# Patient Record
Sex: FEMALE | Race: WHITE | HISPANIC OR LATINO | Employment: FULL TIME | URBAN - METROPOLITAN AREA
[De-identification: names, ages, dates, MRNs, and addresses within clinical notes are randomized per-mention and may not be internally consistent; named-entity substitution may affect disease eponyms.]

---

## 2017-01-23 ENCOUNTER — TRANSFERRED RECORDS (OUTPATIENT)
Dept: HEALTH INFORMATION MANAGEMENT | Facility: CLINIC | Age: 37
End: 2017-01-23

## 2017-09-15 ENCOUNTER — TRANSFERRED RECORDS (OUTPATIENT)
Dept: HEALTH INFORMATION MANAGEMENT | Facility: CLINIC | Age: 37
End: 2017-09-15

## 2017-12-27 ENCOUNTER — TRANSFERRED RECORDS (OUTPATIENT)
Dept: HEALTH INFORMATION MANAGEMENT | Facility: CLINIC | Age: 37
End: 2017-12-27

## 2017-12-27 LAB
CHOLEST SERPL-MCNC: 189 MG/DL (ref 100–199)
CREAT SERPL-MCNC: 0.68 MG/DL (ref 0.57–1.11)
GFR SERPL CREATININE-BSD FRML MDRD: >60 ML/MIN/1.73M2
GLUCOSE SERPL-MCNC: 110 MG/DL (ref 65–100)
HDLC SERPL-MCNC: 44 MG/DL
LDLC SERPL CALC-MCNC: 92 MG/DL
NONHDLC SERPL-MCNC: 145 MG/DL
POTASSIUM SERPL-SCNC: 4.3 MMOL/L (ref 3.5–5)
TRIGL SERPL-MCNC: 264 MG/DL

## 2018-04-18 ENCOUNTER — TRANSFERRED RECORDS (OUTPATIENT)
Dept: HEALTH INFORMATION MANAGEMENT | Facility: CLINIC | Age: 38
End: 2018-04-18

## 2018-06-25 ENCOUNTER — TRANSFERRED RECORDS (OUTPATIENT)
Dept: HEALTH INFORMATION MANAGEMENT | Facility: CLINIC | Age: 38
End: 2018-06-25

## 2019-05-06 ENCOUNTER — HOSPITAL ENCOUNTER (OUTPATIENT)
Facility: CLINIC | Age: 39
Setting detail: OBSERVATION
Discharge: HOME | End: 2019-05-07
Attending: EMERGENCY MEDICINE | Admitting: HOSPITALIST
Payer: COMMERCIAL

## 2019-05-06 ENCOUNTER — APPOINTMENT (OUTPATIENT)
Dept: GENERAL RADIOLOGY | Facility: CLINIC | Age: 39
End: 2019-05-06
Attending: EMERGENCY MEDICINE
Payer: COMMERCIAL

## 2019-05-06 ENCOUNTER — APPOINTMENT (OUTPATIENT)
Dept: ULTRASOUND IMAGING | Facility: CLINIC | Age: 39
End: 2019-05-06
Attending: EMERGENCY MEDICINE
Payer: COMMERCIAL

## 2019-05-06 DIAGNOSIS — N92.0 MENORRHAGIA WITH REGULAR CYCLE: ICD-10-CM

## 2019-05-06 DIAGNOSIS — K59.00 CONSTIPATION, UNSPECIFIED CONSTIPATION TYPE: Primary | ICD-10-CM

## 2019-05-06 DIAGNOSIS — D50.0 IRON DEFICIENCY ANEMIA DUE TO CHRONIC BLOOD LOSS: ICD-10-CM

## 2019-05-06 PROBLEM — D64.9 SYMPTOMATIC ANEMIA: Status: ACTIVE | Noted: 2019-05-06

## 2019-05-06 LAB
ABO + RH BLD: NORMAL
ABO + RH BLD: NORMAL
ANION GAP SERPL CALCULATED.3IONS-SCNC: 5 MMOL/L (ref 3–14)
ANISOCYTOSIS BLD QL SMEAR: NORMAL
BLD GP AB SCN SERPL QL: NORMAL
BLD PROD TYP BPU: NORMAL
BLOOD BANK CMNT PATIENT-IMP: NORMAL
BUN SERPL-MCNC: 10 MG/DL (ref 7–30)
CALCIUM SERPL-MCNC: 9.5 MG/DL (ref 8.5–10.1)
CHLORIDE SERPL-SCNC: 113 MMOL/L (ref 94–109)
CO2 SERPL-SCNC: 21 MMOL/L (ref 20–32)
CREAT SERPL-MCNC: 0.46 MG/DL (ref 0.52–1.04)
DIFFERENTIAL METHOD BLD: NORMAL
ERYTHROCYTE [DISTWIDTH] IN BLOOD BY AUTOMATED COUNT: 18.6 % (ref 10–15)
FERRITIN SERPL-MCNC: 3 NG/ML (ref 12–150)
FOLATE SERPL-MCNC: 10.6 NG/ML
GFR SERPL CREATININE-BSD FRML MDRD: >90 ML/MIN/{1.73_M2}
GLUCOSE SERPL-MCNC: 97 MG/DL (ref 70–99)
HCG SERPL QL: NEGATIVE
HCT VFR BLD AUTO: 18.4 % (ref 35–47)
HGB BLD-MCNC: 4.7 G/DL (ref 11.7–15.7)
HGB BLD-MCNC: 5.4 G/DL (ref 11.7–15.7)
HGB BLD-MCNC: 6.7 G/DL (ref 11.7–15.7)
HYPOCHROMIA BLD QL: PRESENT
INTERPRETATION ECG - MUSE: NORMAL
INTERPRETATION ECG - MUSE: NORMAL
IRON SATN MFR SERPL: 2 % (ref 15–46)
IRON SERPL-MCNC: 10 UG/DL (ref 35–180)
LDH SERPL L TO P-CCNC: 164 U/L (ref 81–234)
MCH RBC QN AUTO: 15.6 PG (ref 26.5–33)
MCHC RBC AUTO-ENTMCNC: 25.5 G/DL (ref 31.5–36.5)
MCV RBC AUTO: 61 FL (ref 78–100)
MICROCYTES BLD QL SMEAR: PRESENT
NUM BPU REQUESTED: 3
PLATELET # BLD AUTO: 358 10E9/L (ref 150–450)
PLATELET # BLD EST: NORMAL 10*3/UL
POTASSIUM SERPL-SCNC: 3.7 MMOL/L (ref 3.4–5.3)
RBC # BLD AUTO: 3.02 10E12/L (ref 3.8–5.2)
RETICS # AUTO: 51.8 10E9/L (ref 25–95)
RETICS/RBC NFR AUTO: 1.8 % (ref 0.5–2)
SODIUM SERPL-SCNC: 139 MMOL/L (ref 133–144)
SPECIMEN EXP DATE BLD: NORMAL
TIBC SERPL-MCNC: 509 UG/DL (ref 240–430)
TROPONIN I SERPL-MCNC: <0.015 UG/L (ref 0–0.04)
VIT B12 SERPL-MCNC: 516 PG/ML (ref 193–986)
WBC # BLD AUTO: 7.7 10E9/L (ref 4–11)

## 2019-05-06 PROCEDURE — P9016 RBC LEUKOCYTES REDUCED: HCPCS | Performed by: EMERGENCY MEDICINE

## 2019-05-06 PROCEDURE — 71046 X-RAY EXAM CHEST 2 VIEWS: CPT

## 2019-05-06 PROCEDURE — 83550 IRON BINDING TEST: CPT | Performed by: EMERGENCY MEDICINE

## 2019-05-06 PROCEDURE — 86923 COMPATIBILITY TEST ELECTRIC: CPT | Performed by: EMERGENCY MEDICINE

## 2019-05-06 PROCEDURE — 86901 BLOOD TYPING SEROLOGIC RH(D): CPT | Performed by: EMERGENCY MEDICINE

## 2019-05-06 PROCEDURE — 85045 AUTOMATED RETICULOCYTE COUNT: CPT | Performed by: EMERGENCY MEDICINE

## 2019-05-06 PROCEDURE — 76830 TRANSVAGINAL US NON-OB: CPT

## 2019-05-06 PROCEDURE — 86900 BLOOD TYPING SEROLOGIC ABO: CPT | Performed by: EMERGENCY MEDICINE

## 2019-05-06 PROCEDURE — 93005 ELECTROCARDIOGRAM TRACING: CPT

## 2019-05-06 PROCEDURE — 83010 ASSAY OF HAPTOGLOBIN QUANT: CPT | Performed by: EMERGENCY MEDICINE

## 2019-05-06 PROCEDURE — 85027 COMPLETE CBC AUTOMATED: CPT | Performed by: EMERGENCY MEDICINE

## 2019-05-06 PROCEDURE — 99285 EMERGENCY DEPT VISIT HI MDM: CPT | Mod: 25

## 2019-05-06 PROCEDURE — 86850 RBC ANTIBODY SCREEN: CPT | Performed by: EMERGENCY MEDICINE

## 2019-05-06 PROCEDURE — 84703 CHORIONIC GONADOTROPIN ASSAY: CPT | Performed by: EMERGENCY MEDICINE

## 2019-05-06 PROCEDURE — 80048 BASIC METABOLIC PNL TOTAL CA: CPT | Performed by: EMERGENCY MEDICINE

## 2019-05-06 PROCEDURE — 36430 TRANSFUSION BLD/BLD COMPNT: CPT

## 2019-05-06 PROCEDURE — 36415 COLL VENOUS BLD VENIPUNCTURE: CPT | Performed by: PHYSICIAN ASSISTANT

## 2019-05-06 PROCEDURE — G0378 HOSPITAL OBSERVATION PER HR: HCPCS

## 2019-05-06 PROCEDURE — 82746 ASSAY OF FOLIC ACID SERUM: CPT | Performed by: EMERGENCY MEDICINE

## 2019-05-06 PROCEDURE — 82607 VITAMIN B-12: CPT | Performed by: EMERGENCY MEDICINE

## 2019-05-06 PROCEDURE — 83540 ASSAY OF IRON: CPT | Performed by: EMERGENCY MEDICINE

## 2019-05-06 PROCEDURE — 82728 ASSAY OF FERRITIN: CPT | Performed by: EMERGENCY MEDICINE

## 2019-05-06 PROCEDURE — 85018 HEMOGLOBIN: CPT | Mod: 91 | Performed by: PHYSICIAN ASSISTANT

## 2019-05-06 PROCEDURE — 84484 ASSAY OF TROPONIN QUANT: CPT | Performed by: EMERGENCY MEDICINE

## 2019-05-06 PROCEDURE — 83615 LACTATE (LD) (LDH) ENZYME: CPT | Performed by: EMERGENCY MEDICINE

## 2019-05-06 PROCEDURE — 25000132 ZZH RX MED GY IP 250 OP 250 PS 637: Performed by: PHYSICIAN ASSISTANT

## 2019-05-06 PROCEDURE — 99220 ZZC INITIAL OBSERVATION CARE,LEVL III: CPT | Performed by: PHYSICIAN ASSISTANT

## 2019-05-06 PROCEDURE — 85004 AUTOMATED DIFF WBC COUNT: CPT | Performed by: EMERGENCY MEDICINE

## 2019-05-06 RX ORDER — NALOXONE HYDROCHLORIDE 0.4 MG/ML
.1-.4 INJECTION, SOLUTION INTRAMUSCULAR; INTRAVENOUS; SUBCUTANEOUS
Status: DISCONTINUED | OUTPATIENT
Start: 2019-05-06 | End: 2019-05-07 | Stop reason: HOSPADM

## 2019-05-06 RX ORDER — ACETAMINOPHEN 650 MG/1
650 SUPPOSITORY RECTAL EVERY 4 HOURS PRN
Status: DISCONTINUED | OUTPATIENT
Start: 2019-05-06 | End: 2019-05-07 | Stop reason: HOSPADM

## 2019-05-06 RX ORDER — ONDANSETRON 2 MG/ML
4 INJECTION INTRAMUSCULAR; INTRAVENOUS EVERY 6 HOURS PRN
Status: DISCONTINUED | OUTPATIENT
Start: 2019-05-06 | End: 2019-05-07 | Stop reason: HOSPADM

## 2019-05-06 RX ORDER — AMOXICILLIN 250 MG
1 CAPSULE ORAL 2 TIMES DAILY PRN
Status: DISCONTINUED | OUTPATIENT
Start: 2019-05-06 | End: 2019-05-07 | Stop reason: HOSPADM

## 2019-05-06 RX ORDER — ACETAMINOPHEN 325 MG/1
650 TABLET ORAL EVERY 4 HOURS PRN
Status: DISCONTINUED | OUTPATIENT
Start: 2019-05-06 | End: 2019-05-07 | Stop reason: HOSPADM

## 2019-05-06 RX ORDER — AMOXICILLIN 250 MG
2 CAPSULE ORAL 2 TIMES DAILY PRN
Status: DISCONTINUED | OUTPATIENT
Start: 2019-05-06 | End: 2019-05-07 | Stop reason: HOSPADM

## 2019-05-06 RX ORDER — ONDANSETRON 4 MG/1
4 TABLET, ORALLY DISINTEGRATING ORAL EVERY 6 HOURS PRN
Status: DISCONTINUED | OUTPATIENT
Start: 2019-05-06 | End: 2019-05-07 | Stop reason: HOSPADM

## 2019-05-06 RX ORDER — IBUPROFEN 200 MG
400 TABLET ORAL DAILY PRN
COMMUNITY
End: 2020-07-06 | Stop reason: ALTCHOICE

## 2019-05-06 RX ORDER — PROCHLORPERAZINE 25 MG
25 SUPPOSITORY, RECTAL RECTAL EVERY 12 HOURS PRN
Status: DISCONTINUED | OUTPATIENT
Start: 2019-05-06 | End: 2019-05-07 | Stop reason: HOSPADM

## 2019-05-06 RX ORDER — PROCHLORPERAZINE MALEATE 10 MG
10 TABLET ORAL EVERY 6 HOURS PRN
Status: DISCONTINUED | OUTPATIENT
Start: 2019-05-06 | End: 2019-05-07 | Stop reason: HOSPADM

## 2019-05-06 RX ADMIN — ACETAMINOPHEN 650 MG: 325 TABLET, FILM COATED ORAL at 21:00

## 2019-05-06 ASSESSMENT — ENCOUNTER SYMPTOMS
FEVER: 0
SHORTNESS OF BREATH: 1
COUGH: 1
NECK PAIN: 1
RHINORRHEA: 0

## 2019-05-06 NOTE — PLAN OF CARE
ROOM # 231    Living Situation  Lives independently in the community with spouse and two children  Facility name:  [facility]  : Cj Chapa,  [phone] (H), cell:  [phone]    Activity level at baseline: indep  Activity level on admit: indep      Patient registered to observation; given Patient Bill of Rights; given the opportunity to ask questions about observation status and their plan of care.  Patient has been oriented to the observation room, bathroom and call light is in place.    Discussed discharge goals and expectations with patient/family.

## 2019-05-06 NOTE — ED NOTES
[facility]  ED Nurse Handoff Report    Radha Bryan is a 38 year old female   ED Chief complaint: Chest Pain and Shortness of Breath  . ED Diagnosis:   Final diagnoses:   Iron deficiency anemia due to chronic blood loss   Menorrhagia with regular cycle     Allergies: No Known Allergies    Code Status: Full Code  Activity level - Baseline/Home:  Independent. Activity Level - Current:   Independent. Lift room needed: No. Bariatric: No   Needed: No   Isolation: No. Infection: Not Applicable.     Vital Signs:   Vitals:    05/06/19 1136 05/06/19 1341 05/06/19 1344   BP: 130/75  116/69   Pulse:   90   Resp: 20  18   Temp: 98  F (36.7  C) 97.8  F (36.6  C) 97.8  F (36.6  C)   TempSrc: Oral Oral    SpO2: 99%         Cardiac Rhythm:  ,      Pain level:    Patient confused: No. Patient Falls Risk: Yes.   Elimination Status: Has voided   Patient Report - Initial Complaint: Shortness of breath. Focused Assessment: Patient here with one week of feeling short of breath especially with activity.  Also reports pressure sensation in her neck.  Patient has a history of anemia secondary to heavy menstrual cycles, reports her period lasts for 3 weeks and she uses an entire box of pads and tampons during that time.   Tests Performed:   Labs Ordered and Resulted from Time of ED Arrival Up to the Time of Departure from the ED   CBC WITH PLATELETS - Abnormal; Notable for the following components:       Result Value    RBC Count 3.02 (*)     Hemoglobin 4.7 (*)     Hematocrit 18.4 (*)     MCV 61 (*)     MCH 15.6 (*)     MCHC 25.5 (*)     RDW 18.6 (*)     All other components within normal limits   BASIC METABOLIC PANEL - Abnormal; Notable for the following components:    Chloride 113 (*)     Creatinine 0.46 (*)     All other components within normal limits   TROPONIN I   HCG QUALITATIVE   DIFFERENTIAL   IRON AND IRON BINDING CAPACITY   FERRITIN   HAPTOGLOBIN   FOLATE   LACTATE DEHYDROGENASE   RETICULOCYTE COUNT    VITAMIN B12   PERIPHERAL IV CATHETER   CARDIAC CONTINUOUS MONITORING   ABO/RH TYPE AND SCREEN   RED BLOOD CELL PREPARE ORDER UNIT   BLOOD COMPONENT   BLOOD COMPONENT     . Abnormal Results: Hgb 4.7.   Chest XR,  PA & LAT   Final Result   IMPRESSION: Negative.      ULISES CÁRDENAS MD      US Pelvic Complete w Transvaginal    (Results Pending)       Treatments provided: Transfuse  PRBC  Family Comments: Patient has been talking to family on the phone.  OBS brochure/video discussed/provided to patient:  N/A  ED Medications: Medications - No data to display  Drips infusing:  No  For the majority of the shift, the patient's behavior Green. Interventions performed were None.     Severe Sepsis OR Septic Shock Diagnosis Present: No      ED Nurse Name/Phone Number: Ana María Martinez,   1:52 PM  RECEIVING UNIT ED HANDOFF REVIEW    Above ED Nurse Handoff Report was reviewed: Yes  Reviewed by: Karen M. Lesch on May 6, 2019 at 3:13 PM

## 2019-05-06 NOTE — LETTER
May 7, 2019      Radha Bryan  [address] [address]        To Whom It May Concern:    Radha Bryan was seen on 5/7-5/8/19.  Please excuse her until 5/9/19 due to illness.        Sincerely,        Edie Cunha PA-C

## 2019-05-06 NOTE — ED PROVIDER NOTES
History     Chief Complaint:  Chest Pain and Shortness of Breath     HPI   Radha Bryan is a 38 year old female with a history of anemia who presents to the Emergency Department today for evaluation of chest pain and shortness of breath. Patient reports she has had a dry nonproductive cough for the last month. She has become progressively more short of breath and now cannot walk from her bed to the door of her room without feeling short of breath. She reports a chest pressure which increases when she feels more short of breath. The chest discomfort is intermittent and non-radiating. She has no active chest pain. For the last three days patient has had intermittent neck pain. Her legs feel bilaterally swollen. She is pale. No fever or runny nose. Patient is not coughing up blood. Her last period was two weeks ago. Patient reports her anemia is due to heavy periods and that she typically uses 60 tampons and 30 pads in a week for her typical period.     CARDIAC RISK FACTORS:  Sex:    F  Tobacco:   N  Hypertension:   N  Hyperlipidemia:  N  Diabetes:   N  Family History:  N    PE/DVT RISK FACTORS:  Sex:    F  Hormones:   N  Tobacco:   N  Cancer:   N  Travel:   N  Surgery:   N  Other immobilization: N  Personal history:  N  Family history:  N    Allergies:  No known drug allergies    Medications:    The patient is not currently taking any prescribed medications.    Past Medical History:    Anemia  Irregular periods  Uterine fibroids    Past Surgical History:    Cholecystectomy  Tubal ligation     Family History:    History reviewed. No pertinent family history.     Social History:  Smoking status: Never smoker  Alcohol use: No  Marital Status:  Single [1]     Review of Systems   Constitutional: Negative for fever.   HENT: Negative for rhinorrhea.    Respiratory: Positive for cough and shortness of breath.    Cardiovascular: Positive for chest pain and leg swelling.   Musculoskeletal: Positive for neck pain.   Skin:  Positive for pallor.   All other systems reviewed and are negative.      Physical Exam     Patient Vitals for the past 24 hrs:   BP Temp Temp src Pulse Heart Rate Resp SpO2   05/06/19 1500 115/70 97.8  F (36.6  C) -- 78 -- 16 --   05/06/19 1415 123/79 97.9  F (36.6  C) Oral 78 77 16 99 %   05/06/19 1344 116/69 97.8  F (36.6  C) -- 90 -- 18 --   05/06/19 1341 -- 97.8  F (36.6  C) Oral -- -- -- --   05/06/19 1136 130/75 98  F (36.7  C) Oral -- 81 20 99 %       Physical Exam    General:   Pleasant, age appropriate.  HEENT:    Oropharynx is moist, without lesions or trismus.  Eyes:    Conjunctival pallor  Neck:    Supple, no meningismus.     CV:     Regular rate and rhythm.      No murmurs, rubs or gallops.       No unilateral leg swelling.       2+ radial pulses bilateral.       No lower extremity edema.  PULM:    Clear to auscultation bilateral.       No respiratory distress.      Good air exchange.     No rales or wheezing.  ABD:    Soft, non-tender, non-distended.       No pulsatile masses.       No rebound, guarding or rigidity.  :    Normal external genitalia.     No perineal lesions.     No blood in the in the vaginal vault.     No clots present.     No visualized tissue  MSK:     No gross deformity to all four extremities.   LYMPH:   No cervical lymphadenopathy.  NEURO:   Alert. Good muscle tone, no atrophy.  Skin:    Warm, dry and intact.    Psych:    Mood is good and affect is appropriate.        Emergency Department Course     ECG (12:31:06):  Rate 82 bpm. WV interval 136. QRS duration 80. QT/QTc 372/434. P-R-T axes 20 49 27. Normal sinus rhythm. Normal ECG. Interpreted at 1237 by Tani Houston MD.    Imaging:  Radiographic findings were communicated with the patient who voiced understanding of the findings.  Chest XR, PA & LAT  IMPRESSION: Negative As read by radiology.  US Pelvic Complete w Transvaginal   Pending    Laboratory:  CBC: HGB 4.7 (L) o/w WNL (WBC 7.7, )  WBC differential:  WNL  BMP: Chloride 113 (H), Creatinine 0.46 (L) o/w WNL  HCG: Negative    Troponin I: <0.015    Lactate dehydrogenase: 164    Ferritin: 3 (L)  Iron and iron binding capacity: Iron 10 (L), Iron binding capacity 509 (H), Iron saturation 2 (L)  Haptoglobin: In process  Folate: In process    Reticulocyte count: WNL    Vitamin B12: In process    ABO/Rh type and screen: A positive    Emergency Department Course:  Past medical records, nursing notes, and vitals reviewed.  1229: I performed an exam of the patient and obtained history, as documented above.    IV inserted and blood drawn.    The patient was sent for a chest x-ray while in the emergency department, findings above.    1329: I rechecked the patient. Explained findings to the patient.    The patient was sent for a complete pelvic ultrasound while in the emergency department, findings above.    Findings and plan explained to the Patient who consents to admission.     1347: Discussed the patient with Dr. Mendez, who will admit the patient to a obs bed for further monitoring, evaluation, and treatment.      1401: I performed a chaperoned pelvic exam of the patient.    Impression & Plan      Medical Decision Makin-year-old female presents the ED with progressively worsening dyspnea and associated chest pain.  EKG reveals no ischemic changes and troponin is within normal limits.  No features concerning for pulmonary embolus, aortic dissection or aortic aneurysm.  Unfortunately she has marked chronic iron deficiency anemia with a hemoglobin of 4.7 likely related to profound menorrhagia.  Pelvic examination unremarkable.  Pelvic ultrasound pending but does have a reported history of uterine fibroids.  Patient transfused with 2 units of packed red blood cells in the ED but may require additional transfusions thus she will be transferred to an observation bed.  Patient safe for transfer the floor.    Diagnosis:    ICD-10-CM   1. Iron deficiency anemia due to  chronic blood loss D50.0                       2. Menorrhagia with regular cycle N92.0     Disposition:  Admitted     Margi Amezcua  5/6/2019   [facility] EMERGENCY DEPARTMENT  Scribe Disclosure:  I, Margi Amezcua, am serving as a scribe at 12:29 PM on 5/6/2019 to document services personally performed by Tani Houston MD based on my observations and the provider's statements to me.        Tani Houston MD  05/07/19 1319

## 2019-05-06 NOTE — ED TRIAGE NOTES
Chest pain and shortness of breath for the past month. States noticing it is getting worse. Also states her mouth is dry.     Patient is alert and oriented times four and in no apparent distress. Skin is warm and dry. Respirations are even and easy. She does have an intermittent dry cough.

## 2019-05-06 NOTE — LETTER
May 7, 2019      Radha Bryan  [address] [address] [facility] [address]        To Whom It May Concern:    Radha Bryan was seen on 5/6-5/7/19.  Please excuse her until 5/8/19 due to illness.        Sincerely,        Edie Cunha Pa-C

## 2019-05-06 NOTE — PHARMACY-ADMISSION MEDICATION HISTORY
Admission medication history interview status for this patient is complete. See [facility] admission navigator for allergy information, prior to admission medications and immunization status.     Medication history interview source(s):Patient  Medication history resources (including written lists, pill bottles, clinic record):None  Primary pharmacy:none    Changes made to PTA medication list:  Added: ibuprofen prn  Deleted: none  Changed: none    Actions taken by pharmacist (provider contacted, etc):None     Additional medication history information:None    Medication reconciliation/reorder completed by provider prior to medication history? No    Do you take OTC medications (eg tylenol, ibuprofen, fish oil, eye/ear drops, etc)? Y(Y/N)    For patients on insulin therapy: N (Y/N)  Lantus/levemir/NPH/Mix 70/30 dose:   (Y/N) (see Med list for doses)   Sliding scale Novolog Y/N  If Yes, do you have a baseline novolog pre-meal dose:  units with meals  Patients eat three meals a day:   Y/N    How many episodes of hypoglycemia do you have per week: _______  How many missed doses do you have per week: ______  How many times do you check your blood glucose per day: _______  Do you have a Continuous glucose monitor (CGM)   Y/N (remind pt that not approved for hospital use)   Any Barriers to therapy - Be specific :  cost of medications, comfortable with giving injections (if applicable), comfortable and confident with current diabetes regimen: Y/N ______________      Prior to Admission medications    Medication Sig Last Dose Taking? Auth Provider   ibuprofen (ADVIL/MOTRIN) 200 MG tablet Take 400 mg by mouth daily as needed for mild pain  Yes Unknown, Entered By History

## 2019-05-06 NOTE — H&P
[facility]    Observation Unit  Hospitalist History and Physical    Name: Radha Bryan    MRN: [id number]  YOB: 1980    Age: 38 year old  Date of Admission:  5/6/2019  Date of Service (when I saw the patient): 05/06/19    Assessment & Plan   Radha Bryan is a 38 year old female with PMH significant for iron deficiency anemia 2/2 menorrhagia who presents to the ED on 5/6/19 for evaluation of chest pain and shortness of breath. ED workup reveals VSS, chloride of 113, ferritin of 3, iron of 10, iron binding capacity of 509, iron saturation index of 2, negative troponin, Hgb of 4.7, hematocrit of 18.4, type and screen performed, EKG showed rate of 82 bpm in NSR, CXR negative, and ultrasound of pelvis shows focal area of hyperechogenicity within the endometrium measures 1.7x1cm and is indeterminate, possible endometrial polyp, intramural fibroid within uterine body that measures 3 cm, and 4.9 cm slightly complex cystic lesion containing septation in the left ovary.      #Symptomatic iron deficiency anemia: ongoing shortness of breath with centralized chest pressure on exertion, increased fatigue, lightheadedness/dizziness for the past month that has been worse the last few days with Hgb of 4.7 today. Suspect 2/2 menorrhagia. LMP 2 weeks ago. Previously instructed to take iron supplementation but has been noncompliant due to this causing constipation. Iron studies today consistent with iron deficiency anemia.   - Transfuse 2 units of PRBCs, started in the ED  - Conditional 1 unit of PRBCs after recheck if Hgb <7.0  - Recheck CBC in AM  - Monitor on telemetry  - Start iron supplement upon discharge  - Recommend outpatient follow up with gynecology for additional management of menorrhagia     #Abnormal pelvis ultrasound: ultrasound of pelvis shows focal area of hyperechogenicity within the endometrium measures 1.7x1cm and is indeterminate, possible endometrial polyp, intramural fibroid  within uterine body that measures 3 cm, and 4.9 cm slightly complex cystic lesion containing septation in the left ovary. Will review these findings with on call gynecologist for recommendations on management and follow up prior to discharge.     DVT Prophylaxis: Ambulate every shift  Code Status: Full Code, discussed with patient   Disposition: Expected discharge tomorrow once hemoglobin stable, admit to observation     Primary Care Physician   [facility]    Chief Complaint   Chest pain and shortness of breath    History obtained from discussion with ED provider, chart review, and interview with patient.     History of Present Illness   Radha Bryan is a 38 year old female who presents with central chest pain and shortness of breath for the past month.  The patient states that she has become aggressively more short of breath especially with exertion since the onset of her symptoms.  Patient states that she can barely walk from her bed to the door of her room without feeling out of breath.  The patient's chest pressure seems to be associated with her increased shortness of breath and denies associated diaphoresis or radiation.  She notes a dry cough for the last 2 days without congestion or wheezing.  She notes feeling lightheaded and dizzy all the time, especially with standing.  She notes intermittent lower abdominal pain that has been associated with her menstrual cycle.  She was recently told by her mother over the last day that she appears pale.  She notes feeling generally weak and fatigued. Denies recent fever, chills, nausea, vomiting, or diarrhea.  Denies presence of blood in stools or urine.  She has not previously had a colonoscopy.  Denies increased use of NSAIDs.  The patient states that her last menstrual cycle was about 2 weeks ago.  Her periods last about 2 weeks in duration and are extremely heavy with the patient reporting using 3-4 super tampons with a pad each time per day.  The  patient states that she has never been hospitalized previously for anemia.  She has been instructed in the past to be taking iron due to her heavy menstrual cycles but has not been doing so due to this causing constipation.  Patient has previously discussed her heavy periods with her gynecologist and believe she had an ultrasound about 2 years ago that showed fibroids but at that time her provider had no recommendations for management per the patient's understanding.    Past Medical History    Past Medical History:   Diagnosis Date     Irregular periods/menstrual cycles      Past Surgical History   Past Surgical History:   Procedure Laterality Date     CHOLECYSTECTOMY       LAPAROSCOPIC TUBAL LIGATION       Prior to Admission Medications   Prior to Admission Medications   Prescriptions Last Dose Informant Patient Reported? Taking?   ibuprofen (ADVIL/MOTRIN) 200 MG tablet   Yes Yes   Sig: Take 400 mg by mouth daily as needed for mild pain      Facility-Administered Medications: None     Allergies   No Known Allergies    Social History   Social History     Tobacco Use     Smoking status: Never Smoker   Substance Use Topics     Alcohol use: No     Social History     Social History Narrative     Not on file     Family History   Family history reviewed with patient and is noncontributory.    Review of Systems   A Comprehensive greater than 10 system review of systems was carried out.  Pertinent positives and negatives are noted above.  Otherwise negative for contributory information.    Physical Exam   Temp: 97.8  F (36.6  C) Temp src: Oral BP: 115/70 Pulse: 78 Heart Rate: 73 Resp: 16 SpO2: 100 % O2 Device: None (Room air)    Vital Signs with Ranges  Temp:  [97.8  F (36.6  C)-98  F (36.7  C)] 97.8  F (36.6  C)  Pulse:  [78-90] 78  Heart Rate:  [73-81] 73  Resp:  [16-20] 16  BP: (115-130)/(69-79) 115/70  SpO2:  [99 %-100 %] 100 %  0 lbs 0 oz    GEN:  Alert, oriented x 3, appears comfortable, no overt distress  HEENT:   Normocephalic/atraumatic, no scleral icterus, no nasal discharge, mouth moist.  CV:  Regular rate and rhythm, no murmur or JVD.  S1 + S2 noted, no S3 or S4.  LUNGS:  Clear to auscultation bilaterally without rales/rhonchi/wheezing/retractions. Symmetric chest rise on inhalation noted.  ABD:  Active bowel sounds, soft, non-tender/non-distended.  No rebound/guarding/rigidity.  EXT:  No edema.  No cyanosis.  No acute joint synovitis noted.  SKIN:  Pale, dry to touch, no exanthems noted in the visualized areas.  NEURO:  Symmetric muscle strength, sensation to touch grossly intact.  Coordination symmetric on general exam.  No new focal deficits appreciated.    Data   Data reviewed today:  I personally reviewed the EKG tracing showing rate of 82 bpm in NSR.    Results for orders placed or performed during the hospital encounter of 05/06/19   Chest XR,  PA & LAT    Narrative    XR CHEST 2 VW 5/6/2019 1:36 PM    HISTORY: chest pain      Impression    IMPRESSION: Negative.    ULISES CÁRDENAS MD   CBC (platelets, no diff)   Result Value Ref Range    WBC 7.7 4.0 - 11.0 10e9/L    RBC Count 3.02 (L) 3.8 - 5.2 10e12/L    Hemoglobin 4.7 (LL) 11.7 - 15.7 g/dL    Hematocrit 18.4 (L) 35.0 - 47.0 %    MCV 61 (L) 78 - 100 fl    MCH 15.6 (L) 26.5 - 33.0 pg    MCHC 25.5 (L) 31.5 - 36.5 g/dL    RDW 18.6 (H) 10.0 - 15.0 %    Platelet Count 358 150 - 450 10e9/L   Basic metabolic panel (BMP)   Result Value Ref Range    Sodium 139 133 - 144 mmol/L    Potassium 3.7 3.4 - 5.3 mmol/L    Chloride 113 (H) 94 - 109 mmol/L    Carbon Dioxide 21 20 - 32 mmol/L    Anion Gap 5 3 - 14 mmol/L    Glucose 97 70 - 99 mg/dL    Urea Nitrogen 10 7 - 30 mg/dL    Creatinine 0.46 (L) 0.52 - 1.04 mg/dL    GFR Estimate >90 >60 mL/min/[1.73_m2]    GFR Estimate If Black >90 >60 mL/min/[1.73_m2]    Calcium 9.5 8.5 - 10.1 mg/dL   Troponin I   Result Value Ref Range    Troponin I ES <0.015 0.000 - 0.045 ug/L   HCG qualitative   Result Value Ref Range    HCG Qualitative  Serum Negative NEG^Negative   WBC Differential   Result Value Ref Range    Diff Method Automated Method     Anisocytosis Moderate     Microcytes Present     Hypochromasia Present     Platelet Estimate       Automated count confirmed.  Platelet morphology is normal.   Iron and iron binding capacity   Result Value Ref Range    Iron 10 (L) 35 - 180 ug/dL    Iron Binding Cap 509 (H) 240 - 430 ug/dL    Iron Saturation Index 2 (L) 15 - 46 %   Ferritin   Result Value Ref Range    Ferritin 3 (L) 12 - 150 ng/mL   Lactate Dehydrogenase   Result Value Ref Range    Lactate Dehydrogenase 164 81 - 234 U/L   Reticulocyte count   Result Value Ref Range    % Retic 1.8 0.5 - 2.0 %    Absolute Retic 51.8 25 - 95 10e9/L   EKG 12-lead, tracing only   Result Value Ref Range    Interpretation ECG Click View Image link to view waveform and result    EKG 12 lead   Result Value Ref Range    Interpretation ECG Click View Image link to view waveform and result    ABO/Rh type and screen   Result Value Ref Range    Units Ordered 2     ABO A     RH(D) Pos     Antibody Screen Neg     Test Valid Only At [facility]        Specimen Expires 05/09/2019     Crossmatch Red Blood Cells    Blood component   Result Value Ref Range    Unit Number [id number]     Blood Component Type Red Blood Cells Leukocyte Reduced     Division Number 00     Status of Unit Released to care unit 05/06/2019 1328     Blood Product Code [id number]     Unit Status ISS    Blood component   Result Value Ref Range    Unit Number [id number]     Blood Component Type Red Blood Cells Leukocyte Reduced     Division Number 00     Status of Unit Ready for patient 05/06/2019 1327     Blood Product Code [id number]     Unit Status ZOEY Cunha PA-C

## 2019-05-07 VITALS
OXYGEN SATURATION: 97 % | SYSTOLIC BLOOD PRESSURE: 101 MMHG | HEART RATE: 79 BPM | RESPIRATION RATE: 20 BRPM | DIASTOLIC BLOOD PRESSURE: 60 MMHG | TEMPERATURE: 98.6 F

## 2019-05-07 LAB
ANION GAP SERPL CALCULATED.3IONS-SCNC: 2 MMOL/L (ref 3–14)
BASOPHILS # BLD AUTO: 0.1 10E9/L (ref 0–0.2)
BASOPHILS NFR BLD AUTO: 0.6 %
BLD PROD TYP BPU: NORMAL
BLD UNIT ID BPU: 0
BLOOD PRODUCT CODE: NORMAL
BPU ID: NORMAL
BUN SERPL-MCNC: 8 MG/DL (ref 7–30)
CALCIUM SERPL-MCNC: 9.3 MG/DL (ref 8.5–10.1)
CHLORIDE SERPL-SCNC: 113 MMOL/L (ref 94–109)
CO2 SERPL-SCNC: 23 MMOL/L (ref 20–32)
CREAT SERPL-MCNC: 0.47 MG/DL (ref 0.52–1.04)
DIFFERENTIAL METHOD BLD: ABNORMAL
EOSINOPHIL # BLD AUTO: 0.1 10E9/L (ref 0–0.7)
EOSINOPHIL NFR BLD AUTO: 1.6 %
ERYTHROCYTE [DISTWIDTH] IN BLOOD BY AUTOMATED COUNT: 25.2 % (ref 10–15)
GFR SERPL CREATININE-BSD FRML MDRD: >90 ML/MIN/{1.73_M2}
GLUCOSE SERPL-MCNC: 95 MG/DL (ref 70–99)
HAPTOGLOB SERPL-MCNC: 112 MG/DL (ref 15–200)
HCT VFR BLD AUTO: 25.5 % (ref 35–47)
HGB BLD-MCNC: 7.4 G/DL (ref 11.7–15.7)
IMM GRANULOCYTES # BLD: 0 10E9/L (ref 0–0.4)
IMM GRANULOCYTES NFR BLD: 0.2 %
LYMPHOCYTES # BLD AUTO: 3.1 10E9/L (ref 0.8–5.3)
LYMPHOCYTES NFR BLD AUTO: 35.4 %
MCH RBC QN AUTO: 19.7 PG (ref 26.5–33)
MCHC RBC AUTO-ENTMCNC: 29 G/DL (ref 31.5–36.5)
MCV RBC AUTO: 68 FL (ref 78–100)
MONOCYTES # BLD AUTO: 0.7 10E9/L (ref 0–1.3)
MONOCYTES NFR BLD AUTO: 8.1 %
NEUTROPHILS # BLD AUTO: 4.7 10E9/L (ref 1.6–8.3)
NEUTROPHILS NFR BLD AUTO: 54.1 %
NRBC # BLD AUTO: 0 10*3/UL
NRBC BLD AUTO-RTO: 0 /100
PLATELET # BLD AUTO: 290 10E9/L (ref 150–450)
POTASSIUM SERPL-SCNC: 4 MMOL/L (ref 3.4–5.3)
RBC # BLD AUTO: 3.75 10E12/L (ref 3.8–5.2)
SODIUM SERPL-SCNC: 138 MMOL/L (ref 133–144)
TRANSFUSION STATUS PATIENT QL: NORMAL
WBC # BLD AUTO: 8.7 10E9/L (ref 4–11)

## 2019-05-07 PROCEDURE — 80048 BASIC METABOLIC PNL TOTAL CA: CPT | Performed by: PHYSICIAN ASSISTANT

## 2019-05-07 PROCEDURE — G0378 HOSPITAL OBSERVATION PER HR: HCPCS

## 2019-05-07 PROCEDURE — 85025 COMPLETE CBC W/AUTO DIFF WBC: CPT | Performed by: PHYSICIAN ASSISTANT

## 2019-05-07 PROCEDURE — 36415 COLL VENOUS BLD VENIPUNCTURE: CPT | Performed by: PHYSICIAN ASSISTANT

## 2019-05-07 PROCEDURE — 99217 ZZC OBSERVATION CARE DISCHARGE: CPT | Performed by: PHYSICIAN ASSISTANT

## 2019-05-07 RX ORDER — FERROUS GLUCONATE 324(38)MG
324 TABLET ORAL
Refills: 0 | COMMUNITY
Start: 2019-05-07 | End: 2019-08-27

## 2019-05-07 RX ORDER — AMOXICILLIN 250 MG
1 CAPSULE ORAL DAILY PRN
COMMUNITY
Start: 2019-05-07 | End: 2019-08-27

## 2019-05-07 NOTE — PLAN OF CARE
PRIMARY DIAGNOSIS: Anemia    OUTPATIENT/OBSERVATION GOALS TO BE MET BEFORE DISCHARGE  Orthostatic performed: No        Stable Hgb Yes.   Recent Labs   Lab Test 05/07/19  0518 05/06/19  1838 05/06/19  1648   HGB 7.4* 6.7* 5.4*       Appropriate testing complete: Yes    Cleared for discharge by consultants (if involved): No      Discharge Planner Nurse   Safe discharge environment identified: Yes  Barriers to discharge: No       Entered by: Kenya Connors 05/07/2019 8:31 AM     Please review provider order for any additional goals.   Nurse to notify provider when observation goals have been met and patient is ready for discharge.    /60 (BP Location: Left arm)   Pulse 79   Temp 98.6  F (37  C) (Oral)   Resp 20   SpO2 97%     Orientation: A&Ox4  Pain status: denies - denies dizziness or lightheadedness  Activity: SBA  Resp: no SOB reported or assessed  Lungs: clear on auscultation  Cardiac: WNL  GI: WNL - reported last BM last night, bowel sounds present in all 4Qs  : WNL  Skin: in tact  IVF: PIV SL  Labs/imaging: Hgb this AM 7.4  Diet: Regular - pt did not wish to order breakfast, oral intake encouraged  Plan: monitor hgb, tele, encourage PO intake and ambulation

## 2019-05-07 NOTE — PLAN OF CARE
PRIMARY DIAGNOSIS: SOB/Central chest pain/Low Hgb   OUTPATIENT/OBSERVATION GOALS TO BE MET BEFORE DISCHARGE:  1. Stable vital signs Yes  2. Tolerating diet:Yes  3. Pain controlled with oral pain medications:  Yes  4. Positive bowel sounds:  Yes  5. Voiding without difficulty:  Yes  6. Able to ambulate:  Yes. SBA   7. Provider specific discharge goals met:  No     Discharge Planner Nurse   Safe discharge environment identified: Yes  Barriers to discharge: Yes.       Entered by: Azul Gil 05/07/2019 5:11 AM      A&Ox4. VSS. Hgb at 6.7 in evening. Pt denies any pain/chest pain. Tele NSR. LS are WNL. BS audible and active.Tolerating regular diet. Pt up with SBA. Plan to continue supportive cares and monitor labs closely. Discharge TBD.     Please review provider order for any additional goals.   Nurse to notify provider when observation goals have been met and patient is ready for discharge.

## 2019-05-07 NOTE — PLAN OF CARE
PRIMARY DIAGNOSIS: SOB/Central chest pain/Low Hgb   OUTPATIENT/OBSERVATION GOALS TO BE MET BEFORE DISCHARGE:  1. Stable vital signs Yes  2. Tolerating diet:Yes  3. Pain controlled with oral pain medications:  Yes  4. Positive bowel sounds:  Yes  5. Voiding without difficulty:  Yes  6. Able to ambulate:  Yes. SBA   7. Provider specific discharge goals met:  No     Discharge Planner Nurse   Safe discharge environment identified: Yes  Barriers to discharge: Yes.       Entered by: Azul Gil 05/07/2019 1:41 AM     Please review provider order for any additional goals.   Nurse to notify provider when observation goals have been met and patient is ready for discharge.

## 2019-05-07 NOTE — DISCHARGE INSTRUCTIONS
Your follow-up appointment with OB has been set up for June 18th 2019 at 11:00AM with Dr. Carrion in [place].    [facility]  [address]   [place], [place] [id number] [phone]

## 2019-05-07 NOTE — PLAN OF CARE
PRIMARY DIAGNOSIS: SOB, Central Chest Pain, Low HGB  OUTPATIENT/OBSERVATION GOALS TO BE MET BEFORE DISCHARGE:  1. ADLs back to baseline: Yes    2. Activity and level of assistance: Up with standby assistance.    3. Pain status: c/o of minor headache/neck ache    4. Return to near baseline physical activity: No     Discharge Planner Nurse   Safe discharge environment identified: Yes  Barriers to discharge: Yes, unless medically cleared    Recent Labs   Lab Test 05/06/19  1838 05/06/19  1648 05/06/19  1231   HGB 6.7* 5.4* 4.7*     Blood pressure 113/59, pulse 79, temperature 98  F (36.7  C), temperature source Oral, resp. rate 24, SpO2 98 %.    VSS.  Lung sounds clear,  Adequate sats on room air. Hgb 6.7 after 1 unit of PRBCs.  Second of a total of 2 units of PRBCs infusing since admission to Obs Unit.   Patient c/o of mild neck pain and headache pain, however refusing any medical intervention at this time.  Patient tolerating regular diet.  Patient up with standby assist to bathroom prn.  PLAN:  Continue to provide supportive cares.        Please review provider order for any additional goals.   Nurse to notify provider when observation goals have been met and patient is ready for discharge.           Entered by: Karen M. Lesch 05/06/2019 8:43 PM     Please review provider order for any additional goals.   Nurse to notify provider when observation goals have been met and patient is ready for discharge.

## 2019-05-07 NOTE — PLAN OF CARE
Patient's After Visit Summary was reviewed with patient and/or daughter.   Patient verbalized understanding of After Visit Summary, recommended follow up and was given an opportunity to ask questions.   Discharge medications sent home with patient/family: Not applicable, pt to  meds at local pharmacy - prescription not needed   Discharged with daughter        OBSERVATION patient END time: 1010

## 2019-05-23 ENCOUNTER — OFFICE VISIT (OUTPATIENT)
Dept: INTERNAL MEDICINE | Facility: CLINIC | Age: 39
End: 2019-05-23
Payer: COMMERCIAL

## 2019-05-23 VITALS
OXYGEN SATURATION: 97 % | BODY MASS INDEX: 31.91 KG/M2 | RESPIRATION RATE: 16 BRPM | HEIGHT: 62 IN | WEIGHT: 173.4 LBS | TEMPERATURE: 98.5 F | SYSTOLIC BLOOD PRESSURE: 108 MMHG | HEART RATE: 82 BPM | DIASTOLIC BLOOD PRESSURE: 68 MMHG

## 2019-05-23 DIAGNOSIS — R93.89 ABNORMAL PELVIC ULTRASOUND: ICD-10-CM

## 2019-05-23 DIAGNOSIS — D50.0 IRON DEFICIENCY ANEMIA DUE TO CHRONIC BLOOD LOSS: Primary | ICD-10-CM

## 2019-05-23 LAB
ERYTHROCYTE [DISTWIDTH] IN BLOOD BY AUTOMATED COUNT: ABNORMAL % (ref 10–15)
HCT VFR BLD AUTO: 33.4 % (ref 35–47)
HGB BLD-MCNC: 10 G/DL (ref 11.7–15.7)
MCH RBC QN AUTO: 21.5 PG (ref 26.5–33)
MCHC RBC AUTO-ENTMCNC: 29.9 G/DL (ref 31.5–36.5)
MCV RBC AUTO: 72 FL (ref 78–100)
PLATELET # BLD AUTO: 486 10E9/L (ref 150–450)
RBC # BLD AUTO: 4.66 10E12/L (ref 3.8–5.2)
WBC # BLD AUTO: 7.1 10E9/L (ref 4–11)

## 2019-05-23 PROCEDURE — 99203 OFFICE O/P NEW LOW 30 MIN: CPT | Performed by: FAMILY MEDICINE

## 2019-05-23 PROCEDURE — 85027 COMPLETE CBC AUTOMATED: CPT | Performed by: FAMILY MEDICINE

## 2019-05-23 PROCEDURE — 36415 COLL VENOUS BLD VENIPUNCTURE: CPT | Performed by: FAMILY MEDICINE

## 2019-05-23 ASSESSMENT — MIFFLIN-ST. JEOR: SCORE: 1409.79

## 2019-05-23 NOTE — PROGRESS NOTES
Subjective     Radha Bryan is a 38 year old female who presents to clinic today for the following health issues:    HPI       Hospital Follow-up Visit:    Hospital/Nursing Home/IP Rehab Facility: [facility]  Date of Admission: 5/6/19  Date of Discharge: 5/7/19  Reason(s) for Admission: anemia            Problems taking medications regularly:  None       Medication changes since discharge: None       Problems adhering to non-medication therapy:  None    Summary of hospitalization:  [facility] discharge summary reviewed  Diagnostic Tests/Treatments reviewed.  Follow up needed: Hgb  Other Healthcare Providers Involved in Patient s Care:         OB-GYN  Update since discharge: stable.     Post Discharge Medication Reconciliation: discharge medications reconciled, continue medications without change.  Plan of care communicated with patient     Coding guidelines for this visit:  Type of Medical   Decision Making Face-to-Face Visit       within 7 Days of discharge Face-to-Face Visit        within 14 days of discharge   Moderate Complexity [id number]   High Complexity [id number]            This patient presented with a hemoglobin of 4.7.  She did receive 3 units of PRBC. Hgb then was up to 7.4 at dischg.  She has an abnormal pelvic ultrasound and GYN follow-up as planned.  She is on supplemental iron.  She has been taking her medication.  Tolerating OK. She does have some headache.  She states her menstrual period began 1 day ago and is not too heavy at this point but tends to be heavier later.      Patient Active Problem List   Diagnosis     Symptomatic anemia     Current Outpatient Medications   Medication Sig Dispense Refill     ferrous gluconate (FERGON) 324 (38 Fe) MG tablet Take 1 tablet (324 mg) by mouth daily (with breakfast)  0     ibuprofen (ADVIL/MOTRIN) 200 MG tablet Take 400 mg by mouth daily as needed for mild pain       senna-docusate (SENOKOT-S/PERICOLACE) 8.6-50 MG tablet Take 1  "tablet by mouth daily as needed for constipation         REVIEW OF SYSTEMS    No fever  No sob  No CP  No abd pain      Past Medical History:   Diagnosis Date     Irregular periods/menstrual cycles          EXAM  /68 (BP Location: Right arm, Patient Position: Sitting, Cuff Size: Adult Regular)   Pulse 82   Temp 98.5  F (36.9  C) (Oral)   Resp 16   Ht 1.575 m (5' 2\")   Wt 78.7 kg (173 lb 6.4 oz)   LMP 05/22/2019 (Exact Date)   SpO2 97%   Breastfeeding? No   BMI 31.72 kg/m      Appears well.  HR normal  Non labored breathing.  Ambulates comfortably      Hgb is 10 today.      (D50.0) Iron deficiency anemia due to chronic blood loss  (primary encounter diagnosis)  Comment:   Improving.  Plan: CBC with platelets            (R93.89) Abnormal pelvic ultrasound  Comment:   ? Polyp or other abnormal blood loss, ovarian cyst.  Plan:   She got a letter and needs to reschedule. She will stop in today.              "

## 2019-06-27 ENCOUNTER — OFFICE VISIT (OUTPATIENT)
Dept: OBGYN | Facility: CLINIC | Age: 39
End: 2019-06-27
Payer: COMMERCIAL

## 2019-06-27 VITALS — DIASTOLIC BLOOD PRESSURE: 56 MMHG | WEIGHT: 172.8 LBS | SYSTOLIC BLOOD PRESSURE: 108 MMHG | BODY MASS INDEX: 31.61 KG/M2

## 2019-06-27 DIAGNOSIS — D50.0 IRON DEFICIENCY ANEMIA DUE TO CHRONIC BLOOD LOSS: Primary | ICD-10-CM

## 2019-06-27 DIAGNOSIS — Z12.4 SCREENING FOR CERVICAL CANCER: ICD-10-CM

## 2019-06-27 DIAGNOSIS — N89.8 VAGINAL DISCHARGE: ICD-10-CM

## 2019-06-27 DIAGNOSIS — N93.9 ABNORMAL UTERINE BLEEDING (AUB): ICD-10-CM

## 2019-06-27 DIAGNOSIS — Z01.818 PRE-OP EXAM: ICD-10-CM

## 2019-06-27 LAB
ERYTHROCYTE [DISTWIDTH] IN BLOOD BY AUTOMATED COUNT: ABNORMAL % (ref 10–15)
HCT VFR BLD AUTO: 32.7 % (ref 35–47)
HGB BLD-MCNC: 10.4 G/DL (ref 11.7–15.7)
MCH RBC QN AUTO: 26.1 PG (ref 26.5–33)
MCHC RBC AUTO-ENTMCNC: 31.8 G/DL (ref 31.5–36.5)
MCV RBC AUTO: 82 FL (ref 78–100)
PLATELET # BLD AUTO: 387 10E9/L (ref 150–450)
RBC # BLD AUTO: 3.98 10E12/L (ref 3.8–5.2)
SPECIMEN SOURCE: NORMAL
WBC # BLD AUTO: 8 10E9/L (ref 4–11)
WET PREP SPEC: NORMAL

## 2019-06-27 PROCEDURE — 99204 OFFICE O/P NEW MOD 45 MIN: CPT | Performed by: OBSTETRICS & GYNECOLOGY

## 2019-06-27 PROCEDURE — G0145 SCR C/V CYTO,THINLAYER,RESCR: HCPCS | Performed by: OBSTETRICS & GYNECOLOGY

## 2019-06-27 PROCEDURE — 80048 BASIC METABOLIC PNL TOTAL CA: CPT | Performed by: OBSTETRICS & GYNECOLOGY

## 2019-06-27 PROCEDURE — 87210 SMEAR WET MOUNT SALINE/INK: CPT | Performed by: OBSTETRICS & GYNECOLOGY

## 2019-06-27 PROCEDURE — 36415 COLL VENOUS BLD VENIPUNCTURE: CPT | Performed by: OBSTETRICS & GYNECOLOGY

## 2019-06-27 PROCEDURE — 85027 COMPLETE CBC AUTOMATED: CPT | Performed by: OBSTETRICS & GYNECOLOGY

## 2019-06-27 PROCEDURE — G0476 HPV COMBO ASSAY CA SCREEN: HCPCS | Performed by: OBSTETRICS & GYNECOLOGY

## 2019-06-27 PROCEDURE — 87624 HPV HI-RISK TYP POOLED RSLT: CPT | Performed by: OBSTETRICS & GYNECOLOGY

## 2019-06-27 NOTE — PATIENT INSTRUCTIONS
"Head to lab for blood work today.       What is a hysterectomy?  Hysterectomy is the surgical removal of the uterus. It ends menstruation and the ability to become pregnant. Depending on the reason for the surgery, a hysterectomy may also involve the removal of other organs and tissues such as the ovaries and/or fallopian tubes.     A supracervical hysterectomy is the removal of the upper part of the uterus leaving the cervix behind.   A total hysterectomy is the removal of the uterus and cervix.   A total hysterectomy with bilateral salpingo-oophorectomy is the removal of the uterus, cervix, fallopian tubes (salpingo) and ovaries (oophor). If you haven't experienced menopause, removing the ovaries will usually initiate it since your body can no longer produce as much estrogen.   Why is hysterectomy performed?  A hysterectomy may be performed to treat:     Abnormal vaginal bleeding that is not controlled by other treatment methods   Severe endometriosis (uterine tissue that grows outside the uterus)   Leiomyomas or uterine fibroids (benign tumors) that have increased in size, are painful or are causing bleeding   Increased pelvic pain related to the uterus but not controlled by other treatment   Uterine prolapse (uterus that has \"dropped\" into the vaginal canal due to weakened support muscles) that can lead to urinary incontinence or difficulty with bowel movements   Cervical or uterine cancer or abnormalities that may lead to cancer for cancer prevention   Are there alternatives to hysterectomy?  Yes. A hysterectomy is only one way to treat problems affecting the uterus. For certain conditions, however, hysterectomy may be the best choice. Please ask your health care provider to discuss what alternatives are available to treat your specific condition.  Does hysterectomy affect sexual function?  A woman's sexual function is usually not affected after hysterectomy, and her sexual desire should not change. Only if the " "ovaries were removed with the uterus prior to menopause, decreased sex drive may occur and vaginal dryness may be a problem during sex. However, estrogen therapy can relieve vaginal dryness and other hormone-related effects.    Day of procedure:  A health care provider will explain the procedure in detail, including possible complications and side effects. He or she will also answer your questions.  In addition:   Blood and urine tests are taken   An enema or bowel prep may be given to cleanse the bowel   Abdominal and pelvic areas may be shaved   An intravenous (IV) line is placed in a vein in your arm to deliver medications and fluids   During the procedure  An anesthesiologist will give you either:   General anesthesia in which you will not be awake during the procedure; or   Regional anesthesia (also called epidural or spinal anesthesia) in which medications are placed near the nerves in your lower back to \"block\" pain while you stay awake.   The surgeon removes the uterus through an incision in your abdomen or vagina or through several small incisions during laparoscopy. The method used during surgery depends on why you need the surgery and the results of your pelvic exam.  During a vaginal hysterectomy, some doctors use a laparoscope (a procedure called laparoscopically assisted vaginal hysterectomy or LAVH) to help them view the uterus and perform the surgery.   A laparoscope with advanced instruments can also be used to perform hysterectomy completely through tiny incisions (total or supracervical laparoscopic hysterectomy). In more difficult cases, surgeons may employ assistance of robotic instruments placed through the laparoscope to complete the laparoscopic hysterectomy (robotic-assisted laparoscopic hysterectomy).  How long does the procedure last?  The procedure lasts 1 to 3 hours. The amount of time you spend in the hospital for recovery varies, depending on the type of surgery performed.  The day of " discharge  A responsible adult must drive you home the day you are discharged from the hospital.  Home recovery   You may resume your normal diet, as tolerated.   You may take a bath or shower. Wash the incision with soap and water (the stitches do not have to be removed, as they will dissolve in about 6 weeks). A dressing over the incision is not necessary. Do not submerge the skin incisions until they are completely healed  You may use lotions and creams on the skin around the incision to relieve itching.   Increase your activity gradually every day, when you feel capable and aren't in pain. Completely normal activities can be resumed within 4 to 6 weeks or sooner if the procedure was performed vaginally or through the laparoscope.   Drive when you feel capable and are no longer requiring narcotic pain medications-- about 2 weeks after surgery.   You can travel out of town 3 weeks after surgery, including air travel.   Avoid lifting heavy objects (over 15 pounds) for at least 4 weeks.   Do not douche or put anything into the vagina for 4 weeks.   You may have intercourse 6 weeks after surgery, or as directed by your health care provider.   Light swimming is permitted 2 weeks after surgery in a swimming pool, but avoid vigorous swimming until 4 weeks after surgery.   Resume your exercise routine in 4 to 6 weeks, depending on how you feel.   Your doctor can tell you when it's best to go back to work. You can usually go back to work in 3 to 6 weeks, depending on the procedure.   How will I feel after hysterectomy?  Physically  After hysterectomy, your periods will stop. Occasionally, you may feel bloated and have symptoms similar to when you were menstruating. It is normal to have light vaginal bleeding or a dark brown discharge for about 4 to 6 weeks after surgery.  You may feel discomfort at the incision site for about 4 weeks, and any redness, bruising or swelling will disappear in 4 to 6 weeks. Feeling burning or  itching around the incision is normal. You may also experience a numb feeling around the incision and down your leg. This is normal and, if present, usually lasts about 2 months.  Most people experience moderate soreness in the abdomen and increased fatigue levels for 1 to 4 weeks following surgery.   If the ovaries remain, you should not experience hormone-related effects, but you may continue to have period symptoms (breast tenderness, moodiness, bloating, headache, low back pain) monthly. If the ovaries were removed with the uterus before menopause, you may experience the symptoms that often occur with menopause, such as hot flashes. If you develop these symptoms we can discuss hormone replacement therapy to relieve menopausal symptoms.  Emotionally  Emotional reactions to hysterectomy vary, depending on how well you were prepared for the surgery, the reason for having it, and whether the problem has been treated.  Some women may feel a sense of loss or become depressed, but these emotional reactions are usually temporary. Other women may find that hysterectomy improves their health and well-being, and may even be a life-saving operation. Please discuss your emotional concerns with your health care provider.  What are the complications of hysterectomy?  As with any surgery, there is a slight chance that problems may occur. Problems could include blood clots, severe infection, bleeding after surgery, bowel blockage or injury, urinary tract injury, or problems related to anesthesia.  When should I call my health care provider?  Call your health care provider if you have:   Bright red vaginal bleeding   A fever over 100.4 F   Severe nausea or vomiting   Difficulty urinating, burning feeling when urinating, or frequent urination   Increasing amount of pain   Increasing redness, swelling, or drainage from your incision   Inability to pass gas for 24 hours after surgery

## 2019-06-27 NOTE — PROGRESS NOTES
SUBJECTIVE:   CC: AUB and anemia                                               Radha Bryan is a 38 year old  female who presents to clinic today for evaluation and treatment for heavy uterine bleeding and anemia. Menses every 4-6w, lasting 10-20 days. Often wears a tampon in addition to a pad for heavy bleeding, changes every 30 min to 2 hours. She does experience significant pelvic cramping both during her menses and in between cycles.   She has used by oral contraceptives and mirena in the past and had improved control of her menses with oral contraceptives. Hx of symptomatic anemia.  - received 3u pRBCs during admission 19    Surgical history:  PPTL  Cholecystectomy    Hgb 7.4 19  Hgb 10.0 19    Pelvic US images reviewed by me:  US PELVIC COMPLETE WITH TRANSVAGINAL    2019 3:59 PM      HISTORY: Menorrhagia. Low hemoglobin. History of uterine fibroids.     TECHNIQUE: Transvaginal imaging was added to the transabdominal scans.     COMPARISON: 10/1/2016.     FINDINGS: The uterus is measured at 12.1 x 7.4 x 6.1 cm. There is an  intramural fibroid in the uterine body on the right measuring 3 x 2.2  x 2.6 cm, not significantly changed. Endometrial stripe measures 1.8  cm and is mildly thickened for a premenopausal patient. There is an  ill-defined area of increased echogenicity within the endometrium,  measuring 1.7 x 1 cm, with associated hypervascularity. The right  ovary is unremarkable. The left ovary contains a slightly complex  septated cystic lesion measuring 4.9 cm. No solid adnexal masses are  identified. No free pelvic fluid is present. Color Doppler blood flow  is noted within the ovaries bilaterally.                                                                      IMPRESSION:   1. The endometrium is mildly thickened and heterogeneous.  2. A focal area of hyperechogenicity within the endometrium measures  1.7 x 1 cm, and is indeterminate. An endometrial polyp could have  this  appearance, and gynecologic consultation is recommended. This finding  was not seen on the previous exam.  3. An intramural fibroid within the uterine body on the right measures  3 cm, and is not significantly changed.  4. There is a 4.9 cm slightly complex cystic lesion containing  septations in the left ovary, also new since the previous exam. A  follow-up ultrasound in 6-12 weeks may be helpful to ensure  resolution.     Problem list and histories reviewed & adjusted, as indicated.  Additional history: as documented.    ROS:  Const: no weight changes, fever, chills  : no dysuria, hematuria, urinary frequency, urgency, hesitancy  GI: no constipation, diarrhea, abdominal pain  Breast: no nipple discharge, skin changes, lumps  Heme: no history blood clots or easy bruising/bleeding  Neuro: no Hx migraine, no aura  Endo: no heat or cold intolerance, fatigue  Psych: mood stable, no anxiety, depression  CV: no chest pain, palpitations  Pulm: no shortness of breath, chest tightness, cough, wheeze  Skin: no rashes, skin changes     Patient Active Problem List   Diagnosis     Symptomatic anemia     Past Surgical History:   Procedure Laterality Date     CHOLECYSTECTOMY       LAPAROSCOPIC TUBAL LIGATION        Social History     Tobacco Use     Smoking status: Never Smoker     Smokeless tobacco: Never Used   Substance Use Topics     Alcohol use: No      Problem (# of Occurrences) Relation (Name,Age of Onset)    No Known Problems (4) Brother (2), Sister (3), Son (2), Daughter (2)              Current Outpatient Medications on File Prior to Visit:  ferrous gluconate (FERGON) 324 (38 Fe) MG tablet Take 1 tablet (324 mg) by mouth daily (with breakfast)   ibuprofen (ADVIL/MOTRIN) 200 MG tablet Take 400 mg by mouth daily as needed for mild pain   senna-docusate (SENOKOT-S/PERICOLACE) 8.6-50 MG tablet Take 1 tablet by mouth daily as needed for constipation     No current facility-administered medications on file prior to  visit.   No Known Allergies    OBJECTIVE:   /56 (BP Location: Right arm, Patient Position: Chair, Cuff Size: Adult Regular)   Wt 78.4 kg (172 lb 12.8 oz)   LMP 06/20/2019   BMI 31.61 kg/m     Const: sitting in chair in no acute distress, comfortable  Eyes: no scleral icterus, EOMI  CV: regular rate, well perfused  Pulm: no increased work of breathing, no cough  Skin: warm and dry, no rashes/lesions  Psych: mood stable, appropriate affect  Abd: soft, no hepatosplenomegaly, non-tender to palpation  Neuro: A+Ox3   : External genitalia normal well-estrogenized, healthy tissue.  No obvious excoriations, lesions, or rashes. Bartholins, urethra, normal.  Normal moist pink vaginal mucosa.  SSE: large multiparous cervix, normal physiologic discharge.   Bimanual: No CMT, enlarged uterus moderate decreased mobility. No adnexal masses or tenderness appreciated.     ASSESSMENT/PLAN:                                                    Radha Bryan is a 38 year old female P4 with severe AUB provoking anemia, here for evaluation and treatment. We discussed the potential etiologies of AUB including hormonal changes (PCOS/oligoovulation, perimenopause), structural abnormalities (polyps, fibroids), endometrial hyperplasia or carcinoma, adenomyosis. Patient counselled on evaluation and potential options for treatment including hormonal management, progestin IUD, endometrial ablation, hysterectomy. She does appear to have an endometrial polyp in addition to left ovarian cyst and fibroid uterus. We discussed hysteroscopy with polypectomy and ablation, however given adnexal findings in addition to strong desire for definitive management, she would prefer to move forward with total laparoscopic hysterectomy, bilateral salpingectomy, left oopherectomy if cyst is persistent at time of surgery.     1. Iron deficiency anemia due to chronic blood loss  - CBC with platelets    2. Pre-op exam  - Basic metabolic panel  (Ca, Cl, CO2,  Creat, Gluc, K, Na, BUN)    3. Screening for cervical cancer  - PAP imaged thin layer screen  - HPV High Risk Types DNA Cervical    4. Vaginal discharge  - Wet prep    5. Abnormal uterine bleeding (AUB)  See above    Pre-op orders placed for total laparoscopic hysterectomy bilateral salpingectomy, left oophorectomy, possible cystoscopy at [facility]. Plan pre-op with PCP. Reviewed moraima-op expectations at length. Plan 4w follow up as I will be on leave during a 6w post-op time period.       Pamela Carrion MD  Obstetrics and Gynecology   [facility]

## 2019-06-27 NOTE — NURSING NOTE
"Chief Complaint   Patient presents with     Consult     for vaginal bleeding, patient was seen and observed in [facility] . She has heavy bleeding, and she needed 3 blood transfusion.      Vaginal Problem     green discharge, for the past couple of days.        Initial /56 (BP Location: Right arm, Patient Position: Chair, Cuff Size: Adult Regular)   Wt 78.4 kg (172 lb 12.8 oz)   LMP 2019   BMI 31.61 kg/m   Estimated body mass index is 31.61 kg/m  as calculated from the following:    Height as of 19: 1.575 m (5' 2\").    Weight as of this encounter: 78.4 kg (172 lb 12.8 oz).  BP completed using cuff size: regular    Questioned patient about current smoking habits.  Pt. has never smoked.          The following HM Due: pap smear    Jose Galvan CMA                "

## 2019-06-27 NOTE — LETTER
July 8, 2019    Radha Bryan  [address] [id number]    Dear ,  This letter is regarding your recent Pap smear (cervical cancer screening) and Human Papillomavirus (HPV) test.  We are happy to inform you that your Pap smear result is normal. Cervical cancer is closely linked with certain types of HPV. Your results showed no evidence of high-risk HPV.  We recommend you have your next PAP smear and HPV test in 5 years.  You will still need to return to the clinic every year for an annual exam and other preventive tests.  If you have additional questions regarding this result, please call our registered nurse, [place] at [phone].  Sincerely,    Pamela Carrion MD /[facility]

## 2019-06-28 ENCOUNTER — TELEPHONE (OUTPATIENT)
Dept: OBGYN | Facility: CLINIC | Age: 39
End: 2019-06-28

## 2019-06-28 LAB
ANION GAP SERPL CALCULATED.3IONS-SCNC: 8 MMOL/L (ref 3–14)
BUN SERPL-MCNC: 10 MG/DL (ref 7–30)
CALCIUM SERPL-MCNC: 12.2 MG/DL (ref 8.5–10.1)
CHLORIDE SERPL-SCNC: 111 MMOL/L (ref 94–109)
CO2 SERPL-SCNC: 21 MMOL/L (ref 20–32)
CREAT SERPL-MCNC: 0.5 MG/DL (ref 0.52–1.04)
GFR SERPL CREATININE-BSD FRML MDRD: >90 ML/MIN/{1.73_M2}
GLUCOSE SERPL-MCNC: 83 MG/DL (ref 70–99)
POTASSIUM SERPL-SCNC: 4 MMOL/L (ref 3.4–5.3)
SODIUM SERPL-SCNC: 140 MMOL/L (ref 133–144)

## 2019-06-28 NOTE — TELEPHONE ENCOUNTER
Type of surgery: total laparoscopic hysterectomy, bilateral salpingectomy, left oophorectomy, possible cystoscopy  Location of surgery: Other: [facility]  Date and time of surgery: 7/17/19 @ 8:00 am  Surgeon: Dr. Carrion  Pre-Op Appt Date: 7/10/19  Post-Op Appt Date: Patient advised to schedule.   Packet sent out: Yes  Pre-cert/Authorization completed:  No  Date: 6/27/19

## 2019-06-30 NOTE — RESULT ENCOUNTER NOTE
Please call patient with stable hgb and mild electrolyte abnormalities which I would like her to discuss with her PCP during her pre-op visit.     Pamela Carrion MD

## 2019-07-02 LAB
COPATH REPORT: NORMAL
PAP: NORMAL

## 2019-07-04 LAB
FINAL DIAGNOSIS: NORMAL
HPV HR 12 DNA CVX QL NAA+PROBE: NEGATIVE
HPV16 DNA SPEC QL NAA+PROBE: NEGATIVE
HPV18 DNA SPEC QL NAA+PROBE: NEGATIVE
SPECIMEN DESCRIPTION: NORMAL
SPECIMEN SOURCE CVX/VAG CYTO: NORMAL

## 2019-07-11 ENCOUNTER — OFFICE VISIT (OUTPATIENT)
Dept: INTERNAL MEDICINE | Facility: CLINIC | Age: 39
End: 2019-07-11
Payer: COMMERCIAL

## 2019-07-11 VITALS
HEART RATE: 91 BPM | BODY MASS INDEX: 32.02 KG/M2 | RESPIRATION RATE: 16 BRPM | OXYGEN SATURATION: 96 % | HEIGHT: 62 IN | WEIGHT: 174 LBS | SYSTOLIC BLOOD PRESSURE: 108 MMHG | TEMPERATURE: 98.6 F | DIASTOLIC BLOOD PRESSURE: 68 MMHG

## 2019-07-11 DIAGNOSIS — Z01.818 PREOP GENERAL PHYSICAL EXAM: Primary | ICD-10-CM

## 2019-07-11 DIAGNOSIS — K21.9 GASTROESOPHAGEAL REFLUX DISEASE WITHOUT ESOPHAGITIS: ICD-10-CM

## 2019-07-11 LAB
ERYTHROCYTE [DISTWIDTH] IN BLOOD BY AUTOMATED COUNT: 17 % (ref 10–15)
HCT VFR BLD AUTO: 30.2 % (ref 35–47)
HGB BLD-MCNC: 9.7 G/DL (ref 11.7–15.7)
MCH RBC QN AUTO: 27.2 PG (ref 26.5–33)
MCHC RBC AUTO-ENTMCNC: 32.1 G/DL (ref 31.5–36.5)
MCV RBC AUTO: 85 FL (ref 78–100)
PLATELET # BLD AUTO: 349 10E9/L (ref 150–450)
RBC # BLD AUTO: 3.56 10E12/L (ref 3.8–5.2)
WBC # BLD AUTO: 8.1 10E9/L (ref 4–11)

## 2019-07-11 PROCEDURE — 85027 COMPLETE CBC AUTOMATED: CPT | Performed by: NURSE PRACTITIONER

## 2019-07-11 PROCEDURE — 99214 OFFICE O/P EST MOD 30 MIN: CPT | Performed by: NURSE PRACTITIONER

## 2019-07-11 PROCEDURE — 36415 COLL VENOUS BLD VENIPUNCTURE: CPT | Performed by: NURSE PRACTITIONER

## 2019-07-11 PROCEDURE — 80048 BASIC METABOLIC PNL TOTAL CA: CPT | Performed by: NURSE PRACTITIONER

## 2019-07-11 ASSESSMENT — MIFFLIN-ST. JEOR: SCORE: 1416.48

## 2019-07-11 NOTE — LETTER
July 15, 2019      Radha Bryan  [address] [facility] [address]        Dear ,    We are writing to inform you of your test results.  The results of your recent labs were abnormal.  Your hemoglobin is 9.7, likely due to abnormal uterine bleeding.  I advise starting over the counter ferrous sulfate 325 mg 2 x daily and recheck labs 3-4 weeks after your surgery. We attempted to call you, but was unable to reach you, sorry.      Resulted Orders   CBC with platelets   Result Value Ref Range    WBC 8.1 4.0 - 11.0 10e9/L    RBC Count 3.56 (L) 3.8 - 5.2 10e12/L    Hemoglobin 9.7 (L) 11.7 - 15.7 g/dL    Hematocrit 30.2 (L) 35.0 - 47.0 %    MCV 85 78 - 100 fl    MCH 27.2 26.5 - 33.0 pg    MCHC 32.1 31.5 - 36.5 g/dL    RDW 17.0 (H) 10.0 - 15.0 %    Platelet Count 349 150 - 450 10e9/L   Basic metabolic panel   Result Value Ref Range    Sodium 141 133 - 144 mmol/L    Potassium 3.7 3.4 - 5.3 mmol/L    Chloride 110 (H) 94 - 109 mmol/L    Carbon Dioxide 22 20 - 32 mmol/L    Anion Gap 9 3 - 14 mmol/L    Glucose 93 70 - 99 mg/dL      Comment:      Non Fasting    Urea Nitrogen 8 7 - 30 mg/dL    Creatinine 0.45 (L) 0.52 - 1.04 mg/dL    GFR Estimate >90 >60 mL/min/[1.73_m2]      Comment:      Non  GFR Calc  Starting 12/18/2018, serum creatinine based estimated GFR (eGFR) will be   calculated using the Chronic Kidney Disease Epidemiology Collaboration   (CKD-EPI) equation.      GFR Estimate If Black >90 >60 mL/min/[1.73_m2]      Comment:       GFR Calc  Starting 12/18/2018, serum creatinine based estimated GFR (eGFR) will be   calculated using the Chronic Kidney Disease Epidemiology Collaboration   (CKD-EPI) equation.      Calcium 11.2 (H) 8.5 - 10.1 mg/dL       If you have any questions or concerns, please call the clinic at the number listed above.       Sincerely,        Jessica Zimmerman NP

## 2019-07-11 NOTE — PROGRESS NOTES
[place] [address]  [facility] [address]  [phone]  Dept: [phone]    PRE-OP EVALUATION:  Today's date: 2019    Radha Bryan (: 1980) presents for pre-operative evaluation assessment as requested by Dr. Carrion .  She requires evaluation and anesthesia risk assessment prior to undergoing surgery/procedure for treatment of dysfunctional uterine bleeding .    Fax number for surgical facility: [facility] specialty clinic   Primary Physician: [facility]  Type of Anesthesia Anticipated: to be determined    Patient has a Health Care Directive or Living Will:  NO    Preop Questions 2019   Who is doing your surgery? Dr Carrion   What are you having done? hysterectomy   Date of Surgery/Procedure:    Facility or Hospital where procedure/surgery will be performed: [facility]   1.  Do you have a history of Heart attack, stroke, stent, coronary bypass surgery, or other heart surgery? No   2.  Do you ever have any pain or discomfort in your chest? No   3.  Do you have a history of  Heart Failure? No   4.   Are you troubled by shortness of breath when:  walking on a level surface, or up a slight hill, or at night? No   5.  Do you currently have a cold, bronchitis or other respiratory infection? No   6.  Do you have a cough, shortness of breath, or wheezing? No   7.  Do you sometimes get pains in the calves of your legs when you walk? No   8. Do you or anyone in your family have previous history of blood clots? No   9.  Do you or does anyone in your family have a serious bleeding problem such as prolonged bleeding following surgeries or cuts? No   10. Have you ever had problems with anemia or been told to take iron pills? YES -    11. Have you had any abnormal blood loss such as black, tarry or bloody stools, or abnormal vaginal bleeding? No   12. Have you ever had a blood transfusion? YES -    13. Have you or any of your relatives ever had problems with  anesthesia? No   14. Do you have sleep apnea, excessive snoring or daytime drowsiness? No   15. Do you have any prosthetic heart valves? No   16. Do you have prosthetic joints? No   17. Is there any chance that you may be pregnant? No         HPI:     HPI related to upcoming procedure: DUB      See problem list for active medical problems.  Problems all longstanding and stable, except as noted/documented.  See ROS for pertinent symptoms related to these conditions.      MEDICAL HISTORY:     Patient Active Problem List    Diagnosis Date Noted     Symptomatic anemia 05/06/2019     Priority: Medium      Past Medical History:   Diagnosis Date     Irregular periods/menstrual cycles      Past Surgical History:   Procedure Laterality Date     CHOLECYSTECTOMY       LAPAROSCOPIC TUBAL LIGATION       Current Outpatient Medications   Medication Sig Dispense Refill     ferrous gluconate (FERGON) 324 (38 Fe) MG tablet Take 1 tablet (324 mg) by mouth daily (with breakfast)  0     ibuprofen (ADVIL/MOTRIN) 200 MG tablet Take 400 mg by mouth daily as needed for mild pain       senna-docusate (SENOKOT-S/PERICOLACE) 8.6-50 MG tablet Take 1 tablet by mouth daily as needed for constipation       OTC products: None, except as noted above    No Known Allergies   Latex Allergy: NO    Social History     Tobacco Use     Smoking status: Never Smoker     Smokeless tobacco: Never Used   Substance Use Topics     Alcohol use: No     History   Drug Use No       REVIEW OF SYSTEMS:   CONSTITUTIONAL: NEGATIVE for fever, chills, change in weight  ENT/MOUTH: NEGATIVE for ear, mouth and throat problems  RESP: NEGATIVE for significant cough or SOB  CV: NEGATIVE for chest pain, palpitations or peripheral edema  GI: NEGATIVE for nausea, abdominal pain, heartburn, or change in bowel habits  : NEGATIVE for frequency, dysuria, or hematuria  MUSCULOSKELETAL: NEGATIVE for significant arthralgias or myalgia  NEURO: NEGATIVE for weakness, dizziness or  "paresthesias  ENDOCRINE: NEGATIVE for temperature intolerance, skin/hair changes  HEME: NEGATIVE for bleeding problems    EXAM:   /68   Pulse 91   Temp 98.6  F (37  C) (Oral)   Resp 16   Ht 1.581 m (5' 2.25\")   Wt 78.9 kg (174 lb)   LMP 06/20/2019   SpO2 96%   BMI 31.57 kg/m      GENERAL APPEARANCE: healthy, alert and no distress     RESP: lungs clear to auscultation - no rales, rhonchi or wheezes     CV: regular rates and rhythm, normal S1 S2, no S3 or S4 and no murmur, click or rub     ABDOMEN:  soft, nontender, no HSM or masses and bowel sounds normal     MS: extremities normal- no gross deformities noted, no evidence of inflammation in joints, FROM in all extremities.     NEURO: Normal strength and tone, sensory exam grossly normal, mentation intact and speech normal     PSYCH: mentation appears normal. and affect normal/bright    DIAGNOSTICS:   EKG: Not indicated due to non-vascular surgery and low risk of event (age <65 and without cardiac risk factors)    Recent Labs   Lab Test 06/27/19  1519 05/23/19  0926 05/07/19  0518   HGB 10.4* 10.0* 7.4*    486* 290     --  138   POTASSIUM 4.0  --  4.0   CR 0.50*  --  0.47*        IMPRESSION:   Reason for surgery/procedure: dysfunctional uterine bleeding    The proposed surgical procedure is considered INTERMEDIATE risk.    REVISED CARDIAC RISK INDEX  The patient has the following serious cardiovascular risks for perioperative complications such as (MI, PE, VFib and 3  AV Block):  No serious cardiac risks  INTERPRETATION: 0 risks: Class I (very low risk - 0.4% complication rate)    The patient has the following additional risks for perioperative complications:  No identified additional risks      ICD-10-CM    1. Preop general physical exam Z01.818      (Z01.818) Preop general physical exam  (primary encounter diagnosis)  Comment:   Plan: CBC with platelets, Basic metabolic panel          RECOMMENDATIONS:     --Consult hospital rounder / IM to " assist post-op medical management    --Patient is to take all scheduled medications on the day of surgery EXCEPT for modifications listed below.    APPROVAL GIVEN to proceed with proposed procedure, without further diagnostic evaluation       Signed Electronically by: Jessica Zimmerman NP    Copy of this evaluation report is provided to requesting physician.    [facility] Preop Guidelines    Revised Cardiac Risk Index

## 2019-07-12 LAB
ANION GAP SERPL CALCULATED.3IONS-SCNC: 9 MMOL/L (ref 3–14)
BUN SERPL-MCNC: 8 MG/DL (ref 7–30)
CALCIUM SERPL-MCNC: 11.2 MG/DL (ref 8.5–10.1)
CHLORIDE SERPL-SCNC: 110 MMOL/L (ref 94–109)
CO2 SERPL-SCNC: 22 MMOL/L (ref 20–32)
CREAT SERPL-MCNC: 0.45 MG/DL (ref 0.52–1.04)
GFR SERPL CREATININE-BSD FRML MDRD: >90 ML/MIN/{1.73_M2}
GLUCOSE SERPL-MCNC: 93 MG/DL (ref 70–99)
POTASSIUM SERPL-SCNC: 3.7 MMOL/L (ref 3.4–5.3)
SODIUM SERPL-SCNC: 141 MMOL/L (ref 133–144)

## 2019-07-17 ENCOUNTER — HOSPITAL PATHOLOGY (OUTPATIENT)
Dept: OTHER | Facility: CLINIC | Age: 39
End: 2019-07-17

## 2019-07-17 ENCOUNTER — TRANSFERRED RECORDS (OUTPATIENT)
Dept: HEALTH INFORMATION MANAGEMENT | Facility: CLINIC | Age: 39
End: 2019-07-17

## 2019-07-17 NOTE — LETTER
[facility] RESULTS  [address]  [facility] [address]  [phone] July 22, 2019    Radha Bryan                                                                                                            [address] [facility] [address]              Dear Radha,    I have reviewed your recent pathology results from surgery.  Benign findings: Uterine findings included fibroids and focal adenomyosis, both of which explain heavy / painful bleeding. I will review further at your follow up appointment.        If you have any problems or concerns, please call myself or my nurse at [phone].      Sincerely,        Pamela Carrion MD

## 2019-07-18 LAB — COPATH REPORT: NORMAL

## 2019-07-22 NOTE — RESULT ENCOUNTER NOTE
Please call patient with benign findings on pathology specimen from surgery. Uterine findings included fibroids and focal adenomyosis, both of which explain heavy / painful bleeding. We will review further at her follow up appointment.     Pamela Carrion MD

## 2019-08-06 ENCOUNTER — OFFICE VISIT (OUTPATIENT)
Dept: OBGYN | Facility: CLINIC | Age: 39
End: 2019-08-06
Payer: COMMERCIAL

## 2019-08-06 VITALS — WEIGHT: 164.9 LBS | BODY MASS INDEX: 29.92 KG/M2 | SYSTOLIC BLOOD PRESSURE: 118 MMHG | DIASTOLIC BLOOD PRESSURE: 78 MMHG

## 2019-08-06 DIAGNOSIS — G89.18 POSTOPERATIVE PAIN: Primary | ICD-10-CM

## 2019-08-06 LAB
ALBUMIN UR-MCNC: NEGATIVE MG/DL
APPEARANCE UR: CLEAR
BILIRUB UR QL STRIP: NEGATIVE
COLOR UR AUTO: YELLOW
GLUCOSE UR STRIP-MCNC: NEGATIVE MG/DL
HGB UR QL STRIP: ABNORMAL
KETONES UR STRIP-MCNC: NEGATIVE MG/DL
LEUKOCYTE ESTERASE UR QL STRIP: NEGATIVE
NITRATE UR QL: NEGATIVE
PH UR STRIP: 5.5 PH (ref 5–7)
RBC #/AREA URNS AUTO: NORMAL /HPF
SOURCE: ABNORMAL
SP GR UR STRIP: 1.02 (ref 1–1.03)
UROBILINOGEN UR STRIP-ACNC: 0.2 EU/DL (ref 0.2–1)
WBC #/AREA URNS AUTO: NORMAL /HPF

## 2019-08-06 PROCEDURE — 99024 POSTOP FOLLOW-UP VISIT: CPT | Performed by: OBSTETRICS & GYNECOLOGY

## 2019-08-06 PROCEDURE — 81001 URINALYSIS AUTO W/SCOPE: CPT | Performed by: OBSTETRICS & GYNECOLOGY

## 2019-08-06 RX ORDER — SIMETHICONE 80 MG
80 TABLET,CHEWABLE ORAL EVERY 6 HOURS PRN
Qty: 30 TABLET | Refills: 1 | Status: SHIPPED | OUTPATIENT
Start: 2019-08-06 | End: 2019-08-27

## 2019-08-06 NOTE — LETTER
[facility]  [address], [address]  [place] [address]  [phone]        August 6, 2019    Radha Bryan  [address] [facility] [address]            Dear Ms. Radha Bryan:      The results of your recent urine test were NORMAL, no sign of bladder infection.  If you have any further questions or problems, please contact our office.    Sincerely,        Pamela Carrion MD

## 2019-08-06 NOTE — NURSING NOTE
"Chief Complaint   Patient presents with     Post-op Visit     patient had TLH on , has c/o pain on the right side.       Initial /78 (BP Location: Right arm, Patient Position: Chair, Cuff Size: Adult Regular)   Wt 74.8 kg (164 lb 14.4 oz)   BMI 29.92 kg/m   Estimated body mass index is 29.92 kg/m  as calculated from the following:    Height as of 19: 1.581 m (5' 2.25\").    Weight as of this encounter: 74.8 kg (164 lb 14.4 oz).  BP completed using cuff size: regular    Questioned patient about current smoking habits.  Pt. has never smoked.          The following HM Due: NONE      Jose Galvan CMA               "

## 2019-08-06 NOTE — PATIENT INSTRUCTIONS
Try simethicone up to every 6 hours for bloating/gas pain.    Ok to continue tylenol and ibuprofen as needed for post-op pain.     Normal activity around the house is ok, avoid lifting anything over 15 pounds. No sexual activity until 8 weeks post-op.    You can go to the medical supply store for an abdominal binder or purchase one online or at a drug store. You probably need a size large.     Ok to continue physical therapy exercises at home.

## 2019-08-06 NOTE — PROGRESS NOTES
Gyn Clinic Postoperative Visit Note  2019    S: Radha Bryan is a 38 year old  here for post-operative visit following total laparoscopic hysterectomy bilateral salpingo-oophorectomy on 19 for AUB, fibroid uterus, and anemia. She reports right sided pelvic pain since surgery, mickey BMs but difficulty passing gas, and occasional right leg pain. She does have chronic back pain and is undergoing physical therapy for this. Pelvic pain does improve with ibuprofen or tylenol, which she takes very rarely.  no fevers/chills.  Rare pinching with urination.  Denies vaginal bleeding    O:   /78 (BP Location: Right arm, Patient Position: Chair, Cuff Size: Adult Regular)   Wt 74.8 kg (164 lb 14.4 oz)   BMI 29.92 kg/m    Gen: sitting in chair in no acute distress, comfortable  Eyes: no scleral icterus, EOMI  CV: regular rate, well perfused  Pulm: no increased work of breathing, no cough  Abd: soft, no organomegaly, no palpable masses. +RLQ tenderness near port site, suspect   Inc: laparoscopic port sites clean, dry, intact with some skin glue visible.  Skin: warm and dry, no rashes/lesions  Psych: mood stable, appropriate affect  Neuro: A+Ox3       Labs:   Hemoglobin   Date Value Ref Range Status   2019 9.7 (L) 11.7 - 15.7 g/dL Final     Pathology:   Copath Report   Date Value Ref Range Status   2019   Final    SPECIMEN(S):  Uterus, cervix and bilateral fallopian tubes    FINAL DIAGNOSIS:  Uterus, cervix, and bilateral fallopian tubes, hysterectomy and bilateral   salpingectomies.  - Cervix without evidence of dysplasia.  - Proliferative endometrium without evidence of hyperplasia.  - Uterine leiomyomas and focal adenomyosis.  - Paratubal cyst formation.  - Negative for malignancy.     A: 38 year old female POD#21 s/p total laparoscopic hysterectomy bilateral salpingectomy. Persistent RLQ pain with activity, improves with NSAIDs. No s/sx of infection. Likely ongoing port site pain which is  not unexpected at this point. UA/UC given occasional pinching sensation with urination.    P:   1. Postoperative pain  - encouraged NSAIDs/tylenol as needed  - Abdominal dressing bronson/binder  - simethicone (MYLICON) 80 MG chewable tablet; Take 1 tablet (80 mg) by mouth every 6 hours as needed for flatulence or cramping (bloating)  Dispense: 30 tablet; Refill: 1  - UA reflex to Microscopic and Culture  - order for DME; Equipment being ordered: Abdominal binder/dressing  Dispense: 1 Device; Refill: 0  - Urine Microscopic     F/u in 3-4w for routine post-op visit.     Pamela Carrion MD   OB/Gyn  8/6/2019

## 2019-08-27 ENCOUNTER — OFFICE VISIT (OUTPATIENT)
Dept: OBGYN | Facility: CLINIC | Age: 39
End: 2019-08-27
Payer: COMMERCIAL

## 2019-08-27 VITALS — DIASTOLIC BLOOD PRESSURE: 64 MMHG | BODY MASS INDEX: 30.15 KG/M2 | SYSTOLIC BLOOD PRESSURE: 114 MMHG | WEIGHT: 166.2 LBS

## 2019-08-27 DIAGNOSIS — Z48.89 ENCOUNTER FOR POSTOPERATIVE WOUND CHECK: Primary | ICD-10-CM

## 2019-08-27 PROCEDURE — 99024 POSTOP FOLLOW-UP VISIT: CPT | Performed by: OBSTETRICS & GYNECOLOGY

## 2019-08-27 NOTE — NURSING NOTE
"Chief Complaint   Patient presents with     Surgical Followup   Lap hyst with Dr Carrion 19    Initial /64   Wt 75.4 kg (166 lb 3.2 oz)   LMP 2019   BMI 30.15 kg/m   Estimated body mass index is 30.15 kg/m  as calculated from the following:    Height as of 19: 1.581 m (5' 2.25\").    Weight as of this encounter: 75.4 kg (166 lb 3.2 oz).  BP completed using cuff size: regular    Questioned patient about current smoking habits.  Pt. has never smoked.          The following HM Due: NONE    Tiffany Childs CMA             "

## 2019-08-27 NOTE — PROGRESS NOTES
Post Op Follow Up    Radha Bryan is here for post op follow up visit following TLH/BS on 7/17/19.    Procedure was uncomplicated.  Final pathology demonstrated     SPECIMEN(S):   Uterus, cervix and bilateral fallopian tubes     FINAL DIAGNOSIS:   Uterus, cervix, and bilateral fallopian tubes, hysterectomy and bilateral   salpingectomies.   - Cervix without evidence of dysplasia.   - Proliferative endometrium without evidence of hyperplasia.   - Uterine leiomyomas and focal adenomyosis.   - Paratubal cyst formation.   - Negative for malignancy.     Since surgery patient has not had concerns.    Activity, bowel function and bladder function have all returned to normal.    EXAM:  Abdomen: Abdomen soft, non-tender. BS normal. No masses, organomegaly.  Incisions CDI  : Normal external genitalia without lesions and vaginal cuff intact and well supported     Follow up as needed.    Rufino Baumann MD

## 2020-01-22 ENCOUNTER — OFFICE VISIT (OUTPATIENT)
Dept: INTERNAL MEDICINE | Facility: CLINIC | Age: 40
End: 2020-01-22
Payer: COMMERCIAL

## 2020-01-22 VITALS
SYSTOLIC BLOOD PRESSURE: 110 MMHG | BODY MASS INDEX: 30.93 KG/M2 | HEIGHT: 62 IN | RESPIRATION RATE: 16 BRPM | WEIGHT: 168.1 LBS | OXYGEN SATURATION: 98 % | HEART RATE: 104 BPM | DIASTOLIC BLOOD PRESSURE: 70 MMHG | TEMPERATURE: 97.9 F

## 2020-01-22 DIAGNOSIS — Z23 NEED FOR PROPHYLACTIC VACCINATION AND INOCULATION AGAINST INFLUENZA: ICD-10-CM

## 2020-01-22 DIAGNOSIS — B36.0 TINEA VERSICOLOR: ICD-10-CM

## 2020-01-22 DIAGNOSIS — K21.9 GASTROESOPHAGEAL REFLUX DISEASE WITHOUT ESOPHAGITIS: Primary | ICD-10-CM

## 2020-01-22 PROCEDURE — 99213 OFFICE O/P EST LOW 20 MIN: CPT | Mod: 25 | Performed by: NURSE PRACTITIONER

## 2020-01-22 PROCEDURE — 90686 IIV4 VACC NO PRSV 0.5 ML IM: CPT | Performed by: NURSE PRACTITIONER

## 2020-01-22 PROCEDURE — 90471 IMMUNIZATION ADMIN: CPT | Performed by: NURSE PRACTITIONER

## 2020-01-22 RX ORDER — ITRACONAZOLE 100 MG/1
200 CAPSULE ORAL DAILY
Qty: 10 CAPSULE | Refills: 0 | Status: SHIPPED | OUTPATIENT
Start: 2020-01-22 | End: 2020-02-13

## 2020-01-22 ASSESSMENT — MIFFLIN-ST. JEOR: SCORE: 1384.72

## 2020-01-22 NOTE — PROGRESS NOTES
Subjective     Radha Bryan is a 39 year old female who presents to clinic today for the following health issues:    HPI   Rash      Duration: 2-3 years    Description  Location: upper back, shoulders, front of neck and sternum  Itching: mild    Intensity:  mild    Accompanying signs and symptoms: None    History (similar episodes/previous evaluation): no derm eval    Precipitating or alleviating factors:  New exposures:  None  Recent travel: no      Therapies tried and outcome: selsun Blue shampoo x 1 month not helpful        Patient Active Problem List   Diagnosis     Symptomatic anemia     Gastroesophageal reflux disease without esophagitis     Past Surgical History:   Procedure Laterality Date     CHOLECYSTECTOMY       LAPAROSCOPIC HYSTERECTOMY TOTAL, SALPINGECTOMY BILATERAL  07/17/2019    AUB, fibroid uterus, anemia. [facility], Dr. Pamela Carrion     LAPAROSCOPIC TUBAL LIGATION         Social History     Tobacco Use     Smoking status: Never Smoker     Smokeless tobacco: Never Used   Substance Use Topics     Alcohol use: No     Family History   Problem Relation Age of Onset     Family History Negative Mother      No Known Problems Brother      No Known Problems Sister      No Known Problems Son      No Known Problems Daughter          Current Outpatient Medications   Medication Sig Dispense Refill     ibuprofen (ADVIL/MOTRIN) 200 MG tablet Take 400 mg by mouth daily as needed for mild pain       itraconazole (SPORANOX) 100 MG capsule Take 2 capsules (200 mg) by mouth daily for 5 days 10 capsule 0     omeprazole (PRILOSEC) 20 MG DR capsule Take 1 capsule (20 mg) by mouth daily 90 capsule 3     BP Readings from Last 3 Encounters:   01/22/20 110/70   08/27/19 114/64   08/06/19 118/78    Wt Readings from Last 3 Encounters:   01/22/20 76.2 kg (168 lb 1.6 oz)   08/27/19 75.4 kg (166 lb 3.2 oz)   08/06/19 74.8 kg (164 lb 14.4 oz)                      Reviewed and updated as needed this visit by  "Provider         Review of Systems   ROS COMP: Constitutional, HEENT, cardiovascular, pulmonary, gi and gu systems are negative, except as otherwise noted.      Objective    /70 (BP Location: Right arm, Patient Position: Sitting, Cuff Size: Adult Regular)   Pulse 104   Temp 97.9  F (36.6  C) (Oral)   Resp 16   Ht 1.581 m (5' 2.25\")   Wt 76.2 kg (168 lb 1.6 oz)   LMP 06/20/2019   SpO2 98%   BMI 30.50 kg/m    Body mass index is 30.5 kg/m .  Physical Exam   GENERAL: healthy, alert and no distress  SKIN: tinea versicolor darker pigmentation to upper torso A&P            Assessment & Plan       ICD-10-CM    1. Gastroesophageal reflux disease without esophagitis K21.9 omeprazole (PRILOSEC) 20 MG DR capsule   2. Tinea versicolor B36.0 itraconazole (SPORANOX) 100 MG capsule     DERMATOLOGY REFERRAL        BMI:   Estimated body mass index is 30.5 kg/m  as calculated from the following:    Height as of this encounter: 1.581 m (5' 2.25\").    Weight as of this encounter: 76.2 kg (168 lb 1.6 oz).           Sporanox daily x 5 days  F/u derm    Jessica Zimmerman NP  [facility]        "

## 2020-01-22 NOTE — NURSING NOTE
"rash on back,neck chest and face that is itchy-patient states that she has had this for years.  /70 (BP Location: Right arm, Patient Position: Sitting, Cuff Size: Adult Regular)   Pulse 104   Temp 97.9  F (36.6  C) (Oral)   Resp 16   Ht 1.581 m (5' 2.25\")   Wt 76.2 kg (168 lb 1.6 oz)   LMP 06/20/2019   SpO2 98%   BMI 30.50 kg/m      "

## 2020-01-30 ENCOUNTER — HOSPITAL ENCOUNTER (EMERGENCY)
Facility: CLINIC | Age: 40
Discharge: HOME | End: 2020-01-30
Attending: PHYSICIAN ASSISTANT | Admitting: PHYSICIAN ASSISTANT
Payer: COMMERCIAL

## 2020-01-30 VITALS
TEMPERATURE: 98.7 F | HEART RATE: 100 BPM | DIASTOLIC BLOOD PRESSURE: 89 MMHG | SYSTOLIC BLOOD PRESSURE: 125 MMHG | RESPIRATION RATE: 16 BRPM | OXYGEN SATURATION: 99 %

## 2020-01-30 DIAGNOSIS — J32.9 SINUSITIS: ICD-10-CM

## 2020-01-30 LAB
FLUAV+FLUBV AG SPEC QL: NEGATIVE
FLUAV+FLUBV AG SPEC QL: NEGATIVE
SPECIMEN SOURCE: NORMAL

## 2020-01-30 PROCEDURE — 99283 EMERGENCY DEPT VISIT LOW MDM: CPT

## 2020-01-30 PROCEDURE — 87804 INFLUENZA ASSAY W/OPTIC: CPT | Mod: 59 | Performed by: EMERGENCY MEDICINE

## 2020-01-30 PROCEDURE — 87804 INFLUENZA ASSAY W/OPTIC: CPT | Performed by: EMERGENCY MEDICINE

## 2020-01-30 PROCEDURE — 25000132 ZZH RX MED GY IP 250 OP 250 PS 637: Performed by: EMERGENCY MEDICINE

## 2020-01-30 RX ORDER — ACETAMINOPHEN 500 MG
1000 TABLET ORAL EVERY 4 HOURS PRN
Status: DISCONTINUED | OUTPATIENT
Start: 2020-01-30 | End: 2020-01-30 | Stop reason: HOSPADM

## 2020-01-30 RX ORDER — IBUPROFEN 600 MG/1
600 TABLET, FILM COATED ORAL ONCE
Status: DISCONTINUED | OUTPATIENT
Start: 2020-01-30 | End: 2020-01-30

## 2020-01-30 RX ORDER — FLUTICASONE PROPIONATE 50 MCG
1 SPRAY, SUSPENSION (ML) NASAL DAILY
Qty: 16 G | Refills: 0 | Status: SHIPPED | OUTPATIENT
Start: 2020-01-30 | End: 2020-03-17

## 2020-01-30 RX ORDER — PSEUDOEPHEDRINE HCL 30 MG
30 TABLET ORAL EVERY 6 HOURS PRN
Qty: 6 TABLET | Refills: 0 | Status: SHIPPED | OUTPATIENT
Start: 2020-01-30 | End: 2020-03-17

## 2020-01-30 RX ADMIN — ACETAMINOPHEN 1000 MG: 500 TABLET, FILM COATED ORAL at 14:17

## 2020-01-30 ASSESSMENT — ENCOUNTER SYMPTOMS
COUGH: 1
WEAKNESS: 1
HEADACHES: 1
SORE THROAT: 1
FEVER: 1
FATIGUE: 1
RHINORRHEA: 0

## 2020-01-30 NOTE — LETTER
January 30, 2020      To Whom It May Concern:      Radha Bryan was seen in our Emergency Department today, 01/30/20. Please excuse her from work tomorrow(1/31). I expect her condition to improve over the next 2-3 days.  She may return to work/school when improved.    Sincerely,        Eileen Meeks PA-C

## 2020-01-30 NOTE — ED AVS SNAPSHOT
[facility] Emergency Department  [address]  [facility] [address]  Phone:  [phone]  Fax:  [phone]                                    Radha Bryan   MRN: [id number]    Department:  [facility] Emergency Department   Date of Visit:  1/30/2020           After Visit Summary Signature Page    I have received my discharge instructions, and my questions have been answered. I have discussed any challenges I see with this plan with the nurse or doctor.    ..........................................................................................................................................  Patient/Patient Representative Signature      ..........................................................................................................................................  Patient Representative Print Name and Relationship to Patient    ..................................................               ................................................  Date                                   Time    ..........................................................................................................................................  Reviewed by Signature/Title    ...................................................              ..............................................  Date                                               Time          22EPIC Rev 08/18

## 2020-01-30 NOTE — ED PROVIDER NOTES
History     Chief Complaint:  Congestion      The history is provided by the patient.      Radha Bryan is a 39 year old female who presents for evaluation of congestion. For the past two weeks, the patient has been feeling congested, fatigued, and weak with a headache, fever, cough, and sore throat. She denies nasal discharge, but notes intense congestion. She has frequent sinus infections, about twice a year, but has not seen an ENT doctor or been given antibiotics. She has taken ibuprofen for pain with this episode. She denies any fever or vision changes. She has no further concerns at this time.     Allergies:  No Known Allergies    Medications:    Omeprazole    Past Medical History:    Irregular menstrual cycles    Past Surgical History:    Cholecystectomy  Laparoscopic total hysterectomy  Laparoscopic tubal ligation    Family History:    No past pertinent family history.    Social History:  Marital Status:  Single [1]  Presents by herself.  Negative for tobacco use.  Negative for alcohol use.  Negative for drug use.    Review of Systems   Constitutional: Positive for fatigue and fever.   HENT: Positive for congestion and sore throat. Negative for rhinorrhea.    Respiratory: Positive for cough.    Neurological: Positive for weakness and headaches.   All other systems reviewed and are negative.    Physical Exam     Patient Vitals for the past 24 hrs:   BP Temp Temp src Pulse Resp SpO2   01/30/20 1413 125/89 98.7  F (37.1  C) Temporal 100 16 99 %     Physical Exam  General: Resting comfortably.  Alert.  Head:  The scalp, face, and head appear normal   Eyes:  Conjunctivae and sclerae are normal   ENT:    The oropharynx is normal     Uvula is in the midline       Structures are symmetric. No tonsillar hypertrophy or exudate.     Bilateral TMs are normal without evidence of infection     Tenderness to bilateral frontal and axillary sinuses.  Neck:  No lymphadenopathy   CV:  Regular rate and rhythm     Normal  S1/S2   Resp:  Lungs are clear to auscultation    Non-labored    No rales or wheezing   MS:  Normal muscular tone   Skin:  No rash or acute skin lesions noted   Neuro: Speech is normal and fluent.      Emergency Department Course     Laboratory:  Laboratory findings were communicated with the patient who voiced understanding of the findings.    Influenza A/B antigen: Negative    Procedures:  None    Emergency Department Course:  Past medical records, nursing notes, and vitals reviewed.    1527 I performed an exam of the patient as documented above.      The patient's nose was swabbed and this sample was sent for influenza antigen, findings above.     Findings and plan explained to the Patient. Patient discharged home with instructions regarding supportive care, medications, and reasons to return. The importance of close follow-up was reviewed. The patient was prescribed Augmentin, Flonase, and Sudafed.    I personally reviewed the laboratory results with the Patient and answered all related questions prior to discharge.     Impression & Plan     Medical Decision Making:  Radha Bryan is a 39 year old female who presents for evaluation of facial pain and nasal congestion. Upon my exam, patient did have tenderness palpation throughout the frontal and maxillary sinuses..  Nasal congestion noted.  Symptoms consistent with sinusitis.  We did discuss viral versus bacterial sinusitis.  As the patient has had symptoms for longer than 10 days, oral antibiotics of Augmentin will be initiated.We also discussed other symptomatic cares including Flonase, over-the-counter anti-mucus agents, Elis pot, etc. Doubt fungal sinusitis, meningitis, encephalitis, cavernous sinus thrombosis, ocular pathology, intracerebral bleed, serious bacterial infection otherwise.  Exam negative for other abnormalities.  She will follow-up with her primary care doctor in 2 to 3 days for recheck.  She will return immediately for changing/worsening  symptoms, persistent fevers, sore throat, difficulty breathing or swallowing, new concerns. All questions were answered prior to discharge. The patient understands and agrees to this plan.    Diagnosis:    ICD-10-CM    1. Sinusitis J32.9        Disposition:  Discharged to home.    Discharge Medications:  Discharge Medication List as of 1/30/2020  3:47 PM      START taking these medications    Details   amoxicillin-clavulanate (AUGMENTIN) 875-125 MG tablet Take 1 tablet by mouth 2 times daily for 7 days Please take with yogurt of probiotics to help prevent diarrhea, Disp-14 tablet, R-0, Local Print      fluticasone (FLONASE) 50 MCG/ACT nasal spray Spray 1 spray into both nostrils daily, Disp-16 g, R-0, Local Print      pseudoePHEDrine (SUDAFED) 30 MG tablet Take 1 tablet (30 mg) by mouth every 6 hours as needed for congestion, Disp-6 tablet, R-0, Local Print             Scribe Disclosure:  I, Chelsey Max, am serving as a scribe at 3:27 PM on 1/30/2020 to document services personally performed by Eileen Meeks PA based on my observations and the provider's statements to me.     [facility] EMERGENCY DEPARTMENT       Eileen Meeks PA-C  01/30/20 2074

## 2020-01-30 NOTE — ED TRIAGE NOTES
"Sinus pressure x 1 week.  Congestion, weak, tired, \"feels like my head is going to explode\".  ABCDS intact.  "

## 2020-01-30 NOTE — DISCHARGE INSTRUCTIONS
Discharge Instructions  Sinus Infection    You have acute sinusitis, or an infection of the sinuses. The sinuses are the hollow areas within the facial bones that are connected to the nasal opening. The most common cause of acute sinusitis is a virus infection associated with the common cold. Bacterial sinusitis occurs much less commonly, usually as a complication of viral sinusitis. Experts say that most sinusitis is caused by a virus within the first 7-10 days of illness. Antibiotics do nothing to help with virus infections, so most people do not need antibiotics for acute sinusitis.     Generally, every Emergency Department visit should have a follow-up clinic visit with either a primary or a specialty clinic/provider. Please follow-up as instructed by your emergency provider today.    Return to the Emergency Department if:  Your vision changes.  You are confused or have difficulty thinking clearly.  You have swelling around your eye.  You develop a severe headache or neck stiffness.  Your symptoms get worse and you are unable to see your primary provider.      Treatment:  Pain relief -- Non-prescription pain medications, such as Tylenol  (acetaminophen) or Motrin  or Advil  (ibuprofen) are recommended for pain.  Do not use a medicine that you are allergic to, or if your provider has told you not to use it.     Nasal irrigation -- Flushing the nose and sinuses with a saline solution several times per day can help to decrease pain caused by congestion.  Nasal decongestants -- Nasal decongestant sprays, including Afrin  (oxymetazoline) and Yovanny-Synephrine  (phenylephrine) can be used to temporarily treat congestion. However, these sprays should not be used for more than two to three days due to the risk of rebound congestion (when the nose is congested constantly unless the medication is used repeatedly).  Nasal glucocorticoids -- These are prescription steroids delivered by a nasal spray that can help to reduce  swelling inside the nose, usually within two to three days. These drugs have few side effects and dramatically relieve symptoms in most people.  If you use these in conjunction with Afrin  you will need to use at least 15 minutes prior to the nasal decongestant.    Antibiotic? -- Rarely antibiotics are used along with the above treatments.    If you were given a prescription for medicine here today, be sure to read all of the information (including the package insert) that comes with your prescription.  This will include important information about the medicine, its side effects, and any warnings that you need to know about.  The pharmacist who fills the prescription can provide more information and answer questions you may have about the medicine.  If you have questions or concerns that the pharmacist cannot address, please call or return to the Emergency Department.   Remember that you can always come back to the Emergency Department if you are not able to see your regular provider in the amount of time listed above, if you get any new symptoms, or if there is anything that worries you.

## 2020-02-05 ENCOUNTER — TELEPHONE (OUTPATIENT)
Dept: INTERNAL MEDICINE | Facility: CLINIC | Age: 40
End: 2020-02-05

## 2020-02-05 NOTE — TELEPHONE ENCOUNTER
Prior Authorization Retail Medication Request    Medication/Dose: ITRACONAZOLE 100 MG CAPSULES  ICD code (if different than what is on RX):    Previously Tried and Failed:    Rationale:      Insurance Name:  BLUE PLUS  Insurance ID:  [id number]      Pharmacy Information (if different than what is on RX)  Name:  [facility]  Phone:  [phone]

## 2020-02-10 NOTE — TELEPHONE ENCOUNTER
Central Prior Authorization Team   Phone: [phone]    PA Initiation    Medication: ITRACONAZOLE  Insurance Company: [employer] [place] - Phone [phone] Fax [phone]  Pharmacy Filling the Rx: [facility] [address] [place] [address]  Filling Pharmacy Phone: [phone]  Filling Pharmacy Fax: [phone]  Start Date: 2/10/2020

## 2020-02-11 NOTE — TELEPHONE ENCOUNTER
Prior Authorization Approval    Authorization Effective Date: 1/1/2020  Authorization Expiration Date: 3/10/2020  Medication: ITRACONAZOLE-APPROVED  Approved Dose/Quantity:    Reference #:     Insurance Company: [employer] [place] - Phone [phone] Fax [phone]  Expected CoPay:       CoPay Card Available:      Foundation Assistance Needed:    Which Pharmacy is filling the prescription (Not needed for infusion/clinic administered): [id number] - [place], [place] - [address] [facility]  Pharmacy Notified: Yes  Patient Notified: Yes  **Instructed pharmacy to notify patient when script is ready to /ship.**

## 2020-02-12 NOTE — PROGRESS NOTES
[facility] - PRIMARY CARE SKIN    CC: Lesion(s) on chest  SUBJECTIVE:   Radha Bryan is a(n) 39 year old female who presents to clinic today because of episodic rash on chest that started 16 years ago.     She reports the lesions are intermittent in nature. Pt reports they flare up most when having menstrual cycle. She recently was prescribed itraconazole tablets but insurance did not cover this prescription therefore did not fill it.Pt reports the rash are sometimes itchy and can be darker in color. Pt reports she get this rash about 4 times per year. Her daughters get the same time of rash. She has no rash at this time.        Personal Medical History  Skin cancer: NO  Eczema Psoriasis Lupus   NO NO NO   Other: .    Family Medical History  Skin cancer: NO  Eczema Psoriasis Lupus   NO NO NO   Other: .    Occupation:  (indoor).    Refer to electronic medical record (EMR) for past medical history and medications.    INTEGUMENTARY/SKIN: POSITIVE for changing lesion over chest.   ROS: 14 point review of systems was negative except the symptoms listed above in the HPI.    This document serves as a record of the services and decisions personally performed and made by Shira Edmonds MD and was created by Juan Luis Dixon, a trained medical scribe, based on personal observations and provider statements to the medical scribe.  February 13, 2020 12:21 PM   Juan Luis Dixon      OBJECTIVE:   GENERAL: healthy, alert and no distress.  HEENT: PERRL. Conjunctiva, sclera clear.  SKIN: Stern Skin Type - III.  Trunk examined. The dermatoscope was used to help evaluate pigmented lesions.  Skin Pertinent Findings:  Upper back: Faint hyperpigmented macules     ASSESSMENT:     Encounter Diagnosis   Name Primary?     Tinea versicolor Yes     MDM: there are minimal clinical findings at this time but clinical history is most consistent with tinea versicolor.    PLAN:   Patient Instructions   FUTURE APPOINTMENTS  Follow  up as needed.      ORAL MEDICATION  Take by mouth 1 capsule(s)/tablet(s) of 150 mg 2 tablets a week for 4 weeks. .    For prevention :     Apply Selsun Blue on chest/back and leave it on for about 4 hours then wash it off, once per week          to prevent.       TT: 20 minutes.  CT: 15 minutes.    The information in this document, created by the medical scribe for me, accurately reflects the services I personally performed and the decisions made by me. I have reviewed and approved this document for accuracy prior to leaving the patient care area.  February 13, 2020 12:20 PM  Shira Edmonds MD  [facility] [place]

## 2020-02-13 ENCOUNTER — OFFICE VISIT (OUTPATIENT)
Dept: FAMILY MEDICINE | Facility: CLINIC | Age: 40
End: 2020-02-13
Payer: COMMERCIAL

## 2020-02-13 VITALS — SYSTOLIC BLOOD PRESSURE: 122 MMHG | DIASTOLIC BLOOD PRESSURE: 68 MMHG

## 2020-02-13 DIAGNOSIS — B36.0 TINEA VERSICOLOR: Primary | ICD-10-CM

## 2020-02-13 PROCEDURE — 99213 OFFICE O/P EST LOW 20 MIN: CPT | Performed by: FAMILY MEDICINE

## 2020-02-13 RX ORDER — FLUCONAZOLE 150 MG/1
TABLET ORAL
Qty: 8 TABLET | Refills: 0 | Status: SHIPPED | OUTPATIENT
Start: 2020-02-13 | End: 2020-04-07

## 2020-02-13 NOTE — LETTER
2/13/2020         RE: Radha Bryan  [address] [facility] [address]        Dear Colleague,    Thank you for referring your patient, Radha Bryan, to the [facility]. Please see a copy of my visit note below.    [facility] - PRIMARY CARE SKIN    CC: Lesion(s) on chest  SUBJECTIVE:   Radha Bryan is a(n) 39 year old female who presents to clinic today because of episodic rash on chest that started 16 years ago.     She reports the lesions are intermittent in nature. Pt reports they flare up most when having menstrual cycle. She recently was prescribed itraconazole tablets but insurance did not cover this prescription therefore did not fill it.Pt reports the rash are sometimes itchy and can be darker in color. Pt reports she get this rash about 4 times per year. Her daughters get the same time of rash. She has no rash at this time.        Personal Medical History  Skin cancer: NO  Eczema Psoriasis Lupus   NO NO NO   Other: .    Family Medical History  Skin cancer: NO  Eczema Psoriasis Lupus   NO NO NO   Other: .    Occupation:  (indoor).    Refer to electronic medical record (EMR) for past medical history and medications.    INTEGUMENTARY/SKIN: POSITIVE for changing lesion over chest.   ROS: 14 point review of systems was negative except the symptoms listed above in the HPI.    This document serves as a record of the services and decisions personally performed and made by Shira Edmonds MD and was created by Juan Luis Dixon, a trained medical scribe, based on personal observations and provider statements to the medical scribe.  February 13, 2020 12:21 PM   Juan Luis Dixon      OBJECTIVE:   GENERAL: healthy, alert and no distress.  HEENT: PERRL. Conjunctiva, sclera clear.  SKIN: Stern Skin Type - III.  Trunk examined. The dermatoscope was used to help evaluate pigmented lesions.  Skin Pertinent Findings:  Upper back: Faint hyperpigmented macules     ASSESSMENT:      Encounter Diagnosis   Name Primary?     Tinea versicolor Yes     MDM: there are minimal clinical findings at this time but clinical history is most consistent with tinea versicolor.    PLAN:   Patient Instructions   FUTURE APPOINTMENTS  Follow up as needed.      ORAL MEDICATION  Take by mouth 1 capsule(s)/tablet(s) of 150 mg 2 tablets a week for 4 weeks. .    For prevention :     Apply Selsun Blue on chest/back and leave it on for about 4 hours then wash it off, once per week          to prevent.       TT: 20 minutes.  CT: 15 minutes.    The information in this document, created by the medical scribe for me, accurately reflects the services I personally performed and the decisions made by me. I have reviewed and approved this document for accuracy prior to leaving the patient care area.  February 13, 2020 12:20 PM  Shira Edmonds MD  [facility] [place]        Again, thank you for allowing me to participate in the care of your patient.        Sincerely,        Shira Edmonds MD

## 2020-02-13 NOTE — PATIENT INSTRUCTIONS
FUTURE APPOINTMENTS  Follow up as needed.      ORAL MEDICATION  Take by mouth 1 capsule(s)/tablet(s) of 150 mg 2 tablets a week for 4 weeks. .    For prevention :     Apply Selsun Blue on chest/back and leave it on for about 4 hours then wash it off, once per week          to prevent.

## 2020-03-17 ENCOUNTER — HOSPITAL ENCOUNTER (EMERGENCY)
Facility: CLINIC | Age: 40
Discharge: HOME | End: 2020-03-17
Attending: EMERGENCY MEDICINE | Admitting: EMERGENCY MEDICINE
Payer: MEDICAID

## 2020-03-17 VITALS
SYSTOLIC BLOOD PRESSURE: 136 MMHG | OXYGEN SATURATION: 100 % | TEMPERATURE: 97.6 F | DIASTOLIC BLOOD PRESSURE: 88 MMHG | RESPIRATION RATE: 20 BRPM | BODY MASS INDEX: 31.28 KG/M2 | WEIGHT: 170 LBS | HEIGHT: 62 IN

## 2020-03-17 DIAGNOSIS — J02.0 STREPTOCOCCAL PHARYNGITIS: ICD-10-CM

## 2020-03-17 DIAGNOSIS — J01.90 ACUTE SINUSITIS, RECURRENCE NOT SPECIFIED, UNSPECIFIED LOCATION: ICD-10-CM

## 2020-03-17 LAB
DEPRECATED S PYO AG THROAT QL EIA: POSITIVE
SPECIMEN SOURCE: ABNORMAL

## 2020-03-17 PROCEDURE — 25000128 H RX IP 250 OP 636: Performed by: EMERGENCY MEDICINE

## 2020-03-17 PROCEDURE — 99283 EMERGENCY DEPT VISIT LOW MDM: CPT

## 2020-03-17 PROCEDURE — 25000132 ZZH RX MED GY IP 250 OP 250 PS 637: Performed by: EMERGENCY MEDICINE

## 2020-03-17 PROCEDURE — 87880 STREP A ASSAY W/OPTIC: CPT | Performed by: EMERGENCY MEDICINE

## 2020-03-17 RX ORDER — ACETAMINOPHEN 325 MG/1
650 TABLET ORAL ONCE
Status: COMPLETED | OUTPATIENT
Start: 2020-03-17 | End: 2020-03-17

## 2020-03-17 RX ORDER — FLUTICASONE PROPIONATE 50 MCG
1 SPRAY, SUSPENSION (ML) NASAL DAILY
Qty: 15.8 ML | Refills: 1 | Status: SHIPPED | OUTPATIENT
Start: 2020-03-17 | End: 2020-04-07

## 2020-03-17 RX ORDER — IBUPROFEN 600 MG/1
600 TABLET, FILM COATED ORAL ONCE
Status: COMPLETED | OUTPATIENT
Start: 2020-03-17 | End: 2020-03-17

## 2020-03-17 RX ORDER — AMOXICILLIN 500 MG/1
500 CAPSULE ORAL 2 TIMES DAILY
Qty: 20 CAPSULE | Refills: 0 | Status: SHIPPED | OUTPATIENT
Start: 2020-03-17 | End: 2020-04-07

## 2020-03-17 RX ORDER — PSEUDOEPHEDRINE HCL 120 MG/1
120 TABLET, FILM COATED, EXTENDED RELEASE ORAL EVERY 12 HOURS
Qty: 30 TABLET | Refills: 0 | Status: SHIPPED | OUTPATIENT
Start: 2020-03-17 | End: 2020-05-05

## 2020-03-17 RX ORDER — DEXAMETHASONE SODIUM PHOSPHATE 10 MG/ML
10 INJECTION, SOLUTION INTRAMUSCULAR; INTRAVENOUS ONCE
Status: COMPLETED | OUTPATIENT
Start: 2020-03-17 | End: 2020-03-17

## 2020-03-17 RX ADMIN — IBUPROFEN 600 MG: 600 TABLET, FILM COATED ORAL at 14:32

## 2020-03-17 RX ADMIN — ACETAMINOPHEN 650 MG: 325 TABLET, FILM COATED ORAL at 14:32

## 2020-03-17 RX ADMIN — DEXAMETHASONE SODIUM PHOSPHATE 10 MG: 10 INJECTION, SOLUTION INTRAMUSCULAR; INTRAVENOUS at 14:32

## 2020-03-17 ASSESSMENT — ENCOUNTER SYMPTOMS
SINUS PRESSURE: 1
SORE THROAT: 1
COUGH: 0
SINUS PAIN: 1
FEVER: 0
SHORTNESS OF BREATH: 0

## 2020-03-17 ASSESSMENT — MIFFLIN-ST. JEOR: SCORE: 1389.36

## 2020-03-17 NOTE — ED PROVIDER NOTES
"  History     Chief Complaint:  Sinusitis    HPI   Radha Bryan is a 39 year old female who presents for the evaluation of sinusitis. The patient reports that four days ago she started to experience a sore throat and that three days ago she started to experience nasal congestion, prompting her to the ED. The patient describes that her nostrils have an associated burning sensation as well. She notes that she is also experiencing some slight abdominal pain, but states this is typical for her. She states that she has taken a dose of Sudafed today. No known sick contacts or recent travel. The patient denies fever, cough, shortness of breath, vomiting, diarrhea, and other issues.       Allergies:  No known drug allergies       Medications:    Diflucan  Prilosec  Sudafed      Past Medical History:    Irregular periods  GERD  Symptomatic anemia      Past Surgical History:    Cholecystectomy   Hysterectomy      Family History:    History reviewed. No pertinent family history.       Social History:  Smoking status: Never smoker  Alcohol use: No  Drug use: No  PCP: Jessica Zimmerman  Patient presents alone to the ED  Patient is a   Marital Status:  Single [1]     Review of Systems   Constitutional: Negative for fever.   HENT: Positive for sinus pressure, sinus pain and sore throat. Negative for ear pain and postnasal drip.    Respiratory: Negative for cough and shortness of breath.    Cardiovascular: Negative for chest pain.   All other systems reviewed and are negative.    Physical Exam     Patient Vitals for the past 24 hrs:   BP Temp Temp src Heart Rate Resp SpO2 Height Weight   03/17/20 1356 136/88 97.6  F (36.4  C) Oral 77 20 100 % 1.575 m (5' 2\") 77.1 kg (170 lb)      Physical Exam  General: Alert, appears well-developed and well-nourished. Cooperative.   In mild distress   HEENT:   Head: Atraumatic   Ears: External ears are normal   Mouth/Throat: Oropharynx is without erythema or exudate and mucous " membranes are moist.   Eyes: Conjunctivae normal and EOM are normal. No scleral icterus.   CV: Normal rate, regular rhythm, normal heart sounds and radial pulses are 2+ and symmetric. No murmur.   Resp: Breath sounds are clear bilaterally   Non-labored, no retractions or accessory muscle use   GI: Abdomen is soft, no distension, no tenderness. No rebound or guarding. No CVA tenderness bilaterally   MS: Normal range of motion. No edema.   Normal strength in all 4 extremities.   Back atraumatic.   No midline cervical, thoracic, or lumbar tenderness   Skin: Warm and dry. No rash or lesions noted.   Neuro: Alert. Normal strength. GCS: 15   Psych: Normal mood and affect.   Lymph: No anterior or posterior cervical lymphadenopathy noted     Emergency Department Course   Laboratory:  Streptococcus A Rapid Screen w Reflex to PCR: Positive     Interventions:  1432, Tylenol, 650 mg, PO  1432, Advil, 600 mg, PO  1432, Decadron, 10 mg, IV     Emergency Department Course:  Past medical records, nursing notes, and vitals reviewed.  1401: I performed an exam of the patient and obtained history, as documented above.      IV inserted.      1545: I rechecked the patient. Explained findings to patient.      Findings and plan explained to the Patient. Patient discharged home with instructions regarding supportive care, medications, and reasons to return. The importance of close follow-up was reviewed. The patient was prescribed Flonase and Sudafed.       Impression & Plan    Medical Decision Making:  This patient presented with sore throat and clinical evidence of pharyngitis.  The rapid strep test is positive. There is no clinical evidence of  peritonsillar abscess, retropharyngeal abscess, Lemierre's Syndrome, epiglottis, or Vidal's angina. The patient's symptoms are consistent with streptococcal pharyngitis.  I have recommended treatment with antibiotics and analgesics.  Patient also endorses sinusitis symptoms, prescribed Flonase  and Sudafed subsequently. Return if increasing pain, change in voice, neck pain, vomiting, fever, or shortness of breath. Follow-up with primary physician if not improving in 3-5 days.     Diagnosis:    ICD-10-CM    1. Acute sinusitis, recurrence not specified, unspecified location  J01.90 Streptococcus A Rapid Scr w Reflx to PCR   2. Streptococcal pharyngitis  J02.0        Disposition:  Discharged to home with Flonase and Sudafed.    Discharge Medications:  New Prescriptions    AMOXICILLIN (AMOXIL) 500 MG CAPSULE    Take 1 capsule (500 mg) by mouth 2 times daily for 10 days    FLUTICASONE (FLONASE) 50 MCG/ACT NASAL SPRAY    Spray 1 spray into both nostrils daily    PSEUDOEPHEDRINE (SUDAFED) 120 MG 12 HR TABLET    Take 1 tablet (120 mg) by mouth every 12 hours     Scribe Disclosure:  Paddy PEREZ, am serving as a scribe at 3:44 PM on 3/17/2020 to document services personally performed by Miguel Wan MD based on my observations and the provider's statements to me.      Paddy Ac  3/17/2020   [facility] EMERGENCY DEPARTMENT       Miguel Wan MD  03/17/20 2027

## 2020-03-17 NOTE — ED AVS SNAPSHOT
[facility] Emergency Department  [address]  [facility] [address]  Phone:  [phone]  Fax:  [phone]                                    Radha Bryan   MRN: [id number]    Department:  [facility] Emergency Department   Date of Visit:  3/17/2020           After Visit Summary Signature Page    I have received my discharge instructions, and my questions have been answered. I have discussed any challenges I see with this plan with the nurse or doctor.    ..........................................................................................................................................  Patient/Patient Representative Signature      ..........................................................................................................................................  Patient Representative Print Name and Relationship to Patient    ..................................................               ................................................  Date                                   Time    ..........................................................................................................................................  Reviewed by Signature/Title    ...................................................              ..............................................  Date                                               Time          22EPIC Rev 08/18

## 2020-03-17 NOTE — ED TRIAGE NOTES
Sinus pain and pressure for the past 3 days.  Slight cough.  Patient alert and oriented x3.  Airway, breathing and circulation intact.

## 2020-03-17 NOTE — LETTER
March 17, 2020      To Whom It May Concern:      Radha Bryan was seen in our Emergency Department today, 03/17/20.  I expect her condition to improve over the next 2-3 days.  She may return to work/school when improved.    Sincerely,        Ryan Hawkins RN

## 2020-03-18 ENCOUNTER — PATIENT OUTREACH (OUTPATIENT)
Dept: CARE COORDINATION | Facility: CLINIC | Age: 40
End: 2020-03-18

## 2020-03-18 NOTE — PROGRESS NOTES
Clinic Care Coordination Contact  [facility]       Clinical Data: Care Coordinator Outreach  Outreach attempted x 1.  Unable to leave voicemail. Invalid number.    Chart Review:  Referral from discharge report.  ED for sinusitis.  Follow up with Jessica Zimmerman NP, within 1 week if symptoms worsen.    Plan:  Care Coordinator will try to reach patient again in 1-2 business days.    ARNOLD Payan  Clinic Care Coordination  [facility] : [place], [place], [place], and [place]  Phone: [phone]    ______________________  Next Outreach: 03/19/20

## 2020-03-19 NOTE — PROGRESS NOTES
Clinic Care Coordination Contact  [facility]/Voicemail       Clinical Data: Care Coordinator Outreach  Outreach attempted x 2.  Unable to leave voicemail. Number is invalid.    Plan:  Care Coordinator will do no further outreaches at this time.    ARNOLD Payan  Clinic Care Coordination  [facility] : [place], [place], [place], and [place]  Phone: [phone]

## 2020-03-29 ENCOUNTER — HOSPITAL ENCOUNTER (EMERGENCY)
Facility: CLINIC | Age: 40
Discharge: HOME | End: 2020-03-29
Attending: EMERGENCY MEDICINE | Admitting: EMERGENCY MEDICINE
Payer: MEDICAID

## 2020-03-29 VITALS
RESPIRATION RATE: 16 BRPM | HEART RATE: 91 BPM | OXYGEN SATURATION: 100 % | DIASTOLIC BLOOD PRESSURE: 88 MMHG | TEMPERATURE: 98.1 F | SYSTOLIC BLOOD PRESSURE: 143 MMHG

## 2020-03-29 DIAGNOSIS — J32.9 SINUSITIS, UNSPECIFIED CHRONICITY, UNSPECIFIED LOCATION: ICD-10-CM

## 2020-03-29 DIAGNOSIS — J06.9 UPPER RESPIRATORY TRACT INFECTION, UNSPECIFIED TYPE: ICD-10-CM

## 2020-03-29 DIAGNOSIS — J02.9 ACUTE PHARYNGITIS, UNSPECIFIED ETIOLOGY: ICD-10-CM

## 2020-03-29 PROCEDURE — 99282 EMERGENCY DEPT VISIT SF MDM: CPT

## 2020-03-29 ASSESSMENT — ENCOUNTER SYMPTOMS
COUGH: 1
NAUSEA: 0
FEVER: 0
VOICE CHANGE: 0
SORE THROAT: 1
SHORTNESS OF BREATH: 0
DIARRHEA: 0
VOMITING: 0
NECK STIFFNESS: 0
SINUS PRESSURE: 1

## 2020-03-29 NOTE — ED AVS SNAPSHOT
[facility] Department  [address]  [facility] [address]  Phone:  [phone]  Fax:  [phone]                                    Radha Bryan   MRN: [id number]    Department:  [facility] Emergency Department   Date of Visit:  3/29/2020           After Visit Summary Signature Page    I have received my discharge instructions, and my questions have been answered. I have discussed any challenges I see with this plan with the nurse or doctor.    ..........................................................................................................................................  Patient/Patient Representative Signature      ..........................................................................................................................................  Patient Representative Print Name and Relationship to Patient    ..................................................               ................................................  Date                                   Time    ..........................................................................................................................................  Reviewed by Signature/Title    ...................................................              ..............................................  Date                                               Time          22EPIC Rev 08/18

## 2020-03-29 NOTE — LETTER
March 29, 2020      To Whom It May Concern:      Radha Bryan was seen in our Emergency Department today, 03/29/20.  Please excuse her from work until her fever and cough has been gone for 3 days according to the instruction by the [place] [employer].    Sincerely,        Prem Moulton MD

## 2020-03-30 ENCOUNTER — PATIENT OUTREACH (OUTPATIENT)
Dept: CARE COORDINATION | Facility: CLINIC | Age: 40
End: 2020-03-30

## 2020-03-30 NOTE — PROGRESS NOTES
Clinic Care Coordination Contact  The clinic Community Health Worker spoke with the patient today due to ED/Hospital Discharge to discuss possible Clinic Care Coordination enrollment.  The service was described to the patient and immediate needs were discussed.  The patient declined enrollment at this time.  The PCP is encouraged to refer  in the future if the patient's needs change.    Chart Review:  Referral made from discharge report.  ED for sore throat, sinus pressure and cough.  Follow up with PCP as needed, return to ED as needed if symptoms worsen.

## 2020-03-30 NOTE — DISCHARGE INSTRUCTIONS
Discharge Instructions  COVID-19    Your Provider has determined that you should practice self-isolation and self-monitoring in order to protect yourself and your community from COVID-19, which is the disease caused by a new coronavirus. The virus spreads from person to person primarily by droplets when an infected person coughs or sneezes and the droplet either lands on another person or that other person touches a surface with the droplet on it. Diagnosis of COVID-19 can be made with a test but many times the test is unavailable or not necessary. There is no specific treatment or medicine for the disease.    Symptoms of COVID-19  Many people have no symptoms or mild symptoms.  Symptoms may usually appear 4 to 5 days (up to 14 days) after contact with another ill person. Some people will get severe symptoms and pneumonia. Usual symptoms are:     Fever    Cough    Trouble breathing    Less common symptoms are: Headache, body aches, sore throat, sneezing,               diarrhea.    How to Care for Yourself    Stay home  Most people will recover from illness with mild symptoms.  Isolation by staying home is the best method to prevent the spread of the illness. Do not go to work or school. Have a friend or relative do your shopping. Do not use public transportation (bus, train) or ridesharing (Lyft, Uber).    How long should I stay home?    If you have symptoms of a respiratory disease (fever, cough), you should stay home for at least 7 days, and for 3 days with no fever and improvement of respiratory symptoms--whichever is longer. (Your fever should be gone for 3 days without using fever-reducing medicine.)  For example, if you have a fever and coughing for 4 days, you need to stay home 3 more days with no fever for a total of 7 days. Or, if you have a fever and coughing for 5 days, you need to stay home 3 more days with no fever for a total of 8 days.    Separate yourself from other people in your home.? As much as  possible, you should stay in one room and away from other people in your home. Also, use a separate bathroom, if possible. Avoid handling pets or other animals while sick.     Wear a facemask: if you need to be around other people and cover your mouth and nose with a tissue when you cough or sneeze.     Avoid sharing personal household items. You should not share dishes, drinking glasses, forks/knives/spoons, towels, or bedding with other people in your home. After using these items, they should be washed with soap and water. Clean parts of your home that are touched often (doorknobs, faucets, countertops, etc.) daily.     Wash your hands often with soap and water for at least 20 seconds or use an alcohol-based hand  containing at least 60% alcohol.     Avoid touching your face    Treat your symptoms: Take over the counter medications like ibuprofen (Advil or Motrin) or Acetaminophen (Tylenol). Drink fluids. Rest.    Watch for worsening symptoms: shortness of breath, or difficulty breathing.    Return to the Emergency Department if:    If you are developing worsening breathing, shortness of breath, or feel worse you should seek medical attention.  If you are uncertain, contact your health care provider/clinic. If you need emergency medical attention, call 911 and tell them you have been ill.    Discharge Instructions  Upper Respiratory Infection    The upper respiratory tract includes the sinuses, nasal passages, pharynx, and larynx. A URI, or upper respiratory infection, is an infection of any of the parts of the upper airway. Symptoms include runny nose, congestion, sneezing, sore throat, cough, and fever. URIs are almost always caused by a virus. Antibiotics do not help with viral infections, so are generally not prescribed. A URI is very contagious through coughing and nasal secretions; make sure you wash your hands often and clean surfaces after sneezing, coughing or touching them. While you should  start to improve in 3 - 5 days, remember that sometimes a cough can linger for several weeks.    Generally, every Emergency Department visit should have a follow-up clinic visit with either a primary or a specialty clinic/provider. Please follow-up as instructed by your emergency provider today.    Return to the Emergency Department if:  Any of your symptoms get much worse.  You seem very sick, like being too weak to get up.  You have chest pain or shortness of breath.   You have a severe headache.  You are vomiting (throwing up) so much you cannot keep fluids or medicines down.  You have confusion or seem unusually drowsy.  You have a seizure.    What can I do to help myself?  Fill any prescriptions the provider gave you and take them right away  If you have a fever, get plenty of rest and drink lots of fluids, especially water.  Using a humidifier or saline nose spray will also help loosen mucous.   Clothes or blankets will not change your fever. Do what is comfortable for you.  Bathing or sponging in lukewarm water may help you feel better.  Acetaminophen (Tylenol ) or ibuprofen (Advil , Motrin ) will help bring fever down and may help you feel more comfortable. Be sure to read and follow the package directions, and ask your provider if you have questions.  Do not drink alcohol.  Decongestants may help you feel better. You may use decongestant nose sprays Afrin  (oxymetazoline) or Yovanny-Synephrine  (phenylephrine hydrochloride) for up to 3 days, or may use a decongestant tablet like Sudafed  (pseudoephedrine).  If you were given a prescription for medicine here today, be sure to read all of the information (including the package insert) that comes with your prescription.  This will include important information about the medicine, its side effects, and any warnings that you need to know about.  The pharmacist who fills the prescription can provide more information and answer questions you may have about the  medicine.  If you have questions or concerns that the pharmacist cannot address, please call or return to the Emergency Department.   Remember that you can always come back to the Emergency Department if you are not able to see your regular provider in the amount of time listed above, if you get any new symptoms, or if there is anything that worries you.    Discharge Instructions  Sinus Infection    You have acute sinusitis, or an infection of the sinuses. The sinuses are the hollow areas within the facial bones that are connected to the nasal opening. The most common cause of acute sinusitis is a virus infection associated with the common cold. Bacterial sinusitis occurs much less commonly, usually as a complication of viral sinusitis. Experts say that most sinusitis is caused by a virus within the first 7-10 days of illness. Antibiotics do nothing to help with virus infections, so most people do not need antibiotics for acute sinusitis.     Generally, every Emergency Department visit should have a follow-up clinic visit with either a primary or a specialty clinic/provider. Please follow-up as instructed by your emergency provider today.    Return to the Emergency Department if:  Your vision changes.  You are confused or have difficulty thinking clearly.  You have swelling around your eye.  You develop a severe headache or neck stiffness.  Your symptoms get worse and you are unable to see your primary provider.      Treatment:  Pain relief -- Non-prescription pain medications, such as Tylenol  (acetaminophen) or Motrin  or Advil  (ibuprofen) are recommended for pain.  Do not use a medicine that you are allergic to, or if your provider has told you not to use it.     Nasal irrigation -- Flushing the nose and sinuses with a saline solution several times per day can help to decrease pain caused by congestion.  Nasal decongestants -- Nasal decongestant sprays, including Afrin  (oxymetazoline) and Yovanny-Synephrine   (phenylephrine) can be used to temporarily treat congestion. However, these sprays should not be used for more than two to three days due to the risk of rebound congestion (when the nose is congested constantly unless the medication is used repeatedly).  Nasal glucocorticoids -- These are prescription steroids delivered by a nasal spray that can help to reduce swelling inside the nose, usually within two to three days. These drugs have few side effects and dramatically relieve symptoms in most people.  If you use these in conjunction with Afrin  you will need to use at least 15 minutes prior to the nasal decongestant.    Antibiotic? -- Rarely antibiotics are used along with the above treatments.    If you were given a prescription for medicine here today, be sure to read all of the information (including the package insert) that comes with your prescription.  This will include important information about the medicine, its side effects, and any warnings that you need to know about.  The pharmacist who fills the prescription can provide more information and answer questions you may have about the medicine.  If you have questions or concerns that the pharmacist cannot address, please call or return to the Emergency Department.   Remember that you can always come back to the Emergency Department if you are not able to see your regular provider in the amount of time listed above, if you get any new symptoms, or if there is anything that worries you.

## 2020-03-30 NOTE — ED TRIAGE NOTES
Sore throat, cough and sinus infection x 2 weeks.  Seen in ER 3/17/20 and diagnosed with Sinusitis, started on Amoxicillin and Pseuodoephrine.  Pt states medications helped for about a week but symptoms have returned.

## 2020-03-30 NOTE — ED PROVIDER NOTES
History   Chief Complaint:  Pharyngitis and Cough     HPI   Radha Bryan is a 39 year old female who presents with sore throat, sinus pressure, and cough.  Patient reports she was in the ER 2 weeks ago diagnosed with strep pharyngitis and sinusitis.  She took amoxicillin and felt better until approximately 1 week ago.  She does not have neck stiffness, trouble swallowing, voice change, shortness of breath, chest pain, nausea,  vomiting, diarrhea, or fever.  Her work told her that she needs to be evaluated in the ER.    Allergies:  No Known Drug Allergies    Medications:   Diflucan  Flonase  Omeprazole  Sudafed    Past Medical History:    Irregular menstrual cycles  GERD  Anemia  Depression   Ovarian cyst    Past Surgical History:    Cholecystectomy   Laparoscopic hysterectomy total, salpingectomy bilateral  Laparoscopic tubal ligation     Family History:    Father: diabetes, hyperlipidemia, heart disease   Mother: heart disease, osteoporosis, thyroid disease    Social History:  The patient presents to the ED unaccompanied.  Smoking status: Never Smoker  Smokeless tobacco: Never used  Alcohol use: No  Drug use: No    Review of Systems   Constitutional: Negative for fever.   HENT: Positive for sinus pressure and sore throat. Negative for voice change.    Respiratory: Positive for cough. Negative for shortness of breath.         Denies difficulty breathing   Cardiovascular: Negative for chest pain.   Gastrointestinal: Negative for diarrhea, nausea and vomiting.   Musculoskeletal: Negative for neck stiffness.   All other systems reviewed and are negative.    Physical Exam     Patient Vitals for the past 24 hrs:   BP Temp Temp src Pulse Heart Rate Resp SpO2   03/29/20 1932 -- -- -- -- -- -- 100 %   03/29/20 1930 (!) 143/88 98.1  F (36.7  C) Temporal 91 91 16 (!) 10 %       Physical Exam  VS: Reviewed per above  HENT: Mucous membranes moist. Uvula midline, no tonsillar hypertrophy nor asymmetry. Tolerating  secretions, normal phonation. No nuchal rigidity.  EYES: sclera anicteric  CV: Rate as noted, regular rhythm.   RESP: Effort normal. Breath sounds are normal bilaterally.  NEURO: Alert, moving all extremities  MSK: No deformity of the extremities  SKIN: Warm and dry      Emergency Department Course     Emergency Department Course:  Past medical records, nursing notes, and vitals reviewed.   1935 I performed an exam of the patient and obtained history, as documented above.    Findings and plan explained to the Patient. Patient discharged home with instructions regarding supportive care, medications, and reasons to return. The importance of close follow-up was reviewed. The patient was prescribed Augmentin.     Impression & Plan      Medical Decision Making:  Radha Bryan is a 39 year old female who presents for evaluation of cough, sore throat, sinus pressure.  Vitals signs reveal no hypoxemia or tachycardia or fever. Constellation of sx is consistent with an influenza like illness vs URI but upon initial evaluation, there is also concern for possible SARS-CoV2 infection.   Further testing for influenza and SARS-CoV2 was not pursued as testing media is limited and currently being reserved for specific situations, mostly hospital admission. At this point in time, patient is well appearing and there are no historical or exam findings that merit hospital admission or further workup.  I have low suspicion for processes such as meningitis, peritonsillar abscess, retropharyngeal abscess, epiglottitis, pneumonia.  With possible double sickening phenomenon with respect to sinusitis symptoms, will prescribe Augmentin.  Encouraged tylenol at home and lots of fluids and rest. Discussed self isolation. Discussed return precautions including shortness of breath, neck stiffness, trouble swallowing, blood in sputum, unable to tolerate liquids or new concerns. Encouraged follow up with in primary care clinic as they are able as  well.      Diagnosis:    ICD-10-CM    1. Sinusitis, unspecified chronicity, unspecified location  J32.9    2. Acute pharyngitis, unspecified etiology  J02.9    3. Upper respiratory tract infection, unspecified type  J06.9         Disposition:  Discharged to home.    Discharge Medications:  New Prescriptions    AMOXICILLIN-CLAVULANATE (AUGMENTIN) 875-125 MG TABLET    Take 1 tablet by mouth 2 times daily for 7 days       3/29/2020   Jacki Vidal I, Jacki Vidal, am serving as a scribe at 7:34 PM on 3/29/2020 to document services personally performed by Prem Moulton MD based on my observations and the provider's statements to me.      Prem Moulton MD  03/29/20 2005

## 2020-04-07 ENCOUNTER — VIRTUAL VISIT (OUTPATIENT)
Dept: INTERNAL MEDICINE | Facility: CLINIC | Age: 40
End: 2020-04-07
Payer: COMMERCIAL

## 2020-04-07 DIAGNOSIS — N61.0 MASTITIS, ACUTE: Primary | ICD-10-CM

## 2020-04-07 PROCEDURE — 99441 ZZC PHYSICIAN TELEPHONE EVALUATION 5-10 MIN: CPT | Performed by: NURSE PRACTITIONER

## 2020-04-07 RX ORDER — AMOXICILLIN AND CLAVULANATE POTASSIUM 500; 125 MG/1; MG/1
1 TABLET, FILM COATED ORAL 2 TIMES DAILY
Qty: 20 TABLET | Refills: 0 | Status: SHIPPED | OUTPATIENT
Start: 2020-04-07 | End: 2020-05-05

## 2020-04-07 NOTE — PROGRESS NOTES
"Subjective     Radha Bryan is a 39 year old female who is being evaluated via a billable telephone visit.      The patient has been notified of following:     \"This telephone visit will be conducted via a call between you and your physician/provider. We have found that certain health care needs can be provided without the need for a physical exam.  This service lets us provide the care you need with a short phone conversation.  If a prescription is necessary we can send it directly to your pharmacy.  If lab work is needed we can place an order for that and you can then stop by our lab to have the test done at a later time.    Telephone visits are billed at different rates depending on your insurance coverage. During this emergency period, for some insurers they may be billed the same as an in-person visit.  Please reach out to your insurance provider with any questions.    If during the course of the call the physician/provider feels a telephone visit is not appropriate, you will not be charged for this service.\"    Patient has given verbal consent for Telephone visit?  Yes    Radha Bryan complains of   Chief Complaint   Patient presents with     Breast Pain     patient states that she is having Rt breast pain for 1 week. Looks a little swollen,no injury,, not breast feeding.       ALLERGIES  Patient has no known allergies.    R breast pain      Duration: 1 week    Description (location/character/radiation): redness, swelling and tenderness  R breast below areola, no masses or nipple discharge.  Not pregnant or breast feeding. No known injury    Intensity:  moderate    Accompanying signs and symptoms: afebrile    History (similar episodes/previous evaluation): None    Precipitating or alleviating factors: treated for strep throat 2 weeks ago    Therapies tried and outcome: NSAIDS, no heat              Patient Active Problem List   Diagnosis     Symptomatic anemia     Gastroesophageal reflux disease without " esophagitis     Past Surgical History:   Procedure Laterality Date     CHOLECYSTECTOMY       LAPAROSCOPIC HYSTERECTOMY TOTAL, SALPINGECTOMY BILATERAL  07/17/2019    AUB, fibroid uterus, anemia. [facility], Dr. Pamela Carrion     LAPAROSCOPIC TUBAL LIGATION         Social History     Tobacco Use     Smoking status: Never Smoker     Smokeless tobacco: Never Used   Substance Use Topics     Alcohol use: No     Family History   Problem Relation Age of Onset     Family History Negative Mother      No Known Problems Brother      No Known Problems Sister      No Known Problems Son      No Known Problems Daughter          Current Outpatient Medications   Medication Sig Dispense Refill     ibuprofen (ADVIL/MOTRIN) 200 MG tablet Take 400 mg by mouth daily as needed for mild pain       omeprazole (PRILOSEC) 20 MG DR capsule Take 1 capsule (20 mg) by mouth daily 90 capsule 3     pseudoePHEDrine (SUDAFED) 120 MG 12 hr tablet Take 1 tablet (120 mg) by mouth every 12 hours (Patient not taking: Reported on 4/7/2020) 30 tablet 0     BP Readings from Last 3 Encounters:   03/29/20 (!) 143/88   03/17/20 136/88   02/13/20 122/68    Wt Readings from Last 3 Encounters:   03/17/20 77.1 kg (170 lb)   01/22/20 76.2 kg (168 lb 1.6 oz)   08/27/19 75.4 kg (166 lb 3.2 oz)                    Reviewed and updated as needed this visit by Provider         Review of Systems   ROS COMP: Constitutional, HEENT, cardiovascular, pulmonary, gi and gu systems are negative, except as otherwise noted.       Objective   Reported vitals:  LMP 06/20/2019                Assessment/Plan:  1. Mastitis, acute    - amoxicillin-clavulanate (AUGMENTIN) 500-125 MG tablet; Take 1 tablet by mouth 2 times daily for 10 days  Dispense: 20 tablet; Refill: 0    NSAIDs, warm compresses  Call if not improving in 1 week      Phone call duration:  10 minutes    Jessica Zimmerman NP

## 2020-04-07 NOTE — NURSING NOTE
patient states that she is having Rt breast pain for 1 week. Looks a little swollen,no injury,, not breast feeding.

## 2020-05-05 ENCOUNTER — VIRTUAL VISIT (OUTPATIENT)
Dept: INTERNAL MEDICINE | Facility: CLINIC | Age: 40
End: 2020-05-05
Payer: COMMERCIAL

## 2020-05-05 DIAGNOSIS — M54.41 ACUTE RIGHT-SIDED LOW BACK PAIN WITH RIGHT-SIDED SCIATICA: Primary | ICD-10-CM

## 2020-05-05 PROCEDURE — 99213 OFFICE O/P EST LOW 20 MIN: CPT | Mod: 95 | Performed by: NURSE PRACTITIONER

## 2020-05-05 RX ORDER — CYCLOBENZAPRINE HCL 10 MG
10 TABLET ORAL 3 TIMES DAILY PRN
Qty: 30 TABLET | Refills: 0 | Status: SHIPPED | OUTPATIENT
Start: 2020-05-05 | End: 2020-05-19

## 2020-05-05 NOTE — PROGRESS NOTES
"Radha Bryan is a 39 year old female who is being evaluated via a billable telephone visit.      The patient has been notified of following:     \"This telephone visit will be conducted via a call between you and your physician/provider. We have found that certain health care needs can be provided without the need for a physical exam.  This service lets us provide the care you need with a short phone conversation.  If a prescription is necessary we can send it directly to your pharmacy.  If lab work is needed we can place an order for that and you can then stop by our lab to have the test done at a later time.    Telephone visits are billed at different rates depending on your insurance coverage. During this emergency period, for some insurers they may be billed the same as an in-person visit.  Please reach out to your insurance provider with any questions.    If during the course of the call the physician/provider feels a telephone visit is not appropriate, you will not be charged for this service.\"    Patient has given verbal consent for Telephone visit?  Yes    What phone number would you like to be contacted at? [phone]    How would you like to obtain your AVS? Mail a copy    Subjective     Radha Bryan is a 39 year old female who presents to clinic today for the following health issues:    HPI  Back Pain       Duration: 8 days        Specific cause: lifting    Description:   Location of pain: low back right, dull pain  Character of pain: intermittent  Pain radiation:radiates into the right buttock  New numbness or weakness in legs, not attributed to pain:  no    Intensity:     History:   Pain interferes with job: YES  History of back problems: yes  Any previous MRI or X-rays: Yes  Sees a specialist for back pain:  YES  Therapies tried without relief: Physical Therapy    Alleviating factors:   Improved by: walking, Physical Therapy and sitting, NSAIDs, heat, ice    Precipitating factors:  Worsened by: " Standing    She works as  and stands for long periods w/o breaks to sit down  Request time off work x 1-2 weeks      Accompanying Signs & Symptoms:  Risk of Fracture:  None  Risk of Cauda Equina:  None  Risk of Infection:  None  Risk of Cancer:  None  Risk of Ankylosing Spondylitis:  Onset at age <35, male, AND morning back stiffness. no                         Patient Active Problem List   Diagnosis     Symptomatic anemia     Gastroesophageal reflux disease without esophagitis     Past Surgical History:   Procedure Laterality Date     CHOLECYSTECTOMY       LAPAROSCOPIC HYSTERECTOMY TOTAL, SALPINGECTOMY BILATERAL  07/17/2019    AUB, fibroid uterus, anemia. [facility], Dr. Pamela Carrion     LAPAROSCOPIC TUBAL LIGATION         Social History     Tobacco Use     Smoking status: Never Smoker     Smokeless tobacco: Never Used   Substance Use Topics     Alcohol use: No     Family History   Problem Relation Age of Onset     Family History Negative Mother      No Known Problems Brother      No Known Problems Sister      No Known Problems Son      No Known Problems Daughter          Current Outpatient Medications   Medication Sig Dispense Refill     cyclobenzaprine (FLEXERIL) 10 MG tablet Take 1 tablet (10 mg) by mouth 3 times daily as needed for muscle spasms 30 tablet 0     ibuprofen (ADVIL/MOTRIN) 200 MG tablet Take 400 mg by mouth daily as needed for mild pain       omeprazole (PRILOSEC) 20 MG DR capsule Take 1 capsule (20 mg) by mouth daily 90 capsule 3       Reviewed and updated as needed this visit by Provider         Review of Systems   ROS COMP: Constitutional, HEENT, cardiovascular, pulmonary, gi and gu systems are negative, except as otherwise noted.       Objective   Reported vitals:  LMP 06/20/2019    Remainder of exam unable to be completed due to telephone visits            Assessment/Plan:  1. Acute right-sided low back pain with right-sided sciatica  RTW letter, off work 1 week  NSAIDS,  evette, zara  Consider formal PT if not improving  - cyclobenzaprine (FLEXERIL) 10 MG tablet; Take 1 tablet (10 mg) by mouth 3 times daily as needed for muscle spasms  Dispense: 30 tablet; Refill: 0          Phone call duration:  14 minutes    Jessica Zimmerman NP

## 2020-05-05 NOTE — PROGRESS NOTES
"Radha Bryan is a 39 year old female who is being evaluated via a billable telephone visit.      The patient has been notified of following:     \"This telephone visit will be conducted via a call between you and your physician/provider. We have found that certain health care needs can be provided without the need for a physical exam.  This service lets us provide the care you need with a short phone conversation.  If a prescription is necessary we can send it directly to your pharmacy.  If lab work is needed we can place an order for that and you can then stop by our lab to have the test done at a later time.    Telephone visits are billed at different rates depending on your insurance coverage. During this emergency period, for some insurers they may be billed the same as an in-person visit.  Please reach out to your insurance provider with any questions.    If during the course of the call the physician/provider feels a telephone visit is not appropriate, you will not be charged for this service.\"    Patient has given verbal consent for Telephone visit?  Yes    What phone number would you like to be contacted at? [phone]    How would you like to obtain your AVS? Mail a copy     Louisa Davis CMA.      Subjective     Radha Bryan is a 39 year old female who presents to clinic today for the following health issues:    HPI  {SUPERLIST (Optional):632538}      {additonal problems for provider to add (Optional):274516}    {HIST REVIEW/ LINKS 2 (Optional):720577}    Reviewed and updated as needed this visit by Provider         Review of Systems   {ROS COMP (Optional):534180}       Objective   Reported vitals:  LMP 06/20/2019    {GENERAL APPEARANCE:50::\"healthy\",\"alert\",\"no distress\"}  PSYCH: Alert and oriented times 3; coherent speech, normal   rate and volume, able to articulate logical thoughts, able   to abstract reason, no tangential thoughts, no hallucinations   or delusions  Her affect is { " ":9603500::\"normal\"}  RESP: No cough, no audible wheezing, able to talk in full sentences  Remainder of exam unable to be completed due to telephone visits    {Diagnostic Test Results (Optional):422653::\"Diagnostic Test Results:\",\"Labs reviewed in Epic\"}        Assessment/Plan:  {Diagnosis, Associated Orders and Comment:559855}    No follow-ups on file.      Phone call duration:  *** minutes    {signature options:880714}        "

## 2020-05-18 ENCOUNTER — TELEPHONE (OUTPATIENT)
Dept: INTERNAL MEDICINE | Facility: CLINIC | Age: 40
End: 2020-05-18

## 2020-05-18 ENCOUNTER — VIRTUAL VISIT (OUTPATIENT)
Dept: INTERNAL MEDICINE | Facility: CLINIC | Age: 40
End: 2020-05-18
Payer: COMMERCIAL

## 2020-05-18 DIAGNOSIS — M54.41 ACUTE RIGHT-SIDED LOW BACK PAIN WITH RIGHT-SIDED SCIATICA: ICD-10-CM

## 2020-05-18 DIAGNOSIS — M54.50 LUMBAR PAIN: Primary | ICD-10-CM

## 2020-05-18 PROCEDURE — 99213 OFFICE O/P EST LOW 20 MIN: CPT | Mod: 95 | Performed by: NURSE PRACTITIONER

## 2020-05-18 NOTE — LETTER
May 19, 2020      Radha Bryan  [address] [address] [facility] [address]        To Whom It May Concern:    Radha Bryan was seen in our clinic.  She will be absent from work 5/19/20 - 5/26/20 due to back pain      Sincerely,        Jessica Zimmerman, NP

## 2020-05-18 NOTE — TELEPHONE ENCOUNTER
Patient calling  She needs a note to excuse her from work. Ok to call and lm [phone]  She was seen today.

## 2020-05-18 NOTE — PROGRESS NOTES
"Radha Bryan is a 39 year old female who is being evaluated via a billable telephone visit.      The patient has been notified of following:     \"This telephone visit will be conducted via a call between you and your physician/provider. We have found that certain health care needs can be provided without the need for a physical exam.  This service lets us provide the care you need with a short phone conversation.  If a prescription is necessary we can send it directly to your pharmacy.  If lab work is needed we can place an order for that and you can then stop by our lab to have the test done at a later time.    Telephone visits are billed at different rates depending on your insurance coverage. During this emergency period, for some insurers they may be billed the same as an in-person visit.  Please reach out to your insurance provider with any questions.    If during the course of the call the physician/provider feels a telephone visit is not appropriate, you will not be charged for this service.\"    Patient has given verbal consent for Telephone visit?  Yes    What phone number would you like to be contacted at? [phone]    How would you like to obtain your AVS? Mail a copy    Subjective     Radha Bryan is a 39 year old female who presents via phone visit today for the following health issues:    HPI  Back Pain       Duration: 2 weeks        Specific cause: none    Description:   Location of pain: low back right and gluteus right  Character of pain: sharp and dull ache  Pain radiation:radiates into the right buttocks and radiates into the right leg  New numbness or weakness in legs, not attributed to pain:  no     Intensity: mild, moderate    History:   Pain interferes with job: No  History of back problems: recurrent self limited episodes of low back pain in the past  Any previous MRI or X-rays: 2017  Sees a specialist for back pain:  No  Therapies tried without relief: acetaminophen (Tylenol), heat, " muscle relaxants and NSAIDs    Alleviating factors:   Improved by: muscle relaxants      Precipitating factors:  Worsened by: Standing          Accompanying Signs & Symptoms:  Risk of Fracture:  None  Risk of Cauda Equina:  None  Risk of Infection:  None  Risk of Cancer:  None  Risk of Ankylosing Spondylitis:  Onset at age <35, male, AND morning back stiffness. no                         Patient Active Problem List   Diagnosis     Symptomatic anemia     Gastroesophageal reflux disease without esophagitis     Past Surgical History:   Procedure Laterality Date     CHOLECYSTECTOMY       LAPAROSCOPIC HYSTERECTOMY TOTAL, SALPINGECTOMY BILATERAL  07/17/2019    AUB, fibroid uterus, anemia. [facility], Dr. Pamela Carrion     LAPAROSCOPIC TUBAL LIGATION         Social History     Tobacco Use     Smoking status: Never Smoker     Smokeless tobacco: Never Used   Substance Use Topics     Alcohol use: No     Family History   Problem Relation Age of Onset     Family History Negative Mother      No Known Problems Brother      No Known Problems Sister      No Known Problems Son      No Known Problems Daughter          Current Outpatient Medications   Medication Sig Dispense Refill     cyclobenzaprine (FLEXERIL) 10 MG tablet Take 1 tablet (10 mg) by mouth 3 times daily as needed for muscle spasms 30 tablet 0     ibuprofen (ADVIL/MOTRIN) 200 MG tablet Take 400 mg by mouth daily as needed for mild pain       omeprazole (PRILOSEC) 20 MG DR capsule Take 1 capsule (20 mg) by mouth daily 90 capsule 3       Reviewed and updated as needed this visit by Provider         Review of Systems   Constitutional, HEENT, cardiovascular, pulmonary, gi and gu systems are negative, except as otherwise noted.       Objective   Reported vitals:  LMP 06/20/2019      Remainder of exam unable to be completed due to telephone visits            Assessment/Plan:  1. Lumbar pain    - Orthopedic & Spine  Referral; Future          Phone call  duration:  10 minutes    Jessica Zimmerman NP   The current medical regimen is effective;  continue present plan and medications.

## 2020-05-18 NOTE — TELEPHONE ENCOUNTER
Reason for Call:  medication refill:    [facility]?  Name of the pharmacy and phone number for the current request[facility]   Name of the medication requested:   cyclobenzaprine (FLEXERIL) 10 MG tablet  Other request:     Can we leave a detailed message on this number? YES    Phone number patient can be reached at: Cell number on file:    Telephone Information:   Mobile [phone]       Best Time: any    Call taken on 5/18/2020 at 2:40 PM by Sheri Gusman

## 2020-05-19 RX ORDER — CYCLOBENZAPRINE HCL 10 MG
10 TABLET ORAL 3 TIMES DAILY PRN
Qty: 30 TABLET | Refills: 0 | Status: SHIPPED | OUTPATIENT
Start: 2020-05-19 | End: 2020-07-06

## 2020-05-19 NOTE — TELEPHONE ENCOUNTER
"Attempted to contact pt. Unable to reach pt.   Dialing the '1' first, but phone still is stating \"need to dial a '1' first.\"    Tried multiple times.    If pt calls, letter is done. How does she want to get the letter?       "

## 2020-05-19 NOTE — TELEPHONE ENCOUNTER
Last RTW letter for 5/5 -5/12, will do new one for 5/19 -5/26 only as she will be starting PT  Sent to [employer] to print.  Jessica Zimmerman CNP

## 2020-05-20 NOTE — TELEPHONE ENCOUNTER
"Patient's home/cell number would not go through for me either.  Emergency contact number \"mail box is full\".    "

## 2020-05-21 ENCOUNTER — VIRTUAL VISIT (OUTPATIENT)
Dept: ORTHOPEDICS | Facility: CLINIC | Age: 40
End: 2020-05-21
Payer: COMMERCIAL

## 2020-05-21 VITALS — HEART RATE: 80 BPM | HEIGHT: 62 IN | WEIGHT: 165 LBS | BODY MASS INDEX: 30.36 KG/M2

## 2020-05-21 DIAGNOSIS — M51.369 BULGING OF LUMBAR INTERVERTEBRAL DISC: Primary | ICD-10-CM

## 2020-05-21 PROCEDURE — 99204 OFFICE O/P NEW MOD 45 MIN: CPT | Mod: TEL | Performed by: FAMILY MEDICINE

## 2020-05-21 RX ORDER — NAPROXEN 500 MG/1
500 TABLET ORAL 2 TIMES DAILY WITH MEALS
Qty: 60 TABLET | Refills: 1 | Status: SHIPPED | OUTPATIENT
Start: 2020-05-21 | End: 2020-06-12

## 2020-05-21 RX ORDER — TIZANIDINE 2 MG/1
2 TABLET ORAL
Qty: 30 TABLET | Refills: 0 | Status: SHIPPED | OUTPATIENT
Start: 2020-05-21 | End: 2020-06-12

## 2020-05-21 ASSESSMENT — MIFFLIN-ST. JEOR: SCORE: 1366.69

## 2020-05-21 NOTE — LETTER
May 21, 2020      Radha Bryan  [address] [address] [facility] [address]        To Whom It May Concern:    Radha Bryan  was seen on 5/21/20.  Please excuse her from work until 6/12/20 due to injury.        Sincerely,        Albert Yeo, MD

## 2020-05-21 NOTE — TELEPHONE ENCOUNTER
Dr Yeo did letter for pt today, when she had her Virtual visit with him.     Will close this encounter.

## 2020-05-21 NOTE — PATIENT INSTRUCTIONS
1. Bulging of lumbar intervertebral disc      -Patient has low back pain due to aggravation of lumbar herniated  -Previous MRI lumbar spine performed in 2017 was reviewed today.  Previous treatment and medical visits were reviewed and discussed.  All questions were answered.  -Patient will start formal physical therapy and home exercise program  -She will start naproxen 500 mg twice a day as needed.  patient will start tizanidine 2 mg at bedtime  -Patient will be held out of work until symptoms improve.  A work note was given today she can  the   -Patient will follow-up in 3 weeks for reevaluation and assess ability to return to work  -Call direct clinic number [[phone]] at any time with questions or concerns.    Albert Yeo MD [facility] Orthopedics and Sports Medicine  [facility] [place]

## 2020-05-21 NOTE — PROGRESS NOTES
"Radha Bryan is a 39 year old female who is being evaluated via a billable telephone visit.      The patient has been notified of following:     \"This telephone visit will be conducted via a call between you and your physician/provider. We have found that certain health care needs can be provided without the need for a physical exam.  This service lets us provide the care you need with a short phone conversation.  If a prescription is necessary we can send it directly to your pharmacy.  If lab work is needed we can place an order for that and you can then stop by our lab to have the test done at a later time.    If during the course of the call the physician/provider feels a telephone visit is not appropriate, you will not be charged for this service.\"     Physician has received verbal consent for a Telephone Visit from the patient? Yes    Radha Bryan complains of    Chief Complaint   Patient presents with     Back Pain         ALLERGIES  Patient has no known allergies.              ASSESSMENT & PLAN  Patient Instructions     1. Bulging of lumbar intervertebral disc      -Patient has low back pain due to aggravation of lumbar herniated  -Previous MRI lumbar spine performed in 2017 was reviewed today.  Previous treatment and medical visits were reviewed and discussed.  All questions were answered.  -Patient will start formal physical therapy and home exercise program  -She will start naproxen 500 mg twice a day as needed.  patient will start tizanidine 2 mg at bedtime  -Patient will be held out of work until symptoms improve.  A work note was given today she can  the   -Patient will follow-up in 3 weeks for reevaluation and assess ability to return to work  -Call direct clinic number [[phone]] at any time with questions or concerns.    Albert Yeo MD [facility] Orthopedics and Sports Medicine  [facility] [place]          -----    }.  SUBJECTIVE:    Radha Bryan is a 39 " "year old female who is seen  for low back pain .  Did have an injury in 2017 after carrying heavy items.  She did therapy, and was told she may have pain in the future.  Was told her \"5th disc is not good\".   Currently has pain in her low back.  Feels like needles in her back.  Pain in her low back with walking.  Does have pain and tingling that will travel to her knee down the back of her leg.    Pain with playing in bed.       Was referred to a surgeon in 2018 and did not continue due to not having any guarantee relief.   Did have relief between then and now due to having restrictions on lifting.     Works as a .  Will sometimes have to clean as well.  States she does have to fill a cooler at times.   Does not believe she has had any imaging since 2016.       Onset: 3 years(s) ago. Patient describes injury as lifting  Location of Pain: right low back and leg   Rating of Pain at worst: 8/10  Rating of Pain Currently: 5/10  Worsened by: lifting  Better with: muscle relaxant   Treatments tried: therapy, consulted with a surgeon   Quality: sharp  Red flags: Weakness: Yes, bowel/bladder loss: No, foot drop: No  Associated symptoms: tingling  Orthopedic history: YES - Date: 2016  Relevant surgical history: NO  Social history: social history: works as a chasier     Past Medical History:   Diagnosis Date     Irregular periods/menstrual cycles      Social History     Socioeconomic History     Marital status: Single     Spouse name: Not on file     Number of children: Not on file     Years of education: Not on file     Highest education level: Not on file   Occupational History     Not on file   Social Needs     Financial resource strain: Not on file     Food insecurity     Worry: Not on file     Inability: Not on file     Transportation needs     Medical: Not on file     Non-medical: Not on file   Tobacco Use     Smoking status: Never Smoker     Smokeless tobacco: Never Used   Substance and Sexual Activity     " "Alcohol use: No     Drug use: No     Sexual activity: Yes     Partners: Male     Birth control/protection: Female Surgical   Lifestyle     Physical activity     Days per week: Not on file     Minutes per session: Not on file     Stress: Not on file   Relationships     Social connections     Talks on phone: Not on file     Gets together: Not on file     Attends Rastafarian service: Not on file     Active member of club or organization: Not on file     Attends meetings of clubs or organizations: Not on file     Relationship status: Not on file     Intimate partner violence     Fear of current or ex partner: Not on file     Emotionally abused: Not on file     Physically abused: Not on file     Forced sexual activity: Not on file   Other Topics Concern     Not on file   Social History Narrative     Not on file         Patient's past medical, surgical, social, and family histories were reviewed today and no changes are noted.    REVIEW OF SYSTEMS:  10 point ROS is negative other than symptoms noted above in HPI, Past Medical History or as stated below  Constitutional: NEGATIVE for fever, chills, change in weight  Skin: NEGATIVE for worrisome rashes, moles or lesions  GI/: NEGATIVE for bowel or bladder changes  Neuro: NEGATIVE for weakness, dizziness or paresthesias    OBJECTIVE:  Pulse 80   Ht 1.575 m (5' 2\")   Wt 74.8 kg (165 lb)   LMP 06/20/2019   BMI 30.18 kg/m     General: healthy, alert and in no distress      Independent visualization of the below image:  No results found for this or any previous visit (from the past 24 hour(s)).        Phone call start: 9:20am  Phone call end:  9:50am    Albert Yeo MD, [facility] and Orthopedic [facility]      .  "

## 2020-05-27 ENCOUNTER — VIRTUAL VISIT (OUTPATIENT)
Dept: PHYSICAL THERAPY | Facility: CLINIC | Age: 40
End: 2020-05-27
Attending: FAMILY MEDICINE
Payer: COMMERCIAL

## 2020-05-27 DIAGNOSIS — M54.41 ACUTE RIGHT-SIDED LOW BACK PAIN WITH RIGHT-SIDED SCIATICA: ICD-10-CM

## 2020-05-27 DIAGNOSIS — M51.369 BULGING OF LUMBAR INTERVERTEBRAL DISC: ICD-10-CM

## 2020-05-27 PROCEDURE — 97530 THERAPEUTIC ACTIVITIES: CPT | Mod: 95 | Performed by: PHYSICAL THERAPIST

## 2020-05-27 PROCEDURE — 97110 THERAPEUTIC EXERCISES: CPT | Mod: 95 | Performed by: PHYSICAL THERAPIST

## 2020-05-27 NOTE — PROGRESS NOTES
"Physical Therapy Virtual Initial Visit      The patient has been notified of following:     \"This virtual visit will be conducted between you and your provider. We have found that certain health care needs can be provided without the need for physical presence.  This service lets us provide the care you need with a virtual visit.\"    Due to external, as well as internal [facility] management of the COVID-19 Virus, Radha Bryan was not seen in our clinic.  As a substitution, we implemented a virtual visit to manage this patient's condition utilizing the PTRx virtual visit platform via the patient s existing code.  The provider, Gabriella Colmenares, reviewed the patient's chart, PTRx prescription, and spoke with the patient to determine the following telemedicine visit is appropriate and effective for the patient's care.    The following type of visit was completed:   Telephone Visit:  A telephone visit was used in conjunction with the online PTRx exercise platform.         Subjective:  The history is provided by the patient. No  was used.   Patient Health History  Radha Bryan being seen for low back.     Date of Onset: 5/2020.   Problem occurred: lifting   Pain is reported as 7/10 on pain scale.  General health as reported by patient is good.  Pertinent medical history includes: anemia.   Red flags:  None as reported by patient.  Medical allergies: none.   Surgeries include:  Other. Other surgery history details: hysterectomy.    Current medications:  Muscle relaxants and anti-inflammatory.    Current occupation is .   Primary job tasks include:  Prolonged standing, repetitive tasks and lifting/carrying.                  Therapist Generated HPI Evaluation  Problem details: Patient is reporting onset of low back pain 3 weeks ago (May 2020) after lifting at work. States the pain radiates down the posterior R LE, stopping at the knee. Numbness and tingling present. Pain increases with " "standing > 10-15 minutes, sitting > 1 hour, bending and lifting. Feels the R LE is weaker, limps as a result. Has been prescribed scheduled NSAIDs and muscle relaxers with minimal relief. Not currently working secondary to the pain.    History of similar injury in 2017. States the pain resolved with physical therapy at [facility]. Was told by her  that the pain would return in 2-3 years because she has a \"bad disc\"..         Type of problem:  Lumbar.    This is a new condition.  Condition occurred with:  Lifting.  Where condition occurred: at work.  Patient reports pain:  Lower lumbar spine.  Pain is described as other (\"needles\") and is constant.  Pain radiates to:  Thigh right. Pain is the same all the time.  Since onset symptoms are unchanged.  Associated symptoms:  Loss of strength, tingling, numbness and loss of motion/stiffness. Symptoms are exacerbated by bending, lifting, standing, walking, twisting, sitting, other and lying down (bed mobility)  and relieved by NSAID's, muscle relaxants and other (icy/hot - all with minimal relief).  Special tests included:  MRI (2017: L5-S1 shallow R disc protrusion contacting ventral ascept L5 nerve root, mild to mod foraminal stenosis).  Previous treatment includes physical therapy.   Restrictions due to condition include:  Currently not working due to present treatment.  Barriers include:  None as reported by patient.                  Objective:    System       Lumbar/SI Evaluation  ROM:  Arom wnl lumbar: REIL: improves ROM, centralizes symptoms.  AROM Lumbar:   Flexion:        Knees pain returning to stand  Ext:                    25%    Side Bend:        Left:     Right:   Rotation:           Left:     Right:   Side Glide:        Left:     Right:           Lumbar Myotomes:  Lumbar myotomes: patient reports generalized weakness in R LE, unable to formally assess.                    Lumbar Palpation:  Palpation (lumbar): per patient report.  Tenderness present at Left:    " PSIS                                                     General   ROS    PTRx Content from today's visit:    Exercise Name: Prone Press Ups - Reps: 10 - Sessions: 6-10    Exercise Name: Neutral Spine Slouch Overcorrect, Notes: not an exercise - just a reminder to sit with good posture  lumbar roll    Assessment/Plan:     Patient is a 39 year old female with lumbar complaints.    Patient has the following significant findings with corresponding treatment plan.                Diagnosis 1:  Acute low back pain  Pain -  hot/cold therapy, manual therapy, education, directional preference exercise and home program  Decreased ROM/flexibility - manual therapy and therapeutic exercise  Impaired gait - gait training  Impaired muscle performance - neuro re-education  Decreased function - therapeutic activities    Therapy Evaluation Codes:   1) History comprised of:   Personal factors that impact the plan of care:      Past/current experiences and virtual telephone visit.    Comorbidity factors that impact the plan of care are:      Numbness/tingling and anemia.     Medications impacting care: Anti-inflammatory and Muscle relaxant.  2) Examination of Body Systems comprised of:   Body structures and functions that impact the plan of care:      Lumbar spine.   Activity limitations that impact the plan of care are:      Bending, Lifting, Sitting, Standing, Working, Sleeping and Laying down.  3) Clinical presentation characteristics are:   Evolving/Changing.  4) Decision-Making    Moderate complexity using standardized patient assessment instrument and/or measureable assessment of functional outcome.  Cumulative Therapy Evaluation is: Moderate complexity.    Previous and current functional limitations:  (See Goal Flow Sheet for this information)    Short term and Long term goals: (See Goal Flow Sheet for this information)     Communication ability:  Patient appears to be able to clearly communicate and understand verbal and  written communication and follow directions correctly.  Treatment Explanation - The following has been discussed with the patient:   RX ordered/plan of care  Anticipated outcomes  Possible risks and side effects  This patient would benefit from PT intervention to resume normal activities.   Rehab potential is good.    Frequency:  1 X week, once daily  Duration:  for 4 weeks tapering to 2 X a month over 4 weeks  Discharge Plan:  Achieve all LTG.  Independent in home treatment program.  Reach maximal therapeutic benefit.    Please refer to the daily flowsheet for treatment today, total treatment time and time spent performing 1:1 timed codes.       Virtual visit contact time    Time of service began: 12:00 PM  Time of service ended: 12:38 PM  Total Time for set up, visit, and documentation: 45 minutes    Payor: BLUE PLUS / Plan: BLUE PLUS ADVANTAGE MA / Product Type: HMO /     Procedure Code/s   Therapeutic Exercise (81308): 10 minutes  Therapeutic Activities (44603): 5 minutes  Evaluation: 23 minutes    I have reviewed the note as documented above.  This accurately captures the substance of my conversation with the patient.  Provider location: [place], [place] ([facility]State)  Patient location: home    ___________________________________________________

## 2020-06-01 ENCOUNTER — VIRTUAL VISIT (OUTPATIENT)
Dept: PHYSICAL THERAPY | Facility: CLINIC | Age: 40
End: 2020-06-01
Payer: COMMERCIAL

## 2020-06-01 DIAGNOSIS — M54.41 ACUTE RIGHT-SIDED LOW BACK PAIN WITH RIGHT-SIDED SCIATICA: ICD-10-CM

## 2020-06-01 PROCEDURE — 97110 THERAPEUTIC EXERCISES: CPT | Mod: 95 | Performed by: PHYSICAL THERAPIST

## 2020-06-01 NOTE — PROGRESS NOTES
"Physical Therapy Virtual Follow Up Visit      The patient has been notified of following:     \"This virtual visit will be conducted between you and your provider. We have found that certain health care needs can be provided without the need for physical presence.  This service lets us provide the care you need with a virtual visit.\"    Due to external, as well as internal [facility] management of the COVID-19 Virus, Radha Bryan was not seen in our clinic.  As a substitution, we implemented a virtual visit to manage this patient's condition utilizing the PTRx virtual visit platform via the patient s existing code.  The provider, Yeseina Tirado, reviewed the patient's chart, PTRx prescription, and spoke with the patient to determine the following telemedicine visit is appropriate and effective for the patient's care.    The following type of visit was completed:   Telephone Visit:  A telephone visit was used in conjunction with the online PTRx exercise platform.        S: Radha Bryan is a 39 year old female. Connected virtually on the PTRx platform to discuss their condition/progress. They noted improvements in .  They noted ongoing pain or limitations with back pain and leg symptoms. She is doing press ups about 5x daily but has increased leg symptoms as day progresses.     Current pain level: 7/10  Reported 6/10 following CORI    O: Patient demonstrated per patient, Forward bend to knees, Following CORI FB past knees and pain 6/10     PTRx Content from today's visit:  Exercise Name: Neutral Spine Slouch Overcorrect, Notes: not an exercise - just a reminder to sit with good posture    lumbar roll  Exercise Name: Standing Extension, Sets: 1 - Reps: 10 - Sessions: 6-10      A:   Patient is not progressing as expected.  Response to therapy has shown an improvement in  ROM       P: Patient will continue with the exercise program assigned on their PTRx code and will add the following measures to manage their " pain/condition:      Exercise modification. Changed to CORI and hold press ups for now      Virtual visit contact time    Time of service began: 11:42 AM  Time of service ended: 11:38 AM  Total Time for set up, visit, and documentation: 25 minutes    Payor: BLUE PLUS / Plan: BLUE PLUS ADVANTAGE MA / Product Type: HMO /   .  Procedure Code/s   Therapeutic Exercise (27009): 16 minutes    I have reviewed the note as documented above.  This accurately captures the substance of my conversation with the patient.  Provider location: [facility] ([facility]/State)  Patient location: Home

## 2020-06-08 ENCOUNTER — VIRTUAL VISIT (OUTPATIENT)
Dept: PHYSICAL THERAPY | Facility: CLINIC | Age: 40
End: 2020-06-08
Payer: COMMERCIAL

## 2020-06-08 DIAGNOSIS — M54.41 ACUTE RIGHT-SIDED LOW BACK PAIN WITH RIGHT-SIDED SCIATICA: ICD-10-CM

## 2020-06-08 PROCEDURE — 97110 THERAPEUTIC EXERCISES: CPT | Mod: 95 | Performed by: PHYSICAL THERAPIST

## 2020-06-08 NOTE — PROGRESS NOTES
"Physical Therapy Virtual Follow Up Visit      The patient has been notified of following:     \"This virtual visit will be conducted between you and your provider. We have found that certain health care needs can be provided without the need for physical presence.  This service lets us provide the care you need with a virtual visit.\"    Due to external, as well as internal [facility] management of the COVID-19 Virus, Radha Bryan was not seen in our clinic.  As a substitution, we implemented a virtual visit to manage this patient's condition utilizing the PTRx virtual visit platform via the patient s existing code.  The provider, Gabriella Colmenares, reviewed the patient's chart, PTRx prescription, and spoke with the patient to determine the following telemedicine visit is appropriate and effective for the patient's care.    The following type of visit was completed:   Telephone Visit:  A telephone visit was used in conjunction with the online PTRx exercise platform.        S: Radha Bryan is a 39 year old female. Connected virtually on the PTRx platform to discuss their condition/progress.     Reports the pain started to increase in the back on Friday, did feel some weakness in her R leg. Saturday had pain throughout the R LE. Now the leg is feeling better, only a small amount of pain, having  More pain across the entire low back. Has been working on the [employer] 4-5 times per day.       Current pain level: 6/10    O: Patient demonstrated Lumbar Flx: proximal shins     PTRx Content from today's visit:  Exercise Name: Neutral Spine Slouch Overcorrect, Notes: not an exercise - just a reminder to sit with good posture  lumbar roll    Exercise Name: Standing Extension, Sets: 1 - Reps: 10 - Sessions: 6-10    Attempted TrA contraction x 10 reps - but this resulted in pain, not for home yet    A:   Patient is progressing as expected.  Response to therapy has shown an improvement in  radicular symptoms, ROM and function "   Response to therapy has shown lack of progress in  pain level      P: Patient will continue with the exercise program assigned on their PTRx code and will add the following measures to manage their pain/condition: continue with CORI     Continue current treatment plan until patient demonstrates readiness to progress to higher level exercises.      Virtual visit contact time    Time of service began: 12:00 PM  Time of service ended: 12:25 PM  Total Time for set up, visit, and documentation: 30 minutes    Payor: BLUE PLUS / Plan: BLUE PLUS ADVANTAGE MA / Product Type: HMO /   .  Procedure Code/s   Therapeutic Exercise (18550): 25 minutes    I have reviewed the note as documented above.  This accurately captures the substance of my conversation with the patient.  Provider location: [place], [place] ([facility])  Patient location: home

## 2020-06-12 ENCOUNTER — VIRTUAL VISIT (OUTPATIENT)
Dept: ORTHOPEDICS | Facility: CLINIC | Age: 40
End: 2020-06-12
Payer: COMMERCIAL

## 2020-06-12 VITALS — BODY MASS INDEX: 31.28 KG/M2 | HEIGHT: 62 IN | WEIGHT: 170 LBS

## 2020-06-12 DIAGNOSIS — M51.369 BULGING OF LUMBAR INTERVERTEBRAL DISC: Primary | ICD-10-CM

## 2020-06-12 PROCEDURE — 99214 OFFICE O/P EST MOD 30 MIN: CPT | Mod: TEL | Performed by: FAMILY MEDICINE

## 2020-06-12 RX ORDER — TIZANIDINE 2 MG/1
2 TABLET ORAL
Qty: 30 TABLET | Refills: 0 | Status: ON HOLD | OUTPATIENT
Start: 2020-06-12 | End: 2020-08-27

## 2020-06-12 RX ORDER — NAPROXEN 500 MG/1
500 TABLET ORAL 2 TIMES DAILY WITH MEALS
Qty: 60 TABLET | Refills: 1 | Status: SHIPPED | OUTPATIENT
Start: 2020-06-12 | End: 2020-08-26

## 2020-06-12 ASSESSMENT — MIFFLIN-ST. JEOR: SCORE: 1389.36

## 2020-06-12 NOTE — PATIENT INSTRUCTIONS
1. Bulging of lumbar intervertebral disc      -Patient is following up for low back pain due to bulging intervertebral disc  -Patient will continue with physical therapy and home exercise program.  Patient will start in person visits next week which will hopefully be more effective  -Patient will continue with naproxen and tizanidine as needed.  Refills were sent to her pharmacy  -Patient will continue to be held out of work until 6/10/2020.  Patient will follow-up on 6/8/2020 for reevaluation and assess ability to return to work  -Call direct clinic number [[phone]] at any time with questions or concerns.    Albert Yeo MD CAQSM  [facility]  [facility] [place]

## 2020-06-12 NOTE — PROGRESS NOTES
"Radha Bryan is a 39 year old female who is being evaluated via a billable telephone visit.      The patient has been notified of following:     \"This telephone visit will be conducted via a call between you and your physician/provider. We have found that certain health care needs can be provided without the need for a physical exam.  This service lets us provide the care you need with a short phone conversation.  If a prescription is necessary we can send it directly to your pharmacy.  If lab work is needed we can place an order for that and you can then stop by our lab to have the test done at a later time.    Telephone visits are billed at different rates depending on your insurance coverage. During this emergency period, for some insurers they may be billed the same as an in-person visit.  Please reach out to your insurance provider with any questions.    If during the course of the call the physician/provider feels a telephone visit is not appropriate, you will not be charged for this service.\"    Patient has given verbal consent for Telephone visit?  Yes    What phone number would you like to be contacted at?     How would you like to obtain your AVS? Demario    Phone call duration: 23 minutes    ASSESSMENT & PLAN  Patient Instructions     1. Bulging of lumbar intervertebral disc      -Patient is following up for low back pain due to bulging intervertebral disc  -Patient will continue with physical therapy and home exercise program.  Patient will start in person visits next week which will hopefully be more effective  -Patient will continue with naproxen and tizanidine as needed.  Refills were sent to her pharmacy  -Patient will continue to be held out of work until 6/10/2020.  Patient will follow-up on 6/8/2020 for reevaluation and assess ability to return to work  -Call direct clinic number [[phone]] at any time with questions or concerns.    Albert Yeo MD [facility] " "[facility]          -----    SUBJECTIVE:  Radha Bryan is a 39 year old female who is seen in follow-up for low back pain.They were last seen 5/21/2020 for a virtual visit.     Since their last visit reports that some days she feels better and some days she feels the same. She notes pain and tingling down posterior right leg occasionally. They indicate that their current pain level is 6/10. They have tried rest/activity avoidance, ice, other medications: naproxen tizanidine, home exercises and physical therapy (3 visits), work restrictions.      The patient is seen by themselves.    Patient's past medical, surgical, social, and family histories were reviewed today and no changes are noted.    REVIEW OF SYSTEMS:  Constitutional: NEGATIVE for fever, chills, change in weight  Skin: NEGATIVE for worrisome rashes, moles or lesions  GI/: NEGATIVE for bowel or bladder changes  Neuro: NEGATIVE for weakness, dizziness or paresthesias    OBJECTIVE:  Ht 1.575 m (5' 2\")   Wt 77.1 kg (170 lb)   LMP 06/20/2019   BMI 31.09 kg/m           Albert Yeo MD, CAM  [facility]        "

## 2020-06-12 NOTE — LETTER
"    6/12/2020         RE: Radha Bryan  [address] [address]        Dear Colleague,    Thank you for referring your patient, Radha Bryan, to the [facility]. Please see a copy of my visit note below.    Radha Bryan is a 39 year old female who is being evaluated via a billable telephone visit.      The patient has been notified of following:     \"This telephone visit will be conducted via a call between you and your physician/provider. We have found that certain health care needs can be provided without the need for a physical exam.  This service lets us provide the care you need with a short phone conversation.  If a prescription is necessary we can send it directly to your pharmacy.  If lab work is needed we can place an order for that and you can then stop by our lab to have the test done at a later time.    Telephone visits are billed at different rates depending on your insurance coverage. During this emergency period, for some insurers they may be billed the same as an in-person visit.  Please reach out to your insurance provider with any questions.    If during the course of the call the physician/provider feels a telephone visit is not appropriate, you will not be charged for this service.\"    Patient has given verbal consent for Telephone visit?  Yes    What phone number would you like to be contacted at?     How would you like to obtain your AVS? Demario    Phone call duration: 23 minutes    ASSESSMENT & PLAN  Patient Instructions     1. Bulging of lumbar intervertebral disc      -Patient is following up for low back pain due to bulging intervertebral disc  -Patient will continue with physical therapy and home exercise program.  Patient will start in person visits next week which will hopefully be more effective  -Patient will continue with naproxen and tizanidine as needed.  Refills were sent to her pharmacy  -Patient will continue to be held out of work " "until 6/10/2020.  Patient will follow-up on 6/8/2020 for reevaluation and assess ability to return to work  -Call direct clinic number [[phone]] at any time with questions or concerns.    Albert Yeo MD [facility]  [facility]          -----    SUBJECTIVE:  Radha Bryan is a 39 year old female who is seen in follow-up for low back pain.They were last seen 5/21/2020 for a virtual visit.     Since their last visit reports that some days she feels better and some days she feels the same. She notes pain and tingling down posterior right leg occasionally. They indicate that their current pain level is 6/10. They have tried rest/activity avoidance, ice, other medications: naproxen tizanidine, home exercises and physical therapy (3 visits), work restrictions.      The patient is seen by themselves.    Patient's past medical, surgical, social, and family histories were reviewed today and no changes are noted.    REVIEW OF SYSTEMS:  Constitutional: NEGATIVE for fever, chills, change in weight  Skin: NEGATIVE for worrisome rashes, moles or lesions  GI/: NEGATIVE for bowel or bladder changes  Neuro: NEGATIVE for weakness, dizziness or paresthesias    OBJECTIVE:  Ht 1.575 m (5' 2\")   Wt 77.1 kg (170 lb)   LMP 06/20/2019   BMI 31.09 kg/m           Albert Yeo MD, [facility]          Again, thank you for allowing me to participate in the care of your patient.        Sincerely,        Albert Yeo, MD    "

## 2020-06-12 NOTE — LETTER
June 12, 2020      Radha Bryan  [address] [address]        To Whom It May Concern:    Radha Bryan  was seen on 6/12/20.  Please excuse her  until 7/10/20 due to injury.        Sincerely,          Albert Yeo, MD Allison Kucera, [facility] on behalf of Dr. Yeo

## 2020-06-16 ENCOUNTER — VIRTUAL VISIT (OUTPATIENT)
Dept: PHYSICAL THERAPY | Facility: CLINIC | Age: 40
End: 2020-06-16
Payer: COMMERCIAL

## 2020-06-16 DIAGNOSIS — M54.41 ACUTE RIGHT-SIDED LOW BACK PAIN WITH RIGHT-SIDED SCIATICA: ICD-10-CM

## 2020-06-16 PROCEDURE — 97110 THERAPEUTIC EXERCISES: CPT | Mod: 95 | Performed by: PHYSICAL THERAPIST

## 2020-06-16 PROCEDURE — 97530 THERAPEUTIC ACTIVITIES: CPT | Mod: 95 | Performed by: PHYSICAL THERAPIST

## 2020-06-16 NOTE — PROGRESS NOTES
"Physical Therapy Virtual Follow Up Visit      The patient has been notified of following:     \"This virtual visit will be conducted between you and your provider. We have found that certain health care needs can be provided without the need for physical presence.  This service lets us provide the care you need with a virtual visit.\"    Due to external, as well as internal [facility] management of the COVID-19 Virus, Radha Bryan was not seen in our clinic.  As a substitution, we implemented a virtual visit to manage this patient's condition utilizing the [employer] virtual visit platform via the patient s existing code.  The provider, Kari Yeung, reviewed the patient's chart, PTRx prescription, and spoke with the patient to determine the following telemedicine visit is appropriate and effective for the patient's care.    The following type of visit was completed:   Telephone Visit:  A telephone visit was used in conjunction with the online PTRx exercise platform.        S: Radha Bryan is a 39 year old female. Connected virtually on the [employer] platform to discuss their condition/progress. Called patient 7' into appt time, preferred to be called back in 5'.  Called back and proceeded with visit.   Doing CORI with hands on hips 5-6x/day, 10 repetitions.  Not consistently using towel roll as lumbar roll.  Has slight increase of pain following, does not last.  Overall feels slight improvement in pain.  Pain is right LB, right buttock and right LE posterior to just below the knee.  Pain is intermittent, ranges from 0-8/10 and has intermittent numbness/tingling to the same and intermittent \"weakness\" right LE.  Symptoms increase with standing >30', prolonged walking (has been told by others she is limping), intermittently wakes from, sitting>1 hour.  Symptoms decrease with lying supine, using medication, ice.   Current pain level:  Right LB 8/10    O:   Symptoms prior to test movements:  8/10 right LB  CORI with " "hands on hips:  Limited ROM, no effect   CORI with hips on counter:  \"feels better than with hands\", decrease pain/better to 6/10; further repetitions, further decrease/better  Prone:  Central LBP 5/10  REIL:  No effect  REIL with pt OP/\"sag\":  No effect pain, low back \"tired\"; pain returns to right LB upon standing     Patient instructed to change HEP to CORI with hips against counter vs using hands, restart REIL - between the 2 exercises, goal is 6-10x/day    Verbal review of \"other\" exercise patient is doing - states she has been doing single knee to chest 3-5 repetitions 5x/day IN ADDITION to doing CORI.  Patient instructed to HOLD on KTC for now.    Review importance of using lumbar/towel roll when sitting.  She preferred to have roll - gave patient info on price at [facility] and [employer], preferred to go to [facility] to purchase and will plan to tomorrow.    Education re: body mechanics, using golfer's lift and squat lift and will put videos in PTRX for patient to review.      Stressed importance of importance of frequency of exercise (CORI/REIL), avoiding trunk flexion for now (body mechanics) and being consistent with good sitting posture (roll).        PTRx Content from today's visit:  Exercise Name: Standing Extension - Reps: 10 - Sessions: 6-10 times/day - can do some against the counter and do some lying on your stomach, Notes: DO with your hips against the kitchen counter instead of using your hands on your hips  Exercise Name: Prone Press Ups - Reps: 10  Exercise Name: Neutral Spine Slouch Overcorrect, Notes: not an exercise - just a reminder to sit with good posture    lumbar roll  Exercise Name: Body Mechanics - Golfers Lift  Exercise Name: Body Mechanics - Full Squa    A:   Patient is not progressing as expected.  Response to therapy has shown lack of progress in  pain level, radicular symptoms and function      P: Patient will continue with the exercise program assigned on their PTRx code and will " add the following measures to manage their pain/condition: change CORI to doing at counter, restart REIL to do alternately with CORI, STOP KTC and work on consistency with proper posture and body mechanics.     Current treatment program is being advanced to more complex exercises.      Virtual visit contact time    Time of service began: 11:54 AM  Time of service ended: 12:27 PM  Total Time for set up, visit, and documentation: 43 minutes    Payor: BLUE PLUS / Plan: BLUE PLUS ADVANTAGE MA / Product Type: HMO /   .  Procedure Code/s   Therapeutic Exercise (77367): 23 minutes  Therapeutic Activities (50427): 10 minutes    I have reviewed the note as documented above.  This accurately captures the substance of my conversation with the patient.  Provider location: [facility] ([facility])  Patient location: Home

## 2020-06-29 ENCOUNTER — THERAPY VISIT (OUTPATIENT)
Dept: PHYSICAL THERAPY | Facility: CLINIC | Age: 40
End: 2020-06-29
Payer: COMMERCIAL

## 2020-06-29 DIAGNOSIS — M54.41 ACUTE RIGHT-SIDED LOW BACK PAIN WITH RIGHT-SIDED SCIATICA: ICD-10-CM

## 2020-06-29 PROCEDURE — 97110 THERAPEUTIC EXERCISES: CPT | Mod: GP | Performed by: PHYSICAL THERAPIST

## 2020-07-06 ENCOUNTER — OFFICE VISIT (OUTPATIENT)
Dept: INTERNAL MEDICINE | Facility: CLINIC | Age: 40
End: 2020-07-06
Payer: COMMERCIAL

## 2020-07-06 VITALS
OXYGEN SATURATION: 98 % | HEIGHT: 62 IN | SYSTOLIC BLOOD PRESSURE: 113 MMHG | DIASTOLIC BLOOD PRESSURE: 75 MMHG | HEART RATE: 64 BPM | RESPIRATION RATE: 16 BRPM | TEMPERATURE: 97.5 F | BODY MASS INDEX: 32.87 KG/M2 | WEIGHT: 178.6 LBS

## 2020-07-06 DIAGNOSIS — B36.0 TINEA VERSICOLOR: ICD-10-CM

## 2020-07-06 PROCEDURE — 99213 OFFICE O/P EST LOW 20 MIN: CPT | Performed by: NURSE PRACTITIONER

## 2020-07-06 RX ORDER — FLUCONAZOLE 150 MG/1
TABLET ORAL
Qty: 8 TABLET | Refills: 0 | Status: SHIPPED | OUTPATIENT
Start: 2020-07-06 | End: 2020-08-26

## 2020-07-06 ASSESSMENT — MIFFLIN-ST. JEOR: SCORE: 1428.37

## 2020-07-06 NOTE — PATIENT INSTRUCTIONS
Will treat with Diflucan 150 mg 2 tablets weekly x 4 weeks as prescribed by Dermatologist since worked    Encouraged to follow-up with Dermatologist for ongoing    Avoid exposure to sun and use sunscreen    Continue to use Selsun Blue shampoo can help.    Follow-up in 2 months for physical and labs with PCP Thuy GARCIA

## 2020-07-06 NOTE — PROGRESS NOTES
Subjective     Radha Bryan is a 39 year old female who presents to clinic today for the following health issues:    HPI     Derm problem face/neck/chest/arms for 3 weeks.     See above.  Per medical records, patient was evaluated by a Dermatologist Dr. Edmonds on 2/13/2020 for a rash on chest/back and diagnosed withTinea Versicolor.  Treated with Diflucan 150 mg 2 tablets weekly x 4 weeks and Selsan Blue shampoo.      Patient Active Problem List   Diagnosis     Symptomatic anemia     Gastroesophageal reflux disease without esophagitis     Acute right-sided low back pain with right-sided sciatica     Tinea versicolor     Past Surgical History:   Procedure Laterality Date     CHOLECYSTECTOMY       LAPAROSCOPIC HYSTERECTOMY TOTAL, SALPINGECTOMY BILATERAL  07/17/2019    AUB, fibroid uterus, anemia. [facility], Dr. Pamela Carrion     LAPAROSCOPIC TUBAL LIGATION         Social History     Tobacco Use     Smoking status: Never Smoker     Smokeless tobacco: Never Used   Substance Use Topics     Alcohol use: No     Family History   Problem Relation Age of Onset     Family History Negative Mother      No Known Problems Brother      No Known Problems Sister      No Known Problems Son      No Known Problems Daughter          Current Outpatient Medications   Medication Sig Dispense Refill     fluconazole (DIFLUCAN) 150 MG tablet 2 tablets weekly x 4 weeks 8 tablet 0     naproxen (NAPROSYN) 500 MG tablet Take 1 tablet (500 mg) by mouth 2 times daily (with meals) 60 tablet 1     omeprazole (PRILOSEC) 20 MG DR capsule Take 1 capsule (20 mg) by mouth daily 90 capsule 3     tiZANidine (ZANAFLEX) 2 MG tablet Take 1 tablet (2 mg) by mouth nightly as needed for muscle spasms 30 tablet 0     No Known Allergies      Reviewed and updated as needed this visit by Provider  Tobacco  Allergies  Meds  Med Hx  Soc Hx        Review of Systems   Constitutional, HEENT, cardiovascular, pulmonary, gi and gu systems are negative,  "except as otherwise noted.      Objective    /75 (BP Location: Right arm, Patient Position: Chair, Cuff Size: Adult Small)   Pulse 64   Temp 97.5  F (36.4  C)   Resp 16   Ht 1.575 m (5' 2\")   Wt 81 kg (178 lb 9.6 oz)   LMP 06/20/2019   SpO2 98%   Breastfeeding No   BMI 32.67 kg/m    Body mass index is 32.67 kg/m .     Physical Exam   GENERAL: healthy, alert and no distress  NECK: no adenopathy, no asymmetry, masses, or scars and thyroid normal to palpation  RESP: lungs clear to auscultation - no rales, rhonchi or wheezes  CV: regular rate and rhythm, normal S1 S2, no S3 or S4, no murmur, click or rub, no peripheral edema and peripheral pulses strong  SKIN: on chest, back, creases of elbow and armpits with hyperpigmented macules of varying sizes that itch from time to time. States this type of rash is worse in the summer and no issues during the winter    Diagnostic Test Results:  Labs reviewed in Epic        Assessment & Plan     1. Tinea versicolor  - recurrent issue so will prescribe this medication since helped the last time (ordered previously by Dermatologist):   fluconazole (DIFLUCAN) 150 MG tablet; 2 tablets weekly x 4 weeks  Dispense: 8 tablet; Refill: 0     BMI:   Estimated body mass index is 32.67 kg/m  as calculated from the following:    Height as of this encounter: 1.575 m (5' 2\").    Weight as of this encounter: 81 kg (178 lb 9.6 oz).           Patient Instructions   Will treat with Diflucan 150 mg 2 tablets weekly x 4 weeks as prescribed by Dermatologist since worked    Encouraged to follow-up with Dermatologist for ongoing    Avoid exposure to sun and use sunscreen    Continue to use Selsun Blue shampoo can help.    Follow-up in 2 months for physical and labs with PCP Thuy GARCIA      Return in about 2 months (around 9/6/2020) for Physical Exam, Lab Work, In-Clinic Visit with PCP.    Yeimy Garcia CNP  [facility]      "

## 2020-08-07 ENCOUNTER — APPOINTMENT (OUTPATIENT)
Dept: CT IMAGING | Facility: CLINIC | Age: 40
End: 2020-08-07
Attending: PHYSICIAN ASSISTANT
Payer: COMMERCIAL

## 2020-08-07 ENCOUNTER — HOSPITAL ENCOUNTER (EMERGENCY)
Facility: CLINIC | Age: 40
Discharge: HOME | End: 2020-08-07
Attending: PHYSICIAN ASSISTANT | Admitting: PHYSICIAN ASSISTANT
Payer: COMMERCIAL

## 2020-08-07 VITALS
TEMPERATURE: 97.9 F | DIASTOLIC BLOOD PRESSURE: 85 MMHG | OXYGEN SATURATION: 97 % | RESPIRATION RATE: 16 BRPM | HEART RATE: 77 BPM | SYSTOLIC BLOOD PRESSURE: 126 MMHG

## 2020-08-07 DIAGNOSIS — G44.209 ACUTE NON INTRACTABLE TENSION-TYPE HEADACHE: ICD-10-CM

## 2020-08-07 DIAGNOSIS — R51.9 SCALP PAIN: ICD-10-CM

## 2020-08-07 PROCEDURE — 99284 EMERGENCY DEPT VISIT MOD MDM: CPT | Mod: 25

## 2020-08-07 PROCEDURE — 25000132 ZZH RX MED GY IP 250 OP 250 PS 637: Performed by: PHYSICIAN ASSISTANT

## 2020-08-07 PROCEDURE — 25000128 H RX IP 250 OP 636: Performed by: PHYSICIAN ASSISTANT

## 2020-08-07 PROCEDURE — 70450 CT HEAD/BRAIN W/O DYE: CPT

## 2020-08-07 RX ORDER — ONDANSETRON 4 MG/1
4 TABLET, ORALLY DISINTEGRATING ORAL ONCE
Status: COMPLETED | OUTPATIENT
Start: 2020-08-07 | End: 2020-08-07

## 2020-08-07 RX ORDER — ACETAMINOPHEN 500 MG
1000 TABLET ORAL ONCE
Status: COMPLETED | OUTPATIENT
Start: 2020-08-07 | End: 2020-08-07

## 2020-08-07 RX ORDER — ONDANSETRON 4 MG/1
4 TABLET, ORALLY DISINTEGRATING ORAL EVERY 8 HOURS PRN
Qty: 10 TABLET | Refills: 0 | Status: ON HOLD | OUTPATIENT
Start: 2020-08-07 | End: 2020-08-27

## 2020-08-07 RX ADMIN — ONDANSETRON 4 MG: 4 TABLET, ORALLY DISINTEGRATING ORAL at 22:04

## 2020-08-07 RX ADMIN — ACETAMINOPHEN 1000 MG: 500 TABLET, FILM COATED ORAL at 22:04

## 2020-08-07 ASSESSMENT — ENCOUNTER SYMPTOMS
NUMBNESS: 0
FEVER: 0
SHORTNESS OF BREATH: 0
HEADACHES: 1
CHILLS: 0
COUGH: 0
NAUSEA: 1
SINUS PRESSURE: 0
WEAKNESS: 0
SPEECH DIFFICULTY: 0
VOMITING: 0

## 2020-08-07 NOTE — ED AVS SNAPSHOT
[facility] Emergency Department  [address]  [facility] [address]  Phone:  [phone]  Fax:  [phone]                                    Radha Bryan   MRN: [id number]    Department:  [facility] Emergency Department   Date of Visit:  8/7/2020           After Visit Summary Signature Page    I have received my discharge instructions, and my questions have been answered. I have discussed any challenges I see with this plan with the nurse or doctor.    ..........................................................................................................................................  Patient/Patient Representative Signature      ..........................................................................................................................................  Patient Representative Print Name and Relationship to Patient    ..................................................               ................................................  Date                                   Time    ..........................................................................................................................................  Reviewed by Signature/Title    ...................................................              ..............................................  Date                                               Time          22EPIC Rev 08/18

## 2020-08-08 NOTE — ED PROVIDER NOTES
History   Chief Complaint  Headache    The history is provided by the patient.      Radha Bryan is a 39 year old female with a history of depression and hypertriglyceridemia who presents for evaluation of intermittent headaches for the past 1 month on the left side of her head. Currently she rates the pain as an 8/10 in severity. She has a history of sinus infections, but this does not feel like typical sinus infection. A week ago she noticed an indentation on her hair line on the left side. She's also had occasional blurry vision with reading for the past month. Her children also noticed she has been occasionally forgetful. One week of intermittent nausea, but no vomiting. She denies fevers, chills, cough, sinus congestion, and shortness of breath. No numbness or tingling in her face or weakness in her extremities. There have been no recent trauma or falls. No difficulty speaking. She has no history of migraines. There is no chance she is pregnant. She has been taking ibuprofen intermittently, but none today.     Allergies  No Known Allergies    Medications  Omeprazole  Tizanidine     Past Medical History  Irregular menstrual cycles  Tinea versicolor   Right ovarian cyst  Menorrhagia  Hypertriglyceridemia  Depression  Left ovarian cyst  Intramural leiomyoma of uterus  Microcytic anemia    Past Surgical History  Cholecystectomy  Laparoscopic hysterectomy total  Laparoscopic tubal ligation     Family History  Father - diabetes, heart disease, high cholesterol     Social History  The patient was unaccompanied to the ED.  Smoking Status: never  Smokeless Tobacco: never  Alcohol Use: no  Drug Use: no    Review of Systems   Constitutional: Negative for chills and fever.   HENT: Negative for congestion and sinus pressure.    Eyes: Positive for visual disturbance.   Respiratory: Negative for cough and shortness of breath.    Gastrointestinal: Positive for nausea. Negative for vomiting.   Neurological: Positive for  headaches. Negative for speech difficulty, weakness and numbness.   All other systems reviewed and are negative.    Physical Exam     Patient Vitals for the past 24 hrs:   BP Temp Temp src Pulse Heart Rate Resp SpO2   08/07/20 2235 -- -- -- -- -- -- 97 %   08/07/20 2230 -- -- -- -- -- -- 99 %   08/07/20 2225 -- -- -- -- -- -- 97 %   08/07/20 2220 -- -- -- -- -- -- 97 %   08/07/20 2215 126/85 -- -- 77 -- -- 97 %   08/07/20 2205 123/81 -- -- 72 -- -- --   08/07/20 1949 (!) 152/91 97.9  F (36.6  C) Oral 80 80 16 99 %     Physical Exam  General: Alert and interactive. Appears well. Cooperative and pleasant.   Head: Small indentation in the left scalp. No hematoma. No erythema.   Eyes: The pupils are equal and round. EOMs intact. No scleral icterus.  ENT: No abnormalities to the external nose or ears. Mucous membranes moist. Posterior oropharynx is non-erythematous. No sinus tenderness to percussion.   Neck: Trachea is in the midline. No nuchal rigidity.     CV: Regular rate and rhythm. S1 and S2 normal without murmur, click, gallop or rub.   Resp: Breath sounds are clear bilaterally, without rhonchi, wheezes, rales. Non-labored, no retractions or accessory muscle use.     GI: Abdomen is soft without distension. No tenderness to palpation. No peritoneal signs.    MS: Moving all extremities well. Good muscle tone.   Skin: Warm and dry. No rash or lesions noted.  Neuro: Alert and oriented x 3. No focal neurologic deficits. Good strength and sensation in upper and lower extremities. Psych: Awake. Alert.  Normal affect. Appropriate interactions.  Lymph: No anterior or posterior cervical lymphadenopathy noted.    Emergency Department Course   Imaging:  Radiology findings were communicated with the patient who voiced understanding of the findings.    Head CT w/o contrast:  IMPRESSION:   1.  No acute intracranial process.   Readings per Radiology    Interventions:  2204 Tylenol 1,000 mg PO  2204 Zofran 4 mg PO    Emergency  Department Course:  Past medical records, nursing notes, and vitals reviewed.    2100 I physically examined the patient as documented above.    The patient was sent for radiographs while in the emergency department, results above.     2230 I rechecked the patient and discussed the findings of their workup thus far.     Findings and plan explained to the Patient. Patient discharged home with instructions regarding supportive care, medications, and reasons to return. The importance of close follow-up was reviewed. The patient was prescribed Zofran.     I personally reviewed the imaging results with the Patient and answered all related questions prior to discharge.     Impression & Plan   Medical Decision Making:  Radha Bryan is a 39 year old female with a history of chronic sinus infections who presents for evaluation of left sided ongoing headache for the past month worsening in the past week. She also noted that she had a dent in her scalp which she was concerned about. The patient has a normal neurologic examination without any focal findings on her scalp. She possible has a small indentation on the left side of her scalp, but there are no inflammatory changes surrounding this, and I suspect this is chronic. She has no sinus congestion or tenderness to percussion over her sinuses bilaterally. Given the unusual nature of her headache and lack of migraine history, I did order a CT of her head which is negative for intracranial process. This is not the worst headache of her life nor was it sudden in onset to maximum intensity; therefore, I am doubtful of subarachnoid hemorrhage and do not think lumbar puncture is necessary for official rule out. She has no neurologic deficits to suggest CVA. Her blood pressure was normal here without any suggestion of hypertensive urgency or emergency. There is no fever or chills to suggest meningitis. She feels better after Tylenol and Zofran. I've suggested Tylenol/ibuprofen at  home for her pain as well as Zofran for nausea. If she continues to have headaches into next week she should follow-up with her primary care for possible neurology referral. She is stable for discharge at this time.     Diagnosis:    ICD-10-CM    1. Acute non intractable tension-type headache  G44.209    2. Scalp pain  R51      Disposition:  Discharged to home.    Discharge Medications:  Discharge Medication List as of 8/7/2020 10:39 PM      START taking these medications    Details   ondansetron (ZOFRAN ODT) 4 MG ODT tab Take 1 tablet (4 mg) by mouth every 8 hours as needed for nausea, Disp-10 tablet,R-0, Local Print             Scribe Disclosure:  I, Lucila Johnston, am serving as a scribe at 8:59 PM on 8/7/2020 to document services personally performed by Miranda Yost PA-C based on my observations and the provider's statements to me.      Miranda Yost PA-C  08/08/20 0059

## 2020-08-08 NOTE — DISCHARGE INSTRUCTIONS
Take Ibuprofen or Tylenol for pain.   Use Zofran for nausea.   If you're not feeling better in a week, talk to your primary care.   Return here for fevers/chills, weakness in arms/legs.   Discharge Instructions  Headache    You were seen today for a headache. Headaches may be caused by many different things such as muscle tension, sinus inflammation, anxiety and stress, having too little sleep, too much alcohol, some medical conditions or injury. You may have a migraine, which is caused by changes in the blood vessels in your head.  At this time your provider does not find that your headache is a sign of anything dangerous or life-threatening.  However, sometimes the signs of serious illness do not show up right away.      Generally, every Emergency Department visit should have a follow-up clinic visit with either a primary or a specialty clinic/provider. Please follow-up as instructed by your emergency provider today.    Return to the Emergency Department if:  You get a new fever of 100.4 F or higher.  Your headache gets much worse.  You get a stiff neck with your headache.  You get a new headache that is significantly different or worse than headaches you have had before.  You are vomiting (throwing up) and cannot keep food or water down.  You have blurry or double vision or other problems with your eyes.  You have a new weakness on one side of your body.  You have difficulty with balance which is new.  You or your family thinks you are confused.  You have a seizure.    What can I do to help myself?  Pain medications - You may take a pain medication such as Tylenol  (acetaminophen), Advil , Motrin  (ibuprofen) or Aleve  (naproxen).  Take a pain reliever as soon as you notice symptoms.  Starting medications as soon as you start to have symptoms may lessen the amount of pain you have.  Relaxing in a quiet, dark room may help.  Get enough sleep and eat meals regularly.  You may need to watch for certain foods or  other things which may trigger your headaches.  Keeping a journal of your headaches and possible triggers may help you and your primary provider to identify things which you should avoid which may be causing your headaches.  If you were given a prescription for medicine here today, be sure to read all of the information (including the package insert) that comes with your prescription.  This will include important information about the medicine, its side effects, and any warnings that you need to know about.  The pharmacist who fills the prescription can provide more information and answer questions you may have about the medicine.  If you have questions or concerns that the pharmacist cannot address, please call or return to the Emergency Department.   Remember that you can always come back to the Emergency Department if you are not able to see your regular provider in the amount of time listed above, if you get any new symptoms, or if there is anything that worries you.

## 2020-08-08 NOTE — ED TRIAGE NOTES
"Headache and facial pain x 1 week with nausea.  Pt reports that she has a \"dent\" in her skull that has not been present before.  No hx/o migraines.  "

## 2020-08-12 ENCOUNTER — VIRTUAL VISIT (OUTPATIENT)
Dept: ORTHOPEDICS | Facility: CLINIC | Age: 40
End: 2020-08-12
Payer: COMMERCIAL

## 2020-08-12 DIAGNOSIS — M54.31 SCIATICA, RIGHT SIDE: ICD-10-CM

## 2020-08-12 DIAGNOSIS — M51.369 BULGING OF LUMBAR INTERVERTEBRAL DISC: Primary | ICD-10-CM

## 2020-08-12 PROCEDURE — 99214 OFFICE O/P EST MOD 30 MIN: CPT | Mod: 95 | Performed by: FAMILY MEDICINE

## 2020-08-12 NOTE — LETTER
August 12, 2020      Radha Bryan  [address] [facility] [address]        To Whom It May Concern:    Radha Bryan  was seen on 8/12/20.  Please excuse her from work until 9/9/20 due to injury of her lower back.        Sincerely,        Albert Yeo, MD

## 2020-08-12 NOTE — PROGRESS NOTES
"Radha Bryan is a 39 year old female who is being evaluated via a billable telephone visit.      The patient has been notified of following:     \"This telephone visit will be conducted via a call between you and your physician/provider. We have found that certain health care needs can be provided without the need for a physical exam.  This service lets us provide the care you need with a short phone conversation.  If a prescription is necessary we can send it directly to your pharmacy.  If lab work is needed we can place an order for that and you can then stop by our lab to have the test done at a later time.    Telephone visits are billed at different rates depending on your insurance coverage. During this emergency period, for some insurers they may be billed the same as an in-person visit.  Please reach out to your insurance provider with any questions.    If during the course of the call the physician/provider feels a telephone visit is not appropriate, you will not be charged for this service.\"    Patient has given verbal consent for Telephone visit?  Yes    What phone number would you like to be contacted at? [phone], told to call us back if we get disconnected.     How would you like to obtain your AVS? Mail a copy    Phone call duration: 26 minutes      ASSESSMENT & PLAN  Patient Instructions     1. Bulging of lumbar intervertebral disc    2. Sciatica, right side      -Patient is following up for low back pain pain down the right leg due to lumbar herniated disc  -Patient states no improvement with physical therapy, home exercise program and oral medication  -Patient had a previous lumbar MRI 3 years ago which showed lumbar herniated disc pressing up against the L5 nerve root on the right.  Patient had 3 epidural steroid injections and physical therapy which relieved her symptoms.  -Patient will get an updated MRI to evaluate for intervertebral disc injuries  -Your MRI has been ordered. You may go " down to the first floor, [address] to schedule the MRI in person, or call [phone] to schedule over the phone. Once you know the date of your MRI, please call my office and schedule a follow-up phone visit for 2 days after that.  -Patient was given an updated work note to excuse her from work for the next 4 weeks.  We will  the note at the   -Call direct clinic number [[phone]] at any time with questions or concerns.    Albert Yeo MD [facility]  [facility]        -----    SUBJECTIVE:  Radha Bryan is a 39 year old female who is seen in follow-up for low back pain due to bulging intervertebral disc.They were last seen 6/12/2020.     Since their last visit reports 0% - (About the same as last time).  has pain down back of legs. States she feels weak while doing her physical therapy exercises, a couple of times a week. They indicate that their current pain level is 7/10. They have tried other medications: naproxen and tizanidine as needed. , home exercises and physical therapy (3 visits).   has not gone back to work yet.     The patient is seen by themselves.    Patient's past medical, surgical, social, and family histories were reviewed today and no changes are noted.    REVIEW OF SYSTEMS:  Constitutional: NEGATIVE for fever, chills, change in weight  Skin: NEGATIVE for worrisome rashes, moles or lesions  GI/: NEGATIVE for bowel or bladder changes  Neuro: NEGATIVE for weakness, dizziness or paresthesias          Albert Yeo MD, [facility]

## 2020-08-12 NOTE — PATIENT INSTRUCTIONS
1. Bulging of lumbar intervertebral disc    2. Sciatica, right side      -Patient is following up for low back pain pain down the right leg due to lumbar herniated disc  -Patient states no improvement with physical therapy, home exercise program and oral medication  -Patient had a previous lumbar MRI 3 years ago which showed lumbar herniated disc pressing up against the L5 nerve root on the right.  Patient had 3 epidural steroid injections and physical therapy which relieved her symptoms.  -Patient will get an updated MRI to evaluate for intervertebral disc injuries  -Your MRI has been ordered. You may go down to the first floor, [address] to schedule the MRI in person, or call [phone] to schedule over the phone. Once you know the date of your MRI, please call my office and schedule a follow-up phone visit for 2 days after that.  -Patient was given an updated work note to excuse her from work for the next 4 weeks.  We will  the note at the   -Call direct clinic number [[phone]] at any time with questions or concerns.    Albert Yeo MD [facility]  [facility] [place]

## 2020-08-12 NOTE — LETTER
"    8/12/2020         RE: Radha Bryan  [id number] [address] [address]        Dear Colleague,    Thank you for referring your patient, Radha Bryan, to the [facility]. Please see a copy of my visit note below.    Radha Bryan is a 39 year old female who is being evaluated via a billable telephone visit.      The patient has been notified of following:     \"This telephone visit will be conducted via a call between you and your physician/provider. We have found that certain health care needs can be provided without the need for a physical exam.  This service lets us provide the care you need with a short phone conversation.  If a prescription is necessary we can send it directly to your pharmacy.  If lab work is needed we can place an order for that and you can then stop by our lab to have the test done at a later time.    Telephone visits are billed at different rates depending on your insurance coverage. During this emergency period, for some insurers they may be billed the same as an in-person visit.  Please reach out to your insurance provider with any questions.    If during the course of the call the physician/provider feels a telephone visit is not appropriate, you will not be charged for this service.\"    Patient has given verbal consent for Telephone visit?  Yes    What phone number would you like to be contacted at? [phone], told to call us back if we get disconnected.     How would you like to obtain your AVS? Mail a copy    Phone call duration: 26 minutes      ASSESSMENT & PLAN  Patient Instructions     1. Bulging of lumbar intervertebral disc    2. Sciatica, right side      -Patient is following up for low back pain pain down the right leg due to lumbar herniated disc  -Patient states no improvement with physical therapy, home exercise program and oral medication  -Patient had a previous lumbar MRI 3 years ago which showed lumbar herniated disc pressing up " against the L5 nerve root on the right.  Patient had 3 epidural steroid injections and physical therapy which relieved her symptoms.  -Patient will get an updated MRI to evaluate for intervertebral disc injuries  -Your MRI has been ordered. You may go down to the first floor, [address] to schedule the MRI in person, or call [phone] to schedule over the phone. Once you know the date of your MRI, please call my office and schedule a follow-up phone visit for 2 days after that.  -Patient was given an updated work note to excuse her from work for the next 4 weeks.  We will  the note at the   -Call direct clinic number [[phone]] at any time with questions or concerns.    Albert Yeo MD [facility]  [facility]        -----    SUBJECTIVE:  Radha Bryan is a 39 year old female who is seen in follow-up for low back pain due to bulging intervertebral disc.They were last seen 6/12/2020.     Since their last visit reports 0% - (About the same as last time).  has pain down back of legs. States she feels weak while doing her physical therapy exercises, a couple of times a week. They indicate that their current pain level is 7/10. They have tried other medications: naproxen and tizanidine as needed. , home exercises and physical therapy (3 visits).   has not gone back to work yet.     The patient is seen by themselves.    Patient's past medical, surgical, social, and family histories were reviewed today and no changes are noted.    REVIEW OF SYSTEMS:  Constitutional: NEGATIVE for fever, chills, change in weight  Skin: NEGATIVE for worrisome rashes, moles or lesions  GI/: NEGATIVE for bowel or bladder changes  Neuro: NEGATIVE for weakness, dizziness or paresthesias          Albert Yeo MD, [facility]          Again, thank you for allowing me to participate in the care of your patient.         Sincerely,        Albert Yeo, MD

## 2020-08-14 ENCOUNTER — TELEPHONE (OUTPATIENT)
Dept: ORTHOPEDICS | Facility: CLINIC | Age: 40
End: 2020-08-14

## 2020-08-14 DIAGNOSIS — M51.369 BULGING OF LUMBAR INTERVERTEBRAL DISC: Primary | ICD-10-CM

## 2020-08-14 DIAGNOSIS — M54.31 SCIATICA, RIGHT SIDE: ICD-10-CM

## 2020-08-14 NOTE — TELEPHONE ENCOUNTER
8/14/2020 at 2:39 pm.   Please call pt.  She needs a referral to radiology for an MRI (I think).  She called today to schedule but they did not have the referral from Dr. Yeo.  Please call pt. At [phone].

## 2020-08-14 NOTE — TELEPHONE ENCOUNTER
Upon chart review, patient saw Dr. Yeo for a virtual visit on 8/12/20 and he had recommended an MRI of lumbar spine. No order placed.    MRI lumbar spine pending. Please advise.    Daphney Tanner ATC

## 2020-08-26 ENCOUNTER — APPOINTMENT (OUTPATIENT)
Dept: CT IMAGING | Facility: CLINIC | Age: 40
End: 2020-08-26
Attending: PHYSICIAN ASSISTANT
Payer: COMMERCIAL

## 2020-08-26 ENCOUNTER — HOSPITAL ENCOUNTER (OUTPATIENT)
Facility: CLINIC | Age: 40
Setting detail: OBSERVATION
Discharge: HOME | End: 2020-08-27
Attending: PHYSICIAN ASSISTANT | Admitting: STUDENT IN AN ORGANIZED HEALTH CARE EDUCATION/TRAINING PROGRAM
Payer: COMMERCIAL

## 2020-08-26 DIAGNOSIS — N13.30 HYDRONEPHROSIS OF RIGHT KIDNEY: ICD-10-CM

## 2020-08-26 DIAGNOSIS — E83.52 HYPERCALCEMIA: ICD-10-CM

## 2020-08-26 DIAGNOSIS — N39.0 URINARY TRACT INFECTION: ICD-10-CM

## 2020-08-26 DIAGNOSIS — N20.0 KIDNEY STONE: ICD-10-CM

## 2020-08-26 DIAGNOSIS — N20.1 LEFT URETERAL STONE: Primary | ICD-10-CM

## 2020-08-26 DIAGNOSIS — N20.1 URETERAL STONE: ICD-10-CM

## 2020-08-26 LAB
ALBUMIN UR-MCNC: NEGATIVE MG/DL
ANION GAP SERPL CALCULATED.3IONS-SCNC: 8 MMOL/L (ref 3–14)
APPEARANCE UR: CLEAR
BASOPHILS # BLD AUTO: 0 10E9/L (ref 0–0.2)
BASOPHILS NFR BLD AUTO: 0.3 %
BILIRUB UR QL STRIP: NEGATIVE
BUN SERPL-MCNC: 13 MG/DL (ref 7–30)
CALCIUM SERPL-MCNC: 12.3 MG/DL (ref 8.5–10.1)
CHLORIDE SERPL-SCNC: 109 MMOL/L (ref 94–109)
CO2 SERPL-SCNC: 21 MMOL/L (ref 20–32)
COLOR UR AUTO: ABNORMAL
CREAT SERPL-MCNC: 0.74 MG/DL (ref 0.52–1.04)
DIFFERENTIAL METHOD BLD: ABNORMAL
EOSINOPHIL # BLD AUTO: 0.1 10E9/L (ref 0–0.7)
EOSINOPHIL NFR BLD AUTO: 0.7 %
ERYTHROCYTE [DISTWIDTH] IN BLOOD BY AUTOMATED COUNT: 12.7 % (ref 10–15)
GFR SERPL CREATININE-BSD FRML MDRD: >90 ML/MIN/{1.73_M2}
GLUCOSE SERPL-MCNC: 130 MG/DL (ref 70–99)
GLUCOSE UR STRIP-MCNC: NEGATIVE MG/DL
HCT VFR BLD AUTO: 35.7 % (ref 35–47)
HGB BLD-MCNC: 11.7 G/DL (ref 11.7–15.7)
HGB UR QL STRIP: ABNORMAL
IMM GRANULOCYTES # BLD: 0.1 10E9/L (ref 0–0.4)
IMM GRANULOCYTES NFR BLD: 0.4 %
KETONES UR STRIP-MCNC: NEGATIVE MG/DL
LEUKOCYTE ESTERASE UR QL STRIP: NEGATIVE
LYMPHOCYTES # BLD AUTO: 2.5 10E9/L (ref 0.8–5.3)
LYMPHOCYTES NFR BLD AUTO: 17.9 %
MCH RBC QN AUTO: 29.3 PG (ref 26.5–33)
MCHC RBC AUTO-ENTMCNC: 32.8 G/DL (ref 31.5–36.5)
MCV RBC AUTO: 89 FL (ref 78–100)
MONOCYTES # BLD AUTO: 1 10E9/L (ref 0–1.3)
MONOCYTES NFR BLD AUTO: 6.9 %
MUCOUS THREADS #/AREA URNS LPF: PRESENT /LPF
NEUTROPHILS # BLD AUTO: 10.1 10E9/L (ref 1.6–8.3)
NEUTROPHILS NFR BLD AUTO: 73.8 %
NITRATE UR QL: POSITIVE
NRBC # BLD AUTO: 0 10*3/UL
NRBC BLD AUTO-RTO: 0 /100
PH UR STRIP: 5.5 PH (ref 5–7)
PLATELET # BLD AUTO: 339 10E9/L (ref 150–450)
POTASSIUM SERPL-SCNC: 3.4 MMOL/L (ref 3.4–5.3)
RBC # BLD AUTO: 4 10E12/L (ref 3.8–5.2)
RBC #/AREA URNS AUTO: >182 /HPF (ref 0–2)
SODIUM SERPL-SCNC: 138 MMOL/L (ref 133–144)
SOURCE: ABNORMAL
SP GR UR STRIP: 1.02 (ref 1–1.03)
SQUAMOUS #/AREA URNS AUTO: 2 /HPF (ref 0–1)
UROBILINOGEN UR STRIP-MCNC: 2 MG/DL (ref 0–2)
WBC # BLD AUTO: 13.7 10E9/L (ref 4–11)
WBC #/AREA URNS AUTO: 6 /HPF (ref 0–5)

## 2020-08-26 PROCEDURE — 99285 EMERGENCY DEPT VISIT HI MDM: CPT | Mod: 25

## 2020-08-26 PROCEDURE — 99203 OFFICE O/P NEW LOW 30 MIN: CPT | Mod: 25 | Performed by: STUDENT IN AN ORGANIZED HEALTH CARE EDUCATION/TRAINING PROGRAM

## 2020-08-26 PROCEDURE — 96374 THER/PROPH/DIAG INJ IV PUSH: CPT

## 2020-08-26 PROCEDURE — 25800030 ZZH RX IP 258 OP 636: Performed by: PHYSICIAN ASSISTANT

## 2020-08-26 PROCEDURE — C9803 HOPD COVID-19 SPEC COLLECT: HCPCS

## 2020-08-26 PROCEDURE — 25000128 H RX IP 250 OP 636: Performed by: PHYSICIAN ASSISTANT

## 2020-08-26 PROCEDURE — 80048 BASIC METABOLIC PNL TOTAL CA: CPT | Performed by: PHYSICIAN ASSISTANT

## 2020-08-26 PROCEDURE — 87086 URINE CULTURE/COLONY COUNT: CPT | Performed by: PHYSICIAN ASSISTANT

## 2020-08-26 PROCEDURE — 96375 TX/PRO/DX INJ NEW DRUG ADDON: CPT

## 2020-08-26 PROCEDURE — 81001 URINALYSIS AUTO W/SCOPE: CPT | Performed by: PHYSICIAN ASSISTANT

## 2020-08-26 PROCEDURE — 96376 TX/PRO/DX INJ SAME DRUG ADON: CPT

## 2020-08-26 PROCEDURE — 74176 CT ABD & PELVIS W/O CONTRAST: CPT

## 2020-08-26 PROCEDURE — 96361 HYDRATE IV INFUSION ADD-ON: CPT

## 2020-08-26 PROCEDURE — 85025 COMPLETE CBC W/AUTO DIFF WBC: CPT | Performed by: PHYSICIAN ASSISTANT

## 2020-08-26 PROCEDURE — U0003 INFECTIOUS AGENT DETECTION BY NUCLEIC ACID (DNA OR RNA); SEVERE ACUTE RESPIRATORY SYNDROME CORONAVIRUS 2 (SARS-COV-2) (CORONAVIRUS DISEASE [COVID-19]), AMPLIFIED PROBE TECHNIQUE, MAKING USE OF HIGH THROUGHPUT TECHNOLOGIES AS DESCRIBED BY CMS-2020-01-R: HCPCS | Performed by: PHYSICIAN ASSISTANT

## 2020-08-26 RX ORDER — HYDROMORPHONE HYDROCHLORIDE 1 MG/ML
0.5 INJECTION, SOLUTION INTRAMUSCULAR; INTRAVENOUS; SUBCUTANEOUS
Status: DISCONTINUED | OUTPATIENT
Start: 2020-08-26 | End: 2020-08-27 | Stop reason: HOSPADM

## 2020-08-26 RX ORDER — ONDANSETRON 2 MG/ML
4 INJECTION INTRAMUSCULAR; INTRAVENOUS EVERY 30 MIN PRN
Status: DISCONTINUED | OUTPATIENT
Start: 2020-08-26 | End: 2020-08-27 | Stop reason: HOSPADM

## 2020-08-26 RX ORDER — SODIUM CHLORIDE 9 MG/ML
INJECTION, SOLUTION INTRAVENOUS CONTINUOUS
Status: DISCONTINUED | OUTPATIENT
Start: 2020-08-26 | End: 2020-08-27 | Stop reason: HOSPADM

## 2020-08-26 RX ADMIN — ONDANSETRON 4 MG: 2 INJECTION INTRAMUSCULAR; INTRAVENOUS at 20:42

## 2020-08-26 RX ADMIN — SODIUM CHLORIDE: 9 INJECTION, SOLUTION INTRAVENOUS at 23:44

## 2020-08-26 RX ADMIN — SODIUM CHLORIDE 1000 ML: 9 INJECTION, SOLUTION INTRAVENOUS at 20:39

## 2020-08-26 RX ADMIN — HYDROMORPHONE HYDROCHLORIDE 0.5 MG: 1 INJECTION, SOLUTION INTRAMUSCULAR; INTRAVENOUS; SUBCUTANEOUS at 20:44

## 2020-08-26 RX ADMIN — HYDROMORPHONE HYDROCHLORIDE 0.5 MG: 1 INJECTION, SOLUTION INTRAMUSCULAR; INTRAVENOUS; SUBCUTANEOUS at 23:02

## 2020-08-26 ASSESSMENT — ENCOUNTER SYMPTOMS
BLOOD IN STOOL: 0
BACK PAIN: 1
DIARRHEA: 0
CHILLS: 0
NAUSEA: 1
VOMITING: 0
FREQUENCY: 1
SHORTNESS OF BREATH: 0
FEVER: 0
DYSURIA: 0

## 2020-08-27 ENCOUNTER — ANESTHESIA (OUTPATIENT)
Dept: SURGERY | Facility: CLINIC | Age: 40
End: 2020-08-27
Payer: COMMERCIAL

## 2020-08-27 ENCOUNTER — ANESTHESIA EVENT (OUTPATIENT)
Dept: SURGERY | Facility: CLINIC | Age: 40
End: 2020-08-27
Payer: COMMERCIAL

## 2020-08-27 ENCOUNTER — APPOINTMENT (OUTPATIENT)
Dept: GENERAL RADIOLOGY | Facility: CLINIC | Age: 40
End: 2020-08-27
Attending: STUDENT IN AN ORGANIZED HEALTH CARE EDUCATION/TRAINING PROGRAM
Payer: COMMERCIAL

## 2020-08-27 ENCOUNTER — TELEPHONE (OUTPATIENT)
Dept: UROLOGY | Facility: CLINIC | Age: 40
End: 2020-08-27

## 2020-08-27 VITALS
SYSTOLIC BLOOD PRESSURE: 104 MMHG | WEIGHT: 172 LBS | TEMPERATURE: 97.7 F | OXYGEN SATURATION: 94 % | HEART RATE: 91 BPM | BODY MASS INDEX: 31.46 KG/M2 | DIASTOLIC BLOOD PRESSURE: 53 MMHG | RESPIRATION RATE: 24 BRPM

## 2020-08-27 DIAGNOSIS — N20.1 URETERAL STONE: Primary | ICD-10-CM

## 2020-08-27 LAB
ANION GAP SERPL CALCULATED.3IONS-SCNC: 6 MMOL/L (ref 3–14)
BUN SERPL-MCNC: 10 MG/DL (ref 7–30)
CALCIUM SERPL-MCNC: 11.8 MG/DL (ref 8.5–10.1)
CHLORIDE SERPL-SCNC: 113 MMOL/L (ref 94–109)
CO2 SERPL-SCNC: 21 MMOL/L (ref 20–32)
CREAT SERPL-MCNC: 0.58 MG/DL (ref 0.52–1.04)
ERYTHROCYTE [DISTWIDTH] IN BLOOD BY AUTOMATED COUNT: 12.9 % (ref 10–15)
GFR SERPL CREATININE-BSD FRML MDRD: >90 ML/MIN/{1.73_M2}
GLUCOSE SERPL-MCNC: 122 MG/DL (ref 70–99)
HCT VFR BLD AUTO: 35.2 % (ref 35–47)
HGB BLD-MCNC: 11.5 G/DL (ref 11.7–15.7)
LABORATORY COMMENT REPORT: NORMAL
MCH RBC QN AUTO: 28.9 PG (ref 26.5–33)
MCHC RBC AUTO-ENTMCNC: 32.7 G/DL (ref 31.5–36.5)
MCV RBC AUTO: 88 FL (ref 78–100)
PLATELET # BLD AUTO: 338 10E9/L (ref 150–450)
POTASSIUM SERPL-SCNC: 3.9 MMOL/L (ref 3.4–5.3)
RBC # BLD AUTO: 3.98 10E12/L (ref 3.8–5.2)
SARS-COV-2 RNA SPEC QL NAA+PROBE: NEGATIVE
SARS-COV-2 RNA SPEC QL NAA+PROBE: NORMAL
SODIUM SERPL-SCNC: 140 MMOL/L (ref 133–144)
SPECIMEN SOURCE: NORMAL
SPECIMEN SOURCE: NORMAL
WBC # BLD AUTO: 9 10E9/L (ref 4–11)

## 2020-08-27 PROCEDURE — 36000060 ZZH SURGERY LEVEL 3 W FLUORO 1ST 30 MIN: Performed by: STUDENT IN AN ORGANIZED HEALTH CARE EDUCATION/TRAINING PROGRAM

## 2020-08-27 PROCEDURE — 37000009 ZZH ANESTHESIA TECHNICAL FEE, EACH ADDTL 15 MIN: Performed by: STUDENT IN AN ORGANIZED HEALTH CARE EDUCATION/TRAINING PROGRAM

## 2020-08-27 PROCEDURE — 25000128 H RX IP 250 OP 636: Performed by: STUDENT IN AN ORGANIZED HEALTH CARE EDUCATION/TRAINING PROGRAM

## 2020-08-27 PROCEDURE — 99207 ZZC CDG-CODE CATEGORY CHANGED: CPT | Performed by: INTERNAL MEDICINE

## 2020-08-27 PROCEDURE — 25000125 ZZHC RX 250: Performed by: NURSE ANESTHETIST, CERTIFIED REGISTERED

## 2020-08-27 PROCEDURE — 80048 BASIC METABOLIC PNL TOTAL CA: CPT | Performed by: STUDENT IN AN ORGANIZED HEALTH CARE EDUCATION/TRAINING PROGRAM

## 2020-08-27 PROCEDURE — 25000128 H RX IP 250 OP 636: Performed by: PHYSICIAN ASSISTANT

## 2020-08-27 PROCEDURE — 36415 COLL VENOUS BLD VENIPUNCTURE: CPT | Performed by: STUDENT IN AN ORGANIZED HEALTH CARE EDUCATION/TRAINING PROGRAM

## 2020-08-27 PROCEDURE — 99212 OFFICE O/P EST SF 10 MIN: CPT | Performed by: INTERNAL MEDICINE

## 2020-08-27 PROCEDURE — 25000128 H RX IP 250 OP 636: Performed by: NURSE ANESTHETIST, CERTIFIED REGISTERED

## 2020-08-27 PROCEDURE — 27210794 ZZH OR GENERAL SUPPLY STERILE: Performed by: STUDENT IN AN ORGANIZED HEALTH CARE EDUCATION/TRAINING PROGRAM

## 2020-08-27 PROCEDURE — 96375 TX/PRO/DX INJ NEW DRUG ADDON: CPT | Mod: 59

## 2020-08-27 PROCEDURE — C2617 STENT, NON-COR, TEM W/O DEL: HCPCS | Performed by: STUDENT IN AN ORGANIZED HEALTH CARE EDUCATION/TRAINING PROGRAM

## 2020-08-27 PROCEDURE — 52332 CYSTOSCOPY AND TREATMENT: CPT | Mod: 50 | Performed by: STUDENT IN AN ORGANIZED HEALTH CARE EDUCATION/TRAINING PROGRAM

## 2020-08-27 PROCEDURE — 36415 COLL VENOUS BLD VENIPUNCTURE: CPT | Performed by: PHYSICIAN ASSISTANT

## 2020-08-27 PROCEDURE — 40000306 ZZH STATISTIC PRE PROC ASSESS II: Performed by: STUDENT IN AN ORGANIZED HEALTH CARE EDUCATION/TRAINING PROGRAM

## 2020-08-27 PROCEDURE — 36000058 ZZH SURGERY LEVEL 3 EA 15 ADDTL MIN: Performed by: STUDENT IN AN ORGANIZED HEALTH CARE EDUCATION/TRAINING PROGRAM

## 2020-08-27 PROCEDURE — 25800025 ZZH RX 258: Performed by: STUDENT IN AN ORGANIZED HEALTH CARE EDUCATION/TRAINING PROGRAM

## 2020-08-27 PROCEDURE — 85027 COMPLETE CBC AUTOMATED: CPT | Performed by: STUDENT IN AN ORGANIZED HEALTH CARE EDUCATION/TRAINING PROGRAM

## 2020-08-27 PROCEDURE — 71000012 ZZH RECOVERY PHASE 1 LEVEL 1 FIRST HR: Performed by: STUDENT IN AN ORGANIZED HEALTH CARE EDUCATION/TRAINING PROGRAM

## 2020-08-27 PROCEDURE — 25000132 ZZH RX MED GY IP 250 OP 250 PS 637: Performed by: ANESTHESIOLOGY

## 2020-08-27 PROCEDURE — 74420 UROGRAPHY RTRGR +-KUB: CPT | Mod: 26 | Performed by: STUDENT IN AN ORGANIZED HEALTH CARE EDUCATION/TRAINING PROGRAM

## 2020-08-27 PROCEDURE — 25000132 ZZH RX MED GY IP 250 OP 250 PS 637: Performed by: STUDENT IN AN ORGANIZED HEALTH CARE EDUCATION/TRAINING PROGRAM

## 2020-08-27 PROCEDURE — 87040 BLOOD CULTURE FOR BACTERIA: CPT | Performed by: PHYSICIAN ASSISTANT

## 2020-08-27 PROCEDURE — 40000277 XR SURGERY CARM FLUORO LESS THAN 5 MIN W STILLS: Mod: TC

## 2020-08-27 PROCEDURE — 25800030 ZZH RX IP 258 OP 636: Performed by: STUDENT IN AN ORGANIZED HEALTH CARE EDUCATION/TRAINING PROGRAM

## 2020-08-27 PROCEDURE — 25800030 ZZH RX IP 258 OP 636: Performed by: NURSE ANESTHETIST, CERTIFIED REGISTERED

## 2020-08-27 PROCEDURE — 25000125 ZZHC RX 250: Performed by: STUDENT IN AN ORGANIZED HEALTH CARE EDUCATION/TRAINING PROGRAM

## 2020-08-27 PROCEDURE — 37000008 ZZH ANESTHESIA TECHNICAL FEE, 1ST 30 MIN: Performed by: STUDENT IN AN ORGANIZED HEALTH CARE EDUCATION/TRAINING PROGRAM

## 2020-08-27 PROCEDURE — G0378 HOSPITAL OBSERVATION PER HR: HCPCS

## 2020-08-27 DEVICE — STENT URETERAL POLARIS ULTRA 6FRX24CM M0061921320
Type: IMPLANTABLE DEVICE | Site: URETHRA | Status: NON-FUNCTIONAL
Removed: 2020-09-17

## 2020-08-27 RX ORDER — ALBUTEROL SULFATE 0.83 MG/ML
2.5 SOLUTION RESPIRATORY (INHALATION) EVERY 4 HOURS PRN
Status: DISCONTINUED | OUTPATIENT
Start: 2020-08-27 | End: 2020-08-27 | Stop reason: HOSPADM

## 2020-08-27 RX ORDER — NALOXONE HYDROCHLORIDE 0.4 MG/ML
.1-.4 INJECTION, SOLUTION INTRAMUSCULAR; INTRAVENOUS; SUBCUTANEOUS
Status: DISCONTINUED | OUTPATIENT
Start: 2020-08-27 | End: 2020-08-27 | Stop reason: HOSPADM

## 2020-08-27 RX ORDER — LIDOCAINE HYDROCHLORIDE 10 MG/ML
INJECTION, SOLUTION INFILTRATION; PERINEURAL PRN
Status: DISCONTINUED | OUTPATIENT
Start: 2020-08-27 | End: 2020-08-27

## 2020-08-27 RX ORDER — CEFAZOLIN SODIUM 1 G
1 VIAL (EA) INJECTION SEE ADMIN INSTRUCTIONS
Status: CANCELLED | OUTPATIENT
Start: 2020-08-27

## 2020-08-27 RX ORDER — OXYBUTYNIN CHLORIDE 5 MG/1
5 TABLET ORAL 3 TIMES DAILY PRN
Qty: 30 TABLET | Refills: 0 | Status: ON HOLD | OUTPATIENT
Start: 2020-08-27 | End: 2020-09-17

## 2020-08-27 RX ORDER — IBUPROFEN 200 MG
400 TABLET ORAL EVERY 6 HOURS PRN
Status: ON HOLD | COMMUNITY
End: 2020-09-17

## 2020-08-27 RX ORDER — PROPOFOL 10 MG/ML
INJECTION, EMULSION INTRAVENOUS PRN
Status: DISCONTINUED | OUTPATIENT
Start: 2020-08-27 | End: 2020-08-27

## 2020-08-27 RX ORDER — FENTANYL CITRATE 50 UG/ML
INJECTION, SOLUTION INTRAMUSCULAR; INTRAVENOUS PRN
Status: DISCONTINUED | OUTPATIENT
Start: 2020-08-27 | End: 2020-08-27

## 2020-08-27 RX ORDER — SULFAMETHOXAZOLE/TRIMETHOPRIM 800-160 MG
1 TABLET ORAL 2 TIMES DAILY
Qty: 14 TABLET | Refills: 0 | Status: SHIPPED | OUTPATIENT
Start: 2020-08-27 | End: 2020-09-03

## 2020-08-27 RX ORDER — ACETAMINOPHEN 325 MG/1
975 TABLET ORAL ONCE
Status: COMPLETED | OUTPATIENT
Start: 2020-08-27 | End: 2020-08-27

## 2020-08-27 RX ORDER — LIDOCAINE 40 MG/G
CREAM TOPICAL
Status: DISCONTINUED | OUTPATIENT
Start: 2020-08-27 | End: 2020-08-27 | Stop reason: HOSPADM

## 2020-08-27 RX ORDER — HYDROMORPHONE HYDROCHLORIDE 1 MG/ML
.3-.5 INJECTION, SOLUTION INTRAMUSCULAR; INTRAVENOUS; SUBCUTANEOUS EVERY 10 MIN PRN
Status: DISCONTINUED | OUTPATIENT
Start: 2020-08-27 | End: 2020-08-27 | Stop reason: HOSPADM

## 2020-08-27 RX ORDER — SODIUM CHLORIDE, SODIUM LACTATE, POTASSIUM CHLORIDE, CALCIUM CHLORIDE 600; 310; 30; 20 MG/100ML; MG/100ML; MG/100ML; MG/100ML
INJECTION, SOLUTION INTRAVENOUS CONTINUOUS
Status: DISCONTINUED | OUTPATIENT
Start: 2020-08-27 | End: 2020-08-27 | Stop reason: HOSPADM

## 2020-08-27 RX ORDER — CEFTRIAXONE 1 G/1
1 INJECTION, POWDER, FOR SOLUTION INTRAMUSCULAR; INTRAVENOUS ONCE
Status: COMPLETED | OUTPATIENT
Start: 2020-08-27 | End: 2020-08-27

## 2020-08-27 RX ORDER — ONDANSETRON 2 MG/ML
INJECTION INTRAMUSCULAR; INTRAVENOUS PRN
Status: DISCONTINUED | OUTPATIENT
Start: 2020-08-27 | End: 2020-08-27

## 2020-08-27 RX ORDER — CEFAZOLIN SODIUM 1 G/50ML
1 INJECTION, SOLUTION INTRAVENOUS SEE ADMIN INSTRUCTIONS
Status: DISCONTINUED | OUTPATIENT
Start: 2020-08-27 | End: 2020-08-27 | Stop reason: HOSPADM

## 2020-08-27 RX ORDER — SODIUM CHLORIDE, SODIUM LACTATE, POTASSIUM CHLORIDE, CALCIUM CHLORIDE 600; 310; 30; 20 MG/100ML; MG/100ML; MG/100ML; MG/100ML
INJECTION, SOLUTION INTRAVENOUS CONTINUOUS
Status: DISCONTINUED | OUTPATIENT
Start: 2020-08-27 | End: 2020-08-27

## 2020-08-27 RX ORDER — SODIUM CHLORIDE, SODIUM LACTATE, POTASSIUM CHLORIDE, CALCIUM CHLORIDE 600; 310; 30; 20 MG/100ML; MG/100ML; MG/100ML; MG/100ML
INJECTION, SOLUTION INTRAVENOUS CONTINUOUS PRN
Status: DISCONTINUED | OUTPATIENT
Start: 2020-08-27 | End: 2020-08-27

## 2020-08-27 RX ORDER — HYDRALAZINE HYDROCHLORIDE 20 MG/ML
2.5-5 INJECTION INTRAMUSCULAR; INTRAVENOUS EVERY 10 MIN PRN
Status: DISCONTINUED | OUTPATIENT
Start: 2020-08-27 | End: 2020-08-27 | Stop reason: HOSPADM

## 2020-08-27 RX ORDER — MEPERIDINE HYDROCHLORIDE 25 MG/ML
12.5 INJECTION INTRAMUSCULAR; INTRAVENOUS; SUBCUTANEOUS
Status: DISCONTINUED | OUTPATIENT
Start: 2020-08-27 | End: 2020-08-27 | Stop reason: HOSPADM

## 2020-08-27 RX ORDER — METOPROLOL TARTRATE 1 MG/ML
1-2 INJECTION, SOLUTION INTRAVENOUS EVERY 5 MIN PRN
Status: DISCONTINUED | OUTPATIENT
Start: 2020-08-27 | End: 2020-08-27 | Stop reason: HOSPADM

## 2020-08-27 RX ORDER — ONDANSETRON 2 MG/ML
4 INJECTION INTRAMUSCULAR; INTRAVENOUS EVERY 30 MIN PRN
Status: DISCONTINUED | OUTPATIENT
Start: 2020-08-27 | End: 2020-08-27 | Stop reason: HOSPADM

## 2020-08-27 RX ORDER — FENTANYL CITRATE 50 UG/ML
25-50 INJECTION, SOLUTION INTRAMUSCULAR; INTRAVENOUS EVERY 5 MIN PRN
Status: DISCONTINUED | OUTPATIENT
Start: 2020-08-27 | End: 2020-08-27 | Stop reason: HOSPADM

## 2020-08-27 RX ORDER — OXYCODONE HYDROCHLORIDE 5 MG/1
5 TABLET ORAL EVERY 4 HOURS PRN
Status: DISCONTINUED | OUTPATIENT
Start: 2020-08-27 | End: 2020-08-27 | Stop reason: HOSPADM

## 2020-08-27 RX ORDER — DEXAMETHASONE SODIUM PHOSPHATE 4 MG/ML
INJECTION, SOLUTION INTRA-ARTICULAR; INTRALESIONAL; INTRAMUSCULAR; INTRAVENOUS; SOFT TISSUE PRN
Status: DISCONTINUED | OUTPATIENT
Start: 2020-08-27 | End: 2020-08-27

## 2020-08-27 RX ORDER — ACETAMINOPHEN 325 MG/1
975 TABLET ORAL ONCE
Status: CANCELLED | OUTPATIENT
Start: 2020-08-27 | End: 2020-08-27

## 2020-08-27 RX ORDER — TAMSULOSIN HYDROCHLORIDE 0.4 MG/1
0.4 CAPSULE ORAL DAILY
Qty: 14 CAPSULE | Refills: 0 | Status: ON HOLD | OUTPATIENT
Start: 2020-08-27 | End: 2020-09-17

## 2020-08-27 RX ORDER — FENTANYL CITRATE 50 UG/ML
25-50 INJECTION, SOLUTION INTRAMUSCULAR; INTRAVENOUS
Status: CANCELLED | OUTPATIENT
Start: 2020-08-27

## 2020-08-27 RX ORDER — ONDANSETRON 4 MG/1
4 TABLET, ORALLY DISINTEGRATING ORAL EVERY 30 MIN PRN
Status: DISCONTINUED | OUTPATIENT
Start: 2020-08-27 | End: 2020-08-27 | Stop reason: HOSPADM

## 2020-08-27 RX ORDER — CEFAZOLIN SODIUM 2 G/100ML
2 INJECTION, SOLUTION INTRAVENOUS
Status: DISCONTINUED | OUTPATIENT
Start: 2020-08-27 | End: 2020-08-27 | Stop reason: HOSPADM

## 2020-08-27 RX ORDER — KETOROLAC TROMETHAMINE 30 MG/ML
30 INJECTION, SOLUTION INTRAMUSCULAR; INTRAVENOUS EVERY 6 HOURS PRN
Status: DISCONTINUED | OUTPATIENT
Start: 2020-08-27 | End: 2020-08-27 | Stop reason: HOSPADM

## 2020-08-27 RX ADMIN — DEXAMETHASONE SODIUM PHOSPHATE 4 MG: 4 INJECTION, SOLUTION INTRA-ARTICULAR; INTRALESIONAL; INTRAMUSCULAR; INTRAVENOUS; SOFT TISSUE at 01:20

## 2020-08-27 RX ADMIN — MIDAZOLAM 2 MG: 1 INJECTION INTRAMUSCULAR; INTRAVENOUS at 01:16

## 2020-08-27 RX ADMIN — PROPOFOL 50 MG: 10 INJECTION, EMULSION INTRAVENOUS at 01:45

## 2020-08-27 RX ADMIN — ACETAMINOPHEN 975 MG: 325 TABLET, FILM COATED ORAL at 02:54

## 2020-08-27 RX ADMIN — CEFTRIAXONE SODIUM 1 G: 1 INJECTION, POWDER, FOR SOLUTION INTRAMUSCULAR; INTRAVENOUS at 00:15

## 2020-08-27 RX ADMIN — OXYCODONE HYDROCHLORIDE 5 MG: 5 TABLET ORAL at 09:31

## 2020-08-27 RX ADMIN — PROPOFOL 200 MG: 10 INJECTION, EMULSION INTRAVENOUS at 01:20

## 2020-08-27 RX ADMIN — PROPOFOL 50 MG: 10 INJECTION, EMULSION INTRAVENOUS at 01:36

## 2020-08-27 RX ADMIN — Medication 100 MG: at 01:20

## 2020-08-27 RX ADMIN — ONDANSETRON HYDROCHLORIDE 4 MG: 2 INJECTION, SOLUTION INTRAVENOUS at 01:50

## 2020-08-27 RX ADMIN — SODIUM CHLORIDE, POTASSIUM CHLORIDE, SODIUM LACTATE AND CALCIUM CHLORIDE: 600; 310; 30; 20 INJECTION, SOLUTION INTRAVENOUS at 01:40

## 2020-08-27 RX ADMIN — SODIUM CHLORIDE, POTASSIUM CHLORIDE, SODIUM LACTATE AND CALCIUM CHLORIDE: 600; 310; 30; 20 INJECTION, SOLUTION INTRAVENOUS at 08:35

## 2020-08-27 RX ADMIN — FENTANYL CITRATE 100 MCG: 50 INJECTION, SOLUTION INTRAMUSCULAR; INTRAVENOUS at 01:20

## 2020-08-27 RX ADMIN — LIDOCAINE HYDROCHLORIDE 50 MG: 10 INJECTION, SOLUTION INFILTRATION; PERINEURAL at 01:20

## 2020-08-27 ASSESSMENT — ACTIVITIES OF DAILY LIVING (ADL)
FALL_HISTORY_WITHIN_LAST_SIX_MONTHS: NO
COGNITION: 0 - NO COGNITION ISSUES REPORTED
TRANSFERRING: 0-->INDEPENDENT
WHICH_OF_THE_ABOVE_FUNCTIONAL_RISKS_HAD_A_RECENT_ONSET_OR_CHANGE?: AMBULATION
BATHING: 0-->INDEPENDENT
RETIRED_EATING: 0-->INDEPENDENT
RETIRED_COMMUNICATION: 0-->UNDERSTANDS/COMMUNICATES WITHOUT DIFFICULTY
DRESS: 0-->INDEPENDENT
AMBULATION: 0-->INDEPENDENT
SWALLOWING: 0-->SWALLOWS FOODS/LIQUIDS WITHOUT DIFFICULTY
TOILETING: 0-->INDEPENDENT

## 2020-08-27 NOTE — ED TRIAGE NOTES
Pt arrives with one day of L low back/flank pain and darkening urine. Took IBU PTA with improvement in pain. ABCs intact, A/O x4. Denies NVD, fever.

## 2020-08-27 NOTE — PLAN OF CARE
Pt arrived to room @ 0315. Oriented to room and call system. VS monitored. Denied pain. CMS intact. IV infusing. Voiding in large amounts, strainer in bathroom. Up SBA. IV abx. Plan @ discharge is home.

## 2020-08-27 NOTE — ANESTHESIA PREPROCEDURE EVALUATION
Anesthesia Pre-Procedure Evaluation    Patient: Radha Bryan   MRN: [id number] : 1980          Preoperative Diagnosis: * No pre-op diagnosis entered *    Procedure(s):  CYSTOSCOPY, WITH BILATERAL STENT INSERTION    Past Medical History:   Diagnosis Date     Irregular periods/menstrual cycles      Tinea versicolor      Past Surgical History:   Procedure Laterality Date     CHOLECYSTECTOMY       LAPAROSCOPIC HYSTERECTOMY TOTAL, SALPINGECTOMY BILATERAL  2019    AUB, fibroid uterus, anemia. [facility], Dr. Pamela Carrion     LAPAROSCOPIC TUBAL LIGATION       Anesthesia Evaluation     . Pt has had prior anesthetic.            ROS/MED HX    ENT/Pulmonary:  - neg pulmonary ROS    (-) sleep apnea   Neurologic:       Cardiovascular:  - neg cardiovascular ROS       METS/Exercise Tolerance:     Hematologic:         Musculoskeletal:         GI/Hepatic:     (+) GERD       Renal/Genitourinary:     (+) Nephrolithiasis ,       Endo:     (+) Obesity, .      Psychiatric:         Infectious Disease:         Malignancy:         Other:                          Physical Exam      Airway   Mallampati: II  TM distance: >3 FB  Neck ROM: full    Dental     Cardiovascular   Rhythm and rate: regular and normal      Pulmonary    breath sounds clear to auscultation            Lab Results   Component Value Date    WBC 13.7 (H) 2020    HGB 11.7 2020    HCT 35.7 2020     2020     2020    POTASSIUM 3.4 2020    CHLORIDE 109 2020    CO2 21 2020    BUN 13 2020    CR 0.74 2020     (H) 2020    FRANCISCO 12.3 (H) 2020    ALT 16 10/17/2016    AST 21 10/17/2016    HCGS Negative 2019       Preop Vitals  BP Readings from Last 3 Encounters:   20 (!) 140/86   20 126/85   20 113/75    Pulse Readings from Last 3 Encounters:   20 94   20 77   20 64      Resp Readings from Last 3 Encounters:   20 20  "  08/07/20 16   07/06/20 16    SpO2 Readings from Last 3 Encounters:   08/27/20 97%   08/07/20 97%   07/06/20 98%      Temp Readings from Last 1 Encounters:   08/27/20 97.7  F (36.5  C) (Temporal)    Ht Readings from Last 1 Encounters:   07/06/20 1.575 m (5' 2\")      Wt Readings from Last 1 Encounters:   08/26/20 78 kg (172 lb)    Estimated body mass index is 31.46 kg/m  as calculated from the following:    Height as of 7/6/20: 1.575 m (5' 2\").    Weight as of this encounter: 78 kg (172 lb).       Anesthesia Plan      History & Physical Review  History and physical reviewed and following examination; no interval change.    ASA Status:  2 emergent.    NPO Status:  > 8 hours    Plan for General with Intravenous and Propofol induction. Maintenance will be Balanced.    PONV prophylaxis:  Ondansetron (or other 5HT-3) and Dexamethasone or Solumedrol         Postoperative Care  Postoperative pain management:  IV analgesics, Oral pain medications and Multi-modal analgesia.      Consents  Anesthetic plan, risks, benefits and alternatives discussed with:  Patient..                 Andres Lema MD                    .  "

## 2020-08-27 NOTE — TELEPHONE ENCOUNTER
[facility] Call Center    Phone Message    May a detailed message be left on voicemail: yes     Reason for Call: Other: Lise calling from [facility] to request a call back from Dr. Hernandez's care team. Lise says that Radha camt to them through the ED last night, and that Dr. Hernandez preformed surgery on Radha. Lise says in Dr. Hernandez's notes that he wanted her to continue to be observed with the antibtiotics, but Lise says they don't have orders to continue the antibiotics nor do they have a diet order for Radha either. Lise is hoping to get some clarification from Dr. Hernandez. Lise says that Radha doesn't have an assigned hospitalist yet, so Dr. Hernandez is primary at the moment. Please give Lise a call back at your earliest convenience to discuss. Lise can be reached at [phone].    Action Taken: Message routed to:  Other: [facility]    Travel Screening: Not Applicable

## 2020-08-27 NOTE — DISCHARGE INSTRUCTIONS
POSTOPERATIVE INSTRUCTIONS    Diagnosis-------------------------------   Left ureteral stone, right hydroureteronephrosis    Procedure-------------------------------  Procedure(s) (LRB):  CYSTOSCOPY, WITH BILATERAL STENT INSERTION (Bilateral)    Findings--------------------------------  Bilateral hydroureteronephrosis down to the distal ureter    Home-going instructions-----------------         Activity Limitation:     - No driving or operating heavy machinery until 24 hours after anesthesia    FOLLOW THESE INSTRUCTIONS AS INDICATED BELOW:  - Observe operative area for signs of excessive bleeding.  - You may shower.  - Increase fluid intake to promote clear urine.  - Resume usual diet as tolerated    What to expect while recovering-----------  - You may experience some intermittent bleeding that makes your urine pink or cherry colored. This is normal.  - However, if you are unable to urinate, passing large amount of clots, have jacob blood in your urine, or have a temperature >101 degrees, call the urology nurse on call, or present to your nearest emergency department.  - You are encouraged to walk daily, and have no activity restrictions.   - A URETERAL STENT has been placed in each ureter that allows urine to flow unobstructed from your kidney into your bladder.  The stent has a curl in the kidney and a curl in the bladder.  The curl in the bladder can cause some urgency and frequency of urination as well as some mild blood in the urine.  The curl in the kidney can cause some mild flank discomfort.  This may be more noticeable when you urinate.  A URETERAL STENT is meant to be left in temporarily.  It must be removed or changed no later than 3 months after its insertion.  If it's not removed it can result in stone overgrowth on the stent that can cause pain, infection, and can be very difficult to remove.      Discharge Medications/instructions:     - Flomax (tamsulosin) to be taken daily for stent pain and  irritation    - oxybutinin as needed for bladder spasm    - Antibiotics will be prescribed ***    - Take Tylenol 1000mg every 6 hours for pain    - Take Ibuprofen 600mg every 6 hours as needed for additional pain control    Questions/concerns------------------------  [facility]: [phone]  Future appointments  We will schedule you for a cystoscopy, bilateral stent removal, bilateral ureteroscopy, left laser lithotripsy/stone basketing, possible bilateral stent placement in about 1-2 weeks  Make sure that you follow up with your primary care provider about your hypercalcemia    Kavon Hernandez MD

## 2020-08-27 NOTE — PROGRESS NOTES
Urology    No acute events overnight. Afebrile. C/o some bilateral flank irritation from the stents but left abdominal pain is resolved.    - recheck labs this morning  - from  standpoint patient ok to discharge on 7 day course of empiric abx pending culture results; flomax and oxybutinin as needed for stent pain and irritation  - will schedule cystoscopy, bilateral stent removal, bilateral ureteroscopy, left laser lithotripsy/stone basketing, possible bilateral stent placement in 1-2 weeks for treatment of stones as well as further workup of right hydronephrosis       Kavon Hernandez MD   [facility]  [phone] clinic phone

## 2020-08-27 NOTE — PROGRESS NOTES
SPIRITUAL HEALTH SERVICES Progress Note  [facility] 6    Spiritual Health Services visit per routine admission hospital  request.  Radha shared she was admitted for a kidney stone and is awaiting more information about her plan of care today.  She lives with her spouse and two of her four children.  She is also supported by her extended family; parents and sisters who live in the [place] area.    Pt is Jainism and requesting healing prayer which was provided.  Oriented to Spiritual Health Services.  No other needs expressed at this time.    Plan: Spiritual Health Services remains available for additional emotional/spiritual support.    Demario Guajardo MA  Staff   Pager: [phone]  Phone: [phone]

## 2020-08-27 NOTE — ED NOTES
Patient verbalizes understanding that she needs to go to the operating room tonight to treat her kidney stone. Understanding of this was confirmed using [place]  via phone.

## 2020-08-27 NOTE — ANESTHESIA CARE TRANSFER NOTE
Patient: Radha Bryan    Procedure(s):  CYSTOSCOPY, WITH BILATERAL STENT INSERTION    Diagnosis: * No pre-op diagnosis entered *  Diagnosis Additional Information: No value filed.    Anesthesia Type:   General     Note:  Airway :Face Mask  Patient transferred to:PACU  Handoff Report: Identifed the Patient, Identified the Reponsible Provider, Reviewed the pertinent medical history, Discussed the surgical course, Reviewed Intra-OP anesthesia mangement and issues during anesthesia, Set expectations for post-procedure period and Allowed opportunity for questions and acknowledgement of understanding      Vitals: (Last set prior to Anesthesia Care Transfer)    CRNA VITALS  8/27/2020 0149 - 8/27/2020 0222      8/27/2020             SpO2:  97 %                Electronically Signed By: ELIAZAR Kohler CRNA  August 27, 2020  2:22 AM

## 2020-08-27 NOTE — CONSULTS
[facility]  Hospitalist Consult Note  Name: Radha Bryan    MRN: [id number]  YOB: 1980    Age: 39 year old  Date of admission: 8/26/2020  Primary care provider: Jessica Zimmerman     Requesting Physician:  Dr. Hernandez  Reason for consult:  Post-operative medical management         Assessment and Plan:   Radha Bryan is a 39 year old female with a history of sciatica and prior cholecystectomy and hysterectomy who was admitted 8/26/20 with left flank pain and was found to have 3 mm left UVJ stone and hydroureteronephrosis as well as suspected UTI.  Patient was taken the OR by Urology and is s/p cystoscopy with bilateral ureteral stent placement.  Patient also was found to have hypercalcemia.    1.  Left ureteral stone with right ureteral nephrosis status post cystoscopy with bilateral ureteral stent placement on 8/27: Presented with significant flank pain.  CT showed 3 mm left UVJ stone and mild to moderate hydronephrosis and additional nonobstructive left nephrolithiasis as well as mild right hydronephrosis.  Renal function at baseline.  She did have leukocytosis and UA was concerning for infection in the setting of stones.  She received ceftriaxone and Ancef and intraoperative.  Urology completed the above procedure.  They recommend 7-day course of antibiotics at time of discharge.  No prior culture data available in our system or care everywhere.  We will plan to discharge on Bactrim. Urology has already prescribed Flomax and Ditropan.  She will need to follow up with Urology in clinic in the next 1-2 weeks for stent removal and stone removal.    2.  Hypercalcemia: Patient's calcium was 12.3 on admission, 11.8 the following day.  She did have hypercalcemia in 6/2019 but I do not see further work-up from that time.  She does described some constipation (takes Miralax) and also has stones.  This certainly needs to be worked up further in the outpatient setting.  This was discussed  with the patient and I recommend that she follow up with PCP in the next week to start this workup.  This does not need to be done in the hospital as she is ready for discharge.    Code status: Full code  Prophylaxis: ambulate  Disposition: discharge today    Thank you for the consultation, we will continue to follow along during the hospitalization. Please page with any questions or concerns.         History of Present Illness:   Radha rByan is a 39 year old female with a history of sciatica and prior cholecystectomy and hysterectomy who was admitted 8/26/20 with left flank pain and was found to have 3 mm left UVJ stone and hydroureteronephrosis as well as suspected UTI.  Patient was taken the OR by Urology and is s/p cystoscopy with bilateral ureteral stent placement.  Patient also was found to have hypercalcemia on initial labs.  Pre-operative note was fully reviewed and recommendations acknowledged. Op note and anesthesia notes and flow sheets reviewed.     The patient is currently feeling well.  Denies pain.  No nausea, emesis.  Eating fine.  Wondering when she can go home.  Discussed Urology recommendations for follow up with antibiotic plan.  Also discussed that she has hypercalcemia and needs further workup for this through primary care.    She notes she does have constipation at baseline and takes Miralax to help with this.                Past Medical History:     Past Medical History:   Diagnosis Date     Irregular periods/menstrual cycles      Tinea versicolor              Past Surgical History:     Past Surgical History:   Procedure Laterality Date     CHOLECYSTECTOMY       LAPAROSCOPIC HYSTERECTOMY TOTAL, SALPINGECTOMY BILATERAL  07/17/2019    AUB, fibroid uterus, anemia. [facility], Dr. Pamela Carrion     LAPAROSCOPIC TUBAL LIGATION                 Social History:     Social History     Tobacco Use     Smoking status: Never Smoker     Smokeless tobacco: Never Used   Substance Use Topics      Alcohol use: No             Family History:   Family history was fully reviewed and non-contributory in this case.          Allergies:   No Known Allergies          Medications:     Prior to Admission medications    Medication Sig Last Dose Taking? Auth Provider   ibuprofen (ADVIL/MOTRIN) 200 MG tablet Take 400 mg by mouth every 6 hours as needed for mild pain Past Month at Unknown time Yes Unknown, Entered By History   oxybutynin (DITROPAN) 5 MG tablet Take 1 tablet (5 mg) by mouth 3 times daily as needed for bladder spasms  Yes Kavon Hernandez MD   tamsulosin (FLOMAX) 0.4 MG capsule Take 1 capsule (0.4 mg) by mouth daily While the stent is in place, for stent pain and irritation  Yes Kavon Hernandez MD       Current hospital administered medication list (MAR) also reviewed.          Review of Systems:   A comprehensive greater than 10 system review of systems was carried out.  Pertinent positives and negatives are noted above.  Otherwise negative for contributory info.            Physical Exam:   Blood pressure 104/53, pulse 93, temperature 97.7  F (36.5  C), temperature source Temporal, resp. rate 20, weight 78 kg (172 lb), last menstrual period 06/20/2019, SpO2 94 %, not currently breastfeeding.  Exam:  General: Alert, awake, no acute distress.  HEENT: Normocephalic and atraumatic, eyes anicteric and without scleral injection, EOMI, face symmetric, MMM.  Cardiac: RRR, normal S1, S2. No m/g/r, no LE edema.  Pulmonary: Normal chest rise, normal work of breathing.  Lungs CTAB without crackles or wheezing.  Abdomen: soft, non-tender, non-distended.  Normoactive bowel sounds, no guarding or rebound tenderness.  Extremities: no deformities.  Warm, well perfused.  Skin: no rashes or lesions.  Warm and Dry.  Neuro: No focal deficits.  Speech clear.  Spontaneously moving all extremities in bed.  Psych: Alert and oriented x3. Appropriate affect.          Data:   Imaging:  Reviewed.    Labs: Reviewed.   Recent Labs    Lab 08/27/20 1209 08/26/20 2033   WBC 9.0 13.7*   HGB 11.5* 11.7   HCT 35.2 35.7   MCV 88 89    339     Recent Labs   Lab 08/27/20 1209 08/26/20 2033    138   POTASSIUM 3.9 3.4   CHLORIDE 113* 109   CO2 21 21   ANIONGAP 6 8   * 130*   BUN 10 13   CR 0.58 0.74   GFRESTIMATED >90 >90   GFRESTBLACK >90 >90   FRANCISCO 11.8* 12.3*       Aurora Lujan MD  [facility] Hospitalist  August 27, 2020

## 2020-08-27 NOTE — CONSULTS
[facility] Urology Consultation    Radha Bryan MRN# [id number]   Age: 39 year old YOB: 1980     Date of Admission:  8/26/2020    Reason for consult: Ureteral stone, hydronephrosis       Requesting physician: Nickolas Avila MD       Level of consult: One-time consult to assist in determining a diagnosis and to recommend an appropriate treatment plan           Assessment and Plan:   Assessment:   38 yo F with history of left flank and abdominal pain, found to have a 3mm left UVJ stone as well as right hydroureteronephrosis of unclear etiology. + symptomatic cystitis for two days. Urinalysis concerning for infection given the positive nitrite (however, there is a relative paucity of pyuria and the leukocyte esterase is negative). She does have a leukocytosis to 13.7. Given all of this, will place bilateral ureteral stents in this acute setting and return for ureteroscopy for further evaluation after treatment of the urinary tract infection    Notably, she has had hypercalcemia on labs dating back to at least 6/2019. Calcium was normal in 5/2019. Hypercalcemia can be a risk factor for hypercalciuria and kidney stone formation. Note that there is a large stone burden in the left kidney.      Plan:   Keep NPO  Send COVID-19  To OR for cystoscopy, bilateral retrograde pyelogram and bilateral ureteral stent placement  Recommend workup for hypercalcemia per primary             Chief Complaint:   Urolithiasis     History is obtained from the patient with the assistance of a  (via video)         History of Present Illness:   This patient is a 39 year old female with a past medical history of laparoscopic hysterectomy for fibroid uterus 7/17/2019 who presents with the following condition requiring a hospital admission:    Urolithiasis  Patient complains of left flank pain with radiation to the abdomen. Onset of symptoms was abrupt starting around 4pm 8/26/2020 with worsening course  since that time. Patient describes the pain as sharp, continuous and rated as severe. The patient has had nausea and no vomiting. There has been no fever or chills. The patient IS complaining of frequency and urgency since two days ago. Risk factors for urolithiasis: family history of stone disease in brother. Today has also noticed more dark brown coffee colored urine, no blood per se            Past Medical History:     I have reviewed this patient's past medical history  Past Medical History:   Diagnosis Date     Irregular periods/menstrual cycles      Tinea versicolor              Past Surgical History:     I have reviewed this patient's past surgical history  Past Surgical History:   Procedure Laterality Date     CHOLECYSTECTOMY       LAPAROSCOPIC HYSTERECTOMY TOTAL, SALPINGECTOMY BILATERAL  07/17/2019    AUB, fibroid uterus, anemia. [facility], Dr. Pamela Carrion     LAPAROSCOPIC TUBAL LIGATION               Social History:     I have reviewed this patient's social history  Social History     Tobacco Use     Smoking status: Never Smoker     Smokeless tobacco: Never Used   Substance Use Topics     Alcohol use: No             Family History:     I have reviewed this patient's family history  Family History   Problem Relation Age of Onset     Family History Negative Mother      Kidney Disease Brother         kidney stone     No Known Problems Sister      No Known Problems Son      No Known Problems Daughter      Family history reviewed and updated in [facility]          Immunizations:     Immunization History   Administered Date(s) Administered     Flu, Unspecified 10/23/2017     Influenza (H1N1) 01/14/2010     Influenza (intradermal) 01/14/2010, 11/05/2012, 10/02/2013, 10/23/2014, 03/25/2016, 10/03/2016     Influenza Vaccine IM > 6 months Valent IIV4 01/22/2020     Tdap (Adult) Unspecified Formulation 07/08/2010             Allergies:   All allergies reviewed and addressed  No Known Allergies           Medications:     Current Facility-Administered Medications   Medication     acetaminophen (TYLENOL) tablet 975 mg     ceFAZolin (ANCEF) intermittent infusion 1 g     ceFAZolin (ANCEF) intermittent infusion 2 g in 100 mL dextrose PRE-MIX     [Auto Hold] HYDROmorphone (PF) (DILAUDID) injection 0.5 mg     lactated ringers infusion     lidocaine (LMX4) cream     lidocaine 1 % 0.1-1 mL     [Auto Hold] ondansetron (ZOFRAN) injection 4 mg     sodium chloride (PF) 0.9% PF flush 3 mL     sodium chloride (PF) 0.9% PF flush 3 mL     sodium chloride 0.9% infusion             Review of Systems:   The Review of Systems is negative other than noted in the HPI          Physical Exam:   Vitals were reviewed  Temp: 97.7  F (36.5  C) Temp src: Temporal BP: (!) 140/86 Pulse: 94   Resp: 20 SpO2: 97 % O2 Device: None (Room air)    Constitutional:   Awake, alert, appears fatigued   Eyes:   No scleral icterus   ENT:   Nc/at   Neck:   skin normal   Hematologic / Lymphatic:   No bruising   Back:   + left CVAT   Lungs:   nonlabored breathing on ra   Cardiovascular:   No edema, extrem wwp   Abdomen:   Mild llq tenderness   Chest / Breast:   Not examined   Genitourinary:   deferred   Musculoskeletal:   No redness or swelling of joints   Neurologic:   Awake, alert, oriented   Neuropsychiatric:   Normal mood and affect   Skin:   No rashes             Data:   All laboratory and imaging data in the past 24 hours reviewed  Results for orders placed or performed during the hospital encounter of 08/26/20 (from the past 24 hour(s))   UA with Microscopic reflex to Culture    Specimen: Midstream Urine   Result Value Ref Range    Color Urine Dark Yellow     Appearance Urine Clear     Glucose Urine Negative NEG^Negative mg/dL    Bilirubin Urine Negative NEG^Negative    Ketones Urine Negative NEG^Negative mg/dL    Specific Gravity Urine 1.016 1.003 - 1.035    Blood Urine Large (A) NEG^Negative    pH Urine 5.5 5.0 - 7.0 pH    Protein Albumin Urine Negative  NEG^Negative mg/dL    Urobilinogen mg/dL 2.0 0.0 - 2.0 mg/dL    Nitrite Urine Positive (A) NEG^Negative    Leukocyte Esterase Urine Negative NEG^Negative    Source Midstream Urine     WBC Urine 6 (H) 0 - 5 /HPF    RBC Urine >182 (H) 0 - 2 /HPF    Squamous Epithelial /HPF Urine 2 (H) 0 - 1 /HPF    Mucous Urine Present (A) NEG^Negative /LPF   Basic metabolic panel   Result Value Ref Range    Sodium 138 133 - 144 mmol/L    Potassium 3.4 3.4 - 5.3 mmol/L    Chloride 109 94 - 109 mmol/L    Carbon Dioxide 21 20 - 32 mmol/L    Anion Gap 8 3 - 14 mmol/L    Glucose 130 (H) 70 - 99 mg/dL    Urea Nitrogen 13 7 - 30 mg/dL    Creatinine 0.74 0.52 - 1.04 mg/dL    GFR Estimate >90 >60 mL/min/[1.73_m2]    GFR Estimate If Black >90 >60 mL/min/[1.73_m2]    Calcium 12.3 (H) 8.5 - 10.1 mg/dL   CBC with platelets differential   Result Value Ref Range    WBC 13.7 (H) 4.0 - 11.0 10e9/L    RBC Count 4.00 3.8 - 5.2 10e12/L    Hemoglobin 11.7 11.7 - 15.7 g/dL    Hematocrit 35.7 35.0 - 47.0 %    MCV 89 78 - 100 fl    MCH 29.3 26.5 - 33.0 pg    MCHC 32.8 31.5 - 36.5 g/dL    RDW 12.7 10.0 - 15.0 %    Platelet Count 339 150 - 450 10e9/L    Diff Method Automated Method     % Neutrophils 73.8 %    % Lymphocytes 17.9 %    % Monocytes 6.9 %    % Eosinophils 0.7 %    % Basophils 0.3 %    % Immature Granulocytes 0.4 %    Nucleated RBCs 0 0 /100    Absolute Neutrophil 10.1 (H) 1.6 - 8.3 10e9/L    Absolute Lymphocytes 2.5 0.8 - 5.3 10e9/L    Absolute Monocytes 1.0 0.0 - 1.3 10e9/L    Absolute Eosinophils 0.1 0.0 - 0.7 10e9/L    Absolute Basophils 0.0 0.0 - 0.2 10e9/L    Abs Immature Granulocytes 0.1 0 - 0.4 10e9/L    Absolute Nucleated RBC 0.0    CT Abdomen Pelvis w/o Contrast    Narrative    EXAM: CT ABDOMEN PELVIS W/O CONTRAST  LOCATION: [facility]  DATE/TIME: 8/26/2020 9:01 PM    INDICATION: Left flank pain.  COMPARISON: None.  TECHNIQUE: CT scan of the abdomen and pelvis was performed without IV contrast. Multiplanar reformats were  obtained. Dose reduction techniques were used.  CONTRAST: None.    FINDINGS:   LOWER CHEST: Normal.    HEPATOBILIARY: Cholecystectomy. Liver unremarkable.    PANCREAS: Normal.    SPLEEN: Normal.    ADRENAL GLANDS: Normal.    KIDNEYS/BLADDER: Obstructing 3 x 3 mm left ureterovesical junction stone with mild to moderate hydroureter and hydronephrosis. Left renal and perirenal edema. At least 8 nonobstructing 2 to 5 mm stones lower pole left kidney. Mild fullness of the right   renal pelvis and ureter to the level of the pelvis without obstructing stone identified.    BOWEL: Normal.    LYMPH NODES: Normal.    VASCULATURE: Unremarkable.    PELVIC ORGANS: Hysterectomy.    MUSCULOSKELETAL: Degenerative disease.      Impression    IMPRESSION:   1.  Obstructing 3 x 3 mm left ureterovesical junction stone with mild to moderate hydronephrosis, left renal and perirenal edema. Additional nonobstructive left nephrolithiasis.  2.  Mild right-sided hydronephrosis to the level of the urinary pelvis without obstructing stone identified. Urology consult recommended to consider retrograde pyelogram to exclude underlying lesion.       All imaging studies reviewed by me.  CT scan  Left kidney larger than right. Multiple nonobstructing stones in the left kidney. Bilateral hydroureteronephrosis, with a 3 mm left UVJ stone.         Attestation:  Amount of time performed on this consult: 45 minutes.    Kavon Hernandez MD

## 2020-08-27 NOTE — ED PROVIDER NOTES
History     Chief Complaint:  Back Pain      HPI     Radha Bryan is a 39 year old female with a history of right sided sciatica, s/p cholecystectomy and hysterectomy, who presents for evaluation of left sided back pain. Patient reports on onset of left sided back pain this morning that radiates to her groin. She also reports urinary frequency, voiding light brown urine, and nausea. She has taken ibuprofen PTA for her pain to some improvement. She denies dysuria, rash, vomiting, diarrhea, fever, chills, chest pain, shortness of breath, and black/bloody stools.    Allergies:  No known drug allergies    Medications:    Fluconazole  Naproxen  Omeprazole  Tizanidine    Past Medical History:    Irregular periods  Tinea versicolor  GERD  Acute right sided low back pain with right sided sciatica    Past Surgical History:    Cholecystectomy  JASPER, BSO  Tubal ligation    Family History:    History reviewed. No pertinent family history.     Social History:  Smoking status: never smoker  Alcohol use: no  Drug use: no  The patient presents to the emergency department by herself.  PCP: Jessica Zimmerman  Marital Status:  Single      Review of Systems   Constitutional: Negative for chills and fever.   Respiratory: Negative for shortness of breath.    Cardiovascular: Negative for chest pain.   Gastrointestinal: Positive for nausea. Negative for blood in stool, diarrhea and vomiting.   Genitourinary: Positive for frequency. Negative for dysuria.   Musculoskeletal: Positive for back pain.   All other systems reviewed and are negative.      Physical Exam     Patient Vitals for the past 24 hrs:   BP Temp Temp src Pulse Resp SpO2 Weight   08/27/20 0039 (!) 140/86 97.7  F (36.5  C) Temporal 94 20 97 % --   08/27/20 0015 124/82 -- -- 97 -- 96 % --   08/27/20 0000 126/77 -- -- 96 -- 95 % --   08/26/20 2348 -- 98.6  F (37  C) Oral -- -- -- --   08/26/20 2346 -- -- -- -- -- 95 % --   08/26/20 2345 136/86 -- -- 93 -- -- --   08/26/20  2305 -- 98.1  F (36.7  C) Oral -- -- -- --   08/26/20 2230 124/77 -- -- 82 -- 94 % --   08/26/20 2215 121/77 -- -- 85 -- 95 % --   08/26/20 2200 117/87 -- -- 88 -- 94 % --   08/26/20 2145 120/78 -- -- 84 -- 94 % --   08/26/20 2130 125/81 -- -- 83 -- 95 % --   08/26/20 2115 123/78 -- -- 86 -- 96 % --   08/26/20 2045 127/77 -- -- 85 -- 95 % --   08/26/20 2034 133/78 -- -- 82 -- 95 % --   08/26/20 1948 (!) 149/83 98.5  F (36.9  C) Oral 90 20 97 % 78 kg (172 lb)     Physical Exam  Constitutional: Pleasant. Cooperative.   Eyes: Pupils equally round and reactive  HENT: Head is normal in appearance. Oropharynx is normal with moist mucus membranes.  Cardiovascular: Regular rate and rhythm and without murmurs.  Respiratory: Normal respiratory effort, lungs are clear bilaterally.  GI: Mild TTP to RLQ, otherwise non-tender, soft, non-distended. No guarding, rebound, or rigidity.  Musculoskeletal: No asymmetry of the lower extremities, no tenderness to palpation. No CVA TTP. Mild TTP of R low back.  Skin: Normal, without rash.  Neurologic: Cranial nerves grossly intact, normal cognition, no focal deficits. Alert and oriented x 3.   Psychiatric: Normal affect.  Nursing notes and vital signs reviewed.      Emergency Department Course   Imaging:  Radiographic findings were communicated with the patient who voiced understanding of the findings.  CT Abdomen pelvis with contrast:   IMPRESSION:   1.  Obstructing 3 x 3 mm left ureterovesical junction stone with mild to moderate hydronephrosis, left renal and perirenal edema. Additional nonobstructive left nephrolithiasis.   2.  Mild right-sided hydronephrosis to the level of the urinary pelvis without obstructing stone identified. Urology consult recommended to consider retrograde pyelogram to exclude underlying lesion. as per radiology.    Laboratory:  CBC: WBC: 13.7 (H), HGB: 11.7, PLT: 339  BMP: Glucose 130 (H), Calcium 12.3 (H), o/w WNL (Creatinine: 0.74)  UA: Blood large, Nitrite  positive, WBC 6 (H), RBC >182 (H), Squamous epithelial 2 (H), Mucous present, o/w Negative  Urine culture aerobic bacterial: Pending  Blood cultures (X2): Pending  Asymptomatic COVID-19 PCR: Pending    Interventions:  2039 NS 1000 mL IV  2042 Zofran 4 mg IV  2044 Dilaudid 0.5 mg IV  2302 Dilaudid 0.5 mg IV    Emergency Department Course:  Nursing notes and vitals reviewed. (2028) I performed an exam of the patient as documented above.     IV inserted. Medicine administered as documented above. Blood drawn. Urine sample obtained. This was sent to the lab for further testing, results above.     The patient was sent for a CT while in the emergency department, findings above.     2332 I spoke with Dr. Hernandez of Urology and discussed the patient.    2336 I rechecked the patient and discussed the results of her workup thus far.     2339 I shared care of the patient with Dr. Avila.    Patient sent to OR.    Impression & Plan    Medical Decision Making:  Radha Bryan is a 39 year old female who presents to the ED for evaluation of left-sided back/flank pain.  She reports associated frequency, hematuria, nausea.  See HPI as above for additional details.  Vitals and physical exam as above.  Differential is broad and included disc herniation, kidney stone, pyelonephritis, fracture, diverticulitis, hernia, among others.  The above work-up was obtained.  Patient has evidence for obstructing stone at the left UVJ.  This is in the setting of nitrite positive urine with CBC demonstrating leukocytosis at 13.7.  As a function, urology was consulted, and patient was taken to the OR for stent placement.  Discussed with patient right-sided hydronephrosis as well and need for additional work-up regarding this. Patient additionally has evidence for hypercalcemia which has been present in previously evaluations as well. Etiology of this unclear at this time and will require additional work up. All questions answered. Patient admitted in  stable condition.    Covid-19  Radha Bryan was evaluated during a global COVID-19 pandemic, which necessitated consideration that the patient might be at risk for infection with the SARS-CoV-2 virus that causes COVID-19.   Applicable protocols for evaluation were followed during the patient's care.   COVID-19 was considered as part of the patient's evaluation. The plan for testing is:  a test was obtained during this visit.    Diagnosis:    ICD-10-CM    3. Kidney stone  N20.0    4. Urinary tract infection  N39.0    5. Hypercalcemia  E83.52        Disposition:  To OR    Tadeo Hunter  8/26/2020   [facility] EMERGENCY DEPARTMENT  Scribe Disclosure:  I, Tadeo Hunter, am serving as a scribe at 8:28 PM on 8/26/2020 to document services personally performed by Surya Miles PA-C based on my observations and the provider's statements to me.     This record was created at least in part using electronic voice recognition software, so please excuse any typographical errors.         Surya Miles PA-C  08/27/20 0131

## 2020-08-27 NOTE — PLAN OF CARE
VS-afebrile, stable,   Lung Sounds-clear, no cough, taking deep breathes,   O2-on room air.   GI-+Bs, +flatus, lbm was yesterday. Denies nausea.   -voiding well. No stones present.   IVF-sl discontinued.   Dressings-none.   CMS-denies numbness and tingling. +pp, strong dorsi/planter flexion. Wearing scds while in bed.   Drain-none.   Activity-up to the bathroom with sba. Denies lightheadness/dizziness.   Pain-gave oxycodone x 1  for pain level of a 6. Came down to a 0. Pt able to rest/sleep in between cares.   D/C Plan-home with william--daughter. Reviewed discharge paperwork. Will have follow up appt. Labs rechecked. Hospitalist reviewed them. Will have CA level as outpatient. . Will also have urology appt. No questions at this time. 3 meds filled and given to patient.

## 2020-08-27 NOTE — ANESTHESIA POSTPROCEDURE EVALUATION
Patient: Radha Bryan    Procedure(s):  CYSTOSCOPY, WITH BILATERAL STENT INSERTION    Diagnosis:* No pre-op diagnosis entered *  Diagnosis Additional Information: No value filed.    Anesthesia Type:  General    Note:  Anesthesia Post Evaluation    Patient location during evaluation: PACU  Patient participation: Able to fully participate in evaluation  Level of consciousness: awake  Pain management: adequate  Airway patency: patent  Cardiovascular status: acceptable  Respiratory status: acceptable  Hydration status: acceptable  PONV: controlled     Anesthetic complications: None          Last vitals:  Vitals:    08/27/20 0250 08/27/20 0255 08/27/20 0300   BP: (!) 134/90 (!) 123/93    Pulse: 90 92 89   Resp: 20 18 20   Temp:   97.1  F (36.2  C)   SpO2: 95% 95% 96%         Electronically Signed By: Andres Lema MD  August 27, 2020  3:21 AM

## 2020-08-27 NOTE — OP NOTE
[facility]  Operative Note    Pre-operative diagnosis: Left ureteral stone, right hydroureteronephrosis   Post-operative diagnosis Same as preop   Procedure: Procedure(s):  CYSTOSCOPY, WITH BILATERAL RETROGRADE PYELOGRAM AND BILATERAL URETERAL STENT INSERTION  FLUOROSCOPIC INTERPRETATION <1 HOUR PHYSICIAN TIME   Surgeon: Kavon Hernandez MD   Assistants(s): NONE   Anesthesia: General    Estimated blood loss: none   Total IV fluids: (See anesthesia record)   Blood transfusion: No transfusion was given during surgery   Total urine output: Not measured   Drains: Bilateral ureteral stents   Specimens: none   Implants: Implant Name Type Inv. Item Serial No.  Lot No. LRB No. Used Action   STENT URETERAL POLARIS ULTRA [id number] Stent STENT URETERAL POLARIS ULTRA [id number]  [employer] [id number] Left 1 Implanted   STENT URETERAL POLARIS ULTRA [id number] Stent STENT URETERAL POLARIS ULTRA [id number]  [employer] [id number] Right 1 Implanted        Findings: Bilateral hydroureteronephrosis down to the distal ureter bilaterally.   Complications: None.   Condition: Stable       Description of procedure:  38 yo F with history of left flank and abdominal pain, found to have a 3mm left UVJ stone as well as right hydroureteronephrosis of unclear etiology. + symptomatic cystitis for two days. Urinalysis concerning for infection given the positive nitrite (however, there is a relative paucity of pyuria and the leukocyte esterase is negative). She does have a leukocytosis to 13.7. Given all of this, will place bilateral ureteral stents in this acute setting and return for ureteroscopy for further evaluation after treatment of the urinary tract infection. Informed consent was obtained    The patient was brought to OR#14. General anesthesia was induced and she was placed in the dorsal lithotomy position, prepped and draped in the standard sterile  "fashion. A weight and renal appropriate dose of antibiotics was given. A timeout was performed. A 22 Fr Storz cystoscope was assembled and passed into the bladder. Cystoscopy revealed no bladder stones, no concerning urothelial lesions. The left ureteral orifice was identified and cannulated with a 0.035\" stiff shaft glidewire. Over this was passed a 5 Fr Tigertail catheter. The wire was removed and a gentle left retrograde pyelogram was performed which showed moderate hydroureteronephrosis all the way to the distal ureter. The wire was placed back up in the renal pelvis, the cathete removed and a 6 Fr x 24 cm left ureteral stent was placed in the usual fashion under direct cystoscopic and fluoroscopic guidance with good coils noted proximally and distally. The process was repeated on the right side. Again, there was moderate right hydroureteronephrosis down to the distal ureter. The right stent was placed with good coils. The bladder was emptied and the scope removed. The patient was cleaned up, awoken from anesthesia and brought to PACU in stable condition.    - observe overnight on antibiotics  - will follow up in 1-2 weeks for cystoscopy, bilateral stent removal, bilateral ureteroscopy, possible left laser lithotripsy/stone basketing, possible bilateral stent placement    Kavon Hernandez MD   [facility]  [phone] clinic phone    "

## 2020-08-27 NOTE — ED PROVIDER NOTES
Emergency Department Attending Supervision Note  8/27/2020  12:08 AM      I evaluated this patient in conjunction with Surya Miles PA-C      Briefly, the patient presented with back pain and left flank pain. She was found to have a UTI and left UVJ stone with right sided hydro (and no stone on that side).      On my exam,     Patient Vitals for the past 24 hrs:   BP Temp Temp src Pulse Resp SpO2 Weight   08/27/20 0447 123/69 97.3  F (36.3  C) Temporal 82 18 94 % --   08/27/20 0415 122/70 97.2  F (36.2  C) Temporal 85 19 94 % --   08/27/20 0347 122/76 97.1  F (36.2  C) Temporal 84 19 95 % --   08/27/20 0328 125/73 97.4  F (36.3  C) Temporal 81 19 94 % --   08/27/20 0300 -- 97.1  F (36.2  C) Temporal 89 20 96 % --   08/27/20 0255 (!) 123/93 -- -- 92 18 95 % --   08/27/20 0250 (!) 134/90 -- -- 90 20 95 % --   08/27/20 0245 133/88 -- -- 93 21 96 % --   08/27/20 0240 130/89 -- -- 95 19 97 % --   08/27/20 0235 (!) 136/90 -- -- 100 23 100 % --   08/27/20 0230 133/84 97.3  F (36.3  C) Temporal 92 19 100 % --   08/27/20 0225 123/82 -- -- 97 20 100 % --   08/27/20 0220 (!) 132/91 96.8  F (36  C) Temporal 101 20 100 % --   08/27/20 0039 (!) 140/86 97.7  F (36.5  C) Temporal 94 20 97 % --   08/27/20 0015 124/82 -- -- 97 -- 96 % --   08/27/20 0000 126/77 -- -- 96 -- 95 % --   08/26/20 2348 -- 98.6  F (37  C) Oral -- -- -- --   08/26/20 2346 -- -- -- -- -- 95 % --   08/26/20 2345 136/86 -- -- 93 -- -- --   08/26/20 2305 -- 98.1  F (36.7  C) Oral -- -- -- --   08/26/20 2230 124/77 -- -- 82 -- 94 % --   08/26/20 2215 121/77 -- -- 85 -- 95 % --   08/26/20 2200 117/87 -- -- 88 -- 94 % --   08/26/20 2145 120/78 -- -- 84 -- 94 % --   08/26/20 2130 125/81 -- -- 83 -- 95 % --   08/26/20 2115 123/78 -- -- 86 -- 96 % --   08/26/20 2045 127/77 -- -- 85 -- 95 % --   08/26/20 2034 133/78 -- -- 82 -- 95 % --   08/26/20 1948 (!) 149/83 98.5  F (36.9  C) Oral 90 20 97 % 78 kg (172 lb)       Constitutional: Vital signs reviewed as above.   HENT:     Head: No external signs of trauma noted.   Eyes: Conjunctivae are normal. Pupils are equal, round, and reactive to light.   Cardiovascular:    Normal rate, regular rhythm and normal heart sounds.     Exam reveals no friction rub.     No murmur heard.  Pulmonary/Chest:    Effort normal and breath sounds normal.    No respiratory distress.    There are no wheezes.    There are no rales.   Gastrointestinal:    Soft.    Bowel sounds normal.    There is no distension.    There is LLQ and left sided abdominal tenderness.    There is no rebound or guarding.   Musculoskeletal:    Normal range of motion.    Normal Tone  Neurological: Patient is alert and oriented to person, place, and time.   Skin: Skin is warm and dry. Patient is not diaphoretic  Psychiatric: The patient appears calm      Results:    XR Surgery DUNIA L/T 5 Min Fluoro w Stills   Final Result      CT Abdomen Pelvis w/o Contrast   Final Result   IMPRESSION:    1.  Obstructing 3 x 3 mm left ureterovesical junction stone with mild to moderate hydronephrosis, left renal and perirenal edema. Additional nonobstructive left nephrolithiasis.   2.  Mild right-sided hydronephrosis to the level of the urinary pelvis without obstructing stone identified. Urology consult recommended to consider retrograde pyelogram to exclude underlying lesion.             Labs Ordered and Resulted from Time of ED Arrival Up to the Time of Departure from the ED   BASIC METABOLIC PANEL - Abnormal; Notable for the following components:       Result Value    Glucose 130 (*)     Calcium 12.3 (*)     All other components within normal limits   CBC WITH PLATELETS DIFFERENTIAL - Abnormal; Notable for the following components:    WBC 13.7 (*)     Absolute Neutrophil 10.1 (*)     All other components within normal limits   ROUTINE UA WITH MICROSCOPIC REFLEX TO CULTURE - Abnormal; Notable for the following components:    Blood Urine Large (*)     Nitrite Urine Positive (*)     WBC Urine 6 (*)      RBC Urine >182 (*)     Squamous Epithelial /HPF Urine 2 (*)     Mucous Urine Present (*)     All other components within normal limits   PULSE OXIMETRY NURSING   STRAIN URINE       ED course:    Medications:  2039 NS 1000 mL IV  2042 Zofran 4 mg IV  2044 Dilaudid 0.5 mg IV  2302 Dilaudid 0.5 mg IV    ED Course as of Aug 27 0508   Wed Aug 26, 2020   2339 Dr. Avila discussed the case with Surya Miles PA-C. Discussed presentation, exam, ddx, plan. Urology recommends going to the OR [facility].      Thu Aug 27, 2020   0001 Dr. Avila' evaluation      0008 Updated patient's sister on the phone as well.          Impression:    ICD-10-CM    1. Left ureteral stone  N20.1 Urine Culture Aerobic Bacterial     Asymptomatic COVID-19 Virus (Coronavirus) by PCR     Case Request: CYSTOSCOPY, WITH bilateral RETROGRADE PYELOGRAM AND URETERAL STENT placement     Case Request: CYSTOSCOPY, WITH bilateral RETROGRADE PYELOGRAM AND URETERAL STENT placement     Blood culture     Blood culture     oxybutynin (DITROPAN) 5 MG tablet     tamsulosin (FLOMAX) 0.4 MG capsule   2. Hydronephrosis of right kidney  N13.30 Case Request: CYSTOSCOPY, WITH bilateral RETROGRADE PYELOGRAM AND URETERAL STENT placement     Case Request: CYSTOSCOPY, WITH bilateral RETROGRADE PYELOGRAM AND URETERAL STENT placement   3. Kidney stone  N20.0    4. Urinary tract infection  N39.0    5. Hypercalcemia  E83.52          Nickolas Avila DO Burns, Bradley Joseph, DO  08/27/20 0508

## 2020-08-28 ENCOUNTER — PATIENT OUTREACH (OUTPATIENT)
Dept: CARE COORDINATION | Facility: CLINIC | Age: 40
End: 2020-08-28

## 2020-08-28 LAB
BACTERIA SPEC CULT: NO GROWTH
Lab: NORMAL
SPECIMEN SOURCE: NORMAL

## 2020-08-28 NOTE — PROGRESS NOTES
Clinic Care Coordination Contact  [facility]/Voicemail       Clinical Data: Care Coordinator Outreach  Outreach attempted x 1.  Left message on patient's voicemail with call back information and requested return call.    Chart Review:  Referral made from discharge.  Admitted for kidney stone removal.  Follow up with PCP for further testing.  Follow up with Urology in 1-2 weeks.    Plan:  Care Coordinator will try to reach patient again in 1-2 business days.    ARNOLD Payan  Clinic Care Coordination  [facility] : [place], [place], [place], and [place]  Phone: [phone]    ______________________  Next Outreach:  08/31/20

## 2020-08-28 NOTE — LETTER
[facility] CARE COORDINATION  [address]  [facility] [address]  August 31, 2020      Radha Bryan  [address] [facility] [address]      Dear Radha,    I am a clinic community health worker who works with Jessica Zimmerman NP at [facility]. I have been trying to reach you recently to introduce Clinic Care Coordination and to see if there was anything I could assist you with.  Below is a description of clinic care coordination and how I can further assist you.      The clinic care coordination team is made up of a registered nurse,  and community health worker who understand the health care system. The goal of clinic care coordination is to help you manage your health and improve access to the health care system in the most efficient manner. The team can assist you in meeting your health care goals by providing education, coordinating services, strengthening the communication among your providers and supporting you with any resource needs.    Please feel free to contact me at [phone] with any questions or concerns. We are focused on providing you with the highest-quality healthcare experience possible and that all starts with you.     Sincerely,     ARNOLD Payan  Clinic Care Coordination  [facility] : [place], [place], [place], and [place]  Phone: [phone]

## 2020-08-31 NOTE — PROGRESS NOTES
Clinic Care Coordination Contact  [facility]/Voicemail       Clinical Data: Care Coordinator Outreach  Outreach attempted x 2.  Left message on patient's voicemail with call back information and requested return call.    Plan: Care Coordinator will send care coordination introduction letter with care coordinator contact information and explanation of care coordination services via mail. Care Coordinator will do no further outreaches at this time.    ARNOLD Payan  Clinic Care Coordination  [facility] Clinics : [place], [place], and [place]  Phone: [phone]

## 2020-09-02 LAB
BACTERIA SPEC CULT: NO GROWTH
BACTERIA SPEC CULT: NO GROWTH
SPECIMEN SOURCE: NORMAL
SPECIMEN SOURCE: NORMAL

## 2020-09-08 ENCOUNTER — HOSPITAL ENCOUNTER (OUTPATIENT)
Dept: MRI IMAGING | Facility: CLINIC | Age: 40
Discharge: HOME | End: 2020-09-08
Attending: FAMILY MEDICINE | Admitting: FAMILY MEDICINE
Payer: COMMERCIAL

## 2020-09-08 ENCOUNTER — TELEPHONE (OUTPATIENT)
Dept: UROLOGY | Facility: CLINIC | Age: 40
End: 2020-09-08

## 2020-09-08 DIAGNOSIS — M51.369 BULGING OF LUMBAR INTERVERTEBRAL DISC: ICD-10-CM

## 2020-09-08 DIAGNOSIS — M54.31 SCIATICA, RIGHT SIDE: ICD-10-CM

## 2020-09-08 PROCEDURE — 72148 MRI LUMBAR SPINE W/O DYE: CPT

## 2020-09-08 NOTE — TELEPHONE ENCOUNTER
[facility] Call Center    Phone Message    May a detailed message be left on voicemail: yes     Reason for Call: Other: Pt called and was told to schedule a f/u with Dr. Hernandez. Pt also said that she is still bleeding and whenever she urinates she still feels the burning sensation. Pt wants to speak to someone regarding this. Thanks    Action Taken: Message routed to:  [facility] ([facility] URO    Travel Screening: Not Applicable

## 2020-09-08 NOTE — TELEPHONE ENCOUNTER
Tried calling patient. As number is long distance I dialed a one in front of the number but still got the message that a one must be dialed in front of the number. Therefore was not able to reach patient. Hopefully she will return a call. These sound like normal stent symptoms and should resolve once stent is removed. A message should be sent to scheduling to arrange appointment for stent removal.     Becky Lopez LPN

## 2020-09-09 ENCOUNTER — OFFICE VISIT (OUTPATIENT)
Dept: INTERNAL MEDICINE | Facility: CLINIC | Age: 40
End: 2020-09-09
Payer: COMMERCIAL

## 2020-09-09 VITALS
WEIGHT: 177 LBS | DIASTOLIC BLOOD PRESSURE: 71 MMHG | RESPIRATION RATE: 16 BRPM | HEART RATE: 78 BPM | OXYGEN SATURATION: 98 % | BODY MASS INDEX: 32.37 KG/M2 | SYSTOLIC BLOOD PRESSURE: 111 MMHG

## 2020-09-09 DIAGNOSIS — Z23 NEED FOR PROPHYLACTIC VACCINATION AND INOCULATION AGAINST INFLUENZA: ICD-10-CM

## 2020-09-09 DIAGNOSIS — Z11.59 ENCOUNTER FOR SCREENING FOR OTHER VIRAL DISEASES: Primary | ICD-10-CM

## 2020-09-09 DIAGNOSIS — D36.9 DERMOID CYST: ICD-10-CM

## 2020-09-09 DIAGNOSIS — N20.1 URETERAL STONE: Primary | ICD-10-CM

## 2020-09-09 PROCEDURE — 90471 IMMUNIZATION ADMIN: CPT | Performed by: NURSE PRACTITIONER

## 2020-09-09 PROCEDURE — 99213 OFFICE O/P EST LOW 20 MIN: CPT | Mod: 25 | Performed by: NURSE PRACTITIONER

## 2020-09-09 PROCEDURE — 90686 IIV4 VACC NO PRSV 0.5 ML IM: CPT | Performed by: NURSE PRACTITIONER

## 2020-09-09 NOTE — PROGRESS NOTES
"Subjective     Radha Bryan is a 39 year old female who presents to clinic today for the following health issues:    HPI       ED/UC Followup:    Facility:  [facility] ED  Date of visit: 8/26/20  Reason for visit: kidney stone  Current Status: has not had urology OV for evaluation and stent removal if warranted.  stil hematuria, no flank pain       2 month h/o mildly tender lump and \"groove\"\" L scalp, no injury    Review of Systems   Constitutional, HEENT, cardiovascular, pulmonary, gi and gu systems are negative, except as otherwise noted.      Objective    LMP 06/20/2019   There is no height or weight on file to calculate BMI.  Physical Exam   GENERAL: healthy, alert and no distress  MS: L parietal skull with non inflamed cystic lesion < 2 cm and 3 cm cranial groove             Assessment & Plan     Ureteral stone    - UROLOGY ADULT REFERRAL; Future    Dermoid cyst  Monitor, reassurance given       BMI:   Estimated body mass index is 32.37 kg/m  as calculated from the following:    Height as of 7/6/20: 1.575 m (5' 2\").    Weight as of this encounter: 80.3 kg (177 lb).               Jessica Zimmerman NP  [facility]    "

## 2020-09-09 NOTE — TELEPHONE ENCOUNTER
Called alternative phone # and was able to speak with Radha. She says this # is her 's phone. Symptoms are WNL when stent in place. She expressed understanding. Also gave this # to surgery scheduling to get Radha set for her next surgery with Dr. Hernandez.   CARLOS ENRIQUE Isabel RN       Health Call Center    Phone Message    May a detailed message be left on voicemail: yes     Reason for Call: Other: Per call from PT returning calls to the clinic. Please assist the PT. Per PT if she can't be reached at [phone] then call her at [phone].     Action Taken: Message routed to:  Other: Urology - [place]    Travel Screening: Not Applicable

## 2020-09-13 DIAGNOSIS — Z11.59 ENCOUNTER FOR SCREENING FOR OTHER VIRAL DISEASES: ICD-10-CM

## 2020-09-13 PROCEDURE — U0003 INFECTIOUS AGENT DETECTION BY NUCLEIC ACID (DNA OR RNA); SEVERE ACUTE RESPIRATORY SYNDROME CORONAVIRUS 2 (SARS-COV-2) (CORONAVIRUS DISEASE [COVID-19]), AMPLIFIED PROBE TECHNIQUE, MAKING USE OF HIGH THROUGHPUT TECHNOLOGIES AS DESCRIBED BY CMS-2020-01-R: HCPCS | Performed by: STUDENT IN AN ORGANIZED HEALTH CARE EDUCATION/TRAINING PROGRAM

## 2020-09-14 LAB
SARS-COV-2 RNA SPEC QL NAA+PROBE: NOT DETECTED
SPECIMEN SOURCE: NORMAL

## 2020-09-17 ENCOUNTER — APPOINTMENT (OUTPATIENT)
Dept: GENERAL RADIOLOGY | Facility: CLINIC | Age: 40
End: 2020-09-17
Attending: STUDENT IN AN ORGANIZED HEALTH CARE EDUCATION/TRAINING PROGRAM
Payer: COMMERCIAL

## 2020-09-17 ENCOUNTER — ANESTHESIA EVENT (OUTPATIENT)
Dept: SURGERY | Facility: CLINIC | Age: 40
End: 2020-09-17
Payer: COMMERCIAL

## 2020-09-17 ENCOUNTER — HOSPITAL ENCOUNTER (OUTPATIENT)
Facility: CLINIC | Age: 40
Discharge: HOME | End: 2020-09-17
Attending: STUDENT IN AN ORGANIZED HEALTH CARE EDUCATION/TRAINING PROGRAM | Admitting: STUDENT IN AN ORGANIZED HEALTH CARE EDUCATION/TRAINING PROGRAM
Payer: COMMERCIAL

## 2020-09-17 ENCOUNTER — ANESTHESIA (OUTPATIENT)
Dept: SURGERY | Facility: CLINIC | Age: 40
End: 2020-09-17
Payer: COMMERCIAL

## 2020-09-17 VITALS
SYSTOLIC BLOOD PRESSURE: 127 MMHG | OXYGEN SATURATION: 98 % | HEIGHT: 62 IN | HEART RATE: 90 BPM | RESPIRATION RATE: 12 BRPM | TEMPERATURE: 97.9 F | WEIGHT: 175 LBS | BODY MASS INDEX: 32.2 KG/M2 | DIASTOLIC BLOOD PRESSURE: 86 MMHG

## 2020-09-17 DIAGNOSIS — N20.1 URETERAL STONE: ICD-10-CM

## 2020-09-17 LAB — COPATH REPORT: NORMAL

## 2020-09-17 PROCEDURE — 27211024 ZZHC OR SUPPLY OTHER OPNP: Performed by: STUDENT IN AN ORGANIZED HEALTH CARE EDUCATION/TRAINING PROGRAM

## 2020-09-17 PROCEDURE — 25500064 ZZH RX 255 OP 636: Performed by: STUDENT IN AN ORGANIZED HEALTH CARE EDUCATION/TRAINING PROGRAM

## 2020-09-17 PROCEDURE — 25000128 H RX IP 250 OP 636: Performed by: NURSE ANESTHETIST, CERTIFIED REGISTERED

## 2020-09-17 PROCEDURE — 36000063 ZZH SURGERY LEVEL 4 EA 15 ADDTL MIN: Performed by: STUDENT IN AN ORGANIZED HEALTH CARE EDUCATION/TRAINING PROGRAM

## 2020-09-17 PROCEDURE — C1758 CATHETER, URETERAL: HCPCS | Performed by: STUDENT IN AN ORGANIZED HEALTH CARE EDUCATION/TRAINING PROGRAM

## 2020-09-17 PROCEDURE — 71000012 ZZH RECOVERY PHASE 1 LEVEL 1 FIRST HR: Performed by: STUDENT IN AN ORGANIZED HEALTH CARE EDUCATION/TRAINING PROGRAM

## 2020-09-17 PROCEDURE — 25800025 ZZH RX 258: Performed by: STUDENT IN AN ORGANIZED HEALTH CARE EDUCATION/TRAINING PROGRAM

## 2020-09-17 PROCEDURE — 37000008 ZZH ANESTHESIA TECHNICAL FEE, 1ST 30 MIN: Performed by: STUDENT IN AN ORGANIZED HEALTH CARE EDUCATION/TRAINING PROGRAM

## 2020-09-17 PROCEDURE — 82365 CALCULUS SPECTROSCOPY: CPT | Performed by: STUDENT IN AN ORGANIZED HEALTH CARE EDUCATION/TRAINING PROGRAM

## 2020-09-17 PROCEDURE — C2617 STENT, NON-COR, TEM W/O DEL: HCPCS | Performed by: STUDENT IN AN ORGANIZED HEALTH CARE EDUCATION/TRAINING PROGRAM

## 2020-09-17 PROCEDURE — 36000065 ZZH SURGERY LEVEL 4 W FLUORO 1ST 30 MIN: Performed by: STUDENT IN AN ORGANIZED HEALTH CARE EDUCATION/TRAINING PROGRAM

## 2020-09-17 PROCEDURE — 74420 UROGRAPHY RTRGR +-KUB: CPT | Mod: 26 | Performed by: STUDENT IN AN ORGANIZED HEALTH CARE EDUCATION/TRAINING PROGRAM

## 2020-09-17 PROCEDURE — 25800030 ZZH RX IP 258 OP 636: Performed by: NURSE ANESTHETIST, CERTIFIED REGISTERED

## 2020-09-17 PROCEDURE — 37000009 ZZH ANESTHESIA TECHNICAL FEE, EACH ADDTL 15 MIN: Performed by: STUDENT IN AN ORGANIZED HEALTH CARE EDUCATION/TRAINING PROGRAM

## 2020-09-17 PROCEDURE — 88300 SURGICAL PATH GROSS: CPT | Mod: 26 | Performed by: STUDENT IN AN ORGANIZED HEALTH CARE EDUCATION/TRAINING PROGRAM

## 2020-09-17 PROCEDURE — 52356 CYSTO/URETERO W/LITHOTRIPSY: CPT | Mod: LT | Performed by: STUDENT IN AN ORGANIZED HEALTH CARE EDUCATION/TRAINING PROGRAM

## 2020-09-17 PROCEDURE — 71000027 ZZH RECOVERY PHASE 2 EACH 15 MINS: Performed by: STUDENT IN AN ORGANIZED HEALTH CARE EDUCATION/TRAINING PROGRAM

## 2020-09-17 PROCEDURE — 40000277 XR SURGERY CARM FLUORO LESS THAN 5 MIN W STILLS: Mod: TC

## 2020-09-17 PROCEDURE — 27210794 ZZH OR GENERAL SUPPLY STERILE: Performed by: STUDENT IN AN ORGANIZED HEALTH CARE EDUCATION/TRAINING PROGRAM

## 2020-09-17 PROCEDURE — 52332 CYSTOSCOPY AND TREATMENT: CPT | Mod: 59 | Performed by: STUDENT IN AN ORGANIZED HEALTH CARE EDUCATION/TRAINING PROGRAM

## 2020-09-17 PROCEDURE — 25000128 H RX IP 250 OP 636: Performed by: ANESTHESIOLOGY

## 2020-09-17 PROCEDURE — 25000132 ZZH RX MED GY IP 250 OP 250 PS 637: Performed by: STUDENT IN AN ORGANIZED HEALTH CARE EDUCATION/TRAINING PROGRAM

## 2020-09-17 PROCEDURE — C1769 GUIDE WIRE: HCPCS | Performed by: STUDENT IN AN ORGANIZED HEALTH CARE EDUCATION/TRAINING PROGRAM

## 2020-09-17 PROCEDURE — 25000128 H RX IP 250 OP 636: Performed by: STUDENT IN AN ORGANIZED HEALTH CARE EDUCATION/TRAINING PROGRAM

## 2020-09-17 PROCEDURE — 40000306 ZZH STATISTIC PRE PROC ASSESS II: Performed by: STUDENT IN AN ORGANIZED HEALTH CARE EDUCATION/TRAINING PROGRAM

## 2020-09-17 PROCEDURE — 88300 SURGICAL PATH GROSS: CPT | Performed by: STUDENT IN AN ORGANIZED HEALTH CARE EDUCATION/TRAINING PROGRAM

## 2020-09-17 PROCEDURE — 71000013 ZZH RECOVERY PHASE 1 LEVEL 1 EA ADDTL HR: Performed by: STUDENT IN AN ORGANIZED HEALTH CARE EDUCATION/TRAINING PROGRAM

## 2020-09-17 PROCEDURE — 25000132 ZZH RX MED GY IP 250 OP 250 PS 637: Performed by: ANESTHESIOLOGY

## 2020-09-17 PROCEDURE — 25000125 ZZHC RX 250: Performed by: NURSE ANESTHETIST, CERTIFIED REGISTERED

## 2020-09-17 PROCEDURE — 25000125 ZZHC RX 250: Performed by: STUDENT IN AN ORGANIZED HEALTH CARE EDUCATION/TRAINING PROGRAM

## 2020-09-17 DEVICE — STENT URETERAL POLARIS ULTRA 6FRX24CM M0061921320: Type: IMPLANTABLE DEVICE | Site: URETHRA | Status: FUNCTIONAL

## 2020-09-17 RX ORDER — ONDANSETRON 2 MG/ML
4 INJECTION INTRAMUSCULAR; INTRAVENOUS EVERY 30 MIN PRN
Status: DISCONTINUED | OUTPATIENT
Start: 2020-09-17 | End: 2020-09-17 | Stop reason: HOSPADM

## 2020-09-17 RX ORDER — CIPROFLOXACIN 500 MG/1
500 TABLET, FILM COATED ORAL ONCE
Qty: 1 TABLET | Refills: 0 | Status: SHIPPED | OUTPATIENT
Start: 2020-09-17 | End: 2020-09-17

## 2020-09-17 RX ORDER — OXYBUTYNIN CHLORIDE 5 MG/1
5 TABLET ORAL 3 TIMES DAILY PRN
Qty: 15 TABLET | Refills: 0 | Status: SHIPPED | OUTPATIENT
Start: 2020-09-17 | End: 2020-09-30

## 2020-09-17 RX ORDER — ACETAMINOPHEN 500 MG
1000 TABLET ORAL ONCE
Status: COMPLETED | OUTPATIENT
Start: 2020-09-17 | End: 2020-09-17

## 2020-09-17 RX ORDER — FENTANYL CITRATE 50 UG/ML
25-50 INJECTION, SOLUTION INTRAMUSCULAR; INTRAVENOUS
Status: DISCONTINUED | OUTPATIENT
Start: 2020-09-17 | End: 2020-09-17 | Stop reason: HOSPADM

## 2020-09-17 RX ORDER — DEXAMETHASONE SODIUM PHOSPHATE 4 MG/ML
INJECTION, SOLUTION INTRA-ARTICULAR; INTRALESIONAL; INTRAMUSCULAR; INTRAVENOUS; SOFT TISSUE PRN
Status: DISCONTINUED | OUTPATIENT
Start: 2020-09-17 | End: 2020-09-17

## 2020-09-17 RX ORDER — MEPERIDINE HYDROCHLORIDE 25 MG/ML
12.5 INJECTION INTRAMUSCULAR; INTRAVENOUS; SUBCUTANEOUS
Status: DISCONTINUED | OUTPATIENT
Start: 2020-09-17 | End: 2020-09-17 | Stop reason: HOSPADM

## 2020-09-17 RX ORDER — ACETAMINOPHEN 500 MG
1000 TABLET ORAL EVERY 6 HOURS PRN
Qty: 100 TABLET | Refills: 0 | Status: ON HOLD | OUTPATIENT
Start: 2020-09-17 | End: 2020-10-09

## 2020-09-17 RX ORDER — SODIUM CHLORIDE, SODIUM LACTATE, POTASSIUM CHLORIDE, CALCIUM CHLORIDE 600; 310; 30; 20 MG/100ML; MG/100ML; MG/100ML; MG/100ML
INJECTION, SOLUTION INTRAVENOUS CONTINUOUS PRN
Status: DISCONTINUED | OUTPATIENT
Start: 2020-09-17 | End: 2020-09-17

## 2020-09-17 RX ORDER — OXYCODONE HYDROCHLORIDE 5 MG/1
5 TABLET ORAL EVERY 6 HOURS PRN
Qty: 6 TABLET | Refills: 0 | Status: SHIPPED | OUTPATIENT
Start: 2020-09-17 | End: 2020-09-20

## 2020-09-17 RX ORDER — IBUPROFEN 800 MG/1
800 TABLET, FILM COATED ORAL EVERY 6 HOURS PRN
Qty: 50 TABLET | Refills: 0 | Status: ON HOLD | OUTPATIENT
Start: 2020-09-17 | End: 2020-10-09

## 2020-09-17 RX ORDER — DOCUSATE SODIUM 100 MG/1
100 CAPSULE, LIQUID FILLED ORAL 2 TIMES DAILY
Qty: 30 CAPSULE | Refills: 0 | Status: ON HOLD | OUTPATIENT
Start: 2020-09-17 | End: 2020-10-09

## 2020-09-17 RX ORDER — GLYCOPYRROLATE 0.2 MG/ML
INJECTION, SOLUTION INTRAMUSCULAR; INTRAVENOUS PRN
Status: DISCONTINUED | OUTPATIENT
Start: 2020-09-17 | End: 2020-09-17

## 2020-09-17 RX ORDER — NALOXONE HYDROCHLORIDE 0.4 MG/ML
.1-.4 INJECTION, SOLUTION INTRAMUSCULAR; INTRAVENOUS; SUBCUTANEOUS
Status: DISCONTINUED | OUTPATIENT
Start: 2020-09-17 | End: 2020-09-17 | Stop reason: HOSPADM

## 2020-09-17 RX ORDER — SODIUM CHLORIDE, SODIUM LACTATE, POTASSIUM CHLORIDE, CALCIUM CHLORIDE 600; 310; 30; 20 MG/100ML; MG/100ML; MG/100ML; MG/100ML
INJECTION, SOLUTION INTRAVENOUS CONTINUOUS
Status: DISCONTINUED | OUTPATIENT
Start: 2020-09-17 | End: 2020-09-17 | Stop reason: HOSPADM

## 2020-09-17 RX ORDER — LIDOCAINE HYDROCHLORIDE 10 MG/ML
INJECTION, SOLUTION INFILTRATION; PERINEURAL PRN
Status: DISCONTINUED | OUTPATIENT
Start: 2020-09-17 | End: 2020-09-17

## 2020-09-17 RX ORDER — CEFAZOLIN SODIUM 2 G/100ML
2 INJECTION, SOLUTION INTRAVENOUS
Status: COMPLETED | OUTPATIENT
Start: 2020-09-17 | End: 2020-09-17

## 2020-09-17 RX ORDER — LABETALOL 20 MG/4 ML (5 MG/ML) INTRAVENOUS SYRINGE
10
Status: DISCONTINUED | OUTPATIENT
Start: 2020-09-17 | End: 2020-09-17 | Stop reason: HOSPADM

## 2020-09-17 RX ORDER — TAMSULOSIN HYDROCHLORIDE 0.4 MG/1
0.4 CAPSULE ORAL DAILY
Qty: 14 CAPSULE | Refills: 0 | Status: SHIPPED | OUTPATIENT
Start: 2020-09-17 | End: 2020-09-30

## 2020-09-17 RX ORDER — ACETAMINOPHEN 325 MG/1
975 TABLET ORAL ONCE
Status: DISCONTINUED | OUTPATIENT
Start: 2020-09-17 | End: 2020-09-17 | Stop reason: HOSPADM

## 2020-09-17 RX ORDER — ATROPA BELLADONNA AND OPIUM 16.2; 3 MG/1; MG/1
SUPPOSITORY RECTAL PRN
Status: DISCONTINUED | OUTPATIENT
Start: 2020-09-17 | End: 2020-09-17 | Stop reason: HOSPADM

## 2020-09-17 RX ORDER — PROPOFOL 10 MG/ML
INJECTION, EMULSION INTRAVENOUS PRN
Status: DISCONTINUED | OUTPATIENT
Start: 2020-09-17 | End: 2020-09-17

## 2020-09-17 RX ORDER — FENTANYL CITRATE 50 UG/ML
INJECTION, SOLUTION INTRAMUSCULAR; INTRAVENOUS PRN
Status: DISCONTINUED | OUTPATIENT
Start: 2020-09-17 | End: 2020-09-17

## 2020-09-17 RX ORDER — HYDROMORPHONE HYDROCHLORIDE 1 MG/ML
.3-.5 INJECTION, SOLUTION INTRAMUSCULAR; INTRAVENOUS; SUBCUTANEOUS EVERY 10 MIN PRN
Status: DISCONTINUED | OUTPATIENT
Start: 2020-09-17 | End: 2020-09-17 | Stop reason: HOSPADM

## 2020-09-17 RX ORDER — ACETAMINOPHEN 325 MG/1
975 TABLET ORAL ONCE
Status: COMPLETED | OUTPATIENT
Start: 2020-09-17 | End: 2020-09-17

## 2020-09-17 RX ORDER — ONDANSETRON 4 MG/1
4 TABLET, ORALLY DISINTEGRATING ORAL EVERY 30 MIN PRN
Status: DISCONTINUED | OUTPATIENT
Start: 2020-09-17 | End: 2020-09-17 | Stop reason: HOSPADM

## 2020-09-17 RX ORDER — OXYCODONE HYDROCHLORIDE 5 MG/1
5 TABLET ORAL EVERY 6 HOURS PRN
Status: DISCONTINUED | OUTPATIENT
Start: 2020-09-17 | End: 2020-09-17 | Stop reason: HOSPADM

## 2020-09-17 RX ORDER — ONDANSETRON 2 MG/ML
INJECTION INTRAMUSCULAR; INTRAVENOUS PRN
Status: DISCONTINUED | OUTPATIENT
Start: 2020-09-17 | End: 2020-09-17

## 2020-09-17 RX ORDER — CEFAZOLIN SODIUM 1 G/3ML
1 INJECTION, POWDER, FOR SOLUTION INTRAMUSCULAR; INTRAVENOUS SEE ADMIN INSTRUCTIONS
Status: DISCONTINUED | OUTPATIENT
Start: 2020-09-17 | End: 2020-09-17 | Stop reason: HOSPADM

## 2020-09-17 RX ADMIN — MIDAZOLAM 2 MG: 1 INJECTION INTRAMUSCULAR; INTRAVENOUS at 12:08

## 2020-09-17 RX ADMIN — OXYCODONE HYDROCHLORIDE 5 MG: 5 TABLET ORAL at 15:30

## 2020-09-17 RX ADMIN — ONDANSETRON 4 MG: 4 TABLET, ORALLY DISINTEGRATING ORAL at 16:50

## 2020-09-17 RX ADMIN — FENTANYL CITRATE 50 MCG: 50 INJECTION, SOLUTION INTRAMUSCULAR; INTRAVENOUS at 14:33

## 2020-09-17 RX ADMIN — LIDOCAINE HYDROCHLORIDE 30 MG: 10 INJECTION, SOLUTION INFILTRATION; PERINEURAL at 12:18

## 2020-09-17 RX ADMIN — ONDANSETRON HYDROCHLORIDE 4 MG: 2 INJECTION, SOLUTION INTRAVENOUS at 12:18

## 2020-09-17 RX ADMIN — DEXAMETHASONE SODIUM PHOSPHATE 4 MG: 4 INJECTION, SOLUTION INTRA-ARTICULAR; INTRALESIONAL; INTRAMUSCULAR; INTRAVENOUS; SOFT TISSUE at 12:21

## 2020-09-17 RX ADMIN — CEFAZOLIN SODIUM 2 G: 2 INJECTION, SOLUTION INTRAVENOUS at 12:08

## 2020-09-17 RX ADMIN — SODIUM CHLORIDE, POTASSIUM CHLORIDE, SODIUM LACTATE AND CALCIUM CHLORIDE: 600; 310; 30; 20 INJECTION, SOLUTION INTRAVENOUS at 11:05

## 2020-09-17 RX ADMIN — FENTANYL CITRATE 50 MCG: 50 INJECTION, SOLUTION INTRAMUSCULAR; INTRAVENOUS at 12:35

## 2020-09-17 RX ADMIN — GLYCOPYRROLATE 0.1 MG: 0.2 INJECTION, SOLUTION INTRAMUSCULAR; INTRAVENOUS at 12:18

## 2020-09-17 RX ADMIN — PROPOFOL 40 MG: 10 INJECTION, EMULSION INTRAVENOUS at 12:32

## 2020-09-17 RX ADMIN — FENTANYL CITRATE 50 MCG: 50 INJECTION, SOLUTION INTRAMUSCULAR; INTRAVENOUS at 13:40

## 2020-09-17 RX ADMIN — HYDROMORPHONE HYDROCHLORIDE 0.5 MG: 1 INJECTION, SOLUTION INTRAMUSCULAR; INTRAVENOUS; SUBCUTANEOUS at 15:10

## 2020-09-17 RX ADMIN — FENTANYL CITRATE 100 MCG: 50 INJECTION, SOLUTION INTRAMUSCULAR; INTRAVENOUS at 12:20

## 2020-09-17 RX ADMIN — FENTANYL CITRATE 50 MCG: 50 INJECTION, SOLUTION INTRAMUSCULAR; INTRAVENOUS at 13:20

## 2020-09-17 RX ADMIN — PROPOFOL 160 MG: 10 INJECTION, EMULSION INTRAVENOUS at 12:21

## 2020-09-17 RX ADMIN — FENTANYL CITRATE 50 MCG: 50 INJECTION, SOLUTION INTRAMUSCULAR; INTRAVENOUS at 12:41

## 2020-09-17 RX ADMIN — ACETAMINOPHEN 975 MG: 325 TABLET, FILM COATED ORAL at 09:23

## 2020-09-17 RX ADMIN — ACETAMINOPHEN 1000 MG: 500 TABLET, FILM COATED ORAL at 16:21

## 2020-09-17 RX ADMIN — FENTANYL CITRATE 50 MCG: 50 INJECTION, SOLUTION INTRAMUSCULAR; INTRAVENOUS at 14:19

## 2020-09-17 SDOH — HEALTH STABILITY: MENTAL HEALTH: CURRENT SMOKER: 0

## 2020-09-17 ASSESSMENT — MIFFLIN-ST. JEOR: SCORE: 1412.04

## 2020-09-17 NOTE — ANESTHESIA POSTPROCEDURE EVALUATION
Patient: Radha Bryan    Procedure(s):  Cystoscopy, bilateral stent removal, bilateral ureteroscopy, left laser lithotripsy, stone basketing, possible bilateral stent placement    Diagnosis:Ureteral stone [N20.1]  Diagnosis Additional Information: No value filed.    Anesthesia Type:  General    Note:  Anesthesia Post Evaluation    Patient location during evaluation: PACU  Patient participation: Able to fully participate in evaluation  Level of consciousness: awake  Pain management: adequate  Airway patency: patent  Cardiovascular status: acceptable  Respiratory status: acceptable  Hydration status: acceptable  PONV: none             Last vitals:  Vitals:    09/17/20 1430 09/17/20 1445 09/17/20 1450   BP: 136/81 128/82 128/82   Pulse: 94 98 102   Resp: 18 18 15   Temp:      SpO2: 100% 96% 96%         Electronically Signed By: Nash Rankin MD  September 17, 2020  2:55 PM

## 2020-09-17 NOTE — DISCHARGE INSTRUCTIONS
POSTOPERATIVE INSTRUCTIONS    Diagnosis-------------------------------   Right hydronephrosis, left ureteral and kidney stones    Procedure-------------------------------  Procedure(s) (LRB):  Cystoscopy, bilateral stent removal, bilateral ureteroscopy, left laser lithotripsy, stone basketing, possible bilateral stent placement (Bilateral)      Findings--------------------------------  Right hydronephrosis with mid ureteral narrow area, poor drainage on retrograde so replaced stent  No left ureteral stone seen. Multiple left renal stones dusted/fragmented and basketed    Home-going instructions-----------------         Activity Limitation:     - No driving or operating heavy machinery while on narcotic pain medication.     FOLLOW THESE INSTRUCTIONS AS INDICATED BELOW:  - Observe operative area for signs of excessive bleeding.  - You may shower.  - Increase fluid intake to promote clear urine.  - Resume usual diet as tolerated    What to expect while recovering-----------  - You may experience some intermittent bleeding that makes your urine pink or cherry colored. This is normal.  - However, if you are unable to urinate, passing large amount of clots, have jacob blood in your urine, or have a temperature >101 degrees, call the urology nurse on call, or present to your nearest emergency department.  - You are encouraged to walk daily, and have no activity restrictions.   - A URETERAL STENT has been placed that allows urine to flow unobstructed from your kidney into your bladder.  The stent has a curl in the kidney and a curl in the bladder.  The curl in the bladder can cause some urgency and frequency of urination as well as some mild blood in the urine.  The curl in the kidney can cause some mild flank discomfort.  This may be more noticeable when you urinate.  A URETERAL STENT is meant to be left in temporarily.  It must be removed or changed no later than 3 months after its insertion.  If it's not removed it can  result in stone overgrowth on the stent that can cause pain, infection, and can be very difficult to remove.      Discharge Medications/instructions:     - Flomax (tamsulosin) to be taken daily until stent is removed    - Antibiotics (ciprofloxacin) are to be taken with you to your scheduled return visit for stent removal    - Take Tylenol 1000mg every 6 hours for pain    - Take Ibuprofen 600mg every 6 hours as needed for additional pain control (alternate with the Tylenol so you take one or the other every 3 hours)    - Take Oxycodone 5mg every 6 hours only for break through pain    - Take Colace while taking Oxycodone to prevent constipation      Questions/concerns------------------------  [employer] [employer]: [phone]    Future appointments  Follow up in 1-2 weeks for cystoscopy and stent removal. Afterwards we will get more imaging to see if your kidney is obstructed      Kavon Hernandez MD     GENERAL ANESTHESIA OR SEDATION ADULT DISCHARGE INSTRUCTIONS   SPECIAL PRECAUTIONS FOR 24 HOURS AFTER SURGERY    IT IS NOT UNUSUAL TO FEEL LIGHT-HEADED OR FAINT, UP TO 24 HOURS AFTER SURGERY OR WHILE TAKING PAIN MEDICATION.  IF YOU HAVE THESE SYMPTOMS; SIT FOR A FEW MINUTES BEFORE STANDING AND HAVE SOMEONE ASSIST YOU WHEN YOU GET UP TO WALK OR USE THE BATHROOM.    YOU SHOULD REST AND RELAX FOR THE NEXT 24 HOURS AND YOU MUST MAKE ARRANGEMENTS TO HAVE SOMEONE STAY WITH YOU FOR AT LEAST 24 HOURS AFTER YOUR DISCHARGE.  AVOID HAZARDOUS AND STRENUOUS ACTIVITIES.  DO NOT MAKE IMPORTANT DECISIONS FOR 24 HOURS.    DO NOT DRIVE ANY VEHICLE OR OPERATE MECHANICAL EQUIPMENT FOR 24 HOURS FOLLOWING THE END OF YOUR SURGERY.  EVEN THOUGH YOU MAY FEEL NORMAL, YOUR REACTIONS MAY BE AFFECTED BY THE MEDICATION YOU HAVE RECEIVED.    DO NOT DRINK ALCOHOLIC BEVERAGES FOR 24 HOURS FOLLOWING YOUR SURGERY.    DRINK CLEAR LIQUIDS (APPLE JUICE, GINGER ALE, 7-UP, BROTH, ETC.).  PROGRESS TO YOUR REGULAR DIET AS YOU FEEL  ABLE.    YOU MAY HAVE A DRY MOUTH, A SORE THROAT, MUSCLES ACHES OR TROUBLE SLEEPING.  THESE SHOULD GO AWAY AFTER 24 HOURS.    CALL YOUR DOCTOR FOR ANY OF THE FOLLOWING:  SIGNS OF INFECTION (FEVER, GROWING TENDERNESS AT THE SURGERY SITE, A LARGE AMOUNT OF DRAINAGE OR BLEEDING, SEVERE PAIN, FOUL-SMELLING DRAINAGE, REDNESS OR SWELLING.    IT HAS BEEN OVER 8 TO 10 HOURS SINCE SURGERY AND YOU ARE STILL NOT ABLE TO URINATE (PASS WATER).     STENT INFORMATION/DISCHARGE INSTRUCTIONS  [facility]  [facility] [facility]  [phone]    During surgery, a stent may be placed in the ureter.  The ureter is the tube that drains urine from the kidney to the bladder.  The stent is placed to dilate (open) the ureter so stone fragments can pass easily through the ureter or to decrease ureteral swelling after surgery or to relieve an obstruction.      The stent is made of silicone.  The upper end of the stent curls in the kidney while the lower end rests in the bladder.    While the stent is in place you may experience the following symptoms:  Blood and/or small blood clots in the urine  Bladder spasms (frequency and urgency of urination)  Discomfort or aching in the back or side where the stent is  Burning or discomfort at the end of urine stream    To decrease these symptoms you should:  Take antispasmodic medication as prescribed (Detrol, Ditropan, etc.)  Drink plenty of fluids but avoid caffeine and citrus (include cranberry)  If you are having discomfort in back or side, decrease activity    Please call your physician or the physician on call if you experience:  Fever greater than 101 degrees  Severe pain not relieved by pain medication or rest    Please make an appointment for the removal of the stent according to your physician's instructions.

## 2020-09-17 NOTE — ANESTHESIA CARE TRANSFER NOTE
Patient: Radha Bryan    Procedure(s):  Cystoscopy, bilateral stent removal, bilateral ureteroscopy, left laser lithotripsy, stone basketing, possible bilateral stent placement    Diagnosis: Ureteral stone [N20.1]  Diagnosis Additional Information: No value filed.    Anesthesia Type:   General     Note:  Airway :Face Mask and Oral Airway  Patient transferred to:PACU  Comments:   Spontaneous respirations, oral suctioned, bilateral eye opening and hand grasps.  Extubated to FM O2 6lpm.  VSS to PACU.Handoff Report: Identifed the Patient, Identified the Reponsible Provider, Reviewed the pertinent medical history, Discussed the surgical course, Reviewed Intra-OP anesthesia mangement and issues during anesthesia, Set expectations for post-procedure period and Allowed opportunity for questions and acknowledgement of understanding      Vitals: (Last set prior to Anesthesia Care Transfer)    CRNA VITALS  9/17/2020 1323 - 9/17/2020 1359      9/17/2020             NIBP:  136/78    Pulse:  78    SpO2:  100 %    Resp Rate (observed):  14    EKG:  Sinus rhythm                Electronically Signed By: ELIAZAR De La Garza CRNA  September 17, 2020  1:59 PM

## 2020-09-17 NOTE — ANESTHESIA PREPROCEDURE EVALUATION
Anesthesia Pre-Procedure Evaluation    Patient: Radha Bryan   MRN: [id number] : 1980          Preoperative Diagnosis: Ureteral stone [N20.1]    Procedure(s):  Cystoscopy, bilateral stent removal, bilateral ureteroscopy, left laser lithotripsy, stone basketing, possible bilateral stent placement    Past Medical History:   Diagnosis Date     History of blood transfusion      Irregular periods/menstrual cycles      Tinea versicolor      Past Surgical History:   Procedure Laterality Date     CHOLECYSTECTOMY       CYSTOSCOPY, RETROGRADES, INSERT STENT URETER(S), COMBINED Bilateral 2020    Procedure: Cystoscopy with bilateral retrograde pyelogram and bilateral ureteral stent insertion with Fluoroscopic interpretation <1 hour physician time;  Surgeon: Kavon Hernandez MD;  Location: [facility] OR     LAPAROSCOPIC HYSTERECTOMY TOTAL, SALPINGECTOMY BILATERAL  2019    AUB, fibroid uterus, anemia. [facility], Dr. Pamela Carrion     LAPAROSCOPIC TUBAL LIGATION       Anesthesia Evaluation     . Pt has had prior anesthetic. Type: General    No history of anesthetic complications          ROS/MED HX    ENT/Pulmonary:  - neg pulmonary ROS     Neurologic:  - neg neurologic ROS     Cardiovascular:  - neg cardiovascular ROS       METS/Exercise Tolerance:     Hematologic:  - neg hematologic  ROS       Musculoskeletal:  - neg musculoskeletal ROS       GI/Hepatic:     (+) GERD Asymptomatic on medication,       Renal/Genitourinary:     (+) Nephrolithiasis ,       Endo:     (+) Obesity, .      Psychiatric:  - neg psychiatric ROS       Infectious Disease:  - neg infectious disease ROS       Malignancy:         Other:    - neg other ROS                      Physical Exam  Normal systems: cardiovascular, pulmonary and dental    Airway   Mallampati: II  TM distance: >3 FB  Neck ROM: full    Dental     Cardiovascular       Pulmonary             Lab Results   Component Value Date    WBC 9.0 2020    HGB 11.5 (L)  "08/27/2020    HCT 35.2 08/27/2020     08/27/2020     08/27/2020    POTASSIUM 3.9 08/27/2020    CHLORIDE 113 (H) 08/27/2020    CO2 21 08/27/2020    BUN 10 08/27/2020    CR 0.58 08/27/2020     (H) 08/27/2020    FRANCISCO 11.8 (H) 08/27/2020    ALT 16 10/17/2016    AST 21 10/17/2016    HCGS Negative 05/06/2019       Preop Vitals  BP Readings from Last 3 Encounters:   09/17/20 117/76   09/09/20 111/71   08/27/20 104/53    Pulse Readings from Last 3 Encounters:   09/09/20 78   08/27/20 91   08/07/20 77      Resp Readings from Last 3 Encounters:   09/17/20 20   09/09/20 16   08/27/20 24    SpO2 Readings from Last 3 Encounters:   09/17/20 98%   09/09/20 98%   08/27/20 94%      Temp Readings from Last 1 Encounters:   09/17/20 97.9  F (36.6  C) (Axillary)    Ht Readings from Last 1 Encounters:   09/17/20 1.575 m (5' 2\")      Wt Readings from Last 1 Encounters:   09/17/20 79.4 kg (175 lb)    Estimated body mass index is 32.01 kg/m  as calculated from the following:    Height as of this encounter: 1.575 m (5' 2\").    Weight as of this encounter: 79.4 kg (175 lb).       Anesthesia Plan      History & Physical Review  History and physical reviewed and following examination; no interval change.    ASA Status:  2 .    NPO Status:  > 8 hours    Plan for General with Intravenous induction. Maintenance will be Balanced.    PONV prophylaxis:  Ondansetron (or other 5HT-3) and Dexamethasone or Solumedrol    The patient is not a current smoker      Postoperative Care  Postoperative pain management:  IV analgesics, Oral pain medications and Multi-modal analgesia.      Consents  Anesthetic plan, risks, benefits and alternatives discussed with:  Patient..                 Edin Gregorio MD                    .  "

## 2020-09-17 NOTE — ANESTHESIA POSTPROCEDURE EVALUATION
Patient: Radha Bryan    Procedure(s):  Cystoscopy, bilateral stent removal, bilateral ureteroscopy, left laser lithotripsy, stone basketing, possible bilateral stent placement    Diagnosis:Ureteral stone [N20.1]  Diagnosis Additional Information: No value filed.    Anesthesia Type:  General    Note:  Anesthesia Post Evaluation    Patient location during evaluation: PACU  Patient participation: Able to fully participate in evaluation  Level of consciousness: awake  Pain management: adequate  Airway patency: patent  Cardiovascular status: acceptable  Respiratory status: acceptable  Hydration status: acceptable  PONV: none             Last vitals:  Vitals:    09/17/20 1430 09/17/20 1445 09/17/20 1450   BP: 136/81 128/82 128/82   Pulse: 94 98 102   Resp: 18 18 15   Temp:      SpO2: 100% 96% 96%         Electronically Signed By: Nash Rankin MD  September 17, 2020  2:56 PM

## 2020-09-17 NOTE — OP NOTE
[facility]  Operative Note    Pre-operative diagnosis: Ureteral stone [N20.1]   Post-operative diagnosis Right hydronephrosis, left kidney stones   Procedure: Procedure(s):  Cystoscopy, bilateral stent removal, bilateral ureteroscopy, left laser lithotripsy, stone basketing, possible bilateral stent placement; bilateral retrograde pyelogram  Fluoroscopic interpretation <1 hour physician time   Surgeon: Kavon Hernandez MD   Assistants(s): none   Anesthesia: General    Estimated blood loss: Minimal    Total IV fluids: (See anesthesia record)   Blood transfusion: No transfusion was given during surgery   Total urine output: (See anesthesia record)   Drains: Bilateral ureteral stents   Specimens: ID Type Source Tests Collected by Time Destination   1 : left kidney stone  Calculus/Stone Kidney, Left STONE ANALYSIS Kavon Hernandez MD 9/17/2020  1:47 PM         Implants: Implant Name Type Inv. Item Serial No.  Lot No. LRB No. Used Action   STENT URETERAL POLARIS ULTRA [id number] Stent STENT URETERAL POLARIS ULTRA [id number]  [employer] [id number] Right 1 Explanted   STENT URETERAL POLARIS ULTRA [id number] Stent STENT URETERAL POLARIS ULTRA [id number]  [employer] [id number] Left 1 Explanted   STENT URETERAL POLARIS ULTRA [id number] Stent STENT URETERAL POLARIS ULTRA [id number]  [employer] [id number] Left 1 Implanted   STENT URETERAL POLARIS ULTRA [id number] Stent STENT URETERAL POLARIS ULTRA [id number]  [employer] [id number] Right 1 Implanted        Findings: Right hydronephrosis without hydroureter  Right mid-proximal ureter convolution and narrowing, could not pass ureteroscope proximally to this  No left ureteral stone. Multiple left kidney stones 5-7 mm in diameters, dusted/fragmented and basketed     Complications: None.   Condition: Stable       Description of procedure:  40 yo  F with history of left flank and abdominal pain, found to have a 3mm left UVJ stone with multiple left kidney stones as well as right hydronephrosis of unclear etiology, s/p bilateral stent placement 8/27/2020. She returns today for cystoscopy and bilateral ureteroscopy with left laser lithotripsy for diagnosis of right hydronephrosis and left kidney/ureteral stone treatment.  Informed consent was obtained.    Patient was brought to OR #14.  Weight and renal appropriate dose of antibiotics was given prior to the procedure.  General anesthesia was used, she was placed in dorsolithotomy position, and she was prepped and draped in standard sterile fashion.  Timeout was performed prior to the procedure.    22 Afghan Storz cystoscope was passed into the bladder.  The bilateral ureteral stents were seen emanating from their respective ureteral orifices with minimal encrustation.  The right ureteral stent was grasped with a cup biopsy forceps and brought out the urethral meatus.  This was attempted to be cannulated with a 0.035 inch Glidewire however there was enough encrustation within the stent to preclude passage of the wire.  The stent was removed.  The Glidewire was attempted to be passed back up to the renal pelvis however it coiled in the mid ureter.  A semirigid ureteroscope was assembled and passed up the ureter to the area where coiled.  There appeared to be a narrowing as well as convolution in the ureter at this point and the lumen was unable to be clearly identified.  Multiple attempts were made at passing a wire proximally under vision however this was unsuccessful.  Retrograde pyelogram did show passage of contrast up to the renal pelvis.  The ureteroscope was removed.  A 0.038 inch Glidewire was able to be passed under fluoroscopic guidance past the area and up to the renal pelvis.  This was exchanged for a tiger tail catheter and a retrograde pyelogram showed right hydronephrosis without hydroureter.  A  sensor wire was then passed through the tiger tail up to the renal pelvis and the tiger tail was removed.  A dual-lumen catheter was attempted to be used to place a second working wire, however the dual-lumen catheter was not able to pass this narrow area in the mid ureter and the second wire kept coiling in the mid ureter.  Decision was made to leave the sensor wire in place while we proceeded with a left ureteroscopy.  Sensor wire was left clipped the drapes.    The left ureteral stent was then grasped with a cup biopsy forceps and brought out the urethral meatus.  This was able to be cannulated easily with a 0.035 inch stiff shaft Glidewire which passed up to the renal pelvis under fluoroscopic guidance.  The stent was removed intact and discarded.  A short semirigid ureteroscope was then be passed up the left ureter.  There were no ureteral stones noted and the ureteroscope passed all the way up to the ureteropelvic junction without issue.  A gentle retrograde pyelogram was performed to delineate the renal pelvis which did not have any hydronephrosis.  A second 0.035 inch stiff shaft Glidewire was then passed through the ureteroscope under vision up to the renal pelvis and the ureteroscope was removed.  A 13/15 Mauritanian by 36 cm [place] MoVoxx Navigator ureteral access sheath were then placed over the working wire up to the proximal ureter.  The obturator and wire were removed and an Olympus flexible digital ureteroscope was used to assess the kidney.  There were several stones in the lower pole calyces measuring 5 to 7 mm in diameter.  A 200  m laser fiber was used along with a holmium laser at settings between 0.2 J /50 Hz to 0.2 J / 25 Hz to dust and fragment the stones.  Large pieces were backed up basketed out with the [place] Halo basket.  At the end of the procedure there were no significant stones remaining in the kidney greater than 1 mm in diameter.  Another retrograde pyelogram was performed to  outline the renal pelvis for stent placement.  Pullout ureteroscopy revealed the ureter to be in good condition without any lacerations.  A 6 Argentine by 24 cm stent was then placed in the usual fashion over the working wire with good coils noted proximally distally.    Attention was turned back to the right side.  A spot image showed that there was no significant drainage of contrast despite having waited over half an hour.  Therefore decision was made to place a stent on the right side.  A 6 Argentine by 24 cm stent was placed in usual fashion under cystoscopic and fluoroscopic guidance with good coils noted proximally distally.  Bladder was emptied.  Belladonna opium suppository was placed per rectum.  The patient was cleaned up, awoken from anesthesia and brought to PACU in stable condition    Kavon Hernandez MD   [facility]  [phone] clinic phone

## 2020-09-20 LAB
APPEARANCE STONE: NORMAL
COMPN STONE: NORMAL
NUMBER STONE: 3
SIZE STONE: NORMAL MM
WT STONE: 17 MG

## 2020-09-30 ENCOUNTER — TELEPHONE (OUTPATIENT)
Dept: UROLOGY | Facility: CLINIC | Age: 40
End: 2020-09-30

## 2020-09-30 DIAGNOSIS — N20.1 URETERAL STONE: ICD-10-CM

## 2020-09-30 RX ORDER — TAMSULOSIN HYDROCHLORIDE 0.4 MG/1
0.4 CAPSULE ORAL DAILY
Qty: 7 CAPSULE | Refills: 0 | Status: ON HOLD | OUTPATIENT
Start: 2020-09-30 | End: 2020-10-09

## 2020-09-30 RX ORDER — OXYBUTYNIN CHLORIDE 5 MG/1
5 TABLET ORAL 3 TIMES DAILY PRN
Qty: 21 TABLET | Refills: 0 | Status: SHIPPED | OUTPATIENT
Start: 2020-09-30 | End: 2020-11-18

## 2020-09-30 NOTE — TELEPHONE ENCOUNTER
Pt calling and states she is out of flomax and oxybutynin.  Pt would like refills.  I sent refills to pharmacy.  flomax 7 tabs  Oxybutynin 21 tabs  Pt also instructed to take cipro day of stent out.  RAJANI Levy CMA

## 2020-10-02 ENCOUNTER — OFFICE VISIT (OUTPATIENT)
Dept: ORTHOPEDICS | Facility: CLINIC | Age: 40
End: 2020-10-02
Payer: COMMERCIAL

## 2020-10-02 VITALS
DIASTOLIC BLOOD PRESSURE: 80 MMHG | SYSTOLIC BLOOD PRESSURE: 120 MMHG | WEIGHT: 175 LBS | HEIGHT: 62 IN | BODY MASS INDEX: 32.2 KG/M2

## 2020-10-02 DIAGNOSIS — M51.369 BULGING OF LUMBAR INTERVERTEBRAL DISC: Primary | ICD-10-CM

## 2020-10-02 DIAGNOSIS — M47.816 FACET ARTHROPATHY, LUMBAR: ICD-10-CM

## 2020-10-02 DIAGNOSIS — M48.061 SPINAL STENOSIS OF LUMBAR REGION WITHOUT NEUROGENIC CLAUDICATION: ICD-10-CM

## 2020-10-02 PROCEDURE — 99214 OFFICE O/P EST MOD 30 MIN: CPT | Performed by: FAMILY MEDICINE

## 2020-10-02 RX ORDER — CYCLOBENZAPRINE HCL 10 MG
10 TABLET ORAL
Qty: 30 TABLET | Refills: 0 | Status: SHIPPED | OUTPATIENT
Start: 2020-10-02 | End: 2021-01-15

## 2020-10-02 RX ORDER — CELECOXIB 200 MG/1
200 CAPSULE ORAL DAILY PRN
Qty: 30 CAPSULE | Refills: 2 | Status: SHIPPED | OUTPATIENT
Start: 2020-10-02 | End: 2021-01-15

## 2020-10-02 ASSESSMENT — MIFFLIN-ST. JEOR: SCORE: 1412.04

## 2020-10-02 NOTE — PATIENT INSTRUCTIONS
1. Bulging of lumbar intervertebral disc    2. Spinal stenosis of lumbar region without neurogenic claudication    3. Facet arthropathy, lumbar      -Patient is following up for low back pain due to mild bulging disc, arthritis and stenosis  -Right the lumbar spine was reviewed today.  All questions were answered.  -Patient was given treatment options of continued physical therapy versus repeat cortisone injections  -Patient wishes to proceed with physical therapy and home exercise program.  -Patient would like to think about repeat cortisone injections at this time.  She will call us if she wants to pursue that and will place order at that time  -Patient start Celebrex 200 mg in the morning.  Patient may take 1000 mg of extra Tylenol as needed for breakthrough pain.  Patient will start Flexeril at bedtime.  -Patient will follow-up if pain does not improve for reevaluation  -Call direct clinic number [[phone]] at any time with questions or concerns.    Albert Yeo MD CAM  [facility]  [facility] [place]

## 2020-10-02 NOTE — PROGRESS NOTES
"ASSESSMENT & PLAN  Patient Instructions     1. Bulging of lumbar intervertebral disc    2. Spinal stenosis of lumbar region without neurogenic claudication    3. Facet arthropathy, lumbar      -Patient is following up for low back pain due to mild bulging disc, arthritis and stenosis  -Right the lumbar spine was reviewed today.  All questions were answered.  -Patient was given treatment options of continued physical therapy versus repeat cortisone injections  -Patient wishes to proceed with physical therapy and home exercise program.  -Patient would like to think about repeat cortisone injections at this time.  She will call us if she wants to pursue that and will place order at that time  -Patient start Celebrex 200 mg in the morning.  Patient may take 1000 mg of extra Tylenol as needed for breakthrough pain.  Patient will start Flexeril at bedtime.  -Patient will follow-up if pain does not improve for reevaluation  -Call direct clinic number [[phone]] at any time with questions or concerns.    Albert Yeo MD [facility]  [facility]          -----    SUBJECTIVE:  Radha Bryan is a 39 year old female who is seen in follow-up for low back pain pain down the right leg due to lumbar herniated disc.They were last seen 8/14/2020.     Since their last visit reports 0% - (About the same as last time). They indicate that their current pain level is 6/10. Back feels tight. They have tried Tylenol and ibuprofen.      The patient is seen by themselves.    Patient's past medical, surgical, social, and family histories were reviewed today and no changes are noted.    REVIEW OF SYSTEMS:  Constitutional: NEGATIVE for fever, chills, change in weight  Skin: NEGATIVE for worrisome rashes, moles or lesions  GI/: NEGATIVE for bowel or bladder changes  Neuro: NEGATIVE for weakness, dizziness or paresthesias    OBJECTIVE:  /80   Ht 1.575 m (5' 2\")   Wt 79.4 kg (175 " lb)   LMP 06/20/2019   BMI 32.01 kg/m     General: healthy, alert and in no distress  HEENT: no scleral icterus or conjunctival erythema  Skin: no suspicious lesions or rash. No jaundice.  CV: regular rhythm by palpation, no pedal edema  Resp: normal respiratory effort without conversational dyspnea   Psych: normal mood and affect  Gait: normal steady gait with appropriate coordination and balance  Neuro: normal light touch sensory exam of the extremities.    MSK:  THORACIC/LUMBAR SPINE  Inspection:    No redness, swelling, overlying skin change, gross deformity/asymmetry, scapular winging  Palpation:  landmarks are nontender.  Range of Motion:     Lumbar flexion limited slightly by pain    Lumbar extension limited slightly by pain    Right side bend limited slightly by pain    Left side bend limited slightly by pain    Right rotation limited slightly by pain    Left rotation limited slightly by pain  Strength:    Full strength throughout hips/quads/hamstrings  Special Tests:    Positive: none  Negative: straight leg raise (bilateral)      Independent visualization of the below image:  MRI Lumbar spine w/o contrast [[id number]] Resulted: 09/08/20 0840   Order Status: Completed Updated: 09/08/20 0842   Narrative:     MRI OF THE LUMBAR SPINE WITHOUT CONTRAST 9/8/2020 8:02 AM     COMPARISON: Abdomen and pelvis CT 8/26/2020.     HISTORY: Intervertebral disc degeneration. Bulging of lumbar   intervertebral disc. Sciatica, right side.     TECHNIQUE: Multiplanar, multisequence MRI images of the lumbar spine   were acquired without IV contrast.     FINDINGS: There are five lumbar-type vertebrae for the purposes of   this dictation.     There is normal alignment of the lumbar vertebrae. Vertebral body   heights of the lumbar spine are normal. Marrow signal throughout the   lumbar vertebrae is within normal limits. There is no evidence for   fracture or pathologic bony lesion of the lumbar spine.     There is loss of disc  height, disc desiccation and minimal   circumferential disc bulging at the L5-S1 disc. The lumbar   intervertebral discs are otherwise within normal limits in height,   contour and signal intensity.     The tip of the conus medullaris is at the L1-L2 level which is within   normal limits. There is no evidence for intrathecal abnormality.   Hydronephrosis and hydroureter bilaterally again noted.     Level by level:     T12-L1: Normal disc height and signal. Normal facets. There is no   spinal canal or neural foraminal stenosis at this level.     L1-L2: Normal disc height and signal. Normal facets. There is no   spinal canal or neural foraminal stenosis at this level.     L2-L3: Normal disc height and signal. Normal facets. There is no   spinal canal or neural foraminal stenosis at this level.     L3-L4: Normal disc height and signal. Minimal facet arthropathy   bilaterally. There is no spinal canal or neural foraminal stenosis at   this level.     L4-L5: Normal disc height and signal. Minimal facet arthropathy   bilaterally. There is no spinal canal or neural foraminal stenosis at   this level.     L5-S1: Minimal circumferential disc bulge. There is increased T2   signal in the left posterolateral aspect of the disc bulge consistent   with an annular tear. Mild facet arthropathy bilaterally. No spinal   canal stenosis. Moderate left foraminal stenosis. No right foraminal   stenosis.    Impression:     IMPRESSION:   1. Degenerative changes at the L5-S1 level including moderate left   foraminal stenosis that could result in irritation of impingement upon   the exiting left L5 nerve root.   2. No other significant degenerative changes of the lumbar spine.   3. Hydronephrosis and hydroureter bilaterally again noted.     JEFFREY BRACE, MD Albert Yeo MD, [facility]

## 2020-10-02 NOTE — LETTER
10/2/2020         RE: Radha Bryan  [address] [facility] [address]        Dear Colleague,    Thank you for referring your patient, Radha Bryan, to the [facility] [place]. Please see a copy of my visit note below.    ASSESSMENT & PLAN  Patient Instructions     1. Bulging of lumbar intervertebral disc    2. Spinal stenosis of lumbar region without neurogenic claudication    3. Facet arthropathy, lumbar      -Patient is following up for low back pain due to mild bulging disc, arthritis and stenosis  -Right the lumbar spine was reviewed today.  All questions were answered.  -Patient was given treatment options of continued physical therapy versus repeat cortisone injections  -Patient wishes to proceed with physical therapy and home exercise program.  -Patient would like to think about repeat cortisone injections at this time.  She will call us if she wants to pursue that and will place order at that time  -Patient start Celebrex 200 mg in the morning.  Patient may take 1000 mg of extra Tylenol as needed for breakthrough pain.  Patient will start Flexeril at bedtime.  -Patient will follow-up if pain does not improve for reevaluation  -Call direct clinic number [[phone]] at any time with questions or concerns.    Albert Yeo MD [facility]  [facility]          -----    SUBJECTIVE:  Radha Bryan is a 39 year old female who is seen in follow-up for low back pain pain down the right leg due to lumbar herniated disc.They were last seen 8/14/2020.     Since their last visit reports 0% - (About the same as last time). They indicate that their current pain level is 6/10. Back feels tight. They have tried Tylenol and ibuprofen.      The patient is seen by themselves.    Patient's past medical, surgical, social, and family histories were reviewed today and no changes are noted.    REVIEW OF SYSTEMS:  Constitutional:  "NEGATIVE for fever, chills, change in weight  Skin: NEGATIVE for worrisome rashes, moles or lesions  GI/: NEGATIVE for bowel or bladder changes  Neuro: NEGATIVE for weakness, dizziness or paresthesias    OBJECTIVE:  /80   Ht 1.575 m (5' 2\")   Wt 79.4 kg (175 lb)   LMP 06/20/2019   BMI 32.01 kg/m     General: healthy, alert and in no distress  HEENT: no scleral icterus or conjunctival erythema  Skin: no suspicious lesions or rash. No jaundice.  CV: regular rhythm by palpation, no pedal edema  Resp: normal respiratory effort without conversational dyspnea   Psych: normal mood and affect  Gait: normal steady gait with appropriate coordination and balance  Neuro: normal light touch sensory exam of the extremities.    MSK:  THORACIC/LUMBAR SPINE  Inspection:    No redness, swelling, overlying skin change, gross deformity/asymmetry, scapular winging  Palpation:  landmarks are nontender.  Range of Motion:     Lumbar flexion limited slightly by pain    Lumbar extension limited slightly by pain    Right side bend limited slightly by pain    Left side bend limited slightly by pain    Right rotation limited slightly by pain    Left rotation limited slightly by pain  Strength:    Full strength throughout hips/quads/hamstrings  Special Tests:    Positive: none  Negative: straight leg raise (bilateral)      Independent visualization of the below image:  MRI Lumbar spine w/o contrast [[id number]] Resulted: 09/08/20 0840   Order Status: Completed Updated: 09/08/20 0842   Narrative:     MRI OF THE LUMBAR SPINE WITHOUT CONTRAST 9/8/2020 8:02 AM     COMPARISON: Abdomen and pelvis CT 8/26/2020.     HISTORY: Intervertebral disc degeneration. Bulging of lumbar   intervertebral disc. Sciatica, right side.     TECHNIQUE: Multiplanar, multisequence MRI images of the lumbar spine   were acquired without IV contrast.     FINDINGS: There are five lumbar-type vertebrae for the purposes of   this dictation.     There is normal " alignment of the lumbar vertebrae. Vertebral body   heights of the lumbar spine are normal. Marrow signal throughout the   lumbar vertebrae is within normal limits. There is no evidence for   fracture or pathologic bony lesion of the lumbar spine.     There is loss of disc height, disc desiccation and minimal   circumferential disc bulging at the L5-S1 disc. The lumbar   intervertebral discs are otherwise within normal limits in height,   contour and signal intensity.     The tip of the conus medullaris is at the L1-L2 level which is within   normal limits. There is no evidence for intrathecal abnormality.   Hydronephrosis and hydroureter bilaterally again noted.     Level by level:     T12-L1: Normal disc height and signal. Normal facets. There is no   spinal canal or neural foraminal stenosis at this level.     L1-L2: Normal disc height and signal. Normal facets. There is no   spinal canal or neural foraminal stenosis at this level.     L2-L3: Normal disc height and signal. Normal facets. There is no   spinal canal or neural foraminal stenosis at this level.     L3-L4: Normal disc height and signal. Minimal facet arthropathy   bilaterally. There is no spinal canal or neural foraminal stenosis at   this level.     L4-L5: Normal disc height and signal. Minimal facet arthropathy   bilaterally. There is no spinal canal or neural foraminal stenosis at   this level.     L5-S1: Minimal circumferential disc bulge. There is increased T2   signal in the left posterolateral aspect of the disc bulge consistent   with an annular tear. Mild facet arthropathy bilaterally. No spinal   canal stenosis. Moderate left foraminal stenosis. No right foraminal   stenosis.    Impression:     IMPRESSION:   1. Degenerative changes at the L5-S1 level including moderate left   foraminal stenosis that could result in irritation of impingement upon   the exiting left L5 nerve root.   2. No other significant degenerative changes of the lumbar  spine.   3. Hydronephrosis and hydroureter bilaterally again noted.     JEFFREY BRACE, MD Albert Yeo MD, [facility]            Again, thank you for allowing me to participate in the care of your patient.        Sincerely,        Albert Yeo, MD

## 2020-10-07 ENCOUNTER — PREP FOR PROCEDURE (OUTPATIENT)
Dept: UROLOGY | Facility: CLINIC | Age: 40
End: 2020-10-07

## 2020-10-07 ENCOUNTER — HOSPITAL ENCOUNTER (OUTPATIENT)
Dept: CT IMAGING | Facility: CLINIC | Age: 40
Discharge: HOME | End: 2020-10-07
Attending: STUDENT IN AN ORGANIZED HEALTH CARE EDUCATION/TRAINING PROGRAM | Admitting: STUDENT IN AN ORGANIZED HEALTH CARE EDUCATION/TRAINING PROGRAM
Payer: COMMERCIAL

## 2020-10-07 ENCOUNTER — OFFICE VISIT (OUTPATIENT)
Dept: UROLOGY | Facility: CLINIC | Age: 40
End: 2020-10-07
Payer: COMMERCIAL

## 2020-10-07 VITALS
OXYGEN SATURATION: 98 % | HEART RATE: 86 BPM | HEIGHT: 62 IN | SYSTOLIC BLOOD PRESSURE: 132 MMHG | WEIGHT: 175 LBS | DIASTOLIC BLOOD PRESSURE: 70 MMHG | BODY MASS INDEX: 32.2 KG/M2

## 2020-10-07 DIAGNOSIS — N13.30 HYDRONEPHROSIS OF RIGHT KIDNEY: Primary | ICD-10-CM

## 2020-10-07 DIAGNOSIS — N13.30 HYDRONEPHROSIS OF RIGHT KIDNEY: ICD-10-CM

## 2020-10-07 PROCEDURE — 74176 CT ABD & PELVIS W/O CONTRAST: CPT

## 2020-10-07 PROCEDURE — 99207 PR NO CHARGE LOS: CPT | Performed by: STUDENT IN AN ORGANIZED HEALTH CARE EDUCATION/TRAINING PROGRAM

## 2020-10-07 PROCEDURE — T1013 SIGN LANG/ORAL INTERPRETER: HCPCS | Mod: U3

## 2020-10-07 RX ORDER — CEFAZOLIN SODIUM 1 G
1 VIAL (EA) INJECTION SEE ADMIN INSTRUCTIONS
Status: CANCELLED | OUTPATIENT
Start: 2020-10-07

## 2020-10-07 RX ORDER — CIPROFLOXACIN 500 MG/1
500 TABLET, FILM COATED ORAL 2 TIMES DAILY
Qty: 6 TABLET | Refills: 0 | Status: SHIPPED | OUTPATIENT
Start: 2020-10-07 | End: 2020-11-18

## 2020-10-07 ASSESSMENT — MIFFLIN-ST. JEOR: SCORE: 1412.04

## 2020-10-07 NOTE — PROGRESS NOTES
PRE-PROCEDURE DIAGNOSIS: right hydronephrosis, left renal/ureteral stones    POST-PROCEDURE DIAGNOSIS: right hydronephrosis, left renal/ureteral stones    PROCEDURE: Cystoscopy with left ureteral stent removal, attempted right ureteral stent removal    HISTORY: 39 year old female with h/o left renal and ureteral stones, right hydronephrosis s/p bilateral ureteral stent placement 8/27/2020, left ureteroscopy and laser lithotripsy with bilateral ureteral stent exchange 9/17/2020, here for stent removal    Calculi composed primarily of:   10% calcium oxalate dihydrate, and   90% calcium phosphate (hydroxy- and carbonate- apatite).       DESCRIPTION OF PROCEDURE: After informed consent was obtained, the patient was brought to the procedure room where she was placed in the lithotomy position with all pressure points well padded.  The vulva was prepped and draped in sterile fashion. A flexible cystoscope was introduced through a well-lubricated urethra. The left ureteral stent was visualized, grasped with a flexible grasper and removed intact and discarded. The right ureteral stent was visualized, grasped with a flexible grasper, but could not be completely removed and met significant resistance. This was attempted to be uncoiled by cannulating with a glidewire however the stent was still not able to be removed. The distal coil of the stent was then pushed back into the bladder  The flexible cystoscope was removed and the findings were described to the patient.     ASSESSMENT AND PLAN: 38 yo h/o left renal and ureteral stones, right hydronephrosis s/p bilateral ureteral stent placement 8/27/2020, left ureteroscopy and laser lithotripsy with bilateral ureteral stent exchange 9/17/2020, here for stent removal. Left stent removed easily. Right ureteral stent is retained and could not be removed in the office; was pushed back into the bladder temporarily    Stat ct abd/pelvis to assess stent position  Npo until this  results  Likely will need OR for cystoscopy, right ureteral stent removal, right retrograde pyelogram, right ureteroscopy, possible right ureteral stent placement  If patient is having severe flank pain, nausea/vomiting or fever, needs to go to ED    Kavon Hernandez MD   [facility]  [phone] clinic phone

## 2020-10-07 NOTE — LETTER
10/7/2020       RE: Radha Bryan  [address] [facility] [address]     Dear Colleague,    Thank you for referring your patient, Radha Bryan, to the [facility] [place] [facility]. Please see a copy of my visit note below.    PRE-PROCEDURE DIAGNOSIS: right hydronephrosis, left renal/ureteral stones    POST-PROCEDURE DIAGNOSIS: right hydronephrosis, left renal/ureteral stones    PROCEDURE: Cystoscopy with left ureteral stent removal, attempted right ureteral stent removal    HISTORY: 39 year old female with h/o left renal and ureteral stones, right hydronephrosis s/p bilateral ureteral stent placement 8/27/2020, left ureteroscopy and laser lithotripsy with bilateral ureteral stent exchange 9/17/2020, here for stent removal    Calculi composed primarily of:   10% calcium oxalate dihydrate, and   90% calcium phosphate (hydroxy- and carbonate- apatite).       DESCRIPTION OF PROCEDURE: After informed consent was obtained, the patient was brought to the procedure room where she was placed in the lithotomy position with all pressure points well padded.  The vulva was prepped and draped in sterile fashion. A flexible cystoscope was introduced through a well-lubricated urethra. The left ureteral stent was visualized, grasped with a flexible grasper and removed intact and discarded. The right ureteral stent was visualized, grasped with a flexible grasper, but could not be completely removed and met significant resistance. This was attempted to be uncoiled by cannulating with a glidewire however the stent was still not able to be removed. The distal coil of the stent was then pushed back into the bladder  The flexible cystoscope was removed and the findings were described to the patient.     ASSESSMENT AND PLAN: 38 yo h/o left renal and ureteral stones, right hydronephrosis s/p bilateral ureteral stent placement 8/27/2020, left ureteroscopy and laser  lithotripsy with bilateral ureteral stent exchange 9/17/2020, here for stent removal. Left stent removed easily. Right ureteral stent is retained and could not be removed in the office; was pushed back into the bladder temporarily    Stat ct abd/pelvis to assess stent position  Npo until this results  Likely will need OR for cystoscopy, right ureteral stent removal, right retrograde pyelogram, right ureteroscopy, possible right ureteral stent placement  If patient is having severe flank pain, nausea/vomiting or fever, needs to go to MAMI Hernandez MD   [facility]  [phone] clinic phone

## 2020-10-07 NOTE — NURSING NOTE
Chief Complaint   Patient presents with     Kidney Stone Related     Patient here today for Cystoscopy Stent removal     Prior to the start of the procedure and with procedural staff participation, I verbally confirmed the patient s identity using two indicators, relevant allergies, that the procedure was appropriate and matched the consent or emergent situation, and that the correct equipment/implants were available. Immediately prior to starting the procedure I conducted the Time Out with the procedural staff and re-confirmed the patient s name, procedure, and site/side. I have wiped the patient off with the povidone-Iodine solution, draped them,  , and instilled sterile water into the bladder. (The Joint Commission universal protocol was followed.)  Yes    WE WERE ONLY ABLE TO REMOVE ONE STENT TODAY   PATIENT WENT TO HAVE CT TODAY AND POSSIBLE HAVE SURGERY TO REMOVE THE OTHER STENT ON RIGHT SIDE.    Sedation (Moderate or Deep): None        GAMALIEL Grant

## 2020-10-07 NOTE — PATIENT INSTRUCTIONS
"Get a CT abd/pelvis stat  Wait there for further instructions  Do not eat or drink anything until the CT is resulted      AFTER YOUR CYSTOSCOPY STENT REMOVAL  ?  ?  You have just completed a cystoscopy, or \"cysto\", which allowed your physician to learn more about your bladder (or to remove a stent placed after surgery). We suggest that you continue to avoid caffeine, fruit juice, and alcohol for the next 24 hours, however, you are encouraged to return to your normal activities.  ?  ?  A few things that are considered normal after your cystoscopy:  ?  * small amount of bleeding (or spotting) that clears within the next 24 hours  ?  * slight burning sensation with urination  ?  * sensation of needing to void (urinate) more frequently  ?  * the feeling of \"air\" in your urine  ?  * mild discomfort that is relieved with Tylenol    * bladder spasms  ?  ?  ?  Please contact our office promptly if you:  ?  * develop a fever above 101 degrees  ?  * are unable to urinate  ?  * develop bright red blood that does not stop  ?  * experience severe pain or swelling  ?  ?  ?  And of course, please contact our office with any concerns or questions [phone]  ?      "

## 2020-10-07 NOTE — PROGRESS NOTES
Urology    Attempted right ureteral stent removal in office today unsuccessful. Sent for stat CT scan which shows the proximal coil is now in the proximal ureter just distal to the ureteropelvic junction. There is no large stone seen on the stent; likely that the coil has some encrustation and is not uncoiling well. She has some residual stone burden on the left side despite being clear of significant stone burden on ureteroscopy on 9/17/2020.     Called patient and discussed situation over the phone with assistance of Dutch speaking . She is mainly having bladder spasm type pain right now, no flank pain. Discussed that she will need operative removal of the stent, will tentatively plan for Friday @ [facility] for cystoscopy, right ureteral stent removal, right retrograde pyelogram, possible right ureteroscopy/laser lithotripsy, possible right ureteral stent placement. If she begins having severe flank pain, nausea or vomiting, or fevers/chills she needs to go to the ER for possible more urgent intervention. She will continue a course of ciprofloxacin until the stent is able to be removed, given the significant manipulation today.    Kavon Hernandez MD   [facility]  [phone] clinic phone

## 2020-10-08 ENCOUNTER — HOSPITAL ENCOUNTER (OUTPATIENT)
Dept: LAB | Facility: CLINIC | Age: 40
Discharge: HOME | End: 2020-10-08
Attending: STUDENT IN AN ORGANIZED HEALTH CARE EDUCATION/TRAINING PROGRAM | Admitting: STUDENT IN AN ORGANIZED HEALTH CARE EDUCATION/TRAINING PROGRAM
Payer: COMMERCIAL

## 2020-10-08 ENCOUNTER — APPOINTMENT (OUTPATIENT)
Dept: INTERPRETER SERVICES | Facility: CLINIC | Age: 40
End: 2020-10-08
Payer: COMMERCIAL

## 2020-10-08 DIAGNOSIS — Z01.812 PRE-OPERATIVE LABORATORY EXAMINATION: ICD-10-CM

## 2020-10-08 PROCEDURE — U0003 INFECTIOUS AGENT DETECTION BY NUCLEIC ACID (DNA OR RNA); SEVERE ACUTE RESPIRATORY SYNDROME CORONAVIRUS 2 (SARS-COV-2) (CORONAVIRUS DISEASE [COVID-19]), AMPLIFIED PROBE TECHNIQUE, MAKING USE OF HIGH THROUGHPUT TECHNOLOGIES AS DESCRIBED BY CMS-2020-01-R: HCPCS | Performed by: STUDENT IN AN ORGANIZED HEALTH CARE EDUCATION/TRAINING PROGRAM

## 2020-10-09 ENCOUNTER — HOSPITAL ENCOUNTER (OUTPATIENT)
Facility: CLINIC | Age: 40
Discharge: HOME | End: 2020-10-09
Attending: STUDENT IN AN ORGANIZED HEALTH CARE EDUCATION/TRAINING PROGRAM | Admitting: STUDENT IN AN ORGANIZED HEALTH CARE EDUCATION/TRAINING PROGRAM
Payer: COMMERCIAL

## 2020-10-09 ENCOUNTER — ANESTHESIA (OUTPATIENT)
Dept: SURGERY | Facility: CLINIC | Age: 40
End: 2020-10-09
Payer: COMMERCIAL

## 2020-10-09 ENCOUNTER — APPOINTMENT (OUTPATIENT)
Dept: GENERAL RADIOLOGY | Facility: CLINIC | Age: 40
End: 2020-10-09
Attending: STUDENT IN AN ORGANIZED HEALTH CARE EDUCATION/TRAINING PROGRAM
Payer: COMMERCIAL

## 2020-10-09 ENCOUNTER — ANESTHESIA EVENT (OUTPATIENT)
Dept: SURGERY | Facility: CLINIC | Age: 40
End: 2020-10-09
Payer: COMMERCIAL

## 2020-10-09 VITALS
BODY MASS INDEX: 32.18 KG/M2 | DIASTOLIC BLOOD PRESSURE: 86 MMHG | RESPIRATION RATE: 16 BRPM | OXYGEN SATURATION: 95 % | HEART RATE: 91 BPM | HEIGHT: 62 IN | TEMPERATURE: 96.6 F | WEIGHT: 174.9 LBS | SYSTOLIC BLOOD PRESSURE: 124 MMHG

## 2020-10-09 DIAGNOSIS — N13.30 HYDRONEPHROSIS OF RIGHT KIDNEY: ICD-10-CM

## 2020-10-09 LAB
SARS-COV-2 RNA SPEC QL NAA+PROBE: NOT DETECTED
SPECIMEN SOURCE: NORMAL

## 2020-10-09 PROCEDURE — 360N000027 HC SURGERY LEVEL 4 EA 15 ADDTL MIN: Performed by: STUDENT IN AN ORGANIZED HEALTH CARE EDUCATION/TRAINING PROGRAM

## 2020-10-09 PROCEDURE — 74420 UROGRAPHY RTRGR +-KUB: CPT | Mod: 26 | Performed by: STUDENT IN AN ORGANIZED HEALTH CARE EDUCATION/TRAINING PROGRAM

## 2020-10-09 PROCEDURE — 370N000002 HC ANESTHESIA TECHNICAL FEE, EACH ADDTL 15 MIN: Performed by: STUDENT IN AN ORGANIZED HEALTH CARE EDUCATION/TRAINING PROGRAM

## 2020-10-09 PROCEDURE — C2617 STENT, NON-COR, TEM W/O DEL: HCPCS | Performed by: STUDENT IN AN ORGANIZED HEALTH CARE EDUCATION/TRAINING PROGRAM

## 2020-10-09 PROCEDURE — 52332 CYSTOSCOPY AND TREATMENT: CPT | Mod: RT | Performed by: STUDENT IN AN ORGANIZED HEALTH CARE EDUCATION/TRAINING PROGRAM

## 2020-10-09 PROCEDURE — 761N000002 HC RECOVERY PHASE 1 LEVEL 1 EA ADDTL HR: Performed by: STUDENT IN AN ORGANIZED HEALTH CARE EDUCATION/TRAINING PROGRAM

## 2020-10-09 PROCEDURE — 258N000003 HC RX IP 258 OP 636: Performed by: ANESTHESIOLOGY

## 2020-10-09 PROCEDURE — 258N000001 HC RX 258: Performed by: STUDENT IN AN ORGANIZED HEALTH CARE EDUCATION/TRAINING PROGRAM

## 2020-10-09 PROCEDURE — 761N000001 HC RECOVERY PHASE 1 LEVEL 1 FIRST HR: Performed by: STUDENT IN AN ORGANIZED HEALTH CARE EDUCATION/TRAINING PROGRAM

## 2020-10-09 PROCEDURE — 250N000009 HC RX 250: Performed by: STUDENT IN AN ORGANIZED HEALTH CARE EDUCATION/TRAINING PROGRAM

## 2020-10-09 PROCEDURE — 761N000007 HC RECOVERY PHASE 2 EACH 15 MINS: Performed by: STUDENT IN AN ORGANIZED HEALTH CARE EDUCATION/TRAINING PROGRAM

## 2020-10-09 PROCEDURE — 360N000024 HC SURGERY LEVEL 3 W FLUORO 1ST 30 MIN: Performed by: STUDENT IN AN ORGANIZED HEALTH CARE EDUCATION/TRAINING PROGRAM

## 2020-10-09 PROCEDURE — 250N000009 HC RX 250: Performed by: NURSE ANESTHETIST, CERTIFIED REGISTERED

## 2020-10-09 PROCEDURE — 250N000013 HC RX MED GY IP 250 OP 250 PS 637: Performed by: ANESTHESIOLOGY

## 2020-10-09 PROCEDURE — 272N000002 HC OR SUPPLY OTHER OPNP: Performed by: STUDENT IN AN ORGANIZED HEALTH CARE EDUCATION/TRAINING PROGRAM

## 2020-10-09 PROCEDURE — 272N000001 HC OR GENERAL SUPPLY STERILE: Performed by: STUDENT IN AN ORGANIZED HEALTH CARE EDUCATION/TRAINING PROGRAM

## 2020-10-09 PROCEDURE — 360N000030 HC SURGERY LEVEL 4 W FLUORO 1ST 30 MIN: Performed by: STUDENT IN AN ORGANIZED HEALTH CARE EDUCATION/TRAINING PROGRAM

## 2020-10-09 PROCEDURE — 250N000011 HC RX IP 250 OP 636: Performed by: ANESTHESIOLOGY

## 2020-10-09 PROCEDURE — 250N000011 HC RX IP 250 OP 636: Performed by: NURSE ANESTHETIST, CERTIFIED REGISTERED

## 2020-10-09 PROCEDURE — 360N000021 HC SURGERY LEVEL 3 EA 15 ADDTL MIN: Performed by: STUDENT IN AN ORGANIZED HEALTH CARE EDUCATION/TRAINING PROGRAM

## 2020-10-09 PROCEDURE — 999N000136 HC STATISTIC PRE PROC ASSESS II: Performed by: STUDENT IN AN ORGANIZED HEALTH CARE EDUCATION/TRAINING PROGRAM

## 2020-10-09 PROCEDURE — C1769 GUIDE WIRE: HCPCS | Performed by: STUDENT IN AN ORGANIZED HEALTH CARE EDUCATION/TRAINING PROGRAM

## 2020-10-09 PROCEDURE — 370N000001 HC ANESTHESIA TECHNICAL FEE, 1ST 30 MIN: Performed by: STUDENT IN AN ORGANIZED HEALTH CARE EDUCATION/TRAINING PROGRAM

## 2020-10-09 PROCEDURE — 999N000179 XR SURGERY CARM FLUORO LESS THAN 5 MIN W STILLS: Mod: TC

## 2020-10-09 DEVICE — STENT URETERAL POLARIS ULTRA 5FRX24CM M0061921220: Type: IMPLANTABLE DEVICE | Site: ABDOMEN | Status: FUNCTIONAL

## 2020-10-09 RX ORDER — IBUPROFEN 800 MG/1
800 TABLET, FILM COATED ORAL EVERY 6 HOURS PRN
Qty: 50 TABLET | Refills: 0 | Status: SHIPPED | OUTPATIENT
Start: 2020-10-09 | End: 2020-11-18

## 2020-10-09 RX ORDER — OXYCODONE HYDROCHLORIDE 5 MG/1
5 TABLET ORAL EVERY 4 HOURS PRN
Status: DISCONTINUED | OUTPATIENT
Start: 2020-10-09 | End: 2020-10-09 | Stop reason: HOSPADM

## 2020-10-09 RX ORDER — METOCLOPRAMIDE HYDROCHLORIDE 5 MG/ML
10 INJECTION INTRAMUSCULAR; INTRAVENOUS EVERY 6 HOURS PRN
Status: DISCONTINUED | OUTPATIENT
Start: 2020-10-09 | End: 2020-10-09 | Stop reason: HOSPADM

## 2020-10-09 RX ORDER — CEFAZOLIN SODIUM 1 G/3ML
INJECTION, POWDER, FOR SOLUTION INTRAMUSCULAR; INTRAVENOUS PRN
Status: DISCONTINUED | OUTPATIENT
Start: 2020-10-09 | End: 2020-10-09

## 2020-10-09 RX ORDER — ATROPA BELLADONNA AND OPIUM 16.2; 3 MG/1; MG/1
SUPPOSITORY RECTAL PRN
Status: DISCONTINUED | OUTPATIENT
Start: 2020-10-09 | End: 2020-10-09 | Stop reason: HOSPADM

## 2020-10-09 RX ORDER — HYDROMORPHONE HYDROCHLORIDE 1 MG/ML
.3-.5 INJECTION, SOLUTION INTRAMUSCULAR; INTRAVENOUS; SUBCUTANEOUS EVERY 10 MIN PRN
Status: DISCONTINUED | OUTPATIENT
Start: 2020-10-09 | End: 2020-10-09 | Stop reason: HOSPADM

## 2020-10-09 RX ORDER — TAMSULOSIN HYDROCHLORIDE 0.4 MG/1
0.4 CAPSULE ORAL DAILY
Qty: 14 CAPSULE | Refills: 0 | Status: SHIPPED | OUTPATIENT
Start: 2020-10-09 | End: 2020-11-18

## 2020-10-09 RX ORDER — DEXAMETHASONE SODIUM PHOSPHATE 4 MG/ML
4 INJECTION, SOLUTION INTRA-ARTICULAR; INTRALESIONAL; INTRAMUSCULAR; INTRAVENOUS; SOFT TISSUE EVERY 10 MIN PRN
Status: DISCONTINUED | OUTPATIENT
Start: 2020-10-09 | End: 2020-10-09 | Stop reason: HOSPADM

## 2020-10-09 RX ORDER — FENTANYL CITRATE 50 UG/ML
25-50 INJECTION, SOLUTION INTRAMUSCULAR; INTRAVENOUS
Status: DISCONTINUED | OUTPATIENT
Start: 2020-10-09 | End: 2020-10-09 | Stop reason: HOSPADM

## 2020-10-09 RX ORDER — METOCLOPRAMIDE 10 MG/1
10 TABLET ORAL EVERY 6 HOURS PRN
Status: DISCONTINUED | OUTPATIENT
Start: 2020-10-09 | End: 2020-10-09 | Stop reason: HOSPADM

## 2020-10-09 RX ORDER — OXYCODONE HYDROCHLORIDE 5 MG/1
5 TABLET ORAL EVERY 6 HOURS PRN
Qty: 6 TABLET | Refills: 0 | Status: SHIPPED | OUTPATIENT
Start: 2020-10-09 | End: 2020-10-12

## 2020-10-09 RX ORDER — PHYSOSTIGMINE SALICYLATE 1 MG/ML
1.2 INJECTION INTRAVENOUS
Status: DISCONTINUED | OUTPATIENT
Start: 2020-10-09 | End: 2020-10-09 | Stop reason: HOSPADM

## 2020-10-09 RX ORDER — HYDRALAZINE HYDROCHLORIDE 20 MG/ML
2.5-5 INJECTION INTRAMUSCULAR; INTRAVENOUS EVERY 10 MIN PRN
Status: DISCONTINUED | OUTPATIENT
Start: 2020-10-09 | End: 2020-10-09 | Stop reason: HOSPADM

## 2020-10-09 RX ORDER — KETOROLAC TROMETHAMINE 30 MG/ML
30 INJECTION, SOLUTION INTRAMUSCULAR; INTRAVENOUS EVERY 6 HOURS PRN
Status: DISCONTINUED | OUTPATIENT
Start: 2020-10-09 | End: 2020-10-09 | Stop reason: HOSPADM

## 2020-10-09 RX ORDER — MEPERIDINE HYDROCHLORIDE 25 MG/ML
12.5 INJECTION INTRAMUSCULAR; INTRAVENOUS; SUBCUTANEOUS
Status: DISCONTINUED | OUTPATIENT
Start: 2020-10-09 | End: 2020-10-09 | Stop reason: HOSPADM

## 2020-10-09 RX ORDER — LIDOCAINE HYDROCHLORIDE 10 MG/ML
INJECTION, SOLUTION INFILTRATION; PERINEURAL PRN
Status: DISCONTINUED | OUTPATIENT
Start: 2020-10-09 | End: 2020-10-09

## 2020-10-09 RX ORDER — METOPROLOL TARTRATE 1 MG/ML
1-2 INJECTION, SOLUTION INTRAVENOUS EVERY 5 MIN PRN
Status: DISCONTINUED | OUTPATIENT
Start: 2020-10-09 | End: 2020-10-09 | Stop reason: HOSPADM

## 2020-10-09 RX ORDER — LIDOCAINE 40 MG/G
CREAM TOPICAL
Status: DISCONTINUED | OUTPATIENT
Start: 2020-10-09 | End: 2020-10-09 | Stop reason: HOSPADM

## 2020-10-09 RX ORDER — IBUPROFEN 600 MG/1
600 TABLET, FILM COATED ORAL EVERY 6 HOURS PRN
Status: DISCONTINUED | OUTPATIENT
Start: 2020-10-09 | End: 2020-10-09 | Stop reason: HOSPADM

## 2020-10-09 RX ORDER — SODIUM CHLORIDE, SODIUM LACTATE, POTASSIUM CHLORIDE, CALCIUM CHLORIDE 600; 310; 30; 20 MG/100ML; MG/100ML; MG/100ML; MG/100ML
INJECTION, SOLUTION INTRAVENOUS CONTINUOUS
Status: DISCONTINUED | OUTPATIENT
Start: 2020-10-09 | End: 2020-10-09 | Stop reason: HOSPADM

## 2020-10-09 RX ORDER — CIPROFLOXACIN 500 MG/1
500 TABLET, FILM COATED ORAL ONCE
Qty: 1 TABLET | Refills: 0 | Status: SHIPPED | OUTPATIENT
Start: 2020-10-09 | End: 2020-10-09

## 2020-10-09 RX ORDER — FUROSEMIDE 10 MG/ML
INJECTION INTRAMUSCULAR; INTRAVENOUS PRN
Status: DISCONTINUED | OUTPATIENT
Start: 2020-10-09 | End: 2020-10-09

## 2020-10-09 RX ORDER — DOCUSATE SODIUM 100 MG/1
100 CAPSULE, LIQUID FILLED ORAL 2 TIMES DAILY
Qty: 30 CAPSULE | Refills: 0 | Status: SHIPPED | OUTPATIENT
Start: 2020-10-09 | End: 2020-11-18

## 2020-10-09 RX ORDER — ONDANSETRON 2 MG/ML
4 INJECTION INTRAMUSCULAR; INTRAVENOUS EVERY 30 MIN PRN
Status: DISCONTINUED | OUTPATIENT
Start: 2020-10-09 | End: 2020-10-09 | Stop reason: HOSPADM

## 2020-10-09 RX ORDER — NALOXONE HYDROCHLORIDE 0.4 MG/ML
.1-.4 INJECTION, SOLUTION INTRAMUSCULAR; INTRAVENOUS; SUBCUTANEOUS
Status: DISCONTINUED | OUTPATIENT
Start: 2020-10-09 | End: 2020-10-09 | Stop reason: HOSPADM

## 2020-10-09 RX ORDER — ONDANSETRON 4 MG/1
4 TABLET, ORALLY DISINTEGRATING ORAL EVERY 30 MIN PRN
Status: DISCONTINUED | OUTPATIENT
Start: 2020-10-09 | End: 2020-10-09 | Stop reason: HOSPADM

## 2020-10-09 RX ORDER — DEXAMETHASONE SODIUM PHOSPHATE 4 MG/ML
INJECTION, SOLUTION INTRA-ARTICULAR; INTRALESIONAL; INTRAMUSCULAR; INTRAVENOUS; SOFT TISSUE PRN
Status: DISCONTINUED | OUTPATIENT
Start: 2020-10-09 | End: 2020-10-09

## 2020-10-09 RX ORDER — GLYCOPYRROLATE 0.2 MG/ML
INJECTION, SOLUTION INTRAMUSCULAR; INTRAVENOUS PRN
Status: DISCONTINUED | OUTPATIENT
Start: 2020-10-09 | End: 2020-10-09

## 2020-10-09 RX ORDER — ACETAMINOPHEN 500 MG
1000 TABLET ORAL EVERY 6 HOURS PRN
Qty: 100 TABLET | Refills: 0 | Status: SHIPPED | OUTPATIENT
Start: 2020-10-09 | End: 2020-11-18

## 2020-10-09 RX ORDER — DIMENHYDRINATE 50 MG/ML
25 INJECTION, SOLUTION INTRAMUSCULAR; INTRAVENOUS
Status: DISCONTINUED | OUTPATIENT
Start: 2020-10-09 | End: 2020-10-09 | Stop reason: HOSPADM

## 2020-10-09 RX ORDER — PROPOFOL 10 MG/ML
INJECTION, EMULSION INTRAVENOUS PRN
Status: DISCONTINUED | OUTPATIENT
Start: 2020-10-09 | End: 2020-10-09

## 2020-10-09 RX ORDER — FENTANYL CITRATE 50 UG/ML
INJECTION, SOLUTION INTRAMUSCULAR; INTRAVENOUS PRN
Status: DISCONTINUED | OUTPATIENT
Start: 2020-10-09 | End: 2020-10-09

## 2020-10-09 RX ADMIN — ONDANSETRON 4 MG: 2 INJECTION INTRAMUSCULAR; INTRAVENOUS at 13:56

## 2020-10-09 RX ADMIN — PROPOFOL 160 MG: 10 INJECTION, EMULSION INTRAVENOUS at 10:55

## 2020-10-09 RX ADMIN — MIDAZOLAM 2 MG: 1 INJECTION INTRAMUSCULAR; INTRAVENOUS at 10:44

## 2020-10-09 RX ADMIN — FUROSEMIDE 10 MG: 10 INJECTION, SOLUTION INTRAVENOUS at 11:49

## 2020-10-09 RX ADMIN — FENTANYL CITRATE 100 MCG: 50 INJECTION, SOLUTION INTRAMUSCULAR; INTRAVENOUS at 10:55

## 2020-10-09 RX ADMIN — GLYCOPYRROLATE 0.2 MG: 0.2 INJECTION, SOLUTION INTRAMUSCULAR; INTRAVENOUS at 10:55

## 2020-10-09 RX ADMIN — DEXAMETHASONE SODIUM PHOSPHATE 4 MG: 4 INJECTION, SOLUTION INTRA-ARTICULAR; INTRALESIONAL; INTRAMUSCULAR; INTRAVENOUS; SOFT TISSUE at 10:55

## 2020-10-09 RX ADMIN — PROPOFOL 100 MG: 10 INJECTION, EMULSION INTRAVENOUS at 11:26

## 2020-10-09 RX ADMIN — FENTANYL CITRATE 50 MCG: 50 INJECTION, SOLUTION INTRAMUSCULAR; INTRAVENOUS at 12:42

## 2020-10-09 RX ADMIN — LIDOCAINE HYDROCHLORIDE 50 MG: 10 INJECTION, SOLUTION INFILTRATION; PERINEURAL at 10:55

## 2020-10-09 RX ADMIN — IBUPROFEN 600 MG: 600 TABLET ORAL at 13:40

## 2020-10-09 RX ADMIN — SODIUM CHLORIDE, POTASSIUM CHLORIDE, SODIUM LACTATE AND CALCIUM CHLORIDE: 600; 310; 30; 20 INJECTION, SOLUTION INTRAVENOUS at 10:12

## 2020-10-09 RX ADMIN — SODIUM CHLORIDE, POTASSIUM CHLORIDE, SODIUM LACTATE AND CALCIUM CHLORIDE: 600; 310; 30; 20 INJECTION, SOLUTION INTRAVENOUS at 11:41

## 2020-10-09 RX ADMIN — HYDROMORPHONE HYDROCHLORIDE 0.5 MG: 1 INJECTION, SOLUTION INTRAMUSCULAR; INTRAVENOUS; SUBCUTANEOUS at 13:00

## 2020-10-09 RX ADMIN — CEFAZOLIN 2 G: 1 INJECTION, POWDER, FOR SOLUTION INTRAMUSCULAR; INTRAVENOUS at 10:57

## 2020-10-09 RX ADMIN — FENTANYL CITRATE 50 MCG: 50 INJECTION, SOLUTION INTRAMUSCULAR; INTRAVENOUS at 12:51

## 2020-10-09 RX ADMIN — OXYCODONE HYDROCHLORIDE 5 MG: 5 TABLET ORAL at 13:40

## 2020-10-09 ASSESSMENT — MIFFLIN-ST. JEOR: SCORE: 1411.59

## 2020-10-09 NOTE — DISCHARGE INSTRUCTIONS
POSTOPERATIVE INSTRUCTIONS    Diagnosis-------------------------------   Encrusted right ureteral stent    Procedure-------------------------------  Procedure(s) (LRB):  cystoscopy, right ureteral stent removal, right retrograde pyelogram, right ureteroscopy, right ureteral dilation, right ureteral stent placement (Right)      Findings--------------------------------  Encrusted right ureteral stent removed. Hydronephrosis of the right kidney with a right ureteropelvic junction obstruction/kink    Home-going instructions-----------------         Activity Limitation:     - No driving or operating heavy machinery while on narcotic pain medication.     FOLLOW THESE INSTRUCTIONS AS INDICATED BELOW:  - Observe operative area for signs of excessive bleeding.  - You may shower.  - Increase fluid intake to promote clear urine.  - Resume usual diet as tolerated    What to expect while recovering-----------  - You may experience some intermittent bleeding that makes your urine pink or cherry colored. This is normal.  - However, if you are unable to urinate, passing large amount of clots, have jacob blood in your urine, or have a temperature >101 degrees, call the urology nurse on call, or present to your nearest emergency department.  - You are encouraged to walk daily, and have no activity restrictions.   - A URETERAL STENT has been placed that allows urine to flow unobstructed from your kidney into your bladder.  The stent has a curl in the kidney and a curl in the bladder.  The curl in the bladder can cause some urgency and frequency of urination as well as some mild blood in the urine.  The curl in the kidney can cause some mild flank discomfort.  This may be more noticeable when you urinate.  A URETERAL STENT is meant to be left in temporarily.  It must be removed or changed no later than 3 months after its insertion.  If it's not removed it can result in stone overgrowth on the stent that can cause pain, infection, and  can be very difficult to remove.      Discharge Medications/instructions:     - Flomax (tamsulosin) to be taken daily until stent is removed    - Antibiotics (cipro) are to be taken with you to your scheduled return visit for stent removal    - Take Tylenol 1000mg every 6 hours for pain    - Take Ibuprofen 600mg every 6 hours as needed for additional pain control (alternate with the Tylenol so you take one or the other every 3 hours)    - Take Oxycodone 5mg every 6 hours only for break through pain    - Take Colace while taking Oxycodone to prevent constipation      Questions/concerns------------------------  [facility]: [phone]    Future appointments  Follow up in 1 week for cystoscopy/stent removal      Kavon Hernandez MD           GENERAL ANESTHESIA OR SEDATION ADULT DISCHARGE INSTRUCTIONS   SPECIAL PRECAUTIONS FOR 24 HOURS AFTER SURGERY    IT IS NOT UNUSUAL TO FEEL LIGHT-HEADED OR FAINT, UP TO 24 HOURS AFTER SURGERY OR WHILE TAKING PAIN MEDICATION.  IF YOU HAVE THESE SYMPTOMS; SIT FOR A FEW MINUTES BEFORE STANDING AND HAVE SOMEONE ASSIST YOU WHEN YOU GET UP TO WALK OR USE THE BATHROOM.    YOU SHOULD REST AND RELAX FOR THE NEXT 24 HOURS AND YOU MUST MAKE ARRANGEMENTS TO HAVE SOMEONE STAY WITH YOU FOR AT LEAST 24 HOURS AFTER YOUR DISCHARGE.  AVOID HAZARDOUS AND STRENUOUS ACTIVITIES.  DO NOT MAKE IMPORTANT DECISIONS FOR 24 HOURS.    DO NOT DRIVE ANY VEHICLE OR OPERATE MECHANICAL EQUIPMENT FOR 24 HOURS FOLLOWING THE END OF YOUR SURGERY.  EVEN THOUGH YOU MAY FEEL NORMAL, YOUR REACTIONS MAY BE AFFECTED BY THE MEDICATION YOU HAVE RECEIVED.    DO NOT DRINK ALCOHOLIC BEVERAGES FOR 24 HOURS FOLLOWING YOUR SURGERY.    DRINK CLEAR LIQUIDS (APPLE JUICE, GINGER ALE, 7-UP, BROTH, ETC.).  PROGRESS TO YOUR REGULAR DIET AS YOU FEEL ABLE.    YOU MAY HAVE A DRY MOUTH, A SORE THROAT, MUSCLES ACHES OR TROUBLE SLEEPING.  THESE SHOULD GO AWAY AFTER 24 HOURS.    CALL YOUR DOCTOR FOR ANY OF THE  FOLLOWING:  SIGNS OF INFECTION (FEVER, GROWING TENDERNESS AT THE SURGERY SITE, A LARGE AMOUNT OF DRAINAGE OR BLEEDING, SEVERE PAIN, FOUL-SMELLING DRAINAGE, REDNESS OR SWELLING.    IT HAS BEEN OVER 8 TO 10 HOURS SINCE SURGERY AND YOU ARE STILL NOT ABLE TO URINATE (PASS WATER).

## 2020-10-09 NOTE — ANESTHESIA PREPROCEDURE EVALUATION
Anesthesia Pre-Procedure Evaluation    Patient: Radha Bryan   MRN: [id number] : 1980          Preoperative Diagnosis: Hydronephrosis of right kidney [N13.30]    Procedure(s):  cystoscopy, right ureteral stent removal, right retrograde pyelogram, possible right ureteroscopy/laser lithotripsy, possible right ureteral stent placement    Past Medical History:   Diagnosis Date     History of blood transfusion      Irregular periods/menstrual cycles      Renal disease     stone     Tinea versicolor      Past Surgical History:   Procedure Laterality Date     CHOLECYSTECTOMY       COMBINED CYSTOSCOPY, RETROGRADES, URETEROSCOPY, LASER HOLMIUM LITHOTRIPSY URETER(S), INSERT STENT Bilateral 2020    Procedure: Cystoscopy, bilateral stent removal, bilateral ureteroscopy, left laser lithotripsy, stone basketing, bilateral stent placement; bilateral retrograde pyelogram, fluoroscopic interpretation <1 hour physician time;  Surgeon: Kavon Hernandez MD;  Location: [facility] OR     CYSTOSCOPY, RETROGRADES, INSERT STENT URETER(S), COMBINED Bilateral 2020    Procedure: Cystoscopy with bilateral retrograde pyelogram and bilateral ureteral stent insertion with Fluoroscopic interpretation <1 hour physician time;  Surgeon: Kavon Hernandez MD;  Location: [facility] OR     LAPAROSCOPIC HYSTERECTOMY TOTAL, SALPINGECTOMY BILATERAL  2019    AUB, fibroid uterus, anemia. [facility], Dr. Pamela Carrion     LAPAROSCOPIC TUBAL LIGATION       Anesthesia Evaluation     . Pt has had prior anesthetic. Type: General           ROS/MED HX    ENT/Pulmonary:  - neg pulmonary ROS     Neurologic:  - neg neurologic ROS     Cardiovascular:  - neg cardiovascular ROS       METS/Exercise Tolerance:     Hematologic:  - neg hematologic  ROS       Musculoskeletal:  - neg musculoskeletal ROS       GI/Hepatic:     (+) GERD Asymptomatic on medication,       Renal/Genitourinary:     (+) chronic renal disease, Nephrolithiasis ,       Endo:  - neg  "endo ROS       Psychiatric:  - neg psychiatric ROS       Infectious Disease:  - neg infectious disease ROS       Malignancy:      - no malignancy   Other:    (+) No chance of pregnancy C-spine cleared: N/A, no H/O Chronic Pain,no other significant disability   - neg other ROS                      Physical Exam  Normal systems: cardiovascular and dental    Airway   Mallampati: II  TM distance: >3 FB  Neck ROM: full    Dental     Cardiovascular       Pulmonary             Lab Results   Component Value Date    WBC 9.0 08/27/2020    HGB 11.5 (L) 08/27/2020    HCT 35.2 08/27/2020     08/27/2020     08/27/2020    POTASSIUM 3.9 08/27/2020    CHLORIDE 113 (H) 08/27/2020    CO2 21 08/27/2020    BUN 10 08/27/2020    CR 0.58 08/27/2020     (H) 08/27/2020    FRANCISCO 11.8 (H) 08/27/2020    ALT 16 10/17/2016    AST 21 10/17/2016    HCGS Negative 05/06/2019       Preop Vitals  BP Readings from Last 3 Encounters:   10/09/20 117/76   10/07/20 132/70   10/02/20 120/80    Pulse Readings from Last 3 Encounters:   10/07/20 86   09/17/20 90   09/09/20 78      Resp Readings from Last 3 Encounters:   10/09/20 14   09/17/20 12   09/09/20 16    SpO2 Readings from Last 3 Encounters:   10/09/20 98%   10/07/20 98%   09/17/20 98%      Temp Readings from Last 1 Encounters:   10/09/20 97.8  F (36.6  C) (Temporal)    Ht Readings from Last 1 Encounters:   10/09/20 1.575 m (5' 2\")      Wt Readings from Last 1 Encounters:   10/09/20 79.3 kg (174 lb 14.4 oz)    Estimated body mass index is 31.99 kg/m  as calculated from the following:    Height as of this encounter: 1.575 m (5' 2\").    Weight as of this encounter: 79.3 kg (174 lb 14.4 oz).       Anesthesia Plan      History & Physical Review  History and physical reviewed and following examination; no interval change.    ASA Status:  2 .    NPO Status:  > 8 hours    Plan for General with Intravenous and Propofol induction. Maintenance will be Balanced.    PONV prophylaxis:  Ondansetron " (or other 5HT-3) and Dexamethasone or Solumedrol         Postoperative Care  Postoperative pain management:  IV analgesics.      Consents  Anesthetic plan, risks, benefits and alternatives discussed with:  Patient..                 Trell Patiño MD                    .

## 2020-10-09 NOTE — ANESTHESIA POSTPROCEDURE EVALUATION
Patient: Radha Bryan    Procedure(s):  cystoscopy, right ureteral stent removal, right retrograde pyelogram, possible right ureteroscopy/laser lithotripsy, possible right ureteral stent placement    Diagnosis:Hydronephrosis of right kidney [N13.30]  Diagnosis Additional Information: No value filed.    Anesthesia Type:  General    Note:  Anesthesia Post Evaluation    Patient location during evaluation: PACU  Patient participation: Able to fully participate in evaluation  Level of consciousness: awake and alert  Pain management: adequate  Airway patency: patent  Cardiovascular status: acceptable  Respiratory status: acceptable  Hydration status: acceptable  PONV: controlled     Anesthetic complications: None          Last vitals:  Vitals:    10/09/20 1305 10/09/20 1320 10/09/20 1415   BP: 132/81 124/84 124/86   Pulse: 87 92 91   Resp: 15 16 16   Temp:   96.6  F (35.9  C)   SpO2: 94% 94% 95%         Electronically Signed By: Trell Patiño MD  October 9, 2020  4:50 PM

## 2020-10-09 NOTE — OP NOTE
[facility]  Operative Note    Pre-operative diagnosis: Hydronephrosis of right kidney [N13.30]   Post-operative diagnosis Hydronephrosis of right kidney, right ureteropelvic junction obstruction, encrusted right ureteral stent   Procedure: Procedure(s):  Cystoscopy  right ureteral stent removal  right retrograde pyelogram  right ureteroscopy  Right ureteral dilation  right ureteral stent placement  Fluoroscopic interpretation <1 hour physician   Surgeon: Kavon Hernandez MD   Assistants(s): none   Anesthesia: General    Estimated blood loss: None    Total IV fluids: (See anesthesia record)   Blood transfusion: No transfusion was given during surgery   Total urine output: Not measured   Drains: 5 Fr x 24 cm right ureteral stent   Specimens: none   Implants: Implant Name Type Inv. Item Serial No.  Lot No. LRB No. Used Action   STENT URETERAL POLARIS ULTRA [id number] Stent STENT URETERAL POLARIS ULTRA [id number]  [employer] [id number] Right 1 Implanted      Findings: Encrusted right ureteral stent  Right ureteropelvic junction obstruction/kink in proximal ureter.   Complications: None.   Condition: Stable       Description of procedure:  38 yo F h/o left renal and ureteral stones, right hydronephrosis s/p bilateral ureteral stent placement 8/27/2020, left ureteroscopy and laser lithotripsy with bilateral ureteral stent exchange 9/17/2020, with attempted removal of the stents in the office on 10/7/2020, unable to remove the right ureteral stent. A CT abd/pelvis revealed the proximal stent coil to be pulled down into the proximal ureter. Risks and benefits were discussed and she agreed for stent removal in the operating room with possible ureteroscopy to free the stent. Informed consent was obtained.    The patient was brought to operating room 14.  General anesthesia was induced, she was placed in dorsal lithotomy position, prepped and draped in standard  sterile fashion.  A weight and renal appropriate dose of antibiotics was administered.  A timeout was performed prior to the procedure.  A 22 Malawian Storz cystoscope was assembled and passed through the urethra into the bladder.  The right ureteral stent was noted emanating from the right ureteral orifice and was extending across the trigone due to previously having been pulled out partially.  A 5 Malawian tiger tail catheter was placed alongside the stent at the right ureteral orifice and a gentle right retrograde pyelogram was performed which showed a kinked proximal right ureter as well as significant hydronephrosis concerning for right ureteropelvic junction obstruction.  A 0.035 inch stiff shaft Glidewire was passed next to the stent up to the right renal pelvis under fluoroscopic guidance.  This was then exchanged using the tiger tail catheter for a sensor wire which was able to straighten out the kink in the ureter.  The distal end of the stent was then grasped with a biopsy forceps and was brought out the urethral meatus.  The stent was gently pulled but was unable to be removed.  The sensor wire was then exchanged for an Amplatz superstiff guidewire and this was kept clipped to the drapes as a safety wire.  The sensor wire was then used to cannulate the stent and with some difficulty the proximal coil of the stent was able to be uncoiled and the wire was then seen to enter the renal pelvis.  The stent was still not able to be removed over the wire.  A long semirigid ureteroscope was assembled and passed up the ureteral orifice alongside the stent.  There was significant encrustation noted along the course of the stent.  The ureter was gently dilated up to the mid ureter using the scope.  Gentle traction on the stent was able to move the stent slightly however it was still lodged in the proximal ureter.  The scope was removed.  The ureter was gently dilated using coaxial dilators alongside the stent over the  Amplatz Super Stiff guidewire.  First a 6 Kyrgyz dilator followed by an 8 Kyrgyz dilator was used and these dilators were passed up to the proximal ureter under fluoroscopic guidance.  With careful gentle traction the stent was finally able to be removed over the sensor wire.  There was significant encrustation on the coil.  A [employer] Flex X-2 flexible ureteroscope was passed over the sensor wire up to the renal pelvis under fluoroscopic guidance.  Complete pyeloscopy revealed a very hydronephrotic renal pelvis with dilated calyces, and a small amount of stone debris/encrustation which had been knocked off of the proximal coil.  A repeat retrograde pyelogram was performed which again demonstrated moderate to severe hydronephrosis.  Pullout ureteroscopy revealed the ureteropelvic junction to have a kink which appeared to be obstructive.  The remainder of the ureter appeared normal and widely patent with some small amount of stone debris encrustation lining it.  The Amplatz Super Stiff wire was left in place.  IV Lasix was administered and drainage films were taken several minutes later which did not show any appreciable drainage of the contrast in the renal pelvis.  Therefore decision was made to place another stent.  A 5 Kyrgyz by 24 cm ureteral stent was then placed in the usual fashion under cystoscopic and fluoroscopic guidance with good coils noted proximally distally.  There was excellent efflux of contrast after stent placement.  The bladder was drained.  A belladonna and opium suppository was placed per rectum.  Patient was cleaned up, awoken from anesthesia and brought to PACU in stable condition. The patient needs to return next week for stent removal in the clinic    Kavon Hernandez MD   [facility] Urology  [phone] clinic phone

## 2020-10-09 NOTE — OR NURSING
waiver signed with  over the phone.  Patient understands most English, may need some help from daughter for medical language. This RN did not encounter any difficulties during assessment in English.

## 2020-10-09 NOTE — ANESTHESIA CARE TRANSFER NOTE
Patient: Radha Bryan    Procedure(s):  cystoscopy, right ureteral stent removal, right retrograde pyelogram, possible right ureteroscopy/laser lithotripsy, possible right ureteral stent placement    Diagnosis: Hydronephrosis of right kidney [N13.30]  Diagnosis Additional Information: No value filed.    Anesthesia Type:   General     Note:  Airway :Face Mask  Patient transferred to:PACU  Handoff Report: Identifed the Patient, Identified the Reponsible Provider, Reviewed the pertinent medical history, Discussed the surgical course, Reviewed Intra-OP anesthesia mangement and issues during anesthesia, Set expectations for post-procedure period and Allowed opportunity for questions and acknowledgement of understanding      Vitals: (Last set prior to Anesthesia Care Transfer)    CRNA VITALS  10/9/2020 1158 - 10/9/2020 1232      10/9/2020             Pulse:  93    SpO2:  100 %                Electronically Signed By: ELIAZAR Kohler CRNA  October 9, 2020  12:32 PM

## 2020-10-09 NOTE — PROGRESS NOTES
"SPIRITUAL HEALTH SERVICES Progress Note  [facility] Pre-Op    Saw pt Radha Bryan per her request for  support before her procedure.  Her daughter Josefina was present.  This author used the [employer] through the bedside tablet.  Radha reported that she is having a stent removed today and explained that she will need another surgical procedure after this one.  She acknowledged feeling \"tired\" wanting her treatment plan to be done.  Radha named her spouse, four children, sisters, and extended family as being core to her support network.  She identifies as Jainism and is not affiliated with a aric community at this time.  Radha requested prayer, which was provided.    No further plans as pt expects to discharge home after the procedure.    Ken Nicholson M.Div., [facility]  Staff   Phone [phone]    "

## 2020-10-12 ENCOUNTER — TELEPHONE (OUTPATIENT)
Dept: UROLOGY | Facility: CLINIC | Age: 40
End: 2020-10-12

## 2020-10-12 NOTE — TELEPHONE ENCOUNTER
[facility] Call Center    Phone Message    May a detailed message be left on voicemail: yes     Reason for Call: Medication Refill Request    Has the patient contacted the pharmacy for the refill? Yes   Name of medication being requested: oxyCODONE (ROXICODONE) 5 MG tablet  Provider who prescribed the medication: Dr. Hernandez  Pharmacy: [facility] [id number]  Date medication is needed: 10/12/2020         Action Taken: Message routed to:  [facility] ([facility]  Urology    Travel Screening: Not Applicable

## 2020-10-12 NOTE — TELEPHONE ENCOUNTER
I keep getting the  telling me to dial 1 before the number.  I dial the 1 and still cant get thru to the pt.  I keep getting the .  I only see the [phone]  RAJANI Levy CMA

## 2020-10-12 NOTE — TELEPHONE ENCOUNTER
Also attempted to call patient. Same thing happened. Only received the , even though a one was dialed in front of number.     Becky Lopez LPN

## 2020-10-15 ENCOUNTER — OFFICE VISIT (OUTPATIENT)
Dept: UROLOGY | Facility: CLINIC | Age: 40
End: 2020-10-15
Payer: COMMERCIAL

## 2020-10-15 VITALS
SYSTOLIC BLOOD PRESSURE: 128 MMHG | BODY MASS INDEX: 32.02 KG/M2 | DIASTOLIC BLOOD PRESSURE: 80 MMHG | HEART RATE: 78 BPM | HEIGHT: 62 IN | WEIGHT: 174 LBS

## 2020-10-15 DIAGNOSIS — N13.30 HYDRONEPHROSIS OF RIGHT KIDNEY: Primary | ICD-10-CM

## 2020-10-15 DIAGNOSIS — Z79.2 PROPHYLACTIC ANTIBIOTIC: ICD-10-CM

## 2020-10-15 PROCEDURE — 52310 CYSTOSCOPY AND TREATMENT: CPT | Performed by: STUDENT IN AN ORGANIZED HEALTH CARE EDUCATION/TRAINING PROGRAM

## 2020-10-15 RX ORDER — CIPROFLOXACIN 500 MG/1
500 TABLET, FILM COATED ORAL ONCE
Qty: 1 TABLET | Refills: 0 | Status: SHIPPED | OUTPATIENT
Start: 2020-10-15 | End: 2020-10-15

## 2020-10-15 ASSESSMENT — MIFFLIN-ST. JEOR: SCORE: 1407.51

## 2020-10-15 ASSESSMENT — PAIN SCALES - GENERAL: PAINLEVEL: NO PAIN (0)

## 2020-10-15 NOTE — H&P
Late entry for service rendered 10:30AM 10/9/2019    [facility]    History and Physical  Urology      Assessment & Plan   Radha Bryan is a 38 yo F h/o left renal and ureteral stones, right hydronephrosis s/p bilateral ureteral stent placement 8/27/2020, left ureteroscopy and laser lithotripsy with bilateral ureteral stent exchange 9/17/2020, with attempted removal of the stents in the office on 10/7/2020, unable to remove the right ureteral stent. A CT abd/pelvis revealed the proximal stent coil to be pulled down into the proximal ureter. Risks and benefits were discussed and she agreed for stent removal in the operating room with possible ureteroscopy to free the stent. She presents today for the procedure    - To OR for cystoscopy, possible right ureteroscopy/laser lithotripsy, right stent exchange     Kavon Hernandez    Code Status   Full Code    Primary Care Physician   Jessica Zimmerman    Chief Complaint   Retained right ureteral stent    History is obtained from the patient, review of the medical record and personal involvement in her care    History of Present Illness   Radha Bryan is a 38 yo F h/o left renal and ureteral stones, right hydronephrosis s/p bilateral ureteral stent placement 8/27/2020, left ureteroscopy and laser lithotripsy with bilateral ureteral stent exchange 9/17/2020, with attempted removal of the stents in the office on 10/7/2020, unable to remove the right ureteral stent. A CT abd/pelvis revealed the proximal stent coil to be pulled down into the proximal ureter. Risks and benefits were discussed and she agreed for stent removal in the operating room with possible ureteroscopy to free the stent. She presents today for the procedure     no fever or chills. Right sided flank pain improved    Past Medical History    I have reviewed this patient's medical history and updated it with pertinent information if needed.   Past Medical History:   Diagnosis Date      History of blood transfusion      Irregular periods/menstrual cycles      Renal disease     stone     Tinea versicolor        Past Surgical History   I have reviewed this patient's surgical history and updated it with pertinent information if needed.  Past Surgical History:   Procedure Laterality Date     CHOLECYSTECTOMY       COMBINED CYSTOSCOPY, RETROGRADES, URETEROSCOPY, INSERT STENT Right 10/9/2020    Procedure: Cystoscopy right ureteral stent removal right retrograde pyelogram right ureteroscopy Right ureteral dilation right ureteral stent placement Holmium Laser on Stand-by;  Surgeon: Kavon Hernandez MD;  Location: [facility] OR     COMBINED CYSTOSCOPY, RETROGRADES, URETEROSCOPY, LASER HOLMIUM LITHOTRIPSY URETER(S), INSERT STENT Bilateral 9/17/2020    Procedure: Cystoscopy, bilateral stent removal, bilateral ureteroscopy, left laser lithotripsy, stone basketing, bilateral stent placement; bilateral retrograde pyelogram, fluoroscopic interpretation <1 hour physician time;  Surgeon: Kavon Hernandez MD;  Location: [facility] OR     CYSTOSCOPY, RETROGRADES, INSERT STENT URETER(S), COMBINED Bilateral 8/27/2020    Procedure: Cystoscopy with bilateral retrograde pyelogram and bilateral ureteral stent insertion with Fluoroscopic interpretation <1 hour physician time;  Surgeon: Kavon Hernandez MD;  Location: [facility] OR     LAPAROSCOPIC HYSTERECTOMY TOTAL, SALPINGECTOMY BILATERAL  07/17/2019    AUB, fibroid uterus, anemia. [facility], Dr. Pamela Carrion     LAPAROSCOPIC TUBAL LIGATION         Prior to Admission Medications   Prior to Admission Medications   Prescriptions Last Dose Informant Patient Reported? Taking?   acetaminophen (TYLENOL) 500 MG tablet   No No   Sig: Take 2 tablets (1,000 mg) by mouth every 6 hours as needed for mild pain   Patient not taking: Reported on 10/7/2020   celecoxib (CELEBREX) 200 MG capsule   No No   Sig: Take 1 capsule (200 mg) by mouth daily as needed for moderate pain   Patient not  taking: Reported on 10/7/2020   ciprofloxacin (CIPRO) 500 MG tablet 10/8/2020 at 1800  No Yes   Sig: Take 1 tablet (500 mg) by mouth 2 times daily   Patient not taking: Reported on 10/15/2020   cyclobenzaprine (FLEXERIL) 10 MG tablet   No No   Sig: Take 1 tablet (10 mg) by mouth nightly as needed for muscle spasms   Patient not taking: Reported on 10/7/2020   docusate sodium (COLACE) 100 MG capsule   No No   Sig: Take 1 capsule (100 mg) by mouth 2 times daily   Patient not taking: Reported on 10/7/2020   ibuprofen (ADVIL/MOTRIN) 800 MG tablet 10/8/2020 at 1800  No No   Sig: Take 1 tablet (800 mg) by mouth every 6 hours as needed for pain   omeprazole (PRILOSEC) 20 MG DR capsule   Yes Yes   Sig: Take 20 mg by mouth daily as needed    oxybutynin (DITROPAN) 5 MG tablet 10/8/2020 at 1800  No Yes   Sig: Take 1 tablet (5 mg) by mouth 3 times daily as needed for bladder spasms   Patient not taking: Reported on 10/15/2020   tamsulosin (FLOMAX) 0.4 MG capsule   No No   Sig: Take 1 capsule (0.4 mg) by mouth daily While the stent is in place, for stent pain and irritation   Patient not taking: Reported on 10/7/2020      Facility-Administered Medications: None     Allergies   No Known Allergies    Social History   I have reviewed this patient's social history and updated it with pertinent information if needed. Radha Bryan  reports that she has never smoked. She has never used smokeless tobacco. She reports that she does not drink alcohol or use drugs.    Family History   I have reviewed this patient's family history and updated it with pertinent information if needed.   Family History   Problem Relation Age of Onset     Family History Negative Mother      Kidney Disease Brother         kidney stone     No Known Problems Sister      No Known Problems Son      No Known Problems Daughter        Review of Systems   The 5 point Review of Systems is negative other than noted in the HPI or here    Physical Exam                       Vital Signs with Ranges     174 lbs 14.4 oz    Constitutional: Awake, alert, cooperative, no apparent distress, and appears stated age.  Eyes:  sclera clear, conjunctiva normal.  ENT: Normocephalic, without obvious abnormality  Respiratory: No increased work of breathing, good air exchange, clear to auscultation bilaterally, no crackles or wheezing.  Cardiovascular:  regular rate and rhythm, normal S1 and S2, no S3 or S4, and no murmur noted.  GI: soft, non-distended, non-tender  Lymph/Hematologic: No inguinal LAD  Genitourinary:    Deferred to OR  Skin: No bruising or bleeding  Musculoskeletal: There is no redness, warmth, or swelling of the joints  Neurologic: Awake, alert  Neuropsychiatric: Calm, normal eye contact, alert, normal affect    Data   CT abd/pelvis 10/7/2020  IMPRESSION:   1.  Right hydronephrosis with bilateral nonobstructing renal  collecting system stones. Right ureter is present with the proximal  end just distal to the UPJ and the proximal right ureter. Distal end  is in the bladder. No evidence of left hydronephrosis or ureteral  calculi.     2.  Cholecystectomy changes. No other acute changes in the abdomen or  pelvis are evident.

## 2020-10-15 NOTE — LETTER
10/15/2020       RE: Radha Bryan  [id number] [facility] [address]     Dear Colleague,    Thank you for referring your patient, Radha Bryan, to the [facility]OLOGY [facility] [place] [facility]. Please see a copy of my visit note below.    PRE-PROCEDURE DIAGNOSIS: right hydronephrosis    POST-PROCEDURE DIAGNOSIS: right hydronephrosis    PROCEDURE: Cystoscopy with right ureteral stent removal    HISTORY: 40 yo F h/o left renal and ureteral stones, right hydronephrosis s/p bilateral ureteral stent placement 8/27/2020, left ureteroscopy and laser lithotripsy with bilateral ureteral stent exchange 9/17/2020, with attempted removal of the stents in the office on 10/7/2020, unable to remove the right ureteral stent. Went to OR 10/9/2020 for removal of the right stent which was noted to be encrusted; also seen was a kink in the proximal right ureter concerning for right ureteropelvic junction obstruction. A new stent     Calculi composed primarily of:   10% calcium oxalate dihydrate, and   90% calcium phosphate (hydroxy- and carbonate- apatite).     DESCRIPTION OF PROCEDURE: After informed consent was obtained, the patient was brought to the procedure room where she was placed in the lithotomy position with all pressure points well padded.  The vulva was prepped and draped in sterile fashion. A flexible cystoscope was introduced through a well-lubricated urethra. The stent was visualized, grasped with a flexible grasper and removed intact and discarded  The flexible cystoscope was removed and the findings were described to the patient.     ASSESSMENT AND PLAN: 39 year old female with complex history of urolithiasis as above, here for stent removal. She encrusted her stent to the point of inability to remove it in the office in just over 6 weeks, so she certainly is at high risk for further stone formation. Had predominantly calcium phosphate stones on  analysis. She has right hydronephrosis with a kink in the proximal ureter noted on ureteroscopy. Will proceed with workup for possible ureteropelvic junction obstruction, and also metabolic stone workup    - stent removed intact  - follow up in 4-6 weeks with Blanca MOROCHO NM renal scan and CT urogram prior    Kavon Hernandez MD   [facility] Urology  [phone] clinic phone

## 2020-10-15 NOTE — PROGRESS NOTES
PRE-PROCEDURE DIAGNOSIS: right hydronephrosis    POST-PROCEDURE DIAGNOSIS: right hydronephrosis    PROCEDURE: Cystoscopy with right ureteral stent removal    HISTORY: 38 yo F h/o left renal and ureteral stones, right hydronephrosis s/p bilateral ureteral stent placement 8/27/2020, left ureteroscopy and laser lithotripsy with bilateral ureteral stent exchange 9/17/2020, with attempted removal of the stents in the office on 10/7/2020, unable to remove the right ureteral stent. Went to OR 10/9/2020 for removal of the right stent which was noted to be encrusted; also seen was a kink in the proximal right ureter concerning for right ureteropelvic junction obstruction. A new stent     Calculi composed primarily of:   10% calcium oxalate dihydrate, and   90% calcium phosphate (hydroxy- and carbonate- apatite).     DESCRIPTION OF PROCEDURE: After informed consent was obtained, the patient was brought to the procedure room where she was placed in the lithotomy position with all pressure points well padded.  The vulva was prepped and draped in sterile fashion. A flexible cystoscope was introduced through a well-lubricated urethra. The stent was visualized, grasped with a flexible grasper and removed intact and discarded  The flexible cystoscope was removed and the findings were described to the patient.     ASSESSMENT AND PLAN: 39 year old female with complex history of urolithiasis as above, here for stent removal. She encrusted her stent to the point of inability to remove it in the office in just over 6 weeks, so she certainly is at high risk for further stone formation. Had predominantly calcium phosphate stones on analysis. She has right hydronephrosis with a kink in the proximal ureter noted on ureteroscopy. Will proceed with workup for possible ureteropelvic junction obstruction, and also metabolic stone workup    - stent removed intact  - follow up in 4-6 weeks with BMP, Litholink, NM renal scan and CT urogram  prior    Kavon Hernandez MD   [facility] Urology  [phone] clinic phone

## 2020-10-15 NOTE — PATIENT INSTRUCTIONS
"Return in 4-6 weeks with a BMP, [place], [place] renal scan and CT urogram prior              AFTER YOUR CYSTOSCOPY         You have just completed a cystoscopy, or \"cysto\", which allowed your physician to learn more about your bladder (or to remove a stent placed after surgery). We suggest that you continue to avoid caffeine, fruit juice, and alcohol for the next 24 hours, however, you are encouraged to return to your normal activities.       A few things that are considered normal after your cystoscopy:    * small amount of bleeding (or spotting) that clears within the next 24 hours    * slight burning sensation with urination    * sensation to of needing to avoid more frequently    * the feeling of \"air\" in your urine    * mild discomfort that is relieved with Tylonol        Please contact our office promptly if you:    * develop a fever above 101 degrees    * are unable to urinate    * develop bright red blood that does not stop    * severe pain or swelling        And of course, please contact our office with any concerns or questions [phone]      Patient Education      Cómo prevenir los cálculos renales?  Si tuvo un cálculo renal (nelly en el riñón), quizás le preocupe la posibilidad de tener otro. La extracción o expulsión de un cálculo no previene la formación de otros. Sin embargo, con la ayuda de aiken proveedor de atención médica, podrá reducir el riesgo de que se formen nuevos cálculos. Vaya a las visitas de control con aiken proveedor de atención médica para revisar si tiene cálculos nuevos. Es posible que deba hacer visitas de control con aura frecuencia de entre bismark y doce meses mikel el don de aiken iva.    Dina abundante agua  Estar roxy hidratado es la mejor manera de reducir el riesgo de tener piedras en el futuro. Dina ocho vasos de 12 onzas (355 cc) de agua todos los aide. Dina dos con cada comida y dos entre comidas. Intente tener cerca aura jarra con agua tanto de día althea de noche.  [place] " medicamentos si es necesario  Es posible que le receten medicamentos, que incluyen vitaminas y minerales, para ciertos tipos de piedras. Es aura buena idea escribir las dosis y los horarios en que debe fernando pk medicamentos en un calendario. Algunos medicamentos reducen los niveles en aiken juarez de aquellas sustancias químicas que favorecen la formación de piedras. Otros ayudan a evitar que esos químicos se cristalicen en la orina. Y otros ayudan a que aiken orina mantenga un equilibrio ácido normal.  Siga la dieta que le indicaron  Aiken proveedor de atención médica le dirá qué alimentos tienen las sustancias químicas que debería evitar. También puede sugerirle que hable con un dietista, quien puede ayudarle a planificar comidas que usted disfrutará y que no le generarán riesgos de que se formen piedras en el futuro. Es posible que le indiquen limitar ciertos alimentos, según el tipo de piedras que haya tenido. Debería limitar el consumo de sal en aiken comida a unos dos gramos al día; eso ayudará a prevenir la mayoría de los tipos de cálculos renales. Asegúrese de consumir aura cantidad adecuada de calcio en aiken dieta.  En el jaja de las piedras de oxalato de calcio: Limite las proteínas de origen animal, althea la carne, los huevos y el pescado. Limite el jugo de toronja y las bebidas alcohólicas. Limite los alimentos con alto contenido de oxalato (althea las bebidas cola, el té, el chocolate, la espinaca, el ruibarbo, el salvado de jessica y los cacahuates o maníes).  Date Last Reviewed: 2/1/2017 2000-2019 [employer]. [address] [place], [place] [address], [place] [address]. Todos los derechos reservados. Esta información no pretende sustituir la atención médica profesional. Sólo aiken médico puede diagnosticar y tratar un problema de edgar.

## 2020-10-19 NOTE — PROGRESS NOTES
[facility]: Physical Therapy Initial Evaluation   Oct 20, 2020    Subjective:   Chief Complaint: low back and right leg pain   Pain: bilateral low back, also down the right leg, just past the knee   Numbness/Tingling: None   Weakness: the right leg feels weaker than the left   Stiffness: None  New/Recurrent/Chronic: Chronic  DOI/onset: 2017   Referral Date: 10/2/2020 - Yeo, Albert, MD  Mechanism of onset: accident at work (past work, not present), twisted her back carrying clothes, and again later when trying to move a mattress.   PMH/surgical history/trauma:    - Hydronephrosis  General health as reported by patient: Good  Medications: Not presently  Occupation: works at a gas station Job duties: lifting/carrying, prolonged standing, repetitive tasks,   Previous Treatment (Effect): meds from doctor (helps with sleeping at night) ; PT (helpful, a lot)  Imagin2020 - Lumbar MRI IMPRESSION:  1. Degenerative changes at the L5-S1 level including moderate left foraminal stenosis that could result in irritation of impingement upon the exiting left L5 nerve root.  2. No other significant degenerative changes of the lumbar spine.  3. Hydronephrosis and hydroureter bilaterally again noted.  Quality of Pain: pulsating, throbbing  Pain: 6/10 at present, 4/10 at best, 9/10 at worst  Worse: sleeping, carrying something,   Better: laying on the floor and doing previous PT exercise,   Progression of Symptoms since onset: sometimes feels like it is getting worse   Sleeping: wakes her up early in the mornings because of the pain (around 1am-4am, normally would get up around 8am).    Current Functional Status: SEE GOALS FLOWSHEET  - lifting - laundry is more painful   Previous Functional Status: no restrictions  Transportation to Therapy: hardly drives, got a ride today from her son  Live with Others: , children (grown), sister - not responsible for others' cares    Patient's goal(s): To make my back  stronger and to get rid of the pain.       Objective:    Posture: normal spinal curves in the sagittal plane. Prominence to the right of the spinal column near the thoracic lumbar junction (suspect rotational scoliosis or hypertonic paraspinal    Gait: bilateral hip drop    Lumbar AROM: (Major, Moderate, Minimal or Nil loss)  Movement Loss Manuel Mod Min Nil Comments   Flexion  x   Pain during return from flexion   Extension   x  Shaky, felt pain with pressure   Side Gliding L    x cc   Side Gliding R    x cc      - Prone instability test: (+)            Therapist Impression/Differential Diagnosis:   Radha presents to physical therapy with a primary complaint of low back pain. She has PT previously via virtual in the summer of 2019 during the COVID 19 pandemic. From the records, it appeared she was treated primarily with direction preference exercises. In clinic today, she tested positive for spinal instability. Will proceed with rehab focusing on spinal strengthening and stabilization.       Assessment/Plan:    Patient is a 39 year old female with lumbar complaints.    Patient has the following significant findings with corresponding treatment plan.                Referring Diagnosis: Bulging of lumbar intervertebral disc ; Spinal stenosis of lumbar region without neurogenic claudication ; Facet arthropathy, lumbar   Pain -  hot/cold therapy, manual therapy, splint/taping/bracing/orthotics, self management, education, directional preference exercise and home program  Decreased ROM/flexibility - manual therapy, therapeutic exercise, therapeutic activity and home program  Decreased strength - therapeutic exercise, therapeutic activities and home program  Impaired gait - gait training and home program  Decreased function - therapeutic activities and home program  Instability -  Therapeutic Activity, Therapeutic Exercise, Neuromuscular Re-education, Splinting/Taping/Bracing/Orthotic, and home program      Therapy  Evaluation Codes:   1) History comprised of:   Personal factors that impact the plan of care:      Language, Past/current experiences and Time since onset of symptoms.    Comorbidity factors that impact the plan of care are:      None.     Medications impacting care: None.  2) Examination of Body Systems comprised of:   Body structures and functions that impact the plan of care:      Lumbar spine.   Activity limitations that impact the plan of care are:      Lifting and Sleeping.  3) Clinical presentation characteristics are:   Evolving/Changing.  4) Decision-Making    Low complexity using standardized patient assessment instrument and/or measureable assessment of functional outcome.  Cumulative Therapy Evaluation is: Low complexity.    Previous and current functional limitations:  (See Goal Flow Sheet for this information)    Short term and Long term goals: (See Goal Flow Sheet for this information)     Communication ability:  Patient appears to be able to clearly communicate and understand verbal and written communication and follow directions correctly.  Treatment Explanation - The following has been discussed with the patient:   RX ordered/plan of care  Anticipated outcomes  Possible risks and side effects  This patient would benefit from PT intervention to resume normal activities.   Rehab potential is good.    Frequency:  1 X week, once daily  Duration:  for 8 weeks  Discharge Plan:  Achieve all LTG.  Independent in home treatment program.  Reach maximal therapeutic benefit.    Please refer to the daily flowsheet for treatment today, total treatment time and time spent performing 1:1 timed codes.

## 2020-10-20 ENCOUNTER — THERAPY VISIT (OUTPATIENT)
Dept: PHYSICAL THERAPY | Facility: CLINIC | Age: 40
End: 2020-10-20
Payer: COMMERCIAL

## 2020-10-20 DIAGNOSIS — M48.061 SPINAL STENOSIS OF LUMBAR REGION WITHOUT NEUROGENIC CLAUDICATION: ICD-10-CM

## 2020-10-20 DIAGNOSIS — M54.41 BILATERAL LOW BACK PAIN WITH RIGHT-SIDED SCIATICA: Primary | ICD-10-CM

## 2020-10-20 DIAGNOSIS — M47.816 FACET ARTHROPATHY, LUMBAR: ICD-10-CM

## 2020-10-20 DIAGNOSIS — M51.369 BULGING OF LUMBAR INTERVERTEBRAL DISC: ICD-10-CM

## 2020-10-20 PROCEDURE — 97110 THERAPEUTIC EXERCISES: CPT | Mod: GP | Performed by: PHYSICAL THERAPIST

## 2020-10-20 PROCEDURE — 97161 PT EVAL LOW COMPLEX 20 MIN: CPT | Mod: GP | Performed by: PHYSICAL THERAPIST

## 2020-11-03 ENCOUNTER — TRANSFERRED RECORDS (OUTPATIENT)
Dept: HEALTH INFORMATION MANAGEMENT | Facility: CLINIC | Age: 40
End: 2020-11-03

## 2020-11-17 ENCOUNTER — HOSPITAL ENCOUNTER (OUTPATIENT)
Dept: NUCLEAR MEDICINE | Facility: CLINIC | Age: 40
Setting detail: NUCLEAR MEDICINE
Discharge: HOME | End: 2020-11-17
Attending: STUDENT IN AN ORGANIZED HEALTH CARE EDUCATION/TRAINING PROGRAM | Admitting: STUDENT IN AN ORGANIZED HEALTH CARE EDUCATION/TRAINING PROGRAM
Payer: COMMERCIAL

## 2020-11-17 ENCOUNTER — HOSPITAL ENCOUNTER (OUTPATIENT)
Dept: CT IMAGING | Facility: CLINIC | Age: 40
Discharge: HOME | End: 2020-11-17
Attending: STUDENT IN AN ORGANIZED HEALTH CARE EDUCATION/TRAINING PROGRAM | Admitting: STUDENT IN AN ORGANIZED HEALTH CARE EDUCATION/TRAINING PROGRAM
Payer: COMMERCIAL

## 2020-11-17 DIAGNOSIS — N13.30 HYDRONEPHROSIS OF RIGHT KIDNEY: ICD-10-CM

## 2020-11-17 PROCEDURE — A9562 TC99M MERTIATIDE: HCPCS | Performed by: STUDENT IN AN ORGANIZED HEALTH CARE EDUCATION/TRAINING PROGRAM

## 2020-11-17 PROCEDURE — 250N000011 HC RX IP 250 OP 636: Performed by: RADIOLOGY

## 2020-11-17 PROCEDURE — 343N000001 HC RX 343: Performed by: STUDENT IN AN ORGANIZED HEALTH CARE EDUCATION/TRAINING PROGRAM

## 2020-11-17 PROCEDURE — 78708 K FLOW/FUNCT IMAGE W/DRUG: CPT

## 2020-11-17 PROCEDURE — 250N000009 HC RX 250: Performed by: RADIOLOGY

## 2020-11-17 PROCEDURE — 74178 CT ABD&PLV WO CNTR FLWD CNTR: CPT

## 2020-11-17 PROCEDURE — 36415 COLL VENOUS BLD VENIPUNCTURE: CPT | Performed by: STUDENT IN AN ORGANIZED HEALTH CARE EDUCATION/TRAINING PROGRAM

## 2020-11-17 PROCEDURE — 250N000011 HC RX IP 250 OP 636

## 2020-11-17 PROCEDURE — 80048 BASIC METABOLIC PNL TOTAL CA: CPT | Performed by: STUDENT IN AN ORGANIZED HEALTH CARE EDUCATION/TRAINING PROGRAM

## 2020-11-17 RX ORDER — IOPAMIDOL 755 MG/ML
500 INJECTION, SOLUTION INTRAVASCULAR ONCE
Status: COMPLETED | OUTPATIENT
Start: 2020-11-17 | End: 2020-11-17

## 2020-11-17 RX ORDER — FUROSEMIDE 10 MG/ML
INJECTION INTRAMUSCULAR; INTRAVENOUS
Status: COMPLETED
Start: 2020-11-17 | End: 2020-11-17

## 2020-11-17 RX ADMIN — IOPAMIDOL 88 ML: 755 INJECTION, SOLUTION INTRAVENOUS at 09:28

## 2020-11-17 RX ADMIN — FUROSEMIDE 20 MG: 10 INJECTION, SOLUTION INTRAMUSCULAR; INTRAVENOUS at 08:27

## 2020-11-17 RX ADMIN — TECHNESCAN TC 99M MERTIATIDE 10 MCI.: 1 INJECTION, POWDER, LYOPHILIZED, FOR SOLUTION INTRAVENOUS at 08:15

## 2020-11-17 RX ADMIN — SODIUM CHLORIDE 52 ML: 9 INJECTION, SOLUTION INTRAVENOUS at 09:28

## 2020-11-18 DIAGNOSIS — E83.52 HYPERCALCEMIA: Primary | ICD-10-CM

## 2020-11-18 LAB
ANION GAP SERPL CALCULATED.3IONS-SCNC: 7 MMOL/L (ref 3–14)
BUN SERPL-MCNC: 10 MG/DL (ref 7–30)
CALCIUM SERPL-MCNC: 11 MG/DL (ref 8.5–10.1)
CHLORIDE SERPL-SCNC: 111 MMOL/L (ref 94–109)
CO2 SERPL-SCNC: 20 MMOL/L (ref 20–32)
CREAT SERPL-MCNC: 0.57 MG/DL (ref 0.52–1.04)
GFR SERPL CREATININE-BSD FRML MDRD: >90 ML/MIN/{1.73_M2}
GLUCOSE SERPL-MCNC: 90 MG/DL (ref 70–99)
POTASSIUM SERPL-SCNC: 4 MMOL/L (ref 3.4–5.3)
SODIUM SERPL-SCNC: 138 MMOL/L (ref 133–144)

## 2020-11-18 NOTE — RESULT ENCOUNTER NOTE
Hypercalcemia on BMP, with extreme hypercalciuria noted on Litholink    Will get Parathyroid hormone intact and albumin prior to follow up visit - team please contact patient and ask her to get labs drawn    PCP copied on this result; if PTHI is elevated then suspect hyperparathyroidism. If not elevated recommend further workup including potentially testing PTHrP, VitD 1,25-dihydroxy to assess for malignancy, VitD excess, sarcoidosis, hyperthyroidism    Kavon Hernandez MD   [facility]  [phone] clinic phone

## 2020-11-23 ENCOUNTER — TELEPHONE (OUTPATIENT)
Dept: UROLOGY | Facility: CLINIC | Age: 40
End: 2020-11-23

## 2020-11-23 NOTE — TELEPHONE ENCOUNTER
----- Message from Kavon Hernandez MD sent at 11/18/2020 11:48 AM CST -----  Hypercalcemia on BMP, with extreme hypercalciuria noted on Litholink    Will get Parathyroid hormone intact and albumin prior to follow up visit - team please contact patient and ask her to get labs drawn     PCP copied on this result; if PTHI is elevated then suspect hyperparathyroidism. If not elevated recommend further workup including potentially testing PTHrP, VitD 1,25-dihydroxy to assess for malignancy, VitD excess, sarcoidosis, hyperthyroidism    Kavon Hernandez MD   [facility]  [phone] clinic phone

## 2020-11-24 PROBLEM — M54.41 BILATERAL LOW BACK PAIN WITH RIGHT-SIDED SCIATICA: Status: RESOLVED | Noted: 2020-05-27 | Resolved: 2020-11-24

## 2020-11-24 NOTE — PROGRESS NOTES
"Discharge Note    Progress reporting period is from initial evaluation date 5-27-20 to Jun 29, 2020.      Radha failed to follow up and current status is unknown.  Please see information below for last relevant information on current status.  Patient seen for 5 visits.    SUBJECTIVE  At last visit patient stated she purchased 1/2 lumbar roll and cervical roll (uses on low back when lying down).   Patient frustrated as last 2 days \"back pain has been worse, spreading\" - pointing to right>left LBP and low thoracic pain.  Has not had right LE pain for about 1 week, LBP ranging 0-7/10.  Has intermittent brief feeling of \"weakness\" right LE.  Doing CORI at counter and REIL each about 5x/day.  Has stopped other exercises as recommended.    .  Pain level at last visit: 7/10(across LB, right>left).     Changes in function:  Yes (See Goal flowsheet attached for changes in current functional level)  Adverse reaction to treatment or activity: None    OBJECTIVE  At last visit lumbar flexion AROM fingertips to knees, extension 10%, right SG mod limited, left SG WNL but increase right LBP.  Antalgic gait - decreased right stance phase.      ASSESSMENT/PLAN  Diagnosis: acute low back pain   Updated problem list and treatment plan:     STG/LTGs have been met or progress has been made towards goals:  Yes, please see goal flowsheet for most current information  Assessment of Progress: current status is unknown.    Last current status: Pt is progressing slower than anticipated   Self Management Plans:  HEP  I have re-evaluated this patient and find that the nature, scope, duration and intensity of the therapy is appropriate for the medical condition of the patient.  Radha continues to require the following intervention to meet STG and LTG's:  HEP.    Recommendations:  Patient did not return for follow-up visits as expected.  Will discharge physical therapy chart at this time.      Please refer to the daily flowsheet for treatment " today, total treatment time and time spent performing 1:1 timed codes.

## 2020-11-27 ENCOUNTER — OFFICE VISIT (OUTPATIENT)
Dept: UROLOGY | Facility: CLINIC | Age: 40
End: 2020-11-27
Payer: COMMERCIAL

## 2020-11-27 VITALS
SYSTOLIC BLOOD PRESSURE: 108 MMHG | DIASTOLIC BLOOD PRESSURE: 74 MMHG | BODY MASS INDEX: 32.02 KG/M2 | HEIGHT: 62 IN | WEIGHT: 174 LBS

## 2020-11-27 DIAGNOSIS — R74.8 ABNORMAL SERUM LEVEL OF ALKALINE PHOSPHATASE: ICD-10-CM

## 2020-11-27 DIAGNOSIS — N13.5 URETEROPELVIC JUNCTION OBSTRUCTION: ICD-10-CM

## 2020-11-27 DIAGNOSIS — N13.30 HYDRONEPHROSIS OF RIGHT KIDNEY: ICD-10-CM

## 2020-11-27 DIAGNOSIS — N20.0 CALCIUM NEPHROLITHIASIS: ICD-10-CM

## 2020-11-27 DIAGNOSIS — E83.52 HYPERCALCEMIA: ICD-10-CM

## 2020-11-27 DIAGNOSIS — E21.3 HYPERPARATHYROIDISM (H): ICD-10-CM

## 2020-11-27 DIAGNOSIS — E83.52 HYPERCALCEMIA: Primary | ICD-10-CM

## 2020-11-27 LAB
ALBUMIN SERPL-MCNC: 3.4 G/DL (ref 3.4–5)
ALP SERPL-CCNC: 303 U/L (ref 40–150)
ALT SERPL W P-5'-P-CCNC: 41 U/L (ref 0–50)
ANION GAP SERPL CALCULATED.3IONS-SCNC: 3 MMOL/L (ref 3–14)
AST SERPL W P-5'-P-CCNC: 31 U/L (ref 0–45)
BILIRUB SERPL-MCNC: 0.4 MG/DL (ref 0.2–1.3)
BUN SERPL-MCNC: 9 MG/DL (ref 7–30)
CALCIUM SERPL-MCNC: 11 MG/DL (ref 8.5–10.1)
CHLORIDE SERPL-SCNC: 112 MMOL/L (ref 94–109)
CO2 SERPL-SCNC: 23 MMOL/L (ref 20–32)
CREAT SERPL-MCNC: 0.49 MG/DL (ref 0.52–1.04)
GFR SERPL CREATININE-BSD FRML MDRD: >90 ML/MIN/{1.73_M2}
GLUCOSE SERPL-MCNC: 81 MG/DL (ref 70–99)
POTASSIUM SERPL-SCNC: 4.3 MMOL/L (ref 3.4–5.3)
PROT SERPL-MCNC: 8 G/DL (ref 6.8–8.8)
PTH-INTACT SERPL-MCNC: 544 PG/ML (ref 18–80)
SODIUM SERPL-SCNC: 138 MMOL/L (ref 133–144)

## 2020-11-27 PROCEDURE — 36415 COLL VENOUS BLD VENIPUNCTURE: CPT | Performed by: STUDENT IN AN ORGANIZED HEALTH CARE EDUCATION/TRAINING PROGRAM

## 2020-11-27 PROCEDURE — 99214 OFFICE O/P EST MOD 30 MIN: CPT | Performed by: STUDENT IN AN ORGANIZED HEALTH CARE EDUCATION/TRAINING PROGRAM

## 2020-11-27 PROCEDURE — 83970 ASSAY OF PARATHORMONE: CPT | Performed by: STUDENT IN AN ORGANIZED HEALTH CARE EDUCATION/TRAINING PROGRAM

## 2020-11-27 PROCEDURE — 80053 COMPREHEN METABOLIC PANEL: CPT | Performed by: STUDENT IN AN ORGANIZED HEALTH CARE EDUCATION/TRAINING PROGRAM

## 2020-11-27 ASSESSMENT — MIFFLIN-ST. JEOR: SCORE: 1407.51

## 2020-11-27 ASSESSMENT — PAIN SCALES - GENERAL: PAINLEVEL: NO PAIN (0)

## 2020-11-27 NOTE — PATIENT INSTRUCTIONS
1) Go to lab and have labs drawn (CMP, parathyroid hormone intact)      2) Here are dietary recommendations which can help to reduce the calcium in your urine    sodium 2300 to 3500 mg/day  calcium 800-1200 mg/day from food  protein 63 to 103 gm/day    3) We will refer you to nephrology for further evaluation and treatment of hypercalciuria       Return to clinic in 6 months with repeat NM renal scan prior

## 2020-11-27 NOTE — NURSING NOTE
Chief Complaint   Patient presents with     Hydronephrosis     Review imaging and labs     Regina Charlton, EMT

## 2020-11-27 NOTE — LETTER
"11/27/2020       RE: Radha Bryan  [id number] [address] [facility] [address]     Dear Colleague,    Thank you for referring your patient, Radha Bryan, to the [facility] [place] [facility]. Please see a copy of my visit note below.    CHIEF COMPLAINT   Radha Bryan who is a 39 year old female returns today for follow-up of nephrolithiasis, right hydronephrosis.      HPI  Visit facilitated by Portuguese language phone      Radha Bryan is a 40 yo F h/o left renal and ureteral stones, right hydronephrosis s/p bilateral ureteral stent placement 8/27/2020, left ureteroscopy and laser lithotripsy with bilateral ureteral stent exchange 9/17/2020, with attempted removal of the stents in the office on 10/7/2020, unable to remove the right ureteral stent. Went to OR 10/9/2020 for removal of the right stent which was noted to be encrusted; also seen was a kink in the proximal right ureter concerning for right ureteropelvic junction obstruction. A new stent was placed and then removed 10/15/2020. Here for followup    Has not had any pain her back. No nausea or vomiting. No dysuria or blood in urine.    Occasionally having some burning pain in the right flank, every week or every other week, lasts about a day and goes away.     Calculi composed primarily of:   10% calcium oxalate dihydrate, and   90% calcium phosphate (hydroxy- and carbonate- apatite).     Litholink with extreme hypercalciuria. 465 mg/day (normal <200)    CT urogram with mild kinking of the right proximal ureter with no crossing vessel    NM renal scan with 45:55 R:L split function with mildly delayed excretion on the right after administration of lasix      PHYSICAL EXAM  Patient is a 39 year old  female   Vitals: Blood pressure 108/74, height 1.575 m (5' 2\"), weight 78.9 kg (174 lb), last menstrual period 06/20/2019, not currently breastfeeding.  Body mass index is 31.83 " kg/m .  General Appearance Adult:   Alert, no acute distress, oriented  HENT: throat/mouth:normal, good dentition  Lungs: no respiratory distress, or pursed lip breathing  Heart: No obvious jugular venous distension present  Abdomen: soft, nontender, no organomegaly or masses  Back:  No cvat  Musculoskeltal: extremities normal, no peripheral edema  Skin: no suspicious lesions or rashes  Neuro: Alert, oriented, speech and mentation normal  Psych: affect and mood normal  Gait: Normal  : deferred    All pertinent imaging reviewed:    CT urogram 11/17/2020  IMPRESSION: Mild right hydronephrosis with kinking of the proximal  ureter but no external compression demonstrated.    NM renal scan 11/17/2020  IMPRESSION: Mild partial UPJ obstruction.    Results for MICHAEL CAMPUZANO (MRN [id number]) as of 11/29/2020 00:01   Ref. Range 11/27/2020 11:40   Sodium Latest Ref Range: 133 - 144 mmol/L 138   Potassium Latest Ref Range: 3.4 - 5.3 mmol/L 4.3   Chloride Latest Ref Range: 94 - 109 mmol/L 112 (H)   Carbon Dioxide Latest Ref Range: 20 - 32 mmol/L 23   Urea Nitrogen Latest Ref Range: 7 - 30 mg/dL 9   Creatinine Latest Ref Range: 0.52 - 1.04 mg/dL 0.49 (L)   GFR Estimate Latest Ref Range: >60 mL/min/1.73_m2 >90   GFR Estimate If Black Latest Ref Range: >60 mL/min/1.73_m2 >90   Calcium Latest Ref Range: 8.5 - 10.1 mg/dL 11.0 (H)   Anion Gap Latest Ref Range: 3 - 14 mmol/L 3   Albumin Latest Ref Range: 3.4 - 5.0 g/dL 3.4   Protein Total Latest Ref Range: 6.8 - 8.8 g/dL 8.0   Bilirubin Total Latest Ref Range: 0.2 - 1.3 mg/dL 0.4   Alkaline Phosphatase Latest Ref Range: 40 - 150 U/L 303 (H)   ALT Latest Ref Range: 0 - 50 U/L 41   AST Latest Ref Range: 0 - 45 U/L 31   Glucose Latest Ref Range: 70 - 99 mg/dL 81   Parathyroid Hormone Intact Latest Ref Range: 18 - 80 pg/mL 544 (H)       ASSESSMENT  38 yo F h/o left renal and ureteral stones, right hydronephrosis with complex past surgical history as noted. Litholink with extreme  hypercalciuria. Discussed the following dietary recommendations:    sodium 2300 to 3500 mg/day  calcium 800-1200 mg/day from food  protein 0.8 to 1.3 gm/kg/day (63 to 103 gm a day)    Given the extreme hypercalciuria and also the hypercalcemia seen, sent parathyroid hormone which is elevated at 544, confirming diagnosis of hyperparathyroidism. CMP again with hypercalcemia, also elevated alk phos.    Regarding the right hydronephrosis, it would appear that she has a mild ureteropelvic junction obstruction on that side. Would defer surgical management of this until she becomes more symptomatic and only after the hyperparathyroidism is treated, as the potential treatment (pyeloplasty) would require prolonged postop stenting, and she has been shown to encrust stents very quickly. Will plan to follow the UPJO with repeat NM renal scan in 6 months to ensure stability of split function    PLAN  Referral to nephrology for further assistance with medical management of stones  Referral to endocrine surgery for hyperparathyroidism  Return to clinic in 6 months with repeat NM renal scan prior      I spent over 25 minutes with the patient.  Over half this time was spent on counseling regarding right hydronephrosis, kidney stone prevention.    Kavon Hernandez MD   [facility]  [facility] Phone: [phone]

## 2020-11-27 NOTE — PROGRESS NOTES
"CHIEF COMPLAINT   Radha Bryan who is a 39 year old female returns today for follow-up of nephrolithiasis, right hydronephrosis.      HPI  Visit facilitated by Cambodian language phone      Radha Bryan is a 40 yo F h/o left renal and ureteral stones, right hydronephrosis s/p bilateral ureteral stent placement 8/27/2020, left ureteroscopy and laser lithotripsy with bilateral ureteral stent exchange 9/17/2020, with attempted removal of the stents in the office on 10/7/2020, unable to remove the right ureteral stent. Went to OR 10/9/2020 for removal of the right stent which was noted to be encrusted; also seen was a kink in the proximal right ureter concerning for right ureteropelvic junction obstruction. A new stent was placed and then removed 10/15/2020. Here for followup    Has not had any pain her back. No nausea or vomiting. No dysuria or blood in urine.    Occasionally having some burning pain in the right flank, every week or every other week, lasts about a day and goes away.     Calculi composed primarily of:   10% calcium oxalate dihydrate, and   90% calcium phosphate (hydroxy- and carbonate- apatite).     Litholink with extreme hypercalciuria. 465 mg/day (normal <200)    CT urogram with mild kinking of the right proximal ureter with no crossing vessel    NM renal scan with 45:55 R:L split function with mildly delayed excretion on the right after administration of lasix      PHYSICAL EXAM  Patient is a 39 year old  female   Vitals: Blood pressure 108/74, height 1.575 m (5' 2\"), weight 78.9 kg (174 lb), last menstrual period 06/20/2019, not currently breastfeeding.  Body mass index is 31.83 kg/m .  General Appearance Adult:   Alert, no acute distress, oriented  HENT: throat/mouth:normal, good dentition  Lungs: no respiratory distress, or pursed lip breathing  Heart: No obvious jugular venous distension present  Abdomen: soft, nontender, no organomegaly or masses  Back:  No cvat  Musculoskeltal: " extremities normal, no peripheral edema  Skin: no suspicious lesions or rashes  Neuro: Alert, oriented, speech and mentation normal  Psych: affect and mood normal  Gait: Normal  : deferred    All pertinent imaging reviewed:    CT urogram 11/17/2020  IMPRESSION: Mild right hydronephrosis with kinking of the proximal  ureter but no external compression demonstrated.    NM renal scan 11/17/2020  IMPRESSION: Mild partial UPJ obstruction.    Results for MICHAEL CAMPUZANO (MRN [id number]) as of 11/29/2020 00:01   Ref. Range 11/27/2020 11:40   Sodium Latest Ref Range: 133 - 144 mmol/L 138   Potassium Latest Ref Range: 3.4 - 5.3 mmol/L 4.3   Chloride Latest Ref Range: 94 - 109 mmol/L 112 (H)   Carbon Dioxide Latest Ref Range: 20 - 32 mmol/L 23   Urea Nitrogen Latest Ref Range: 7 - 30 mg/dL 9   Creatinine Latest Ref Range: 0.52 - 1.04 mg/dL 0.49 (L)   GFR Estimate Latest Ref Range: >60 mL/min/1.73_m2 >90   GFR Estimate If Black Latest Ref Range: >60 mL/min/1.73_m2 >90   Calcium Latest Ref Range: 8.5 - 10.1 mg/dL 11.0 (H)   Anion Gap Latest Ref Range: 3 - 14 mmol/L 3   Albumin Latest Ref Range: 3.4 - 5.0 g/dL 3.4   Protein Total Latest Ref Range: 6.8 - 8.8 g/dL 8.0   Bilirubin Total Latest Ref Range: 0.2 - 1.3 mg/dL 0.4   Alkaline Phosphatase Latest Ref Range: 40 - 150 U/L 303 (H)   ALT Latest Ref Range: 0 - 50 U/L 41   AST Latest Ref Range: 0 - 45 U/L 31   Glucose Latest Ref Range: 70 - 99 mg/dL 81   Parathyroid Hormone Intact Latest Ref Range: 18 - 80 pg/mL 544 (H)       ASSESSMENT  40 yo F h/o left renal and ureteral stones, right hydronephrosis with complex past surgical history as noted. Litholink with extreme hypercalciuria. Discussed the following dietary recommendations:    sodium 2300 to 3500 mg/day  calcium 800-1200 mg/day from food  protein 0.8 to 1.3 gm/kg/day (63 to 103 gm a day)    Given the extreme hypercalciuria and also the hypercalcemia seen, sent parathyroid hormone which is elevated at 544, confirming  diagnosis of hyperparathyroidism. CMP again with hypercalcemia, also elevated alk phos.    Regarding the right hydronephrosis, it would appear that she has a mild ureteropelvic junction obstruction on that side. Would defer surgical management of this until she becomes more symptomatic and only after the hyperparathyroidism is treated, as the potential treatment (pyeloplasty) would require prolonged postop stenting, and she has been shown to encrust stents very quickly. Will plan to follow the UPJO with repeat NM renal scan in 6 months to ensure stability of split function    PLAN  Referral to nephrology for further assistance with medical management of stones  Referral to endocrine surgery for hyperparathyroidism  Return to clinic in 6 months with repeat NM renal scan prior      I spent over 25 minutes with the patient.  Over half this time was spent on counseling regarding right hydronephrosis, kidney stone prevention.    Kavon Hernandez MD   [facility]  [facility] Phone: [phone]

## 2020-11-29 NOTE — RESULT ENCOUNTER NOTE
Very high PTH with hypercalcemia and elevated alkaline phosphatase concerning for hyperparathyroidism. Will refer to endocrine surgeon for consideration of parathyroidectomy. PCP cc'ed    Team, can we make sure this patient gets set up to see Dr. Adelaide Saenz at [facility]?    Kavon Hernandez MD   [facility]  [phone] clinic phone

## 2020-12-30 ENCOUNTER — TRANSFERRED RECORDS (OUTPATIENT)
Dept: HEALTH INFORMATION MANAGEMENT | Facility: CLINIC | Age: 40
End: 2020-12-30

## 2021-01-15 ENCOUNTER — TELEPHONE (OUTPATIENT)
Dept: SURGERY | Facility: CLINIC | Age: 41
End: 2021-01-15

## 2021-01-15 ENCOUNTER — VIRTUAL VISIT (OUTPATIENT)
Dept: SURGERY | Facility: CLINIC | Age: 41
End: 2021-01-15
Payer: COMMERCIAL

## 2021-01-15 ENCOUNTER — PREP FOR PROCEDURE (OUTPATIENT)
Dept: SURGERY | Facility: CLINIC | Age: 41
End: 2021-01-15

## 2021-01-15 VITALS — WEIGHT: 174 LBS | BODY MASS INDEX: 32.02 KG/M2 | HEIGHT: 62 IN

## 2021-01-15 DIAGNOSIS — E21.0 PRIMARY HYPERPARATHYROIDISM (H): Primary | ICD-10-CM

## 2021-01-15 PROCEDURE — 99204 OFFICE O/P NEW MOD 45 MIN: CPT | Mod: 95 | Performed by: SURGERY

## 2021-01-15 ASSESSMENT — MIFFLIN-ST. JEOR: SCORE: 1402.51

## 2021-01-15 NOTE — PATIENT INSTRUCTIONS
SESTAMIBI CT SCAN w/ SPECT DUAL ISOTOPE    Date: 1/25/2021  Time: 9:00 am   Location: [place] [address] [place]  [address]        Please check in at the [facility] [employer] lobby at 8:45 am

## 2021-01-15 NOTE — PROGRESS NOTES
History of Present Illness:  Radha Bryan is a 40 year old female who is sent by Kavon Hernandez MD for evaluation of hypercalcemia/hyperparathyroidism. She had an elevated calcium found on a workup for kidney stones.  She has a calcium level as high as 12.3 (8/20/20).   She has had the elevated calcium for at least 3 years (value 11.3 on 12/27/17).  Parathyroid hormone was found to be markedly high at 544 on 12/27/20 when her calcium was 11.0. She has constitutional symptoms of {parathyroid symptoms:616474}.  She has a history of kidney stones which became symptomatic beginning in August 2020.   She has not had bony fractures. A DEXA scan has not been performed     There is no family history of parathyroid disease.  Radha has no prior neck surgery..  She is not on thyroid medications, hydrochlorothiazide, lithium.      Past Medical History:   Diagnosis Date     History of blood transfusion      Irregular periods/menstrual cycles      Renal disease     stone     Tinea versicolor      Past Surgical History:   Procedure Laterality Date     CHOLECYSTECTOMY       COMBINED CYSTOSCOPY, RETROGRADES, URETEROSCOPY, INSERT STENT Right 10/9/2020    Procedure: Cystoscopy right ureteral stent removal right retrograde pyelogram right ureteroscopy Right ureteral dilation right ureteral stent placement Holmium Laser on Stand-by;  Surgeon: Kavon Hernandez MD;  Location: [facility] OR     COMBINED CYSTOSCOPY, RETROGRADES, URETEROSCOPY, LASER HOLMIUM LITHOTRIPSY URETER(S), INSERT STENT Bilateral 9/17/2020    Procedure: Cystoscopy, bilateral stent removal, bilateral ureteroscopy, left laser lithotripsy, stone basketing, bilateral stent placement; bilateral retrograde pyelogram, fluoroscopic interpretation <1 hour physician time;  Surgeon: Kavon Hernandez MD;  Location:  OR     CYSTOSCOPY, RETROGRADES, INSERT STENT URETER(S), COMBINED Bilateral 8/27/2020    Procedure: Cystoscopy with bilateral retrograde pyelogram and bilateral  "ureteral stent insertion with Fluoroscopic interpretation <1 hour physician time;  Surgeon: Kavon Hernandez MD;  Location: [facility] OR     LAPAROSCOPIC HYSTERECTOMY TOTAL, SALPINGECTOMY BILATERAL  07/17/2019    AUB, fibroid uterus, anemia. [facility], Dr. Pamela Carrion     LAPAROSCOPIC TUBAL LIGATION       No Known Allergies  Smoking History:  {SMOKE:476775}  {smoking cessation plans:455307}    Review of Systems:  10 point ROS is negative except as stated in the History of the Present Illness and ***.    Physical Exam:  LMP 06/20/2019   Well developed, well nourished female in no apparent distress***.  HEENT:  Normocephalic, atraumatic***.  Neck:   {Neck appearance:660028}.              Range of motion is normal***.              No*** neck masses.  Thyroid:  Palpably normal***.  Lymph:  No*** cervical adenopathy.  Respirations:  Unlabored.  Neurologic:  Alert.  Speech is clear.  Moves all extremities with good strength***.  Skin:  Warm, dry and no rash***.  Psychologic:  Alert, appropriate range of emotions.    Labs:  Calcium-***  PTH-***  24 hour Urinary calcium-***    Imaging:  All imaging personally reviewed with Radha   Sestimibi Scan with*** SPECT-***  Ultrasound- ***  4 D CT Scan:  ***    Assessment and Plan:  Radha has primary hyperparathyroidism and is a candidate for surgery because of ***.  We discussed the possibility of single adenoma (85%) vs dual adenoma or hyperplasia (15%).  The imaging studies would suggest that there is a good*** probability of a single adenoma.  I would*** recommend a minimally invasive neck exploration with the sestimibi injection to minimize the operative incision and the rapid PTH assay to assess the completeness of the procedure.  Radha is aware of the risks to the recurrent laryngeal nerve and the risk of hypocalcemia, particularly with treatment of  parathyroid hyperplasia.  She is also aware of the 1-2 % risk of \"failure to cure\".  She is anxious to proceed with " surgery in the next few {DAYS/WEEKS:025809}.    Nan Larkin MD    Please route or send letter to:  {LETTERREQ:101721}.  Send dictated letter only to the referring physician.    *** minutes spent with the patient, over 50% as counseling.      .

## 2021-01-15 NOTE — TELEPHONE ENCOUNTER
Type of surgery: CERVICAL EXPLORATION, PARATHYROIDECTOMY      Location of surgery: [facility]  Date and time of surgery: 2/17/2021 @ 7:30 AM   Surgeon: Adelaide Saenz MD   Pre-Op Appt Date: PATIENT TO SCHEDULE    Post-Op Appt Date: PATIENT TO SCHEDULE     Packet sent out: Yes  Pre-cert/Authorization completed:  Not Applicable  Date: 1/15/2021      --OUT PATIENT OVERNIGHT-- CERVICAL EXPLORATION, PARATHYROIDECTOMY    GENERAL PT INST TO HAVE H&P WITH DR ANGLIN 120 MIN REQ PA ASSIST [facility] NMS

## 2021-01-15 NOTE — PROGRESS NOTES
Radha is a 40 year old who is being evaluated via a billable telephone visit.      What phone number would you like to be contacted at? [phone]    History of Present Illness:  Radha Bryan is a 40 year old female who is sent by Kavon Hernandez MD for evaluation of hypercalcemia/hyperparathyroidism. She had an elevated calcium found on a workup for kidney stones.She has a calcium level as high as 12.3 (8/20/20).   She has had the elevated calcium for at least 3 years ([facility]  Parathyroid hormone was found to be markedly high at 544 on 12/27/20 when her calcium was 11.0. She has constitutional symptoms of fatigue, depression, difficulty concentrating, muscle aches and muscle weakness, and constipation.  She has a history of kidney stones which became symptomatic beginning in August 2020.  She has not had bony fractures. A DEXA scan has not been performed     There is no family history of parathyroid disease. However, her brother has had kidney stones as well. Radha has no prior neck surgery.  She is not on thyroid medications, thiazide diuretics, lithium, or calcium/vitamin D supplements.      Past Medical History:   Diagnosis Date     History of blood transfusion      Irregular periods/menstrual cycles      Renal disease     stone     Tinea versicolor      Past Surgical History:   Procedure Laterality Date     CHOLECYSTECTOMY       COMBINED CYSTOSCOPY, RETROGRADES, URETEROSCOPY, INSERT STENT Right 10/9/2020    Procedure: Cystoscopy right ureteral stent removal right retrograde pyelogram right ureteroscopy Right ureteral dilation right ureteral stent placement Holmium Laser on Stand-by;  Surgeon: Kavon Hernandez MD;  Location:  OR     COMBINED CYSTOSCOPY, RETROGRADES, URETEROSCOPY, LASER HOLMIUM LITHOTRIPSY URETER(S), INSERT STENT Bilateral 9/17/2020    Procedure: Cystoscopy, bilateral stent removal, bilateral ureteroscopy, left laser lithotripsy, stone basketing,  "bilateral stent placement; bilateral retrograde pyelogram, fluoroscopic interpretation <1 hour physician time;  Surgeon: Kavon Hernandez MD;  Location: [facility] OR     CYSTOSCOPY, RETROGRADES, INSERT STENT URETER(S), COMBINED Bilateral 8/27/2020    Procedure: Cystoscopy with bilateral retrograde pyelogram and bilateral ureteral stent insertion with Fluoroscopic interpretation <1 hour physician time;  Surgeon: Kavon Hernandez MD;  Location: [facility] OR     LAPAROSCOPIC HYSTERECTOMY TOTAL, SALPINGECTOMY BILATERAL  07/17/2019    AUB, fibroid uterus, anemia. [facility], Dr. Pamela Carrion     LAPAROSCOPIC TUBAL LIGATION       Allergies:   No Known Allergies    Social History:   No tobacco or alcohol use. . Not currently working outside the home.    Review of Systems:  10 point ROS is negative except as stated in the History of the Present Illness.    Vitals:  No vitals were obtained today due to virtual visit.    BMI:   Estimated body mass index is 31.83 kg/m  as calculated from the following:    Height as of this encounter: 1.575 m (5' 2\").    Weight as of this encounter: 78.9 kg (174 lb).     Physical Exam   PSYCH: Alert and oriented times 3; coherent speech, normal   rate and volume, Her affect is normal  RESP: No cough, no audible wheezing, able to talk in full sentences  Remainder of exam unable to be completed due to telephone visits    No imaging of her parathyroid glands has been completed yet.     Labs:  Calcium- up to 12.3  PTH- 544  24 hour Urinary calcium- Not performed    Imaging:   Sestimibi Scan (dual isotope) with SPECT- pending    Assessment & Plan     Primary hyperparathyroidism (H)  - NM Parathyroid Planar w/Spect & CT Dual; Future    Radha has primary hyperparathyroidism and is a candidate for surgery. She has a history of kidney stones and constitutional symptoms  We discussed the possibility of single adenoma (85%) vs dual adenoma or hyperplasia (15%).  I have ordered a dual isotope " "sestamibi scan with SPECT to assess for an adenoma.  If an adenoma is identified, I would recommend a unilateral neck exploration with with rapid PTH assay to assess the completeness of the procedure.  If no adenoma is identified with sestamibi scan, would recommend 4D CT. Ultimately, she would require a bilateral cervical exploration if no adenoma is identified on preoperative imaging. Radha is aware of the risks to the recurrent laryngeal nerve and the risk of hypocalcemia, particularly with treatment of  parathyroid hyperplasia.  She is also aware of the 1-2 % risk of \"failure to cure\".  She is anxious to proceed with surgery in the next few weeks.    Adelaide Saenz MD  [facility] [place]  "

## 2021-01-25 ENCOUNTER — HOSPITAL ENCOUNTER (OUTPATIENT)
Dept: NUCLEAR MEDICINE | Facility: CLINIC | Age: 41
Setting detail: OBSERVATION
Discharge: HOME | End: 2021-01-25
Attending: SURGERY | Admitting: SURGERY
Payer: COMMERCIAL

## 2021-01-25 DIAGNOSIS — E21.0 PRIMARY HYPERPARATHYROIDISM (H): ICD-10-CM

## 2021-01-25 PROCEDURE — A9516 IODINE I-123 SOD IODIDE MIC: HCPCS | Performed by: SURGERY

## 2021-01-25 PROCEDURE — 343N000001 HC RX 343: Performed by: SURGERY

## 2021-01-25 PROCEDURE — A9500 TC99M SESTAMIBI: HCPCS | Performed by: SURGERY

## 2021-01-25 PROCEDURE — 78072 PARATHYRD PLANAR W/SPECT&CT: CPT

## 2021-01-25 RX ADMIN — Medication 885 UCI.: at 08:33

## 2021-01-25 RX ADMIN — Medication 26.2 MILLICURIE: at 11:36

## 2021-01-26 ENCOUNTER — APPOINTMENT (OUTPATIENT)
Dept: INTERPRETER SERVICES | Facility: CLINIC | Age: 41
End: 2021-01-26
Payer: COMMERCIAL

## 2021-01-29 ENCOUNTER — TELEPHONE (OUTPATIENT)
Dept: SURGERY | Facility: CLINIC | Age: 41
End: 2021-01-29

## 2021-01-29 NOTE — TELEPHONE ENCOUNTER
Call does not go through to phone # listed for patient.  I called patient's , Cj, and asked him to have Radha to call our office for test results.

## 2021-01-29 NOTE — TELEPHONE ENCOUNTER
I spoke with Radha over the phone and gave results of NM parathyroid scan.  Per 's note:  Left upper parathyroid adenoma - no further testing prior to surgery needed.     Explained this is likely a benign (non-cancerous) growth on her parathyroid that would be removed during surgery.      Pt verbalized understanding and appreciates the call with results.

## 2021-01-29 NOTE — TELEPHONE ENCOUNTER
Name of caller: Radha    Reason for Call:  pt looking for NM scan results    Surgeon:  Dr. Saenz    Recent Surgery:  No    If yes, when & what type:  N/A      Best phone number to reach pt at is: [phone] and if you cant get through call her spouse at [phone] and then he will get in touch with his wife and tell her to call us back. She says that calls dont always go through.  Ok to leave a message with medical info? No    Pharmacy preferred (if calling for a refill): NA

## 2021-02-02 DIAGNOSIS — Z11.59 ENCOUNTER FOR SCREENING FOR OTHER VIRAL DISEASES: ICD-10-CM

## 2021-02-03 NOTE — PROGRESS NOTES
DISCHARGE REPORT    Updated as of February 3, 2021.    Discharge report is from initial evaluation on Oct 20, 2020.       SUBJECTIVE  Subjective changes noted by patient: Patient has not returned since initial evaluation.   Changes in function:  Patient has not returned to clinic to assess.   Adverse reaction to treatment or activity: Patient has not returned to clinic to assess.     OBJECTIVE  Changes noted in objective findings:  Patient has failed to return to therapy so current objective findings are unknown.  Objective: See initial evaluation note.      ASSESSMENT/PLAN  STG/LTGs have been met or progress has been made towards goals:  Goal status unknown  Assessment of Progress: Patient has not returned to therapy.  Current status is unknown and discharge G code cannot be reported.  Jeanine continues to require the following intervention to meet STG and LTG's:  Unknown, patient has not returned to therapy.     Recommendations:  No recommendations can accurately be made. Patient has failed to return to therapy.     Please refer to the daily flowsheet for treatment today, total treatment time and time spent performing 1:1 timed codes.

## 2021-02-08 ENCOUNTER — OFFICE VISIT (OUTPATIENT)
Dept: INTERNAL MEDICINE | Facility: CLINIC | Age: 41
End: 2021-02-08
Payer: COMMERCIAL

## 2021-02-08 VITALS
HEIGHT: 62 IN | BODY MASS INDEX: 31.74 KG/M2 | WEIGHT: 172.5 LBS | SYSTOLIC BLOOD PRESSURE: 115 MMHG | HEART RATE: 66 BPM | OXYGEN SATURATION: 98 % | RESPIRATION RATE: 16 BRPM | DIASTOLIC BLOOD PRESSURE: 71 MMHG | TEMPERATURE: 96.6 F

## 2021-02-08 DIAGNOSIS — Z01.818 PREOPERATIVE EXAMINATION: Primary | ICD-10-CM

## 2021-02-08 LAB
ERYTHROCYTE [DISTWIDTH] IN BLOOD BY AUTOMATED COUNT: 14.3 % (ref 10–15)
HCT VFR BLD AUTO: 34.2 % (ref 35–47)
HGB BLD-MCNC: 11.2 G/DL (ref 11.7–15.7)
MCH RBC QN AUTO: 28.1 PG (ref 26.5–33)
MCHC RBC AUTO-ENTMCNC: 32.7 G/DL (ref 31.5–36.5)
MCV RBC AUTO: 86 FL (ref 78–100)
PLATELET # BLD AUTO: 308 10E9/L (ref 150–450)
RBC # BLD AUTO: 3.99 10E12/L (ref 3.8–5.2)
WBC # BLD AUTO: 7.3 10E9/L (ref 4–11)

## 2021-02-08 PROCEDURE — 36415 COLL VENOUS BLD VENIPUNCTURE: CPT | Performed by: NURSE PRACTITIONER

## 2021-02-08 PROCEDURE — 80076 HEPATIC FUNCTION PANEL: CPT | Performed by: NURSE PRACTITIONER

## 2021-02-08 PROCEDURE — 99213 OFFICE O/P EST LOW 20 MIN: CPT | Performed by: NURSE PRACTITIONER

## 2021-02-08 PROCEDURE — 85027 COMPLETE CBC AUTOMATED: CPT | Performed by: NURSE PRACTITIONER

## 2021-02-08 ASSESSMENT — MIFFLIN-ST. JEOR: SCORE: 1395.7

## 2021-02-08 NOTE — PROGRESS NOTES
"[place] [address]  [facility] [address]  Phone: [phone]  Primary Provider: Jessica Zimmerman        PREOPERATIVE EVALUATION:  Today's date: 2/8/2021    Radha Bryan is a 40 year old female who presents for a preoperative evaluation.    Surgical Information:  Surgery/Procedure: parathyroidectomy  Surgery Location: [facility]  Surgeon: Dany  Surgery Date: 02/17/21  Time of Surgery: 7\"00  Where patient plans to recover: At home with family  Fax number for surgical facility: Note does not need to be faxed, will be available electronically in Epic.    Type of Anesthesia Anticipated: General    Assessment & Plan     The proposed surgical procedure is considered INTERMEDIATE risk.    Preoperative examination    - CBC with platelets  - Hepatic panel    Possible Sleep Apnea:  Occasional, no CPAP         Risks and Recommendations:  The patient has the following additional risks and recommendations for perioperative complications:   - No identified additional risk factors other than previously addressed    Medication Instructions:  Patient is to take all scheduled medications on the day of surgery    RECOMMENDATION:  APPROVAL GIVEN to proceed with proposed procedure pending review of diagnostic evaluation.                      Subjective     HPI related to upcoming procedure: parathyroidectomy    Preop Questions 2/8/2021   1. Have you ever had a heart attack or stroke? No   2. Have you ever had surgery on your heart or blood vessels, such as a stent placement, a coronary artery bypass, or surgery on an artery in your head, neck, heart, or legs? No   3. Do you have chest pain with activity? No   4. Do you have a history of  heart failure? No   5. Do you currently have a cold, bronchitis or symptoms of other infection? No   6. Do you have a cough, shortness of breath, or wheezing? No   7. Do you or anyone in your family have previous history of blood clots? No   8. Do you or " does anyone in your family have a serious bleeding problem such as prolonged bleeding following surgeries or cuts? No   9. Have you ever had problems with anemia or been told to take iron pills? No   10. Have you had any abnormal blood loss such as black, tarry or bloody stools, or abnormal vaginal bleeding? No   11. Have you ever had a blood transfusion? YES -    11a. Have you ever had a transfusion reaction? No   12. Are you willing to have a blood transfusion if it is medically needed before, during, or after your surgery? Yes   13. Have you or any of your relatives ever had problems with anesthesia? No   14. Do you have sleep apnea, excessive snoring or daytime drowsiness? YES -    14a. Do you have a CPAP machine? No   15. Do you have any artifical heart valves or other implanted medical devices like a pacemaker, defibrillator, or continuous glucose monitor? No   16. Do you have artificial joints? No   17. Are you allergic to latex? No   18. Is there any chance that you may be pregnant? No       Health Care Directive:  Patient does not have a Health Care Directive or Living Will: Discussed advance care planning with patient; information given to patient to review.    Preoperative Review of :   reviewed - no record of controlled substances prescribed.      Status of Chronic Conditions:  See problem list for active medical problems.  Problems all longstanding and stable, except as noted/documented.  See ROS for pertinent symptoms related to these conditions.      Review of Systems  CONSTITUTIONAL: NEGATIVE for fever, chills, change in weight  ENT/MOUTH: NEGATIVE for ear, mouth and throat problems  RESP: NEGATIVE for significant cough or SOB  CV: NEGATIVE for chest pain, palpitations or peripheral edema  GI: NEGATIVE for nausea, abdominal pain, heartburn, or change in bowel habits  : NEGATIVE for frequency, dysuria, or hematuria  MUSCULOSKELETAL: NEGATIVE for significant arthralgias or myalgia  NEURO:  NEGATIVE for weakness, dizziness or paresthesias  ENDOCRINE: NEGATIVE for temperature intolerance, skin/hair changes  HEME: NEGATIVE for bleeding problems  PSYCHIATRIC: NEGATIVE for changes in mood or affect    Patient Active Problem List    Diagnosis Date Noted     Primary hyperparathyroidism (H) 01/15/2021     Priority: Medium     Added automatically from request for surgery [id number]       Hydronephrosis of right kidney 10/07/2020     Priority: Medium     Added automatically from request for surgery [id number]       Ureteral stone 08/27/2020     Priority: Medium     Tinea versicolor      Priority: Medium     Gastroesophageal reflux disease without esophagitis 07/11/2019     Priority: Medium     Symptomatic anemia 05/06/2019     Priority: Medium      Past Medical History:   Diagnosis Date     History of blood transfusion      Irregular periods/menstrual cycles      Renal disease     stone     Tinea versicolor      Past Surgical History:   Procedure Laterality Date     CHOLECYSTECTOMY       COMBINED CYSTOSCOPY, RETROGRADES, URETEROSCOPY, INSERT STENT Right 10/9/2020    Procedure: Cystoscopy right ureteral stent removal right retrograde pyelogram right ureteroscopy Right ureteral dilation right ureteral stent placement Holmium Laser on Stand-by;  Surgeon: Kavon Hernandez MD;  Location:  OR     COMBINED CYSTOSCOPY, RETROGRADES, URETEROSCOPY, LASER HOLMIUM LITHOTRIPSY URETER(S), INSERT STENT Bilateral 9/17/2020    Procedure: Cystoscopy, bilateral stent removal, bilateral ureteroscopy, left laser lithotripsy, stone basketing, bilateral stent placement; bilateral retrograde pyelogram, fluoroscopic interpretation <1 hour physician time;  Surgeon: Kavon Hernandez MD;  Location: [facility] OR     CYSTOSCOPY, RETROGRADES, INSERT STENT URETER(S), COMBINED Bilateral 8/27/2020    Procedure: Cystoscopy with bilateral retrograde pyelogram and bilateral ureteral stent insertion with Fluoroscopic interpretation <1 hour physician time;   "Surgeon: Kavon Hernandez MD;  Location: [facility] OR     LAPAROSCOPIC HYSTERECTOMY TOTAL, SALPINGECTOMY BILATERAL  07/17/2019    AUB, fibroid uterus, anemia. [facility], Dr. Pamela Carrion     LAPAROSCOPIC TUBAL LIGATION       Current Outpatient Medications   Medication Sig Dispense Refill     omeprazole (PRILOSEC) 20 MG DR capsule Take 20 mg by mouth daily as needed          No Known Allergies     Social History     Tobacco Use     Smoking status: Never Smoker     Smokeless tobacco: Never Used   Substance Use Topics     Alcohol use: No     Family History   Problem Relation Age of Onset     Family History Negative Mother      Kidney Disease Brother         kidney stone     No Known Problems Sister      No Known Problems Son      No Known Problems Daughter      History   Drug Use No         Objective     /71 (BP Location: Right arm, Patient Position: Sitting, Cuff Size: Adult Large)   Pulse 66   Temp 96.6  F (35.9  C) (Oral)   Resp 16   Ht 1.575 m (5' 2\")   Wt 78.2 kg (172 lb 8 oz)   LMP 06/20/2019   SpO2 98%   BMI 31.55 kg/m      Physical Exam    GENERAL APPEARANCE: healthy, alert and no distress     EYES: EOMI, PERRL     RESP: lungs clear to auscultation - no rales, rhonchi or wheezes     CV: regular rates and rhythm, normal S1 S2, no S3 or S4 and no murmur, click or rub     ABDOMEN:  soft, nontender, no HSM or masses and bowel sounds normal     MS: extremities normal- no gross deformities noted, no evidence of inflammation in joints, FROM in all extremities.     NEURO: Normal strength and tone, sensory exam grossly normal, mentation intact and speech normal     PSYCH: mentation appears normal. and affect normal/bright     LYMPHATICS: No cervical adenopathy    Recent Labs   Lab Test 11/27/20  1140 11/17/20  0857 08/27/20  1209 08/26/20  2033   HGB  --   --  11.5* 11.7   PLT  --   --  338 339    138 140 138   POTASSIUM 4.3 4.0 3.9 3.4   CR 0.49* 0.57 0.58 0.74        Diagnostics:  Labs " pending at this time.  Results will be reviewed when available.   No EKG required, no history of coronary heart disease, significant arrhythmia, peripheral arterial disease or other structural heart disease.    Revised Cardiac Risk Index (RCRI):  The patient has the following serious cardiovascular risks for perioperative complications:   - No serious cardiac risks = 0 points     RCRI Interpretation: 0 points: Class I (very low risk - 0.4% complication rate)             Signed Electronically by: Jessica Zimmerman NP  Copy of this evaluation report is provided to requesting physician.    [facility] Guidelines    Revised Cardiac Risk Index

## 2021-02-08 NOTE — NURSING NOTE
"Pre  Op for parathyroidectomy FVR  Vital signs:  Temp: 96.6  F (35.9  C) Temp src: Oral BP: 115/71 Pulse: 66   Resp: 16 SpO2: 98 %     Height: 157.5 cm (5' 2\") Weight: 78.2 kg (172 lb 8 oz)  Estimated body mass index is 31.55 kg/m  as calculated from the following:    Height as of this encounter: 1.575 m (5' 2\").    Weight as of this encounter: 78.2 kg (172 lb 8 oz).          "

## 2021-02-08 NOTE — H&P (VIEW-ONLY)
"[place] [address]  [facility] [address]  Phone: [phone]  Primary Provider: Jessica Zimmerman        PREOPERATIVE EVALUATION:  Today's date: 2/8/2021    Radha Bryan is a 40 year old female who presents for a preoperative evaluation.    Surgical Information:  Surgery/Procedure: parathyroidectomy  Surgery Location: [facility]  Surgeon: Dany  Surgery Date: 02/17/21  Time of Surgery: 7\"00  Where patient plans to recover: At home with family  Fax number for surgical facility: Note does not need to be faxed, will be available electronically in Epic.    Type of Anesthesia Anticipated: General    Assessment & Plan     The proposed surgical procedure is considered INTERMEDIATE risk.    Preoperative examination    - CBC with platelets  - Hepatic panel    Possible Sleep Apnea:  Occasional, no CPAP         Risks and Recommendations:  The patient has the following additional risks and recommendations for perioperative complications:   - No identified additional risk factors other than previously addressed    Medication Instructions:  Patient is to take all scheduled medications on the day of surgery    RECOMMENDATION:  APPROVAL GIVEN to proceed with proposed procedure pending review of diagnostic evaluation.                      Subjective     HPI related to upcoming procedure: parathyroidectomy    Preop Questions 2/8/2021   1. Have you ever had a heart attack or stroke? No   2. Have you ever had surgery on your heart or blood vessels, such as a stent placement, a coronary artery bypass, or surgery on an artery in your head, neck, heart, or legs? No   3. Do you have chest pain with activity? No   4. Do you have a history of  heart failure? No   5. Do you currently have a cold, bronchitis or symptoms of other infection? No   6. Do you have a cough, shortness of breath, or wheezing? No   7. Do you or anyone in your family have previous history of blood clots? No   8. Do you or " does anyone in your family have a serious bleeding problem such as prolonged bleeding following surgeries or cuts? No   9. Have you ever had problems with anemia or been told to take iron pills? No   10. Have you had any abnormal blood loss such as black, tarry or bloody stools, or abnormal vaginal bleeding? No   11. Have you ever had a blood transfusion? YES -    11a. Have you ever had a transfusion reaction? No   12. Are you willing to have a blood transfusion if it is medically needed before, during, or after your surgery? Yes   13. Have you or any of your relatives ever had problems with anesthesia? No   14. Do you have sleep apnea, excessive snoring or daytime drowsiness? YES -    14a. Do you have a CPAP machine? No   15. Do you have any artifical heart valves or other implanted medical devices like a pacemaker, defibrillator, or continuous glucose monitor? No   16. Do you have artificial joints? No   17. Are you allergic to latex? No   18. Is there any chance that you may be pregnant? No       Health Care Directive:  Patient does not have a Health Care Directive or Living Will: Discussed advance care planning with patient; information given to patient to review.    Preoperative Review of :   reviewed - no record of controlled substances prescribed.      Status of Chronic Conditions:  See problem list for active medical problems.  Problems all longstanding and stable, except as noted/documented.  See ROS for pertinent symptoms related to these conditions.      Review of Systems  CONSTITUTIONAL: NEGATIVE for fever, chills, change in weight  ENT/MOUTH: NEGATIVE for ear, mouth and throat problems  RESP: NEGATIVE for significant cough or SOB  CV: NEGATIVE for chest pain, palpitations or peripheral edema  GI: NEGATIVE for nausea, abdominal pain, heartburn, or change in bowel habits  : NEGATIVE for frequency, dysuria, or hematuria  MUSCULOSKELETAL: NEGATIVE for significant arthralgias or myalgia  NEURO:  NEGATIVE for weakness, dizziness or paresthesias  ENDOCRINE: NEGATIVE for temperature intolerance, skin/hair changes  HEME: NEGATIVE for bleeding problems  PSYCHIATRIC: NEGATIVE for changes in mood or affect    Patient Active Problem List    Diagnosis Date Noted     Primary hyperparathyroidism (H) 01/15/2021     Priority: Medium     Added automatically from request for surgery [id number]       Hydronephrosis of right kidney 10/07/2020     Priority: Medium     Added automatically from request for surgery [id number]       Ureteral stone 08/27/2020     Priority: Medium     Tinea versicolor      Priority: Medium     Gastroesophageal reflux disease without esophagitis 07/11/2019     Priority: Medium     Symptomatic anemia 05/06/2019     Priority: Medium      Past Medical History:   Diagnosis Date     History of blood transfusion      Irregular periods/menstrual cycles      Renal disease     stone     Tinea versicolor      Past Surgical History:   Procedure Laterality Date     CHOLECYSTECTOMY       COMBINED CYSTOSCOPY, RETROGRADES, URETEROSCOPY, INSERT STENT Right 10/9/2020    Procedure: Cystoscopy right ureteral stent removal right retrograde pyelogram right ureteroscopy Right ureteral dilation right ureteral stent placement Holmium Laser on Stand-by;  Surgeon: Kavon Hernandez MD;  Location:  OR     COMBINED CYSTOSCOPY, RETROGRADES, URETEROSCOPY, LASER HOLMIUM LITHOTRIPSY URETER(S), INSERT STENT Bilateral 9/17/2020    Procedure: Cystoscopy, bilateral stent removal, bilateral ureteroscopy, left laser lithotripsy, stone basketing, bilateral stent placement; bilateral retrograde pyelogram, fluoroscopic interpretation <1 hour physician time;  Surgeon: Kavon Hernandez MD;  Location: [facility] OR     CYSTOSCOPY, RETROGRADES, INSERT STENT URETER(S), COMBINED Bilateral 8/27/2020    Procedure: Cystoscopy with bilateral retrograde pyelogram and bilateral ureteral stent insertion with Fluoroscopic interpretation <1 hour physician time;   "Surgeon: Kavon Hernandez MD;  Location: [facility] OR     LAPAROSCOPIC HYSTERECTOMY TOTAL, SALPINGECTOMY BILATERAL  07/17/2019    AUB, fibroid uterus, anemia. [facility], Dr. Pamela Carrion     LAPAROSCOPIC TUBAL LIGATION       Current Outpatient Medications   Medication Sig Dispense Refill     omeprazole (PRILOSEC) 20 MG DR capsule Take 20 mg by mouth daily as needed          No Known Allergies     Social History     Tobacco Use     Smoking status: Never Smoker     Smokeless tobacco: Never Used   Substance Use Topics     Alcohol use: No     Family History   Problem Relation Age of Onset     Family History Negative Mother      Kidney Disease Brother         kidney stone     No Known Problems Sister      No Known Problems Son      No Known Problems Daughter      History   Drug Use No         Objective     /71 (BP Location: Right arm, Patient Position: Sitting, Cuff Size: Adult Large)   Pulse 66   Temp 96.6  F (35.9  C) (Oral)   Resp 16   Ht 1.575 m (5' 2\")   Wt 78.2 kg (172 lb 8 oz)   LMP 06/20/2019   SpO2 98%   BMI 31.55 kg/m      Physical Exam    GENERAL APPEARANCE: healthy, alert and no distress     EYES: EOMI, PERRL     RESP: lungs clear to auscultation - no rales, rhonchi or wheezes     CV: regular rates and rhythm, normal S1 S2, no S3 or S4 and no murmur, click or rub     ABDOMEN:  soft, nontender, no HSM or masses and bowel sounds normal     MS: extremities normal- no gross deformities noted, no evidence of inflammation in joints, FROM in all extremities.     NEURO: Normal strength and tone, sensory exam grossly normal, mentation intact and speech normal     PSYCH: mentation appears normal. and affect normal/bright     LYMPHATICS: No cervical adenopathy    Recent Labs   Lab Test 11/27/20  1140 11/17/20  0857 08/27/20  1209 08/26/20  2033   HGB  --   --  11.5* 11.7   PLT  --   --  338 339    138 140 138   POTASSIUM 4.3 4.0 3.9 3.4   CR 0.49* 0.57 0.58 0.74        Diagnostics:  Labs " pending at this time.  Results will be reviewed when available.   No EKG required, no history of coronary heart disease, significant arrhythmia, peripheral arterial disease or other structural heart disease.    Revised Cardiac Risk Index (RCRI):  The patient has the following serious cardiovascular risks for perioperative complications:   - No serious cardiac risks = 0 points     RCRI Interpretation: 0 points: Class I (very low risk - 0.4% complication rate)             Signed Electronically by: Jessica Zimmerman NP  Copy of this evaluation report is provided to requesting physician.    [facility] Guidelines    Revised Cardiac Risk Index

## 2021-02-09 ENCOUNTER — TELEPHONE (OUTPATIENT)
Dept: INTERNAL MEDICINE | Facility: CLINIC | Age: 41
End: 2021-02-09

## 2021-02-09 DIAGNOSIS — R79.89 ELEVATED LFTS: Primary | ICD-10-CM

## 2021-02-09 LAB
ALBUMIN SERPL-MCNC: 3.6 G/DL (ref 3.4–5)
ALP SERPL-CCNC: 237 U/L (ref 40–150)
ALT SERPL W P-5'-P-CCNC: 62 U/L (ref 0–50)
AST SERPL W P-5'-P-CCNC: 47 U/L (ref 0–45)
BILIRUB DIRECT SERPL-MCNC: 0.1 MG/DL (ref 0–0.2)
BILIRUB SERPL-MCNC: 0.6 MG/DL (ref 0.2–1.3)
PROT SERPL-MCNC: 7.9 G/DL (ref 6.8–8.8)

## 2021-02-09 NOTE — LETTER
February 10, 2021      Radha Randi  [address]  [facility] [address]        Dear MsGalinaRandi,    We were unable to reach you by phone as the number does not work. Please contact the clinic to update your phone number.    We are writing to inform you of your test results.    Your recent labs show elevated LFT (liver function test),a referral to GI for further evaluation done.They should be calling you within 24-72 hours to schedule an appointment.    No results found from the In Basket message.    If you have any questions or concerns, please call the clinic at the number listed above.       Sincerely,        Jessica Zimmerman NP.

## 2021-02-09 NOTE — TELEPHONE ENCOUNTER
Please advise pt elevated LFT, referral to GI for further evaluation done.  Jessica Zimmerman CNP

## 2021-02-10 ENCOUNTER — TELEPHONE (OUTPATIENT)
Dept: INTERNAL MEDICINE | Facility: CLINIC | Age: 41
End: 2021-02-10

## 2021-02-10 NOTE — TELEPHONE ENCOUNTER
Reason for Call:  Other     Detailed comments: Patient calling. She received a call to schedule with a liver specialist. She did not know anything about why this is needed, she would like a call to discuss what issues she needs to be seen for.     Phone Number Patient can be reached at:     Best Time:     Can we leave a detailed message on this number?     Call taken on 2/10/2021 at 3:11 PM by Haritha Chopra

## 2021-02-10 NOTE — TELEPHONE ENCOUNTER
Pt had elevated LFTs, needs to f/u GI after her surgery, the hyperparathyroid may be influencing this.  Jessica Zimmerman CNP

## 2021-02-10 NOTE — TELEPHONE ENCOUNTER
Unable to reach patient by phone as the number is not working. Letter mailed to patient.  EMILIO RICO CMA

## 2021-02-11 NOTE — TELEPHONE ENCOUNTER
RECORDS RECEIVED FROM: Internal   Appt Date: 02.22.2021   NOTES STATUS DETAILS   OFFICE NOTE from referring provider Internal 02.09.2021 Jessica Zimmerman NP   OFFICE NOTES from other specialists N/A    DISCHARGE SUMMARY from hospital N/A    MEDICATION LIST Internal / CE    LIVER BIOSPY (IF APPLICABLE)      PATHOLOGY REPORTS  N/A    IMAGING     ENDOSCOPY (IF AVAILABLE) N/A    COLONOSCOPY (IF AVAILABLE) N/A    ULTRASOUND LIVER N/A    CT OF ABDOMEN N/A    MRI OF LIVER N/A    FIBROSCAN, US ELASTOGRAPHY, FIBROSIS SCAN, MR ELASTOGRAPHY N/A    LABS     HEPATIC PANEL (LIVER PANEL) Internal 02.08.2021   BASIC METABOLIC PANEL Internal 11.17.2020   COMPLETE METABOLIC PANEL Internal 02.08.2021   COMPLETE BLOOD COUNT (CBC) Internal 02.08.2021   INTERNATIONAL NORMALIZED RATIO (INR) N/A    HEPATITIS C ANTIBODY N/A    HEPATITIS C VIRAL LOAD/PCR N/A    HEPATITIS C GENOTYPE N/A    HEPATITIS B SURFACE ANTIGEN N/A    HEPATITIS B SURFACE ANTIBODY N/A    HEPATITIS B DNA QUANT LEVEL N/A    HEPATITIS B CORE ANTIBODY N/A

## 2021-02-11 NOTE — TELEPHONE ENCOUNTER
Call to pt and advised of Jessica's message she agrees with this, and had no further questions. She has appt with Dr Murillo on 2/22.

## 2021-02-12 ENCOUNTER — APPOINTMENT (OUTPATIENT)
Dept: INTERPRETER SERVICES | Facility: CLINIC | Age: 41
End: 2021-02-12
Payer: COMMERCIAL

## 2021-02-13 NOTE — PHARMACY-ADMISSION MEDICATION HISTORY
PTA meds completed by pre-admitting nurse (Rebekah Davis RN) and reviewed by pharmacy.    Prior to Admission medications    Medication Sig Last Dose Taking? Auth Provider   omeprazole (PRILOSEC) 20 MG DR capsule Take 20 mg by mouth daily as needed   Yes Reported, Patient

## 2021-02-15 DIAGNOSIS — Z11.59 ENCOUNTER FOR SCREENING FOR OTHER VIRAL DISEASES: ICD-10-CM

## 2021-02-15 LAB
SARS-COV-2 RNA RESP QL NAA+PROBE: NORMAL
SPECIMEN SOURCE: NORMAL

## 2021-02-15 PROCEDURE — U0003 INFECTIOUS AGENT DETECTION BY NUCLEIC ACID (DNA OR RNA); SEVERE ACUTE RESPIRATORY SYNDROME CORONAVIRUS 2 (SARS-COV-2) (CORONAVIRUS DISEASE [COVID-19]), AMPLIFIED PROBE TECHNIQUE, MAKING USE OF HIGH THROUGHPUT TECHNOLOGIES AS DESCRIBED BY CMS-2020-01-R: HCPCS | Performed by: SURGERY

## 2021-02-15 PROCEDURE — U0005 INFEC AGEN DETEC AMPLI PROBE: HCPCS | Performed by: SURGERY

## 2021-02-16 LAB
LABORATORY COMMENT REPORT: NORMAL
SARS-COV-2 RNA RESP QL NAA+PROBE: NEGATIVE
SPECIMEN SOURCE: NORMAL

## 2021-02-17 ENCOUNTER — HOSPITAL ENCOUNTER (OUTPATIENT)
Facility: CLINIC | Age: 41
Discharge: HOME | End: 2021-02-17
Attending: SURGERY | Admitting: SURGERY
Payer: COMMERCIAL

## 2021-02-17 ENCOUNTER — APPOINTMENT (OUTPATIENT)
Dept: SURGERY | Facility: PHYSICIAN GROUP | Age: 41
End: 2021-02-17
Payer: COMMERCIAL

## 2021-02-17 ENCOUNTER — ANESTHESIA EVENT (OUTPATIENT)
Dept: SURGERY | Facility: CLINIC | Age: 41
End: 2021-02-17
Payer: COMMERCIAL

## 2021-02-17 ENCOUNTER — ANESTHESIA (OUTPATIENT)
Dept: SURGERY | Facility: CLINIC | Age: 41
End: 2021-02-17
Payer: COMMERCIAL

## 2021-02-17 ENCOUNTER — SURGERY (OUTPATIENT)
Age: 41
End: 2021-02-17
Payer: COMMERCIAL

## 2021-02-17 VITALS
SYSTOLIC BLOOD PRESSURE: 119 MMHG | TEMPERATURE: 97 F | RESPIRATION RATE: 16 BRPM | BODY MASS INDEX: 31.65 KG/M2 | OXYGEN SATURATION: 94 % | DIASTOLIC BLOOD PRESSURE: 79 MMHG | HEART RATE: 104 BPM | WEIGHT: 172 LBS | HEIGHT: 62 IN

## 2021-02-17 DIAGNOSIS — Z90.89 S/P PARATHYROIDECTOMY: Primary | ICD-10-CM

## 2021-02-17 DIAGNOSIS — E21.0 PRIMARY HYPERPARATHYROIDISM (H): ICD-10-CM

## 2021-02-17 DIAGNOSIS — Z98.890 S/P PARATHYROIDECTOMY: Primary | ICD-10-CM

## 2021-02-17 LAB
PTH-INTACT SERPL-MCNC: 37.9 PG/ML (ref 18–80)
PTH-INTACT SERPL-MCNC: 42.8 PG/ML (ref 18–80)
PTH-INTACT SERPL-MCNC: 50.2 PG/ML (ref 18–80)
PTH-INTACT SERPL-MCNC: 556 PG/ML (ref 18–80)
PTH-INTACT SERPL-MCNC: 60.8 PG/ML (ref 18–80)

## 2021-02-17 PROCEDURE — 250N000013 HC RX MED GY IP 250 OP 250 PS 637: Performed by: SURGERY

## 2021-02-17 PROCEDURE — 272N000001 HC OR GENERAL SUPPLY STERILE: Performed by: SURGERY

## 2021-02-17 PROCEDURE — 250N000009 HC RX 250: Performed by: NURSE ANESTHETIST, CERTIFIED REGISTERED

## 2021-02-17 PROCEDURE — 250N000011 HC RX IP 250 OP 636: Performed by: NURSE ANESTHETIST, CERTIFIED REGISTERED

## 2021-02-17 PROCEDURE — 88331 PATH CONSLTJ SURG 1 BLK 1SPC: CPT | Mod: 26 | Performed by: PATHOLOGY

## 2021-02-17 PROCEDURE — 370N000017 HC ANESTHESIA TECHNICAL FEE, PER MIN: Performed by: SURGERY

## 2021-02-17 PROCEDURE — 250N000011 HC RX IP 250 OP 636: Performed by: PHYSICIAN ASSISTANT

## 2021-02-17 PROCEDURE — 60500 EXPLORE PARATHYROID GLANDS: CPT | Performed by: SURGERY

## 2021-02-17 PROCEDURE — 258N000003 HC RX IP 258 OP 636: Performed by: ANESTHESIOLOGY

## 2021-02-17 PROCEDURE — 83970 ASSAY OF PARATHORMONE: CPT | Performed by: PHYSICIAN ASSISTANT

## 2021-02-17 PROCEDURE — 60500 EXPLORE PARATHYROID GLANDS: CPT | Mod: LT | Performed by: PHYSICIAN ASSISTANT

## 2021-02-17 PROCEDURE — 88305 TISSUE EXAM BY PATHOLOGIST: CPT | Mod: 26 | Performed by: PATHOLOGY

## 2021-02-17 PROCEDURE — 83970 ASSAY OF PARATHORMONE: CPT | Performed by: SURGERY

## 2021-02-17 PROCEDURE — 250N000011 HC RX IP 250 OP 636: Performed by: ANESTHESIOLOGY

## 2021-02-17 PROCEDURE — 88305 TISSUE EXAM BY PATHOLOGIST: CPT | Mod: TC | Performed by: SURGERY

## 2021-02-17 PROCEDURE — 88331 PATH CONSLTJ SURG 1 BLK 1SPC: CPT | Mod: TC | Performed by: SURGERY

## 2021-02-17 PROCEDURE — 360N000076 HC SURGERY LEVEL 3, PER MIN: Performed by: SURGERY

## 2021-02-17 PROCEDURE — 710N000009 HC RECOVERY PHASE 1, LEVEL 1, PER MIN: Performed by: SURGERY

## 2021-02-17 PROCEDURE — 710N000012 HC RECOVERY PHASE 2, PER MINUTE: Performed by: SURGERY

## 2021-02-17 PROCEDURE — 999N000141 HC STATISTIC PRE-PROCEDURE NURSING ASSESSMENT: Performed by: SURGERY

## 2021-02-17 RX ORDER — ACETAMINOPHEN 500 MG
1000 TABLET ORAL EVERY 6 HOURS PRN
Qty: 60 TABLET | Refills: 0 | Status: SHIPPED | OUTPATIENT
Start: 2021-02-17 | End: 2022-02-25

## 2021-02-17 RX ORDER — NALOXONE HYDROCHLORIDE 0.4 MG/ML
0.4 INJECTION, SOLUTION INTRAMUSCULAR; INTRAVENOUS; SUBCUTANEOUS
Status: DISCONTINUED | OUTPATIENT
Start: 2021-02-17 | End: 2021-02-17 | Stop reason: HOSPADM

## 2021-02-17 RX ORDER — ONDANSETRON 4 MG/1
4 TABLET, ORALLY DISINTEGRATING ORAL EVERY 30 MIN PRN
Status: DISCONTINUED | OUTPATIENT
Start: 2021-02-17 | End: 2021-02-17 | Stop reason: HOSPADM

## 2021-02-17 RX ORDER — DEXAMETHASONE SODIUM PHOSPHATE 4 MG/ML
INJECTION, SOLUTION INTRA-ARTICULAR; INTRALESIONAL; INTRAMUSCULAR; INTRAVENOUS; SOFT TISSUE PRN
Status: DISCONTINUED | OUTPATIENT
Start: 2021-02-17 | End: 2021-02-17

## 2021-02-17 RX ORDER — SODIUM CHLORIDE, SODIUM LACTATE, POTASSIUM CHLORIDE, CALCIUM CHLORIDE 600; 310; 30; 20 MG/100ML; MG/100ML; MG/100ML; MG/100ML
INJECTION, SOLUTION INTRAVENOUS CONTINUOUS
Status: DISCONTINUED | OUTPATIENT
Start: 2021-02-17 | End: 2021-02-17 | Stop reason: HOSPADM

## 2021-02-17 RX ORDER — OXYCODONE HYDROCHLORIDE 5 MG/1
5-10 TABLET ORAL EVERY 4 HOURS PRN
Qty: 12 TABLET | Refills: 0 | Status: SHIPPED | OUTPATIENT
Start: 2021-02-17 | End: 2021-04-28

## 2021-02-17 RX ORDER — METOPROLOL TARTRATE 1 MG/ML
1-2 INJECTION, SOLUTION INTRAVENOUS EVERY 5 MIN PRN
Status: DISCONTINUED | OUTPATIENT
Start: 2021-02-17 | End: 2021-02-17 | Stop reason: HOSPADM

## 2021-02-17 RX ORDER — CEFAZOLIN SODIUM 1 G/3ML
1 INJECTION, POWDER, FOR SOLUTION INTRAMUSCULAR; INTRAVENOUS SEE ADMIN INSTRUCTIONS
Status: DISCONTINUED | OUTPATIENT
Start: 2021-02-17 | End: 2021-02-17 | Stop reason: HOSPADM

## 2021-02-17 RX ORDER — ONDANSETRON 2 MG/ML
INJECTION INTRAMUSCULAR; INTRAVENOUS PRN
Status: DISCONTINUED | OUTPATIENT
Start: 2021-02-17 | End: 2021-02-17

## 2021-02-17 RX ORDER — HYDROMORPHONE HYDROCHLORIDE 1 MG/ML
.3-.5 INJECTION, SOLUTION INTRAMUSCULAR; INTRAVENOUS; SUBCUTANEOUS EVERY 10 MIN PRN
Status: DISCONTINUED | OUTPATIENT
Start: 2021-02-17 | End: 2021-02-17 | Stop reason: HOSPADM

## 2021-02-17 RX ORDER — PROPOFOL 10 MG/ML
INJECTION, EMULSION INTRAVENOUS CONTINUOUS PRN
Status: DISCONTINUED | OUTPATIENT
Start: 2021-02-17 | End: 2021-02-17

## 2021-02-17 RX ORDER — NALOXONE HYDROCHLORIDE 0.4 MG/ML
0.2 INJECTION, SOLUTION INTRAMUSCULAR; INTRAVENOUS; SUBCUTANEOUS
Status: DISCONTINUED | OUTPATIENT
Start: 2021-02-17 | End: 2021-02-17 | Stop reason: HOSPADM

## 2021-02-17 RX ORDER — CEFAZOLIN SODIUM 2 G/100ML
2 INJECTION, SOLUTION INTRAVENOUS
Status: COMPLETED | OUTPATIENT
Start: 2021-02-17 | End: 2021-02-17

## 2021-02-17 RX ORDER — ACETAMINOPHEN 325 MG/1
650 TABLET ORAL
Status: DISCONTINUED | OUTPATIENT
Start: 2021-02-17 | End: 2021-02-17 | Stop reason: HOSPADM

## 2021-02-17 RX ORDER — ONDANSETRON 2 MG/ML
4 INJECTION INTRAMUSCULAR; INTRAVENOUS EVERY 30 MIN PRN
Status: DISCONTINUED | OUTPATIENT
Start: 2021-02-17 | End: 2021-02-17 | Stop reason: HOSPADM

## 2021-02-17 RX ORDER — LIDOCAINE 40 MG/G
CREAM TOPICAL
Status: DISCONTINUED | OUTPATIENT
Start: 2021-02-17 | End: 2021-02-17 | Stop reason: HOSPADM

## 2021-02-17 RX ORDER — GLYCOPYRROLATE 0.2 MG/ML
INJECTION, SOLUTION INTRAMUSCULAR; INTRAVENOUS PRN
Status: DISCONTINUED | OUTPATIENT
Start: 2021-02-17 | End: 2021-02-17

## 2021-02-17 RX ORDER — LIDOCAINE HYDROCHLORIDE 10 MG/ML
INJECTION, SOLUTION INFILTRATION; PERINEURAL PRN
Status: DISCONTINUED | OUTPATIENT
Start: 2021-02-17 | End: 2021-02-17

## 2021-02-17 RX ORDER — PROPOFOL 10 MG/ML
INJECTION, EMULSION INTRAVENOUS PRN
Status: DISCONTINUED | OUTPATIENT
Start: 2021-02-17 | End: 2021-02-17

## 2021-02-17 RX ORDER — FENTANYL CITRATE 50 UG/ML
25-50 INJECTION, SOLUTION INTRAMUSCULAR; INTRAVENOUS
Status: DISCONTINUED | OUTPATIENT
Start: 2021-02-17 | End: 2021-02-17 | Stop reason: HOSPADM

## 2021-02-17 RX ORDER — FENTANYL CITRATE 50 UG/ML
INJECTION, SOLUTION INTRAMUSCULAR; INTRAVENOUS PRN
Status: DISCONTINUED | OUTPATIENT
Start: 2021-02-17 | End: 2021-02-17

## 2021-02-17 RX ORDER — ALBUTEROL SULFATE 0.83 MG/ML
2.5 SOLUTION RESPIRATORY (INHALATION) EVERY 4 HOURS PRN
Status: DISCONTINUED | OUTPATIENT
Start: 2021-02-17 | End: 2021-02-17 | Stop reason: HOSPADM

## 2021-02-17 RX ORDER — MEPERIDINE HYDROCHLORIDE 25 MG/ML
12.5 INJECTION INTRAMUSCULAR; INTRAVENOUS; SUBCUTANEOUS
Status: DISCONTINUED | OUTPATIENT
Start: 2021-02-17 | End: 2021-02-17 | Stop reason: HOSPADM

## 2021-02-17 RX ORDER — IBUPROFEN 600 MG/1
600 TABLET, FILM COATED ORAL EVERY 6 HOURS PRN
Qty: 30 TABLET | Refills: 0 | Status: SHIPPED | OUTPATIENT
Start: 2021-02-17 | End: 2021-07-09

## 2021-02-17 RX ORDER — ACETAMINOPHEN 325 MG/1
975 TABLET ORAL ONCE
Status: DISCONTINUED | OUTPATIENT
Start: 2021-02-17 | End: 2021-02-17 | Stop reason: HOSPADM

## 2021-02-17 RX ORDER — OXYCODONE HYDROCHLORIDE 5 MG/1
5 TABLET ORAL
Status: COMPLETED | OUTPATIENT
Start: 2021-02-17 | End: 2021-02-17

## 2021-02-17 RX ADMIN — FENTANYL CITRATE 50 MCG: 50 INJECTION, SOLUTION INTRAMUSCULAR; INTRAVENOUS at 10:20

## 2021-02-17 RX ADMIN — MIDAZOLAM 2 MG: 1 INJECTION INTRAMUSCULAR; INTRAVENOUS at 07:33

## 2021-02-17 RX ADMIN — LIDOCAINE HYDROCHLORIDE 30 MG: 10 INJECTION, SOLUTION INFILTRATION; PERINEURAL at 07:38

## 2021-02-17 RX ADMIN — SODIUM CHLORIDE, POTASSIUM CHLORIDE, SODIUM LACTATE AND CALCIUM CHLORIDE: 600; 310; 30; 20 INJECTION, SOLUTION INTRAVENOUS at 06:56

## 2021-02-17 RX ADMIN — FENTANYL CITRATE 250 MCG: 50 INJECTION, SOLUTION INTRAMUSCULAR; INTRAVENOUS at 07:38

## 2021-02-17 RX ADMIN — OXYCODONE HYDROCHLORIDE 5 MG: 5 TABLET ORAL at 11:04

## 2021-02-17 RX ADMIN — FENTANYL CITRATE 50 MCG: 50 INJECTION, SOLUTION INTRAMUSCULAR; INTRAVENOUS at 10:11

## 2021-02-17 RX ADMIN — HYDROMORPHONE HYDROCHLORIDE 1 MG: 1 INJECTION, SOLUTION INTRAMUSCULAR; INTRAVENOUS; SUBCUTANEOUS at 08:23

## 2021-02-17 RX ADMIN — PROPOFOL 50 MCG/KG/MIN: 10 INJECTION, EMULSION INTRAVENOUS at 08:33

## 2021-02-17 RX ADMIN — PROPOFOL 150 MG: 10 INJECTION, EMULSION INTRAVENOUS at 07:38

## 2021-02-17 RX ADMIN — ONDANSETRON HYDROCHLORIDE 4 MG: 2 INJECTION, SOLUTION INTRAVENOUS at 09:28

## 2021-02-17 RX ADMIN — SODIUM CHLORIDE, POTASSIUM CHLORIDE, SODIUM LACTATE AND CALCIUM CHLORIDE: 600; 310; 30; 20 INJECTION, SOLUTION INTRAVENOUS at 08:10

## 2021-02-17 RX ADMIN — HYDROMORPHONE HYDROCHLORIDE 0.5 MG: 1 INJECTION, SOLUTION INTRAMUSCULAR; INTRAVENOUS; SUBCUTANEOUS at 10:50

## 2021-02-17 RX ADMIN — ROCURONIUM BROMIDE 15 MG: 10 INJECTION INTRAVENOUS at 07:38

## 2021-02-17 RX ADMIN — GLYCOPYRROLATE 0.2 MG: 0.2 INJECTION, SOLUTION INTRAMUSCULAR; INTRAVENOUS at 07:38

## 2021-02-17 RX ADMIN — DEXAMETHASONE SODIUM PHOSPHATE 8 MG: 4 INJECTION, SOLUTION INTRA-ARTICULAR; INTRALESIONAL; INTRAMUSCULAR; INTRAVENOUS; SOFT TISSUE at 07:38

## 2021-02-17 RX ADMIN — CEFAZOLIN SODIUM 2 G: 2 INJECTION, SOLUTION INTRAVENOUS at 07:35

## 2021-02-17 ASSESSMENT — MIFFLIN-ST. JEOR: SCORE: 1393.44

## 2021-02-17 NOTE — OR NURSING
Took final PTH values from lab.  PTH value #:3 42.8 & PTH value #4: 37.9.  Both are with in normal range.  No call to physician made.

## 2021-02-17 NOTE — ANESTHESIA PREPROCEDURE EVALUATION
Anesthesia Pre-Procedure Evaluation    Patient: Radha Bryan   MRN: [id number] : 1980        Preoperative Diagnosis: Primary hyperparathyroidism (H) [E21.0]   Procedure : Procedure(s):  CERVICAL EXPLORATION, PARATHYROIDECTOMY     Past Medical History:   Diagnosis Date     History of blood transfusion 2019    heavy menstrual cycles, anemic     Irregular periods/menstrual cycles      PONV (postoperative nausea and vomiting)      Renal disease     stone     Tinea versicolor       Past Surgical History:   Procedure Laterality Date     CHOLECYSTECTOMY       COMBINED CYSTOSCOPY, RETROGRADES, URETEROSCOPY, INSERT STENT Right 10/9/2020    Procedure: Cystoscopy right ureteral stent removal right retrograde pyelogram right ureteroscopy Right ureteral dilation right ureteral stent placement Holmium Laser on Stand-by;  Surgeon: Kavon Hernandez MD;  Location: [facility] OR     COMBINED CYSTOSCOPY, RETROGRADES, URETEROSCOPY, LASER HOLMIUM LITHOTRIPSY URETER(S), INSERT STENT Bilateral 2020    Procedure: Cystoscopy, bilateral stent removal, bilateral ureteroscopy, left laser lithotripsy, stone basketing, bilateral stent placement; bilateral retrograde pyelogram, fluoroscopic interpretation <1 hour physician time;  Surgeon: Kavon Hernandez MD;  Location: [facility] OR     CYSTOSCOPY, RETROGRADES, INSERT STENT URETER(S), COMBINED Bilateral 2020    Procedure: Cystoscopy with bilateral retrograde pyelogram and bilateral ureteral stent insertion with Fluoroscopic interpretation <1 hour physician time;  Surgeon: Kavon Hernandez MD;  Location: [facility] OR     LAPAROSCOPIC HYSTERECTOMY TOTAL, SALPINGECTOMY BILATERAL  2019    AUB, fibroid uterus, anemia. [facility], Dr. Pamela Carrion     LAPAROSCOPIC TUBAL LIGATION        No Known Allergies   Social History     Tobacco Use     Smoking status: Never Smoker     Smokeless tobacco: Never Used   Substance Use Topics     Alcohol use: No      Wt Readings from Last 1  Encounters:   02/17/21 78 kg (172 lb)        Anesthesia Evaluation            ROS/MED HX  ENT/Pulmonary:  - neg pulmonary ROS     Neurologic:  - neg neurologic ROS     Cardiovascular:  - neg cardiovascular ROS     METS/Exercise Tolerance:     Hematologic:  - neg hematologic  ROS     Musculoskeletal:  - neg musculoskeletal ROS     GI/Hepatic:     (+) GERD,     Renal/Genitourinary:     (+) renal disease,     Endo: Comment: .Body mass index is 31.46 kg/m .   - neg endo ROS     Psychiatric/Substance Use:  - neg psychiatric ROS     Infectious Disease:  - neg infectious disease ROS     Malignancy:  - neg malignancy ROS     Other: Comment: .Lab Test        02/08/21 08/27/20 08/26/20                       1000          1209          2033          WBC          7.3          9.0          13.7*         HGB          11.2*        11.5*        11.7          MCV          86           88           89            PLT          308          338          339            Lab Test        11/27/20 11/17/20 08/27/20                       1140          0857          1209          NA           138          138          140           POTASSIUM    4.3          4.0          3.9           CHLORIDE     112*         111*         113*          CO2          23           20           21            BUN          9            10           10            CR           0.49*        0.57         0.58          ANIONGAP     3            7            6             FRANCISCO          11.0*        11.0*        11.8*         GLC          81           90           122*                     Physical Exam    Airway        Mallampati: II    Neck ROM: full     Respiratory Devices and Support         Dental           Cardiovascular   cardiovascular exam normal       Rhythm and rate: regular     Pulmonary   pulmonary exam normal                OUTSIDE LABS:  CBC:   Lab Results   Component Value Date    WBC 7.3 02/08/2021    WBC 9.0 08/27/2020    HGB 11.2 (L)  02/08/2021    HGB 11.5 (L) 08/27/2020    HCT 34.2 (L) 02/08/2021    HCT 35.2 08/27/2020     02/08/2021     08/27/2020     BMP:   Lab Results   Component Value Date     11/27/2020     11/17/2020    POTASSIUM 4.3 11/27/2020    POTASSIUM 4.0 11/17/2020    CHLORIDE 112 (H) 11/27/2020    CHLORIDE 111 (H) 11/17/2020    CO2 23 11/27/2020    CO2 20 11/17/2020    BUN 9 11/27/2020    BUN 10 11/17/2020    CR 0.49 (L) 11/27/2020    CR 0.57 11/17/2020    GLC 81 11/27/2020    GLC 90 11/17/2020     COAGS: No results found for: PTT, INR, FIBR  POC:   Lab Results   Component Value Date    HCGS Negative 05/06/2019     HEPATIC:   Lab Results   Component Value Date    ALBUMIN 3.6 02/08/2021    PROTTOTAL 7.9 02/08/2021    ALT 62 (H) 02/08/2021    AST 47 (H) 02/08/2021    ALKPHOS 237 (H) 02/08/2021    BILITOTAL 0.6 02/08/2021     OTHER:   Lab Results   Component Value Date    FRANCISCO 11.0 (H) 11/27/2020       Anesthesia Plan    ASA Status:  2      Anesthesia Type: General.   Induction: Intravenous, Propofol.   Maintenance: Balanced.        Consents    Anesthesia Plan(s) and associated risks, benefits, and realistic alternatives discussed. Questions answered and patient/representative(s) expressed understanding.     - Discussed with:  Patient         Postoperative Care    Pain management: IV analgesics, Oral pain medications, Multi-modal analgesia.   PONV prophylaxis: Ondansetron (or other 5HT-3), Dexamethasone or Solumedrol     Comments:                Juan Delacruz DO

## 2021-02-17 NOTE — ANESTHESIA CARE TRANSFER NOTE
Patient: Radha Bryan    Procedure(s):  CERVICAL EXPLORATION, PARATHYROIDECTOMY    Diagnosis: Primary hyperparathyroidism (H) [E21.0]  Diagnosis Additional Information: No value filed.    Anesthesia Type:   General     Note:    Oropharynx: nasal airway in place  Level of Consciousness: drowsy  Oxygen Supplementation: face mask    Independent Airway: airway patency satisfactory and stable  Dentition: dentition changed  Vital Signs Stable: post-procedure vital signs reviewed and stable  Report to RN Given: handoff report given  Patient transferred to: PACU    Handoff Report: Identifed the Patient, Identified the Reponsible Provider, Reviewed the pertinent medical history, Discussed the surgical course, Reviewed Intra-OP anesthesia mangement and issues during anesthesia, Set expectations for post-procedure period and Allowed opportunity for questions and acknowledgement of understanding      Vitals: (Last set prior to Anesthesia Care Transfer)  CRNA VITALS  2/17/2021 0916 - 2/17/2021 0953      2/17/2021             Pulse:  95    SpO2:  100 %    Resp Rate (observed):  (!) 7        Electronically Signed By: ELIAZAR Kohler CRNA  February 17, 2021  9:53 AM

## 2021-02-17 NOTE — DISCHARGE INSTRUCTIONS
GENERAL ANESTHESIA OR SEDATION ADULT DISCHARGE INSTRUCTIONS   SPECIAL PRECAUTIONS FOR 24 HOURS AFTER SURGERY    IT IS NOT UNUSUAL TO FEEL LIGHT-HEADED OR FAINT, UP TO 24 HOURS AFTER SURGERY OR WHILE TAKING PAIN MEDICATION.  IF YOU HAVE THESE SYMPTOMS; SIT FOR A FEW MINUTES BEFORE STANDING AND HAVE SOMEONE ASSIST YOU WHEN YOU GET UP TO WALK OR USE THE BATHROOM.    YOU SHOULD REST AND RELAX FOR THE NEXT 24 HOURS AND YOU MUST MAKE ARRANGEMENTS TO HAVE SOMEONE STAY WITH YOU FOR AT LEAST 24 HOURS AFTER YOUR DISCHARGE.  AVOID HAZARDOUS AND STRENUOUS ACTIVITIES.  DO NOT MAKE IMPORTANT DECISIONS FOR 24 HOURS.    DO NOT DRIVE ANY VEHICLE OR OPERATE MECHANICAL EQUIPMENT FOR 24 HOURS FOLLOWING THE END OF YOUR SURGERY.  EVEN THOUGH YOU MAY FEEL NORMAL, YOUR REACTIONS MAY BE AFFECTED BY THE MEDICATION YOU HAVE RECEIVED.    DO NOT DRINK ALCOHOLIC BEVERAGES FOR 24 HOURS FOLLOWING YOUR SURGERY.      DRINK CLEAR LIQUIDS (APPLE JUICE, GINGER ALE, 7-UP, BROTH, ETC.).  PROGRESS TO YOUR REGULAR DIET AS YOU FEEL ABLE.    YOU MAY HAVE A DRY MOUTH, A SORE THROAT, MUSCLES ACHES OR TROUBLE SLEEPING.  THESE SHOULD GO AWAY AFTER 24 HOURS.    CALL YOUR DOCTOR FOR ANY OF THE FOLLOWING:  SIGNS OF INFECTION (FEVER, GROWING TENDERNESS AT THE SURGERY SITE, A LARGE AMOUNT OF DRAINAGE OR BLEEDING, SEVERE PAIN, FOUL-SMELLING DRAINAGE, REDNESS OR SWELLING.    IT HAS BEEN OVER 8 TO 10 HOURS SINCE SURGERY AND YOU ARE STILL NOT ABLE TO URINATE (PASS WATER).       Medications Received:  Oxycodone 5mg at 11:05am. Next dose of Oxycodone after 3:05 pm if needed.

## 2021-02-17 NOTE — ANESTHESIA POSTPROCEDURE EVALUATION
Patient: Radha Bryan    Procedure(s):  CERVICAL EXPLORATION, PARATHYROIDECTOMY    Diagnosis:Primary hyperparathyroidism (H) [E21.0]  Diagnosis Additional Information: No value filed.    Anesthesia Type:  General    Note:     Postop Pain Control: Uneventful            Sign Out: Well controlled pain   PONV: No   Neuro/Psych: Uneventful            Sign Out: Acceptable/Baseline neuro status   Airway/Respiratory: Uneventful            Sign Out: Acceptable/Baseline resp. status   CV/Hemodynamics: Uneventful            Sign Out: Acceptable CV status   Other NRE: NONE   DID A NON-ROUTINE EVENT OCCUR? No         Last vitals:  Vitals:    02/17/21 0614   Temp: 97.7  F (36.5  C)       Last vitals prior to Anesthesia Care Transfer:  CRNA VITALS  2/17/2021 0916 - 2/17/2021 0955      2/17/2021             Pulse:  95    SpO2:  100 %    Resp Rate (observed):  (!) 7          Electronically Signed By: Juan Delacruz DO  February 17, 2021  9:55 AM

## 2021-02-17 NOTE — OP NOTE
General Surgery Operative Note    PREOPERATIVE DIAGNOSIS:  Primary hyperparathyroidism.    POSTOPERATIVE DIAGNOSIS:  Primary hyperparathyroidism.    PROCEDURE: Excision of left superior parathyroid adenoma, Unilateral neck exploration.    ANESTHESIA:  General.    PREOPERATIVE MEDICATIONS:  Ancef 2 gm.    SURGEON:  Adelaide Saenz MD    ASSISTANT:  Chas Rush PA-C  - the physician assistant was medically necessary in providing adequate exposure in the operating field, maintaining hemostasis, cutting suture, clamping and ligating blood vessels, and visualization of the anatomic structures throughout the surgical procedure.     ESTIMATED BLOOD LOSS:  30 ml    INDICATIONS:  Radha Bryan is a 40 year old female who has primary hyperparathyroidism. She has had symptoms of kidney stones, fatigue, depression, difficulty concentrating, muscle aches and muscle weakness, and constipation.  She has been found to have an elevated calcium up to 12.3.  She has a localizing sestamibi in the left superior neck.  She presents today for neck exploration, excision of parathyroid adenoma.  Her PTH preoperatively is 556.    DESCRIPTION OF PROCEDURE:  The patient was placed supine, head and neck in extension and a bump behind the scapulae.  A suitable transverse cervical neck crease was identified and marked. A time-out was performed verifying correct patient and procedure. A transverse incision was made sharply, then deepened to the level of the platysma with electrocautery. The platysma was divided and superior and inferior subplatysmal flaps were raised.  Midline fascia opened and reflected to the left.  An abnormal parathyroid was identified at the left superior location.  It was subcapsular to the thyroid. The superior pole of the thyroid was taken down to aid in exposure. The parathyroid adenoma was meticulously dissected from the surrounding tissue and submitted for frozen section which confirmed a 4138 gram hypercellular  parathyroid. There were an abundance of oncocytic cells, so the pathologist could not say for certain whether this was an oncocytic variant of a parathyroid adenoma vs. A hurthle cell adenoma of the thyroid. We then explored the left inferior neck.  During the exploration, I encountered the second parathyroid at the inferior pole of the left thyroid, just posterolateral to the inferior thyroid vessels. It was not biopsied but appeared to represent normal parathyroid tissue. The site was irrigated and inspected for hemostasis. Given the adenoma's large size, high starting PTH, and the amount of manipulation needed to excise it, I sent extra PTH assays: at 10, 20, 30, and 40 minutes post-excision. The first value came back at 60.8, signifying the completeness of the dissection. The levels continued to appropriately decline to 37.9.  The patient's incision site was closed with interrupted 3-0 Vicryl for the midline fascia, interrupted 3-0 vicryl for platysma and 4-0 subcuticular Monocryl for skin.  The incision was covered with skin glue and a steri-strip. The patient transferred to recovery in good condition.    INTRAOPERATIVE FINDINGS:  1.  A 4138 gram hypercellular left superior parathyroid correlated nicely with sestamibi scan. It was subcapsular to the thyroid.  2.  Second left-sided parathyroid appeared to be grossly normal.  3.  PTH assay diminished from 556 to 60.8 at 10 minutes post excision.  4.  Grossly normal thyroid.    Specimens:   ID Type Source Tests Collected by Time Destination   1 : PTH blood OR Blood, venous Blood PARATHYROID HORMONE INTACT INTRAOPERATIVE Adelaide Saenz MD 2/17/2021  8:53 AM    2 : PTH Blood OR Blood, venous Blood PARATHYROID HORMONE INTACT INTRAOPERATIVE Adelaide Saenz MD 2/17/2021  9:02 AM    3 : PTH Blood OR Blood, venous Blood PARATHYROID HORMONE INTACT INTRAOPERATIVE Adelaide Saenz MD 2/17/2021  9:13 AM    4 : PTH blood OR Blood, venous Blood PARATHYROID  HORMONE INTACT INTRAOPERATIVE Adelaide Saenz MD 2/17/2021  9:21 AM    A : Left superior parathyroid Tissue Parathyroid SURGICAL PATHOLOGY EXAM Adelaide Saenz MD 2/17/2021  8:42 AM        Adelaide Saenz MD

## 2021-02-19 LAB — COPATH REPORT: NORMAL

## 2021-02-20 ENCOUNTER — HOSPITAL ENCOUNTER (EMERGENCY)
Facility: CLINIC | Age: 41
Discharge: HOME | End: 2021-02-20
Attending: EMERGENCY MEDICINE | Admitting: EMERGENCY MEDICINE
Payer: COMMERCIAL

## 2021-02-20 VITALS
TEMPERATURE: 97.5 F | HEART RATE: 80 BPM | RESPIRATION RATE: 18 BRPM | DIASTOLIC BLOOD PRESSURE: 90 MMHG | SYSTOLIC BLOOD PRESSURE: 127 MMHG | OXYGEN SATURATION: 99 %

## 2021-02-20 DIAGNOSIS — R20.2 PARESTHESIA: ICD-10-CM

## 2021-02-20 LAB
ANION GAP SERPL CALCULATED.3IONS-SCNC: 7 MMOL/L (ref 3–14)
BASOPHILS # BLD AUTO: 0 10E9/L (ref 0–0.2)
BASOPHILS NFR BLD AUTO: 0.3 %
BUN SERPL-MCNC: 17 MG/DL (ref 7–30)
CALCIUM SERPL-MCNC: 8.7 MG/DL (ref 8.5–10.1)
CHLORIDE SERPL-SCNC: 108 MMOL/L (ref 94–109)
CO2 SERPL-SCNC: 25 MMOL/L (ref 20–32)
CREAT SERPL-MCNC: 0.57 MG/DL (ref 0.52–1.04)
DIFFERENTIAL METHOD BLD: ABNORMAL
EOSINOPHIL # BLD AUTO: 0.4 10E9/L (ref 0–0.7)
EOSINOPHIL NFR BLD AUTO: 3.9 %
ERYTHROCYTE [DISTWIDTH] IN BLOOD BY AUTOMATED COUNT: 13.9 % (ref 10–15)
GFR SERPL CREATININE-BSD FRML MDRD: >90 ML/MIN/{1.73_M2}
GLUCOSE SERPL-MCNC: 96 MG/DL (ref 70–99)
HCT VFR BLD AUTO: 34 % (ref 35–47)
HGB BLD-MCNC: 11.3 G/DL (ref 11.7–15.7)
IMM GRANULOCYTES # BLD: 0 10E9/L (ref 0–0.4)
IMM GRANULOCYTES NFR BLD: 0.2 %
LYMPHOCYTES # BLD AUTO: 2.5 10E9/L (ref 0.8–5.3)
LYMPHOCYTES NFR BLD AUTO: 28 %
MCH RBC QN AUTO: 28.3 PG (ref 26.5–33)
MCHC RBC AUTO-ENTMCNC: 33.2 G/DL (ref 31.5–36.5)
MCV RBC AUTO: 85 FL (ref 78–100)
MONOCYTES # BLD AUTO: 0.7 10E9/L (ref 0–1.3)
MONOCYTES NFR BLD AUTO: 7.6 %
NEUTROPHILS # BLD AUTO: 5.3 10E9/L (ref 1.6–8.3)
NEUTROPHILS NFR BLD AUTO: 60 %
NRBC # BLD AUTO: 0 10*3/UL
NRBC BLD AUTO-RTO: 0 /100
PLATELET # BLD AUTO: 323 10E9/L (ref 150–450)
POTASSIUM SERPL-SCNC: 3.4 MMOL/L (ref 3.4–5.3)
RBC # BLD AUTO: 3.99 10E12/L (ref 3.8–5.2)
SODIUM SERPL-SCNC: 140 MMOL/L (ref 133–144)
WBC # BLD AUTO: 8.9 10E9/L (ref 4–11)

## 2021-02-20 PROCEDURE — 85025 COMPLETE CBC W/AUTO DIFF WBC: CPT | Performed by: EMERGENCY MEDICINE

## 2021-02-20 PROCEDURE — 82542 COL CHROMOTOGRAPHY QUAL/QUAN: CPT | Performed by: EMERGENCY MEDICINE

## 2021-02-20 PROCEDURE — 80048 BASIC METABOLIC PNL TOTAL CA: CPT | Performed by: EMERGENCY MEDICINE

## 2021-02-20 PROCEDURE — 36415 COLL VENOUS BLD VENIPUNCTURE: CPT | Performed by: EMERGENCY MEDICINE

## 2021-02-20 PROCEDURE — 99284 EMERGENCY DEPT VISIT MOD MDM: CPT

## 2021-02-20 PROCEDURE — 93005 ELECTROCARDIOGRAM TRACING: CPT

## 2021-02-20 RX ORDER — LIDOCAINE 40 MG/G
CREAM TOPICAL
Status: DISCONTINUED | OUTPATIENT
Start: 2021-02-20 | End: 2021-02-20 | Stop reason: HOSPADM

## 2021-02-20 ASSESSMENT — ENCOUNTER SYMPTOMS
NUMBNESS: 1
NAUSEA: 0
VOMITING: 0
SHORTNESS OF BREATH: 0
DIARRHEA: 0

## 2021-02-20 NOTE — ED TRIAGE NOTES
Patient presents s/p parathyroidectomy on 2/17 or high calcium. This morning, patient's left side of face and lips and left arm into left hand became numb around 0800.

## 2021-02-20 NOTE — ED PROVIDER NOTES
History   Chief Complaint:  Post-op Problem     HPI   Radha Bryan is a 40 year old female with history of hyperparathyroidism s/p parathyroidectomy 2/17 by Dr. Saenz who presents with a post-op probelm. This morning, the patient reports the onset of numbness in her left arm and right side of face and lips around 0800. The patient was prescribed 2 calcium pills/day and instructed to take one extra is she experienced numbness, so she took 3 pills after onset. She now notes the numbness has improved a bit, and is only in her right hand and slightly in both lips on the right. She denies any nausea, vomiting, or diarrhea. Further denies any chest pain or shortness of breath. No pain complaints. Notes she has been eating and drinking per normal.    Review of Systems   Respiratory: Negative for shortness of breath.    Cardiovascular: Negative for chest pain.   Gastrointestinal: Negative for diarrhea, nausea and vomiting.   Neurological: Positive for numbness.   All other systems reviewed and are negative.        Allergies:  The patient has no known allergies.     Medications:  Calcium carbonate  Prilosec  Roxicodone    Past Medical History:    Blood transfusion   Renal disease  Tinea versicolor   Hyperparathyroidism  Hydronephrosis right kidney  Uretal stone  GERD  Symptomatic anemia   Ovarian cyst  Depression    Past Surgical History:    Cholecystectomy  Cystoscopy insert stent x3  Hysterectomy  Tubal ligation  Parathyroidectomy      Family History:    DM  Heart disease  Osteoporosis  Thyroid disease    Social History:  Patient presents with  at bedside.  PCP: Jessica Zimmerman NP    Physical Exam     Patient Vitals for the past 24 hrs:   BP Temp Temp src Pulse Resp SpO2   02/20/21 1200 (!) 127/90 -- -- 80 -- 99 %   02/20/21 1145 134/84 -- -- 78 -- 97 %   02/20/21 1115 131/89 -- -- 76 -- 98 %   02/20/21 1100 123/76 -- -- 78 -- 98 %   02/20/21 1045 121/76 -- -- 70 -- 98 %   02/20/21 1030 122/77 -- -- 78  -- 99 %   02/20/21 1020 129/88 -- -- 82 -- 99 %   02/20/21 1006 (!) 136/100 97.5  F (36.4  C) Temporal 83 18 100 %       Physical Exam  Constitutional:       Appearance: She is well-developed.   HENT:      Right Ear: External ear normal.      Left Ear: External ear normal.      Mouth/Throat:      Mouth: Mucous membranes are moist.      Pharynx: Oropharynx is clear. No oropharyngeal exudate.   Eyes:      General: No scleral icterus.     Conjunctiva/sclera: Conjunctivae normal.      Pupils: Pupils are equal, round, and reactive to light.   Neck:      Comments: Midline incision C/D/I  Cardiovascular:      Rate and Rhythm: Normal rate and regular rhythm.      Heart sounds: Normal heart sounds. No murmur. No friction rub. No gallop.    Pulmonary:      Effort: Pulmonary effort is normal. No respiratory distress.      Breath sounds: Normal breath sounds. No wheezing or rales.   Abdominal:      General: Bowel sounds are normal. There is no distension.      Palpations: Abdomen is soft. There is no mass.      Tenderness: There is no abdominal tenderness.   Skin:     General: Skin is warm and dry.      Findings: No rash.   Neurological:      Mental Status: She is alert.      Motor: No weakness.      Deep Tendon Reflexes: Reflexes normal.           Emergency Department Course     ECG  ECG taken at 1107, ECG read at 1120  NSR, Normal ECG   Rate 69 bpm. NM interval 150 ms. QRS duration 82 ms. QT/QTc 394/422 ms. P-R-T axes 52 40 23.     Laboratory:    CBC: WBC: 8.9, HGB: 11.3 (L), PLT: 323  BMP: WNL (Creatinine: 0.57)    PTH Hormone: Pending    Emergency Department Course:    Reviewed:  I reviewed the patient's nursing notes, vitals, past medical records, Care Everywhere.     Assessments:  1000    I evaluated the patient and performed an exam as above.    1205    I updated the patient on results and discussed plan of care. Her numbness is gone and she is comfortable with discharge.    Disposition:  The patient was discharged to  home.     Impression & Plan     Medical Decision Making:  Radha Bryan is a 40 year old female who presents with numbness to left arm which is now only in the hand as well as the right side of her lip. She did recently have parathyroidectomy and is currently on calcium. She did take an extra calcium before she came in after speaking with her on-call surgeon. She has no other neurologic finding, her paresthesia is resolved on its own after monitoring and waiting for labs her paresthesias are gone. Her calcium is 8.7. Most likely her body is still recovering from the surgery and it may take some time to fully recover. She is to continue to follow her post-surgical instructions and will continue with her calcium supplement and return is she has any concerns.    Diagnosis:    ICD-10-CM    1. Paresthesia  R20.2        Scribe Disclosure:  Manuela PEREZ, am serving as a scribe at 10:32 AM on 2/20/2021 to document services personally performed by Reji Giordano MD based on my observations and the provider's statements to me.      Reji Giordano MD  02/20/21 3955

## 2021-02-20 NOTE — DISCHARGE INSTRUCTIONS
Your calcium was normal today  Most likely your body still recovering from the your surgery  Continue calcium and close monitoring

## 2021-02-22 ENCOUNTER — VIRTUAL VISIT (OUTPATIENT)
Dept: GASTROENTEROLOGY | Facility: CLINIC | Age: 41
End: 2021-02-22
Attending: INTERNAL MEDICINE
Payer: COMMERCIAL

## 2021-02-22 ENCOUNTER — PRE VISIT (OUTPATIENT)
Dept: GASTROENTEROLOGY | Facility: CLINIC | Age: 41
End: 2021-02-22

## 2021-02-22 DIAGNOSIS — E88.810 METABOLIC SYNDROME: ICD-10-CM

## 2021-02-22 DIAGNOSIS — R94.5 ABNORMAL RESULTS OF LIVER FUNCTION STUDIES: Primary | ICD-10-CM

## 2021-02-22 LAB — INTERPRETATION ECG - MUSE: NORMAL

## 2021-02-22 PROCEDURE — 99204 OFFICE O/P NEW MOD 45 MIN: CPT | Mod: 95 | Performed by: INTERNAL MEDICINE

## 2021-02-22 ASSESSMENT — PAIN SCALES - GENERAL: PAINLEVEL: NO PAIN (0)

## 2021-02-22 NOTE — LETTER
2/22/2021         RE: Radha Bryan  [address]  [place] [facility] [address]        Dear Colleague,    Thank you for referring your patient, Radha Bryan, to the [facility] [place]. Please see a copy of my visit note below.    Radha is a 40 year old who is being evaluated via a billable telephone visit.      What phone number would you like to be contacted at? [phone] or [phone]  How would you like to obtain your AVS? MyChart  Phone call duration: 25 minutes  ________________________________________________________________________    [facility]    Hepatology New Patient Visit    Referring provider:  Jessica Zimmerman      40 year old female    Chief complaint:  Abnormal liver function tests    HPI:  This visit was conducted with a  ( ID #[id number]).      The patient presents for further evaluation and management of abnormal liver function tests.  This was first identified on routine blood work at a pre-operative physical prior to a parathyroidectomy, which was done last week without complications.  Liver function tests were normal in 10/2016.      The patient is well today.  She has had no problems since her surgery last week.  She denies any symptoms related to liver disease.      The patient denies itch, jaundice, abdominal distention, lower extremity edema, lethargy or confusion.      No history of melena, hematemesis or hematochezia.      The patient denies any fevers, sweats or chills.  No cough or dyspnea.      Weight has been stable.  The patient currently weighs 172 pounds and is 5 feet 2 inches in height (BMI 31).      History of obesity and dyslipidemia noted.  No known history of CAD, CVA, PAD, LUIS FERNANDO, hypertension or diabetes.      The patient does not drink alcohol.  No history of alcohol abuse or DUIs.      The patient has never smoked.  She denies any history of recreational drug use, including marijuana,  IN or IV drugs.       Medical hx Surgical hx   Past Medical History:   Diagnosis Date     Dyslipidemia      History of blood transfusion 2019    heavy menstrual cycles, anemic     Irregular periods/menstrual cycles      Nephrolithiasis      Obesity      PONV (postoperative nausea and vomiting)      Primary hyperparathyroidism (H) 02/2021     Tinea versicolor       Past Surgical History:   Procedure Laterality Date     CHOLECYSTECTOMY       COMBINED CYSTOSCOPY, RETROGRADES, URETEROSCOPY, INSERT STENT Right 10/9/2020    Procedure: Cystoscopy right ureteral stent removal right retrograde pyelogram right ureteroscopy Right ureteral dilation right ureteral stent placement Holmium Laser on Stand-by;  Surgeon: Kavon Hernandez MD;  Location: [facility] OR     COMBINED CYSTOSCOPY, RETROGRADES, URETEROSCOPY, LASER HOLMIUM LITHOTRIPSY URETER(S), INSERT STENT Bilateral 9/17/2020    Procedure: Cystoscopy, bilateral stent removal, bilateral ureteroscopy, left laser lithotripsy, stone basketing, bilateral stent placement; bilateral retrograde pyelogram, fluoroscopic interpretation <1 hour physician time;  Surgeon: Kavon Hernandez MD;  Location: [facility] OR     CYSTOSCOPY, RETROGRADES, INSERT STENT URETER(S), COMBINED Bilateral 8/27/2020    Procedure: Cystoscopy with bilateral retrograde pyelogram and bilateral ureteral stent insertion with Fluoroscopic interpretation <1 hour physician time;  Surgeon: Kavon Hernandez MD;  Location: [facility] OR     LAPAROSCOPIC HYSTERECTOMY TOTAL, SALPINGECTOMY BILATERAL  07/17/2019    AUB, fibroid uterus, anemia. [facility], Dr. Pamela Carrion     LAPAROSCOPIC TUBAL LIGATION       PARATHYROIDECTOMY N/A 2/17/2021    Procedure: CERVICAL EXPLORATION, PARATHYROIDECTOMY;  Surgeon: Adelaide Saenz MD;  Location: [facility] OR          Medications  Prior to Admission medications    Medication Sig Start Date End Date Taking? Authorizing Provider   acetaminophen (TYLENOL) 500 MG tablet Take 2 tablets (1,000 mg) by  mouth every 6 hours as needed for other (mild pain) 2/17/21  Yes Adelaide Saenz MD   calcium carbonate-vitamin D (OSCAL 500/200 D-3) 500-200 MG-UNIT tablet Take 2 tablets by mouth every 12 hours Take two tablets every 12 hours for one week.  Then take two tablets once a day for one week. 2/17/21  Yes Lucero Jack PA-C   ibuprofen (ADVIL/MOTRIN) 600 MG tablet Take 1 tablet (600 mg) by mouth every 6 hours as needed for pain (mild) 2/17/21  Yes Adelaide Saenz MD   omeprazole (PRILOSEC) 20 MG DR capsule Take 20 mg by mouth daily as needed  1/22/20  Yes Reported, Patient   oxyCODONE (ROXICODONE) 5 MG tablet Take 1-2 tablets (5-10 mg) by mouth every 4 hours as needed for pain (Moderate to Severe) 2/17/21  Yes Adelaide Saenz MD       Allergies  No Known Allergies    Family hx Social hx   Family History   Problem Relation Age of Onset     Family History Negative Mother      Kidney Disease Brother         kidney stone     No Known Problems Sister      No Known Problems Son      No Known Problems Daughter      Liver Disease No family hx of      Colon Cancer No family hx of       Social History     Tobacco Use     Smoking status: Never Smoker     Smokeless tobacco: Never Used   Substance Use Topics     Alcohol use: No     Drug use: No     Lives in [place], [place] with  and 4 children, all healthy.  Not currently working.  Previously worked as .  Arrived in [place] from  [place]17 years ago.     Review of systems  A 10-point review of systems was negative.    Examination  N/A    Laboratory  Lab Results   Component Value Date     02/20/2021    POTASSIUM 3.4 02/20/2021    CHLORIDE 108 02/20/2021    CO2 25 02/20/2021    BUN 17 02/20/2021    CR 0.57 02/20/2021       Lab Results   Component Value Date    BILITOTAL 0.6 02/08/2021    ALT 62 02/08/2021    AST 47 02/08/2021    ALKPHOS 237 02/08/2021       Lab Results   Component Value Date    ALBUMIN 3.6 02/08/2021    PROTTOTAL 7.9 02/08/2021         Lab Results   Component Value Date    WBC 8.9 02/20/2021    HGB 11.3 02/20/2021    MCV 85 02/20/2021     02/20/2021       No results found for: INR    Radiology  Nil     Assessment  40-year-old female with a history of metabolic syndrome who presents for further evaluation and management of abnormal liver function tests, most likely secondary to non-alcoholic fatty liver disease.  No biochemical evidence of cirrhosis.  Will rule out other etiologies of chronic liver disease, including primary biliary cholangitis, viral hepatitis and hemochromatosis.  Will obtain an abdominal ultrasound to evaluate for any structural liver disease.        We discussed the natural history of non-alcoholic fatty liver disease and management, which is primarily through weight loss with diet and exercise.     Plan  1.  Check HAV Ab, HBV SAg, HBV SAb, HBV CAb, HCV Ab  2.  Check iron studies, TTG Ab, IgM, AMA  3.  Abd U/S  4.  Weight loss with diet and exercise  5.  Follow-up in 6 months    Raul Gudino MD  Hepatology  [facility]

## 2021-02-22 NOTE — PROGRESS NOTES
Radha is a 40 year old who is being evaluated via a billable telephone visit.      What phone number would you like to be contacted at? [phone] or [phone]  How would you like to obtain your AVS? Demario  Phone call duration: 25 minutes  ________________________________________________________________________    [facility]    Hepatology New Patient Visit    Referring provider:  Jessica Zimmerman      40 year old female    Chief complaint:  Abnormal liver function tests    HPI:  This visit was conducted with a  ( ID #[id number]).      The patient presents for further evaluation and management of abnormal liver function tests.  This was first identified on routine blood work at a pre-operative physical prior to a parathyroidectomy, which was done last week without complications.  Liver function tests were normal in 10/2016.      The patient is well today.  She has had no problems since her surgery last week.  She denies any symptoms related to liver disease.      The patient denies itch, jaundice, abdominal distention, lower extremity edema, lethargy or confusion.      No history of melena, hematemesis or hematochezia.      The patient denies any fevers, sweats or chills.  No cough or dyspnea.      Weight has been stable.  The patient currently weighs 172 pounds and is 5 feet 2 inches in height (BMI 31).      History of obesity and dyslipidemia noted.  No known history of CAD, CVA, PAD, LUIS FERNANDO, hypertension or diabetes.      The patient does not drink alcohol.  No history of alcohol abuse or DUIs.      The patient has never smoked.  She denies any history of recreational drug use, including marijuana, IN or IV drugs.       Medical hx Surgical hx   Past Medical History:   Diagnosis Date     Dyslipidemia      History of blood transfusion 2019    heavy menstrual cycles, anemic     Irregular periods/menstrual cycles      Nephrolithiasis      Obesity      PONV  (postoperative nausea and vomiting)      Primary hyperparathyroidism (H) 02/2021     Tinea versicolor       Past Surgical History:   Procedure Laterality Date     CHOLECYSTECTOMY       COMBINED CYSTOSCOPY, RETROGRADES, URETEROSCOPY, INSERT STENT Right 10/9/2020    Procedure: Cystoscopy right ureteral stent removal right retrograde pyelogram right ureteroscopy Right ureteral dilation right ureteral stent placement Holmium Laser on Stand-by;  Surgeon: Kavon Hernandez MD;  Location: [facility] OR     COMBINED CYSTOSCOPY, RETROGRADES, URETEROSCOPY, LASER HOLMIUM LITHOTRIPSY URETER(S), INSERT STENT Bilateral 9/17/2020    Procedure: Cystoscopy, bilateral stent removal, bilateral ureteroscopy, left laser lithotripsy, stone basketing, bilateral stent placement; bilateral retrograde pyelogram, fluoroscopic interpretation <1 hour physician time;  Surgeon: Kavon Hernandez MD;  Location: [facility] OR     CYSTOSCOPY, RETROGRADES, INSERT STENT URETER(S), COMBINED Bilateral 8/27/2020    Procedure: Cystoscopy with bilateral retrograde pyelogram and bilateral ureteral stent insertion with Fluoroscopic interpretation <1 hour physician time;  Surgeon: Kavon Hernandez MD;  Location: [facility] OR     LAPAROSCOPIC HYSTERECTOMY TOTAL, SALPINGECTOMY BILATERAL  07/17/2019    AUB, fibroid uterus, anemia. [facility], Dr. Pamela Carrion     LAPAROSCOPIC TUBAL LIGATION       PARATHYROIDECTOMY N/A 2/17/2021    Procedure: CERVICAL EXPLORATION, PARATHYROIDECTOMY;  Surgeon: Adelaide Saenz MD;  Location: [facility] OR          Medications  Prior to Admission medications    Medication Sig Start Date End Date Taking? Authorizing Provider   acetaminophen (TYLENOL) 500 MG tablet Take 2 tablets (1,000 mg) by mouth every 6 hours as needed for other (mild pain) 2/17/21  Yes Adelaide Saenz MD   calcium carbonate-vitamin D (OSCAL 500/200 D-3) 500-200 MG-UNIT tablet Take 2 tablets by mouth every 12 hours Take two tablets every 12 hours for one week.  Then take  two tablets once a day for one week. 2/17/21  Yes Lucero Jack PA-C   ibuprofen (ADVIL/MOTRIN) 600 MG tablet Take 1 tablet (600 mg) by mouth every 6 hours as needed for pain (mild) 2/17/21  Yes Adelaide Saenz MD   omeprazole (PRILOSEC) 20 MG DR capsule Take 20 mg by mouth daily as needed  1/22/20  Yes Reported, Patient   oxyCODONE (ROXICODONE) 5 MG tablet Take 1-2 tablets (5-10 mg) by mouth every 4 hours as needed for pain (Moderate to Severe) 2/17/21  Yes Adelaide Saenz MD       Allergies  No Known Allergies    Family hx Social hx   Family History   Problem Relation Age of Onset     Family History Negative Mother      Kidney Disease Brother         kidney stone     No Known Problems Sister      No Known Problems Son      No Known Problems Daughter      Liver Disease No family hx of      Colon Cancer No family hx of       Social History     Tobacco Use     Smoking status: Never Smoker     Smokeless tobacco: Never Used   Substance Use Topics     Alcohol use: No     Drug use: No     Lives in [place], [place] with  and 4 children, all healthy.  Not currently working.  Previously worked as .  [place] from  [place] years ago.     Review of systems  A 10-point review of systems was negative.    Examination  N/A    Laboratory  Lab Results   Component Value Date     02/20/2021    POTASSIUM 3.4 02/20/2021    CHLORIDE 108 02/20/2021    CO2 25 02/20/2021    BUN 17 02/20/2021    CR 0.57 02/20/2021       Lab Results   Component Value Date    BILITOTAL 0.6 02/08/2021    ALT 62 02/08/2021    AST 47 02/08/2021    ALKPHOS 237 02/08/2021       Lab Results   Component Value Date    ALBUMIN 3.6 02/08/2021    PROTTOTAL 7.9 02/08/2021        Lab Results   Component Value Date    WBC 8.9 02/20/2021    HGB 11.3 02/20/2021    MCV 85 02/20/2021     02/20/2021       No results found for: INR    Radiology  Nil     Assessment  40-year-old female with a history of metabolic syndrome who presents  for further evaluation and management of abnormal liver function tests, most likely secondary to non-alcoholic fatty liver disease.  No biochemical evidence of cirrhosis.  Will rule out other etiologies of chronic liver disease, including primary biliary cholangitis, viral hepatitis and hemochromatosis.  Will obtain an abdominal ultrasound to evaluate for any structural liver disease.        We discussed the natural history of non-alcoholic fatty liver disease and management, which is primarily through weight loss with diet and exercise.     Plan  1.  Check HAV Ab, HBV SAg, HBV SAb, HBV CAb, HCV Ab  2.  Check iron studies, TTG Ab, IgM, AMA  3.  Abd U/S  4.  Weight loss with diet and exercise  5.  Follow-up in 6 months    Raul Gudino MD  Hepatology  [facility]

## 2021-02-26 DIAGNOSIS — R94.5 ABNORMAL RESULTS OF LIVER FUNCTION STUDIES: ICD-10-CM

## 2021-02-26 DIAGNOSIS — E88.810 METABOLIC SYNDROME: ICD-10-CM

## 2021-02-26 LAB
HAV IGG SER QL IA: REACTIVE
HBV CORE AB SERPL QL IA: NONREACTIVE
HBV SURFACE AB SERPL IA-ACNC: 0 M[IU]/ML
HBV SURFACE AG SERPL QL IA: NONREACTIVE
HCV AB SERPL QL IA: NONREACTIVE

## 2021-02-26 PROCEDURE — 86706 HEP B SURFACE ANTIBODY: CPT | Performed by: INTERNAL MEDICINE

## 2021-02-26 PROCEDURE — 86803 HEPATITIS C AB TEST: CPT | Performed by: INTERNAL MEDICINE

## 2021-02-26 PROCEDURE — 83516 IMMUNOASSAY NONANTIBODY: CPT | Performed by: INTERNAL MEDICINE

## 2021-02-26 PROCEDURE — 36415 COLL VENOUS BLD VENIPUNCTURE: CPT | Performed by: INTERNAL MEDICINE

## 2021-02-26 PROCEDURE — 82784 ASSAY IGA/IGD/IGG/IGM EACH: CPT | Performed by: INTERNAL MEDICINE

## 2021-02-26 PROCEDURE — 83550 IRON BINDING TEST: CPT | Performed by: INTERNAL MEDICINE

## 2021-02-26 PROCEDURE — 87340 HEPATITIS B SURFACE AG IA: CPT | Performed by: INTERNAL MEDICINE

## 2021-02-26 PROCEDURE — 86708 HEPATITIS A ANTIBODY: CPT | Performed by: INTERNAL MEDICINE

## 2021-02-26 PROCEDURE — 83540 ASSAY OF IRON: CPT | Performed by: INTERNAL MEDICINE

## 2021-02-26 PROCEDURE — 86704 HEP B CORE ANTIBODY TOTAL: CPT | Performed by: INTERNAL MEDICINE

## 2021-02-26 PROCEDURE — 82728 ASSAY OF FERRITIN: CPT | Performed by: INTERNAL MEDICINE

## 2021-02-27 LAB
FERRITIN SERPL-MCNC: 57 NG/ML (ref 12–150)
IRON SATN MFR SERPL: 16 % (ref 15–46)
IRON SERPL-MCNC: 62 UG/DL (ref 35–180)
TIBC SERPL-MCNC: 398 UG/DL (ref 240–430)

## 2021-03-01 LAB
IGM SERPL-MCNC: 124 MG/DL (ref 35–242)
MITOCHONDRIA M2 IGG SER-ACNC: 3 U/ML
TTG IGA SER-ACNC: 1 U/ML
TTG IGG SER-ACNC: <1 U/ML

## 2021-03-04 ENCOUNTER — APPOINTMENT (OUTPATIENT)
Dept: INTERPRETER SERVICES | Facility: CLINIC | Age: 41
End: 2021-03-04
Payer: COMMERCIAL

## 2021-03-05 ENCOUNTER — OFFICE VISIT (OUTPATIENT)
Dept: SURGERY | Facility: CLINIC | Age: 41
End: 2021-03-05
Payer: COMMERCIAL

## 2021-03-05 ENCOUNTER — HOSPITAL ENCOUNTER (OUTPATIENT)
Dept: LAB | Facility: CLINIC | Age: 41
Discharge: HOME | End: 2021-03-05
Attending: SURGERY | Admitting: SURGERY
Payer: COMMERCIAL

## 2021-03-05 VITALS
HEART RATE: 80 BPM | SYSTOLIC BLOOD PRESSURE: 120 MMHG | BODY MASS INDEX: 31.65 KG/M2 | WEIGHT: 172 LBS | OXYGEN SATURATION: 99 % | HEIGHT: 62 IN | RESPIRATION RATE: 16 BRPM | DIASTOLIC BLOOD PRESSURE: 80 MMHG

## 2021-03-05 DIAGNOSIS — Z90.89 S/P PARATHYROIDECTOMY: Primary | ICD-10-CM

## 2021-03-05 DIAGNOSIS — Z90.89 S/P PARATHYROIDECTOMY: ICD-10-CM

## 2021-03-05 DIAGNOSIS — Z98.890 S/P PARATHYROIDECTOMY: Primary | ICD-10-CM

## 2021-03-05 DIAGNOSIS — Z98.890 S/P PARATHYROIDECTOMY: ICD-10-CM

## 2021-03-05 LAB
CALCIUM SERPL-MCNC: 9.2 MG/DL (ref 8.5–10.1)
PTH RELATED PROT SERPL-SCNC: NORMAL PMOL/L

## 2021-03-05 PROCEDURE — 36415 COLL VENOUS BLD VENIPUNCTURE: CPT | Performed by: SURGERY

## 2021-03-05 PROCEDURE — 82310 ASSAY OF CALCIUM: CPT | Performed by: SURGERY

## 2021-03-05 PROCEDURE — 99024 POSTOP FOLLOW-UP VISIT: CPT | Performed by: SURGERY

## 2021-03-05 ASSESSMENT — MIFFLIN-ST. JEOR: SCORE: 1393.44

## 2021-03-05 NOTE — PROGRESS NOTES
"Progress Note/Post-op Follow up:  Radha is here for follow up after left superior parathyroidectomy 3 weeks ago.  She reports good energy. She did have an episode of unilateral hand and lip tingling on POD#3. Calcium at the time was 8.7. While this is in normal range, it is quite a dramatic change from what she had been living with and could conceivably cause symptoms. Since that time, she has occasionally felt some tingling in her left hand early in the morning, which resolves after a few minutes. She is off all calcium supplements. Incisional discomfort is nearly resolved.    Physical Exam:  /80   Pulse 80   Resp 16   Ht 1.575 m (5' 2\")   Wt 78 kg (172 lb)   LMP 06/20/2019   SpO2 99%   BMI 31.46 kg/m    General:  Appears well, in no acute distress  HEENT:  Chvostek's sign is negative.   Neck:  Incision site healing nicely, Healing ridge is mild.             Range of motion is normal.  Neuro:  Voice is Normal.    Pathology:  Pathology was reviewed with the patient.   Parathyroid adenoma, 4.138 gm    Assessment and Plan:  Radha is doing well after left superior parathyroidecomy.  We will recheck calcium today.  I recommend calcium rechecks during her regular followups with her primary care physician to ensure durable cure. I would be happy to see her if there are any ongoing issues, but currently, there are no signs of post-operative complications.    Adelaide Saenz MD  Please route or send letter to:  Primary Care Provider (PCP)          "

## 2021-04-04 ENCOUNTER — HEALTH MAINTENANCE LETTER (OUTPATIENT)
Age: 41
End: 2021-04-04

## 2021-04-08 DIAGNOSIS — Z86.39 HISTORY OF HYPERPARATHYROIDISM: Primary | ICD-10-CM

## 2021-04-10 ENCOUNTER — HOSPITAL ENCOUNTER (OUTPATIENT)
Dept: LAB | Facility: CLINIC | Age: 41
Discharge: HOME | End: 2021-04-10
Attending: SURGERY | Admitting: SURGERY
Payer: COMMERCIAL

## 2021-04-10 DIAGNOSIS — Z86.39 HISTORY OF HYPERPARATHYROIDISM: ICD-10-CM

## 2021-04-10 LAB
CALCIUM SERPL-MCNC: 8.6 MG/DL (ref 8.5–10.1)
PTH-INTACT SERPL-MCNC: 199 PG/ML (ref 18–80)

## 2021-04-10 PROCEDURE — 83970 ASSAY OF PARATHORMONE: CPT | Performed by: SURGERY

## 2021-04-10 PROCEDURE — 36415 COLL VENOUS BLD VENIPUNCTURE: CPT | Performed by: SURGERY

## 2021-04-10 PROCEDURE — 82306 VITAMIN D 25 HYDROXY: CPT | Performed by: SURGERY

## 2021-04-10 PROCEDURE — 82310 ASSAY OF CALCIUM: CPT | Performed by: SURGERY

## 2021-04-13 ENCOUNTER — OFFICE VISIT (OUTPATIENT)
Dept: SURGERY | Facility: CLINIC | Age: 41
End: 2021-04-13
Payer: COMMERCIAL

## 2021-04-13 VITALS
RESPIRATION RATE: 16 BRPM | SYSTOLIC BLOOD PRESSURE: 120 MMHG | DIASTOLIC BLOOD PRESSURE: 78 MMHG | HEIGHT: 62 IN | HEART RATE: 78 BPM | BODY MASS INDEX: 31.65 KG/M2 | WEIGHT: 172 LBS

## 2021-04-13 DIAGNOSIS — Z09 SURGICAL FOLLOWUP VISIT: Primary | ICD-10-CM

## 2021-04-13 DIAGNOSIS — R79.89 ELEVATED PARATHYROID HORMONE: Primary | ICD-10-CM

## 2021-04-13 PROCEDURE — 99024 POSTOP FOLLOW-UP VISIT: CPT | Performed by: SURGERY

## 2021-04-13 ASSESSMENT — MIFFLIN-ST. JEOR: SCORE: 1393.44

## 2021-04-13 NOTE — PROGRESS NOTES
"Progress Note/Post-op Follow up:  Radha is here for follow up after parathyroidectomy surgery 2 months ago. She reports good energy.  Occasional numbness/tingling in hands.  Usually only one hand at a time and is worsened by working with her hands. Her calcium level is in the low-normal range at 8.6. PTH is elevated at 199, indicating either robust bone remodeling after parathyroidectomy vs. possibly underlying secondary hyperparathyroidism from vitamin D deficiency.   Incisional discomfort is still present. She feels some \"tightness\" when rubbing the area or if her collar touches her neck. Occasional swallowing difficulty which also is described as a tightness.    Physical Exam:  /78   Pulse 78   Resp 16   Ht 1.575 m (5' 2\")   Wt 78 kg (172 lb)   LMP 06/20/2019   BMI 31.46 kg/m    General:  Appears well, in no acute distress  HEENT:  Chvostek's sign is negative.   Neck:  Incision site healing nicely, Healing ridge is present, mild. No underlying seroma,              Range of motion is normal.  MSK; Reports numbness/paresthesia of left palm. Sensation is the same over corona surface of each finger.   Neuro:  Voice is Normal.    Pathology:  Parathyroid adenoma, 4.138 gm    Assessment and Plan:  Radha has continued issues with a sensation of neck tightness and occasional numbness of her hands.     - The neck tightness is likely due to scar tissue and continued presence of suture. Her incision looks normal and I do not palpate a seroma. I recommend massage of her neck with mederma or vitamin E daily.      - I cannot say for certain whether the hand numbness is due to hypocalcemia. Her calcium is in normal range and her paresthesias are so localized to one hand at at time. However, she is feeling numb right now and numbness isn't in a particular nerve distribution (median, etc.). I recommend that she try 1000 mg elemental calcium next time she has a numbness episode to see if that helps.    - Her PTH is " elevated despite normal calcium which may be due to either robust bone remodeling after parathyroidectomy vs. possibly underlying secondary hyperparathyroidism (vitamin D deficiency, etc). Will try to add vitamin D levels to her last blood draw, though she may have to return for a repeat draw. If low, will recommend over-the-counter vitamin D supplementation      Adelaide Saenz MD  Please route or send letter to:  Primary Care Provider (PCP)

## 2021-04-15 ENCOUNTER — MYC MEDICAL ADVICE (OUTPATIENT)
Dept: SURGERY | Facility: CLINIC | Age: 41
End: 2021-04-15

## 2021-04-15 DIAGNOSIS — E55.9 VITAMIN D DEFICIENCY: Primary | ICD-10-CM

## 2021-04-15 DIAGNOSIS — E21.1 SECONDARY HYPERPARATHYROIDISM, NON-RENAL (H): ICD-10-CM

## 2021-04-15 LAB — DEPRECATED CALCIDIOL+CALCIFEROL SERPL-MC: 10 UG/L (ref 20–75)

## 2021-04-15 RX ORDER — CHOLECALCIFEROL (VITAMIN D3) 50 MCG
1 TABLET ORAL DAILY
Qty: 90 TABLET | Refills: 3 | Status: SHIPPED | OUTPATIENT
Start: 2021-04-15 | End: 2022-02-25

## 2021-04-15 NOTE — TELEPHONE ENCOUNTER
I attempted to call patient at phone # listed - unable to get call to go through.      I sent patient a [employer] message asking her what pharmacy she would like rx for Vit D to go to if approved by Dr. Saenz.    Await response.

## 2021-04-16 ENCOUNTER — OFFICE VISIT (OUTPATIENT)
Dept: FAMILY MEDICINE | Facility: CLINIC | Age: 41
End: 2021-04-16
Payer: COMMERCIAL

## 2021-04-16 VITALS
DIASTOLIC BLOOD PRESSURE: 80 MMHG | TEMPERATURE: 98.1 F | SYSTOLIC BLOOD PRESSURE: 118 MMHG | OXYGEN SATURATION: 97 % | HEART RATE: 85 BPM | WEIGHT: 182.38 LBS | BODY MASS INDEX: 33.36 KG/M2

## 2021-04-16 DIAGNOSIS — H00.024 HORDEOLUM INTERNUM OF LEFT UPPER EYELID: Primary | ICD-10-CM

## 2021-04-16 PROCEDURE — 99213 OFFICE O/P EST LOW 20 MIN: CPT | Performed by: PHYSICIAN ASSISTANT

## 2021-04-16 RX ORDER — POLYMYXIN B SULFATE AND TRIMETHOPRIM 1; 10000 MG/ML; [USP'U]/ML
1 SOLUTION OPHTHALMIC EVERY 6 HOURS
Qty: 2 ML | Refills: 0 | Status: SHIPPED | OUTPATIENT
Start: 2021-04-16 | End: 2021-04-23

## 2021-04-16 NOTE — PATIENT INSTRUCTIONS
Patient Education     Orzuelo  Un orzuelo aparece cuando se inflama la glándula sebácea del párpado. Puede convertirse en aura infección con aura pequeña bolsa de pus (un absceso). Eso puede provocar dolor, enrojecimiento e hinchazón. Al principio, el orzuelo se puede tratar con crema antibiótica, gotas para los ojos o aura toalla pequeña empapada en agua tibia (aura compresa tibia). En los casos más graves, es posible que necesite que el proveedor de atención médica michael y drene el orzuelo.   Cuidados en el hogar    Por lo general, se recetan gotas o pomadas para los ojos para tratar la infección. Úselas según le hayan indicado.     También se pueden usar lágrimas artificiales para lubricar el miguel y que se sienta más cómodo. Puede comprarlas sin receta althea medicamentos de venta ale. Consulte con aiken proveedor de atención médica antes de usar medicamentos de venta ale para un orzuelo.    Coloque aura toalla húmeda tibia en la vilma afectada mikel al menos 5 minutos, entre 3 y 4 veces al día por aura semana. Las compresas tibias abren los poros y aceleran la cicatrización. Compruebe que las compresas no estén demasiado calientes, ya que podrían quemarle el párpado.    A veces, el orzuelo se drena solo con britta tratamiento. En maddie jaja, siga usando el antibiótico hasta que desaparezcan el enrojecimiento y la hinchazón.    Lávese roxy las yusra antes y después de tocar el párpado infectado.    No apriete ni trate de reventar el orzuelo.    Visitas de control  Asista a los controles con aiken proveedor de atención médica según le hayan indicado.   Cuándo debe buscar atención médica  Llame al proveedor de atención médica o solicite atención médica de inmediato ante cualquiera de los siguientes signos o síntomas:     Aumento de la hinchazón o del enrojecimiento alrededor del párpado después de 48 a 72 horas    Dolor cada vez mayor en el miguel o el párpado.    Aumento del calor: el párpado se siente caliente al  tacto.    Supuración de juarez o de pus espesa del orzuelo.    Ampollas en los párpados.    No puede abrir mucho el párpado por la hinchazón.    Fiebre de 100.4 F (38 C) o más, o althea le indique aiken proveedor.    Alteraciones en la vista.    Dolor de megan o rigidez en el lima.    El orzuelo regresa.    [id number] [employer] Todos los derechos reservados. Esta información no pretende sustituir la atención médica profesional. Sólo aiken médico puede diagnosticar y tratar un problema de edgar.

## 2021-04-16 NOTE — PROGRESS NOTES
Assessment & Plan     Hordeolum internum of left upper eyelid  Exam consistent with hordeolum.  Discussed treatment with patient.  I did not feel oral antibiotics needed at this time, however if symptoms worsen over the weekend I recommended she be seen and evaluated in urgent care.  If symptoms do not worsen until next week she certainly can call clinic and I can prescribe an antibiotic at that time.  I recommended moist warm compresses frequently.  She can use ice as needed for pain and swelling.  - trimethoprim-polymyxin b (POLYTRIM) 07654-8.1 UNIT/ML-% ophthalmic solution; Place 1 drop Into the left eye every 6 hours for 7 days    Prescription drug management    See Patient Instructions    Return if symptoms worsen or fail to improve.    MARIA L Ricks [facility]    Divine Garcia is a 40 year old who presents for the following health issues    HPI     Eye(s) Problem  Onset/Duration: 04/14/21  Description:   Location: left eye   Pain: YES- left upper eye lid, swollen and painful.  Redness: YES  Accompanying Signs & Symptoms:  Discharge/mattering: no  Swelling: YES  Visual changes: no  Fever: no  Nasal Congestion: no  Bothered by bright lights: no  History:  Trauma: no  Foreign body exposure: no  Wearing contacts: no  Precipitating or alleviating factors: None  Therapies tried and outcome: Hot water      Review of Systems   GENERAL:  No fevers        Objective    /80 (BP Location: Right arm, Patient Position: Chair, Cuff Size: Adult Regular)   Pulse 85   Temp 98.1  F (36.7  C) (Oral)   Wt 82.7 kg (182 lb 6 oz)   LMP 06/20/2019   SpO2 97%   BMI 33.36 kg/m    Body mass index is 33.36 kg/m .  Physical Exam   GENERAL: No acute distress  HEENT: Normocephalic, PERRL, Hordeolum over the medial aspect of the left upper lid, tenderness along the medial aspect of the lid. Swelling along the entire left lid. No erythema.  NEURO: Alert and non-focal

## 2021-04-19 ENCOUNTER — MYC MEDICAL ADVICE (OUTPATIENT)
Dept: FAMILY MEDICINE | Facility: CLINIC | Age: 41
End: 2021-04-19

## 2021-04-28 ENCOUNTER — OFFICE VISIT (OUTPATIENT)
Dept: INTERNAL MEDICINE | Facility: CLINIC | Age: 41
End: 2021-04-28
Payer: COMMERCIAL

## 2021-04-28 VITALS
TEMPERATURE: 97.7 F | RESPIRATION RATE: 16 BRPM | BODY MASS INDEX: 34.28 KG/M2 | HEART RATE: 79 BPM | HEIGHT: 62 IN | SYSTOLIC BLOOD PRESSURE: 119 MMHG | WEIGHT: 186.3 LBS | DIASTOLIC BLOOD PRESSURE: 70 MMHG | OXYGEN SATURATION: 95 %

## 2021-04-28 DIAGNOSIS — N64.4 BREAST PAIN: ICD-10-CM

## 2021-04-28 DIAGNOSIS — B36.0 TINEA VERSICOLOR: Primary | ICD-10-CM

## 2021-04-28 PROCEDURE — 99213 OFFICE O/P EST LOW 20 MIN: CPT | Performed by: NURSE PRACTITIONER

## 2021-04-28 ASSESSMENT — PAIN SCALES - GENERAL: PAINLEVEL: NO PAIN (0)

## 2021-04-28 ASSESSMENT — MIFFLIN-ST. JEOR: SCORE: 1458.3

## 2021-04-28 NOTE — PROGRESS NOTES
"    Assessment & Plan     Tinea versicolor    - DERMATOLOGY ADULT REFERRAL; Future    Breast pain    - MA Diagnostic Digital Bilateral; Future    F/u pending results         BMI:   Estimated body mass index is 34.07 kg/m  as calculated from the following:    Height as of this encounter: 1.575 m (5' 2\").    Weight as of this encounter: 84.5 kg (186 lb 4.8 oz).           Jessica Zimmerman NP  [facility] is [facility] 40 year [facility] who presents for the following health issues     HPI     Breast Concern  Onset/Duration: 1 month  Description:   Location: lower outer quadrant L breast  Pain or tenderness: YES  Redness:  no   Intensity: mild  Progression of Symptoms: same  Accompanying Signs & Symptoms:  Any lumps in axillary region:  no   Movable:  no   Nipple discharge: no  Changes in the skin or nipple: none  On Hormone therapy:  no   Does it change with menstrual cycle: not applicable  Previous history of similar problem: no  First degree relative with breast cancer: a negative family history for breast cancer.  Precipitating factors:           Worsened by: none  Alleviating factors:            Improved by: none  Therapies tried and outcome: None  Patient's last menstrual period was 06/20/2019.    Rash  Onset/Duration: 1 year?  Description  Location: chest and L jaw  Character: hyperpigmented  Itching: no  Intensity:  mild  Progression of Symptoms:  same  Accompanying signs and symptoms:   Fever: no  Body aches or joint pain: no  Sore throat symptoms: no  Recent cold symptoms: no  History:           Previous episodes of similar rash: yes  New exposures:  None  Recent travel: no  Exposure to similar rash: no  Precipitating or alleviating factors: none  Therapies tried and outcome: oral antifungals and Selsun topical, no improvement        Review of Systems   Constitutional, HEENT, cardiovascular, pulmonary, gi and gu systems are negative, except as otherwise noted.      Objective " "   /70 (BP Location: Right arm, Patient Position: Sitting, Cuff Size: Adult Regular)   Pulse 79   Temp 97.7  F (36.5  C) (Oral)   Resp 16   Ht 1.575 m (5' 2\")   Wt 84.5 kg (186 lb 4.8 oz)   LMP 06/20/2019   SpO2 95%   BMI 34.07 kg/m    Body mass index is 34.07 kg/m .  Physical Exam   GENERAL: healthy, alert and no distress  BREAST: normal without masses, tenderness or nipple discharge and no palpable axillary masses or adenopathy  SKIN: hyperpigmentation - lower sternum, L jawline                "

## 2021-04-28 NOTE — NURSING NOTE
"dark spots on face,neck and chest.  Vital signs:  Temp: 97.7  F (36.5  C) Temp src: Oral BP: 119/70 Pulse: 79   Resp: 16 SpO2: 95 %     Height: 157.5 cm (5' 2\") Weight: 84.5 kg (186 lb 4.8 oz)  Estimated body mass index is 34.07 kg/m  as calculated from the following:    Height as of this encounter: 1.575 m (5' 2\").    Weight as of this encounter: 84.5 kg (186 lb 4.8 oz).          "

## 2021-04-30 ENCOUNTER — HOSPITAL ENCOUNTER (OUTPATIENT)
Dept: ULTRASOUND IMAGING | Facility: CLINIC | Age: 41
End: 2021-04-30
Attending: NURSE PRACTITIONER
Payer: COMMERCIAL

## 2021-04-30 ENCOUNTER — HOSPITAL ENCOUNTER (OUTPATIENT)
Dept: MAMMOGRAPHY | Facility: CLINIC | Age: 41
End: 2021-04-30
Attending: NURSE PRACTITIONER
Payer: COMMERCIAL

## 2021-04-30 DIAGNOSIS — N64.4 BREAST PAIN: ICD-10-CM

## 2021-04-30 PROCEDURE — 76642 ULTRASOUND BREAST LIMITED: CPT | Mod: LT

## 2021-04-30 PROCEDURE — 77066 DX MAMMO INCL CAD BI: CPT

## 2021-05-21 ENCOUNTER — APPOINTMENT (OUTPATIENT)
Dept: INTERPRETER SERVICES | Facility: CLINIC | Age: 41
End: 2021-05-21
Payer: COMMERCIAL

## 2021-06-18 ENCOUNTER — OFFICE VISIT (OUTPATIENT)
Dept: UROLOGY | Facility: CLINIC | Age: 41
End: 2021-06-18
Payer: COMMERCIAL

## 2021-06-18 ENCOUNTER — HOSPITAL ENCOUNTER (OUTPATIENT)
Dept: NUCLEAR MEDICINE | Facility: CLINIC | Age: 41
Setting detail: NUCLEAR MEDICINE
Discharge: HOME | End: 2021-06-18
Attending: STUDENT IN AN ORGANIZED HEALTH CARE EDUCATION/TRAINING PROGRAM | Admitting: STUDENT IN AN ORGANIZED HEALTH CARE EDUCATION/TRAINING PROGRAM
Payer: COMMERCIAL

## 2021-06-18 VITALS
DIASTOLIC BLOOD PRESSURE: 76 MMHG | WEIGHT: 186 LBS | BODY MASS INDEX: 34.23 KG/M2 | HEIGHT: 62 IN | SYSTOLIC BLOOD PRESSURE: 110 MMHG

## 2021-06-18 DIAGNOSIS — N13.30 HYDRONEPHROSIS OF RIGHT KIDNEY: ICD-10-CM

## 2021-06-18 DIAGNOSIS — N13.30 HYDRONEPHROSIS OF RIGHT KIDNEY: Primary | ICD-10-CM

## 2021-06-18 DIAGNOSIS — E21.3 HYPERPARATHYROIDISM (H): ICD-10-CM

## 2021-06-18 DIAGNOSIS — N20.0 CALCIUM NEPHROLITHIASIS: ICD-10-CM

## 2021-06-18 PROCEDURE — 250N000011 HC RX IP 250 OP 636

## 2021-06-18 PROCEDURE — 99213 OFFICE O/P EST LOW 20 MIN: CPT | Performed by: STUDENT IN AN ORGANIZED HEALTH CARE EDUCATION/TRAINING PROGRAM

## 2021-06-18 PROCEDURE — A9562 TC99M MERTIATIDE: HCPCS | Performed by: STUDENT IN AN ORGANIZED HEALTH CARE EDUCATION/TRAINING PROGRAM

## 2021-06-18 PROCEDURE — 78708 K FLOW/FUNCT IMAGE W/DRUG: CPT

## 2021-06-18 PROCEDURE — 343N000001 HC RX 343: Performed by: STUDENT IN AN ORGANIZED HEALTH CARE EDUCATION/TRAINING PROGRAM

## 2021-06-18 RX ORDER — FUROSEMIDE 10 MG/ML
INJECTION INTRAMUSCULAR; INTRAVENOUS
Status: COMPLETED
Start: 2021-06-18 | End: 2021-06-18

## 2021-06-18 RX ADMIN — FUROSEMIDE 20 MG: 10 INJECTION, SOLUTION INTRAMUSCULAR; INTRAVENOUS at 14:48

## 2021-06-18 RX ADMIN — TECHNESCAN TC 99M MERTIATIDE 10 MCI.: 1 INJECTION, POWDER, LYOPHILIZED, FOR SOLUTION INTRAVENOUS at 14:30

## 2021-06-18 ASSESSMENT — PAIN SCALES - GENERAL: PAINLEVEL: NO PAIN (0)

## 2021-06-18 ASSESSMENT — MIFFLIN-ST. JEOR: SCORE: 1456.94

## 2021-06-18 NOTE — NURSING NOTE
Chief Complaint   Patient presents with     Hydronephrosis     Review imaging     Regina Charlton, EMT

## 2021-06-18 NOTE — PROGRESS NOTES
"CHIEF COMPLAINT   Radha Bryan who is a 40 year old female returns today for follow-up of right hydronephrosis, kidney stones, hypercalciuria      HPI   Radha Bryan is a 40 year old female h/o left renal and ureteral stones, right hydronephrosis s/p bilateral ureteral stent placement 8/27/2020, left ureteroscopy and laser lithotripsy with bilateral ureteral stent exchange 9/17/2020, with attempted removal of the stents in the office on 10/7/2020, unable to remove the right ureteral stent. Went to OR 10/9/2020 for removal of the right stent which was noted to be encrusted; also seen was a kink in the proximal right ureter concerning for right ureteropelvic junction obstruction. A new stent was placed and then removed 10/15/2020. Found to have severe hypercalciuria, workup revealed hyperparathyroidism and underwent parathyroidectomy 2/17/2021.    Recovering from parathyroidectomy in February 2021. Postop PTH is elevated, thought to be either from bone remodeling or vitD deficiency. VitD was found to be deficient so she is now taking this.    Denies any flank pain. No blood in urine. No other stone episodes    PHYSICAL EXAM  Patient is a 40 year old  female   Vitals: Blood pressure 110/76, height 1.575 m (5' 2\"), weight 84.4 kg (186 lb), last menstrual period 06/20/2019, not currently breastfeeding.  Body mass index is 34.02 kg/m .  General Appearance Adult:   Alert, no acute distress, oriented  HENT: throat/mouth:normal, good dentition  Lungs: no respiratory distress, or pursed lip breathing  Heart: No obvious jugular venous distension present  Abdomen: nondistended  Musculoskeltal: extremities normal, no peripheral edema  Skin: no suspicious lesions or rashes  Neuro: Alert, oriented, speech and mentation normal  Psych: affect and mood normal  Gait: Normal    All pertinent imaging reviewed:    NM renal scan 6/18/2021      L:R 57.1%:42.9%  Left T1/2 6.3 minutes  Right T1/2 21.8 minutes    IMPRESSION:   1. There " is again mild prominence of the right renal pelvis, but  normal washout after administration of Lasix. Findings most likely  represent a mildly dilated but nonobstructed right urinary collecting  system.   2. Mildly asymmetric split renal function, with 58% function on the  left compared to the 42% function on the right.    ASSESSMENT and PLAN  40 year old female h/o left renal and ureteral stones, right hydronephrosis s/p bilateral ureteral stent placement 8/27/2020, left ureteroscopy and laser lithotripsy with bilateral ureteral stent exchange 9/17/2020, with attempted removal of the stents in the office on 10/7/2020, unable to remove the right ureteral stent. Went to OR 10/9/2020 for removal of the right stent which was noted to be encrusted; also seen was a kink in the proximal right ureter concerning for right ureteropelvic junction obstruction. A new stent was placed and then removed 10/15/2020. Found to have severe hypercalciuria, workup revealed hyperparathyroidism and underwent parathyroidectomy 2/17/2021.    Repeat NM renal scan today with mildly assymetric split renal function, slightly worse than prior. Mildly prolonged right T1/2. Asymptomatic.     - Follow up in 6 months with GENEVIEVE Rios renal scan, CT urogram prior      Kavon Hernandez MD   [facility]  [facility] Phone: [phone]

## 2021-06-18 NOTE — LETTER
"6/18/2021       RE: Radha Bryan  [address]  [place] [address]     Dear Colleague,    Thank you for referring your patient, Radha Bryan, to the [facility] [place] at [facility]. Please see a copy of my visit note below.    CHIEF COMPLAINT   Radha Bryan who is a 40 year old female returns today for follow-up of right hydronephrosis, kidney stones, hypercalciuria      HPI   Radha Bryan is a 40 year old female h/o left renal and ureteral stones, right hydronephrosis s/p bilateral ureteral stent placement 8/27/2020, left ureteroscopy and laser lithotripsy with bilateral ureteral stent exchange 9/17/2020, with attempted removal of the stents in the office on 10/7/2020, unable to remove the right ureteral stent. Went to OR 10/9/2020 for removal of the right stent which was noted to be encrusted; also seen was a kink in the proximal right ureter concerning for right ureteropelvic junction obstruction. A new stent was placed and then removed 10/15/2020. Found to have severe hypercalciuria, workup revealed hyperparathyroidism and underwent parathyroidectomy 2/17/2021.    Recovering from parathyroidectomy in February 2021. Postop PTH is elevated, thought to be either from bone remodeling or vitD deficiency. VitD was found to be deficient so she is now taking this.    Denies any flank pain. No blood in urine. No other stone episodes    PHYSICAL EXAM  Patient is a 40 year old  female   Vitals: Blood pressure 110/76, height 1.575 m (5' 2\"), weight 84.4 kg (186 lb), last menstrual period 06/20/2019, not currently breastfeeding.  Body mass index is 34.02 kg/m .  General Appearance Adult:   Alert, no acute distress, oriented  HENT: throat/mouth:normal, good dentition  Lungs: no respiratory distress, or pursed lip breathing  Heart: No obvious jugular venous distension present  Abdomen: nondistended  Musculoskeltal: extremities normal, no " peripheral edema  Skin: no suspicious lesions or rashes  Neuro: Alert, oriented, speech and mentation normal  Psych: affect and mood normal  Gait: Normal    All pertinent imaging reviewed:    NM renal scan 6/18/2021      L:R 57.1%:42.9%  Left T1/2 6.3 minutes  Right T1/2 21.8 minutes    IMPRESSION:   1. There is again mild prominence of the right renal pelvis, but  normal washout after administration of Lasix. Findings most likely  represent a mildly dilated but nonobstructed right urinary collecting  system.   2. Mildly asymmetric split renal function, with 58% function on the  left compared to the 42% function on the right.    ASSESSMENT and PLAN  40 year old female h/o left renal and ureteral stones, right hydronephrosis s/p bilateral ureteral stent placement 8/27/2020, left ureteroscopy and laser lithotripsy with bilateral ureteral stent exchange 9/17/2020, with attempted removal of the stents in the office on 10/7/2020, unable to remove the right ureteral stent. Went to OR 10/9/2020 for removal of the right stent which was noted to be encrusted; also seen was a kink in the proximal right ureter concerning for right ureteropelvic junction obstruction. A new stent was placed and then removed 10/15/2020. Found to have severe hypercalciuria, workup revealed hyperparathyroidism and underwent parathyroidectomy 2/17/2021.    Repeat NM renal scan today with mildly assymetric split renal function, slightly worse than prior. Mildly prolonged right T1/2. Asymptomatic.     - Follow up in 6 months with GENEVIEVE Rios renal scan, CT urogram prior      Kavon Hernandez MD   [facility]  [facility] Phone: [phone]

## 2021-06-19 ENCOUNTER — TELEPHONE (OUTPATIENT)
Dept: UROLOGY | Facility: CLINIC | Age: 41
End: 2021-06-19

## 2021-06-19 NOTE — TELEPHONE ENCOUNTER
39 yo F with complex history of kidney stone, right hydronephrosis, underwent NM renal scan yesterday with lasix. Initially felt fine but after getting home began having right sided flank pain. She is wondering if tylenol or ibuprofen are ok.    I discussed that over the counter tylenol and ibuprofen are appropriate for this pain. Hopefully pain was related to intermittent obstruction due to the lasix promoting diuresis (à la Dietl's crisis). IF she continues to have pain, will need CT abd/pelvis w/op contrast to ensure no obstructing stone    Kavon Hernandez MD   [facility]  [phone] clinic phone

## 2021-07-09 ENCOUNTER — OFFICE VISIT (OUTPATIENT)
Dept: FAMILY MEDICINE | Facility: CLINIC | Age: 41
End: 2021-07-09
Payer: COMMERCIAL

## 2021-07-09 VITALS
SYSTOLIC BLOOD PRESSURE: 118 MMHG | RESPIRATION RATE: 16 BRPM | WEIGHT: 181.2 LBS | DIASTOLIC BLOOD PRESSURE: 58 MMHG | OXYGEN SATURATION: 100 % | TEMPERATURE: 98.3 F | HEART RATE: 75 BPM | BODY MASS INDEX: 33.14 KG/M2

## 2021-07-09 DIAGNOSIS — R20.2 PARESTHESIA: Primary | ICD-10-CM

## 2021-07-09 DIAGNOSIS — M25.531 RIGHT WRIST PAIN: ICD-10-CM

## 2021-07-09 PROBLEM — G89.29 CHRONIC RIGHT-SIDED LOW BACK PAIN: Status: ACTIVE | Noted: 2018-02-28

## 2021-07-09 PROBLEM — F32.5 MAJOR DEPRESSION IN REMISSION (H): Status: ACTIVE | Noted: 2021-07-09

## 2021-07-09 PROBLEM — M54.50 CHRONIC RIGHT-SIDED LOW BACK PAIN: Status: ACTIVE | Noted: 2018-02-28

## 2021-07-09 PROCEDURE — 99214 OFFICE O/P EST MOD 30 MIN: CPT | Performed by: NURSE PRACTITIONER

## 2021-07-09 RX ORDER — IBUPROFEN 600 MG/1
600 TABLET, FILM COATED ORAL 4 TIMES DAILY
Qty: 28 TABLET | Refills: 0 | Status: SHIPPED | OUTPATIENT
Start: 2021-07-09 | End: 2021-07-16

## 2021-07-09 NOTE — LETTER
July 9, 2021      Radha Bryan  [address]  [place] [address]        To Whom It May Concern:    Radha Randi  was seen on 7/9/2021, please excuse from work on 7/9/2021         Sincerely,        ELIAZAR Alexandra CNP

## 2021-07-09 NOTE — PROGRESS NOTES
Assessment & Plan     Paresthesia  Right wrist pain  Symptoms consistent with median nerve compression. Will plan for supportive care with NSADs and splinting.   Discomfort had a significant interference with patient's daily living, thus, agreed upon Orthopedics consult if not improving.   - Orthopedic  Referral  - Wrist/Arm/Hand Supplies Order for DME - ONLY FOR DME      Patient Instructions   Ibuprofen 600 mg four times per day x 7 days.    Wear your wrist brace day and night.     Prevent activity to your wrist.     Consult Orthopedics if not having improvements.     Follow-up sooner with any worsening symptoms.       Return in about 4 weeks (around 8/6/2021) for Preventive Visit.    ELIAZAR Alexandra CNP  [facility] SONA Garcia is a 40 year old who presents for the following health issues     HPI     Musculoskeletal problem/pain  Onset/Duration: 6/25/2021  Description  Location: hand - right  Joint Swelling: no  Redness: no  Pain: YES  Warmth: no  Intensity:  severe  Progression of Symptoms:  worsening  Accompanying signs and symptoms:   Fevers: no  Numbness/tingling/weakness: YES  History  Trauma to the area: no  Recent illness:  no  Previous similar problem: no  Previous evaluation:  no  Precipitating or alleviating factors:  Aggravating factors include: overuse  Therapies tried and outcome: massage and Ibuprofen    Right hand and all finger numbness and pain x 2 weeks. No injury.   Constant pain/numbness throbbing.   Recently started cleaning job 1 month ago.  Denies forearm, elbow, shoulder, or neck pain.     Was recommended to take vitamin D related to deficiency and elevated PTH with normal calcium levels. S/p hyperparathyroidism.   She has restarted her recommended vitamin D.     Review of Systems   Constitutional, HEENT, cardiovascular, pulmonary, gi and gu systems are negative, except as otherwise noted.      Objective    /58 (BP Location:  Right arm, Patient Position: Sitting, Cuff Size: Adult Regular)   Pulse 75   Temp 98.3  F (36.8  C) (Oral)   Resp 16   Wt 82.2 kg (181 lb 3.2 oz)   LMP 06/20/2019   SpO2 100%   BMI 33.14 kg/m    Body mass index is 33.14 kg/m .  Physical Exam   GENERAL: healthy, alert and no distress  MS: + Phalen and Tinel sign. Weakened strength to right hand grasp. Strong and equal strength at elbow and shoulder.  no reproducible pain at forearm, elbow, shoulder, and neck.

## 2021-07-09 NOTE — PATIENT INSTRUCTIONS
Ibuprofen 600 mg four times per day x 7 days.    Wear your wrist brace day and night.     Prevent activity to your wrist.     Consult Orthopedics if not having improvements.     Follow-up sooner with any worsening symptoms.

## 2021-07-22 ENCOUNTER — APPOINTMENT (OUTPATIENT)
Dept: ULTRASOUND IMAGING | Facility: CLINIC | Age: 41
End: 2021-07-22
Attending: EMERGENCY MEDICINE
Payer: COMMERCIAL

## 2021-07-22 ENCOUNTER — APPOINTMENT (OUTPATIENT)
Dept: CT IMAGING | Facility: CLINIC | Age: 41
End: 2021-07-22
Attending: EMERGENCY MEDICINE
Payer: COMMERCIAL

## 2021-07-22 ENCOUNTER — HOSPITAL ENCOUNTER (EMERGENCY)
Facility: CLINIC | Age: 41
Discharge: HOME | End: 2021-07-23
Attending: EMERGENCY MEDICINE | Admitting: EMERGENCY MEDICINE
Payer: COMMERCIAL

## 2021-07-22 DIAGNOSIS — N83.202 LEFT OVARIAN CYST: ICD-10-CM

## 2021-07-22 DIAGNOSIS — R10.12 ABDOMINAL PAIN, LEFT UPPER QUADRANT: ICD-10-CM

## 2021-07-22 LAB
ALBUMIN UR-MCNC: NEGATIVE MG/DL
ANION GAP SERPL CALCULATED.3IONS-SCNC: 6 MMOL/L (ref 3–14)
APPEARANCE UR: CLEAR
BASOPHILS # BLD AUTO: 0 10E3/UL (ref 0–0.2)
BASOPHILS NFR BLD AUTO: 0 %
BILIRUB UR QL STRIP: NEGATIVE
BUN SERPL-MCNC: 16 MG/DL (ref 7–30)
CALCIUM SERPL-MCNC: 9.2 MG/DL (ref 8.5–10.1)
CHLORIDE BLD-SCNC: 109 MMOL/L (ref 94–109)
CO2 SERPL-SCNC: 25 MMOL/L (ref 20–32)
COLOR UR AUTO: ABNORMAL
CREAT SERPL-MCNC: 0.61 MG/DL (ref 0.52–1.04)
EOSINOPHIL # BLD AUTO: 0.1 10E3/UL (ref 0–0.7)
EOSINOPHIL NFR BLD AUTO: 2 %
ERYTHROCYTE [DISTWIDTH] IN BLOOD BY AUTOMATED COUNT: 15 % (ref 10–15)
GFR SERPL CREATININE-BSD FRML MDRD: >90 ML/MIN/1.73M2
GLUCOSE BLD-MCNC: 102 MG/DL (ref 70–99)
GLUCOSE UR STRIP-MCNC: NEGATIVE MG/DL
HCG UR QL: NEGATIVE
HCT VFR BLD AUTO: 34.4 % (ref 35–47)
HGB BLD-MCNC: 11 G/DL (ref 11.7–15.7)
HGB UR QL STRIP: ABNORMAL
HOLD SPECIMEN: NORMAL
IMM GRANULOCYTES # BLD: 0 10E3/UL
IMM GRANULOCYTES NFR BLD: 0 %
KETONES UR STRIP-MCNC: NEGATIVE MG/DL
LEUKOCYTE ESTERASE UR QL STRIP: NEGATIVE
LYMPHOCYTES # BLD AUTO: 3.4 10E3/UL (ref 0.8–5.3)
LYMPHOCYTES NFR BLD AUTO: 41 %
MCH RBC QN AUTO: 25.3 PG (ref 26.5–33)
MCHC RBC AUTO-ENTMCNC: 32 G/DL (ref 31.5–36.5)
MCV RBC AUTO: 79 FL (ref 78–100)
MONOCYTES # BLD AUTO: 0.6 10E3/UL (ref 0–1.3)
MONOCYTES NFR BLD AUTO: 7 %
NEUTROPHILS # BLD AUTO: 4.2 10E3/UL (ref 1.6–8.3)
NEUTROPHILS NFR BLD AUTO: 50 %
NITRATE UR QL: NEGATIVE
NRBC # BLD AUTO: 0 10E3/UL
NRBC BLD AUTO-RTO: 0 /100
PH UR STRIP: 5.5 [PH] (ref 5–7)
PLATELET # BLD AUTO: 369 10E3/UL (ref 150–450)
POTASSIUM BLD-SCNC: 4.1 MMOL/L (ref 3.4–5.3)
RBC # BLD AUTO: 4.34 10E6/UL (ref 3.8–5.2)
RBC URINE: 4 /HPF
SODIUM SERPL-SCNC: 140 MMOL/L (ref 133–144)
SP GR UR STRIP: 1.02 (ref 1–1.03)
SQUAMOUS EPITHELIAL: 1 /HPF
UROBILINOGEN UR STRIP-MCNC: NORMAL MG/DL
WBC # BLD AUTO: 8.5 10E3/UL (ref 4–11)
WBC URINE: <1 /HPF

## 2021-07-22 PROCEDURE — 258N000003 HC RX IP 258 OP 636: Performed by: EMERGENCY MEDICINE

## 2021-07-22 PROCEDURE — 85025 COMPLETE CBC W/AUTO DIFF WBC: CPT | Performed by: EMERGENCY MEDICINE

## 2021-07-22 PROCEDURE — 76856 US EXAM PELVIC COMPLETE: CPT

## 2021-07-22 PROCEDURE — 81025 URINE PREGNANCY TEST: CPT | Performed by: EMERGENCY MEDICINE

## 2021-07-22 PROCEDURE — 76830 TRANSVAGINAL US NON-OB: CPT

## 2021-07-22 PROCEDURE — 36415 COLL VENOUS BLD VENIPUNCTURE: CPT | Performed by: EMERGENCY MEDICINE

## 2021-07-22 PROCEDURE — 80048 BASIC METABOLIC PNL TOTAL CA: CPT | Performed by: EMERGENCY MEDICINE

## 2021-07-22 PROCEDURE — 99285 EMERGENCY DEPT VISIT HI MDM: CPT | Mod: 25

## 2021-07-22 PROCEDURE — 81001 URINALYSIS AUTO W/SCOPE: CPT | Performed by: EMERGENCY MEDICINE

## 2021-07-22 PROCEDURE — 96361 HYDRATE IV INFUSION ADD-ON: CPT

## 2021-07-22 PROCEDURE — 74176 CT ABD & PELVIS W/O CONTRAST: CPT

## 2021-07-22 PROCEDURE — 96376 TX/PRO/DX INJ SAME DRUG ADON: CPT

## 2021-07-22 PROCEDURE — 96374 THER/PROPH/DIAG INJ IV PUSH: CPT

## 2021-07-22 PROCEDURE — 250N000011 HC RX IP 250 OP 636: Performed by: EMERGENCY MEDICINE

## 2021-07-22 PROCEDURE — 96375 TX/PRO/DX INJ NEW DRUG ADDON: CPT

## 2021-07-22 RX ORDER — SODIUM CHLORIDE 9 MG/ML
INJECTION, SOLUTION INTRAVENOUS CONTINUOUS
Status: DISCONTINUED | OUTPATIENT
Start: 2021-07-22 | End: 2021-07-23 | Stop reason: HOSPADM

## 2021-07-22 RX ORDER — ONDANSETRON 2 MG/ML
4 INJECTION INTRAMUSCULAR; INTRAVENOUS EVERY 30 MIN PRN
Status: DISCONTINUED | OUTPATIENT
Start: 2021-07-22 | End: 2021-07-23 | Stop reason: HOSPADM

## 2021-07-22 RX ORDER — HYDROCODONE BITARTRATE AND ACETAMINOPHEN 5; 325 MG/1; MG/1
1 TABLET ORAL EVERY 6 HOURS PRN
Qty: 10 TABLET | Refills: 0 | Status: SHIPPED | OUTPATIENT
Start: 2021-07-22 | End: 2021-07-25

## 2021-07-22 RX ORDER — HYDROMORPHONE HYDROCHLORIDE 1 MG/ML
0.5 INJECTION, SOLUTION INTRAMUSCULAR; INTRAVENOUS; SUBCUTANEOUS
Status: DISCONTINUED | OUTPATIENT
Start: 2021-07-22 | End: 2021-07-23 | Stop reason: HOSPADM

## 2021-07-22 RX ADMIN — ONDANSETRON 4 MG: 2 INJECTION INTRAMUSCULAR; INTRAVENOUS at 20:31

## 2021-07-22 RX ADMIN — SODIUM CHLORIDE 1000 ML: 9 INJECTION, SOLUTION INTRAVENOUS at 20:31

## 2021-07-22 RX ADMIN — HYDROMORPHONE HYDROCHLORIDE 0.5 MG: 1 INJECTION, SOLUTION INTRAMUSCULAR; INTRAVENOUS; SUBCUTANEOUS at 23:14

## 2021-07-22 RX ADMIN — HYDROMORPHONE HYDROCHLORIDE 0.5 MG: 1 INJECTION, SOLUTION INTRAMUSCULAR; INTRAVENOUS; SUBCUTANEOUS at 20:35

## 2021-07-22 ASSESSMENT — ENCOUNTER SYMPTOMS
VOMITING: 0
NAUSEA: 1
FLANK PAIN: 1
FEVER: 0
HEMATURIA: 0

## 2021-07-23 VITALS
TEMPERATURE: 98.6 F | DIASTOLIC BLOOD PRESSURE: 89 MMHG | OXYGEN SATURATION: 97 % | RESPIRATION RATE: 16 BRPM | HEART RATE: 60 BPM | SYSTOLIC BLOOD PRESSURE: 144 MMHG

## 2021-07-23 NOTE — ED PROVIDER NOTES
History   Chief Complaint:  Flank Pain       HPI   Radha Bryan is a 40 year old female with history of nephrolithiasis who presents with flank pain. Patient reports mild left sided flank pain began approximately 1 week ago. Today, after she ate pain began to worsen and she has also experienced some nausea. She has taken ibuprofen for pain today. Patient notes she has had kidney stones in the past and her pain today is similar. Patient denies any vomiting, hematuria, or fever.    Review of Systems   Constitutional: Negative for fever.   Gastrointestinal: Positive for nausea. Negative for vomiting.   Genitourinary: Positive for flank pain. Negative for hematuria.   All other systems reviewed and are negative.      Allergies:  The patient has no known allergies.     Medications:  Prilosec  Vitamin D3  Imitrex  Naproxen  Oxycodone  Ferrous sulfate  Senna  Antivert  Robitussin AC  Flonase    Past Medical History:    Dyslipidemia  Irregular menstrual cycles  Nephrolithiasis  Primary hyperparathyroidism  Tinea versicolor  Major depression  Hydronephrosis of right kidney  Ureteral stone  GERD  Chronic right-sided low back pain  Intramural leiomyoma of uterus  Right ovarian cyst  Hypertriglyceridemia  Menorrhagia  Microcytic anemia  Right ovarian cyst  Melasma  Cystitis  Varicella    Past Surgical History:    Cholecystectomy  Cystoscopy right ureteral stent removal right retrograde pyelogram right ureteroscopy Right ureteral dilation right ureteral stent placement Holmium Laser on Stand-by  Cystoscopy, bilateral stent removal, bilateral ureteroscopy, left laser lithotripsy, stone basketing, bilateral stent placement; bilateral retrograde pyelogram, fluoroscopic interpretation <1 hour physician time  Cystoscopy with bilateral retrograde pyelogram and bilateral ureteral stent insertion with Fluoroscopic interpretation <1 hour physician time  Laparoscopic hysterectomy total, salpingectomy bilateral  Laparoscopic tubal  ligation  Parathyroidectomy    Family History:    Kidney stone  Diabetes  High cholesterol  Heart disease  Osteoporosis  Thyroid disease  Genetic  IGT    Social History:  Patient presents alone.    Physical Exam     Patient Vitals for the past 24 hrs:   BP Temp Temp src Pulse Resp SpO2   07/22/21 2200 127/82 -- -- 64 -- 98 %   07/22/21 2100 137/83 -- -- 66 -- 97 %   07/22/21 2036 -- -- -- -- -- 98 %   07/22/21 2035 130/86 -- -- 68 -- --   07/22/21 1942 127/74 98.6  F (37  C) Oral 69 20 99 %       Physical Exam  General: Patient is alert and interactive when I enter the room  Head:  The scalp, face, and head appear normal  Eyes:  Conjunctivae are normal  ENT:    The nose is normal    Pinnae are normal    External acoustic canals are normal  Neck:  Trachea midline  CV:  Pulses are normal.    Resp:  No respiratory distress   Abdomen:      Soft, LUQ tenderness and left CVA tenderness, non-distended  Musc:  Normal muscular tone    No major joint effusions    No asymmetric leg swelling  Skin:  No rash or lesions noted  Neuro:  Speech is normal and fluent. Face is symmetric.     Moving all extremities well.   Psych: Awake. Alert.  Normal affect.  Appropriate interactions.      Emergency Department Course     Imaging:  Abd/pelvis CT no contrast - Stone Protocol   Final Result   IMPRESSION:    1.  No obstructive uropathy or other acute abnormality in the abdomen or pelvis.   2.  New complex left adnexal cyst measuring 4.0 x 5.2 x 3.3 cm. Follow-up pelvic ultrasound recommended.         US Pelvis Cmplt w Transvag & Doppler LmtPel Duplex Limited    (Results Pending)     Laboratory:  UA with microscopic: Blood Urine Trace (A), RBC Urine 4 (H) o/w WNL   HCG qualitative urine: Negative  CBC: WBC 8.5, HGB 11.0 (L),      Emergency Department Course:    Reviewed:  I reviewed nursing notes, vitals, past medical history and care everywhere    Assessments:  2024 I obtained history and examined the patient as noted above.    2209 I rechecked the patient and explained findings.     Interventions:  2031 0.9% sodium chloride BOLUS 1,000 mL IV\  2031 Zofran 4 mg IV  2035 Hydromorphone 0.5 mg IV    Disposition:  Care of the patient was transferred to my colleague Dr. Giordano pending ultrasound results.       Impression & Plan     Medical Decision Making:   Radha Bryan is a 40 year old female who presents with abdominal pain. Patient was concerned she could have recurrence of kidney stones. She has left flank and LUQ tenderness. UA showed no signs of infection. Vitals unremarkable. Blood work otherwise unremarkable. CTAP showed no stone but did show a complex ovarian cyst on the left which could explain her pain. Given her symptoms, we will do an US. Clinical history not consistent with kidney stones. Signed out to oncoming physician to follow-up US. Plan for likely discharge if US shows no torsion and follow-up with gyn.     Diagnosis:    ICD-10-CM    1. Left ovarian cyst  N83.202    2. Abdominal pain, left upper quadrant  R10.12        Discharge Medications:  New Prescriptions    HYDROCODONE-ACETAMINOPHEN (NORCO) 5-325 MG TABLET    Take 1 tablet by mouth every 6 hours as needed       Scribe Disclosure:  CHRIS, DEANNA COLLINS, am serving as a scribe at 8:24 PM on 7/22/2021 to document services personally performed by Patti Aguilera MD based on my observations and the provider's statements to me.            Patti Aguilera MD  07/23/21 [id number]

## 2021-07-27 ENCOUNTER — OFFICE VISIT (OUTPATIENT)
Dept: OBGYN | Facility: CLINIC | Age: 41
End: 2021-07-27
Payer: COMMERCIAL

## 2021-07-27 VITALS — SYSTOLIC BLOOD PRESSURE: 110 MMHG | BODY MASS INDEX: 31.84 KG/M2 | DIASTOLIC BLOOD PRESSURE: 74 MMHG | WEIGHT: 174.1 LBS

## 2021-07-27 DIAGNOSIS — N83.202 CYST OF LEFT OVARY: Primary | ICD-10-CM

## 2021-07-27 PROCEDURE — 99213 OFFICE O/P EST LOW 20 MIN: CPT | Performed by: OBSTETRICS & GYNECOLOGY

## 2021-07-27 RX ORDER — HYDROCODONE BITARTRATE AND ACETAMINOPHEN 5; 325 MG/1; MG/1
1 TABLET ORAL EVERY 6 HOURS PRN
COMMUNITY
End: 2021-12-13

## 2021-07-27 RX ORDER — IBUPROFEN 600 MG/1
600 TABLET, FILM COATED ORAL EVERY 6 HOURS PRN
COMMUNITY
End: 2024-03-14

## 2021-07-27 NOTE — NURSING NOTE
"Chief Complaint   Patient presents with     ER F/U     c/o continued but improved Lt pelvic pain     initial /74   Wt 79 kg (174 lb 1.6 oz)   LMP 06/20/2019   BMI 31.84 kg/m   Estimated body mass index is 31.84 kg/m  as calculated from the following:    Height as of 6/18/21: 1.575 m (5' 2\").    Weight as of this encounter: 79 kg (174 lb 1.6 oz).  BP completed using cuff size regular.  Tiffany Childs CMA    "

## 2021-07-27 NOTE — PROGRESS NOTES
SUBJECTIVE:                                                   Radha Bryan is a 40 year old female who presents to clinic today for the following health issue(s):  Patient presents with:  ER F/U: c/o continued but improved Lt pelvic pain      HPI:  Seen in ED  with Left flank pain reminiscent to patient of nephrolithiasis related pain for which she has an extensive Hx.    ED W/U including a CT and U/S were reviewed.  No evidence of stone but Left adnexal cyst noted.    Pain has improved somewhat.    Patint is S/P hysterectomy in 2019    Patient's last menstrual period was 2019..   Patient is sexually active, .  Using hysterectomy for contraception.     Problem list and histories reviewed & adjusted, as indicated.  Additional history: as documented.    Patient Active Problem List   Diagnosis     Microcytic anemia     Gastroesophageal reflux disease without esophagitis     Chronic right-sided low back pain     Tinea versicolor     Ureteral stone     Hydronephrosis of right kidney     Primary hyperparathyroidism (H)     Family history of diabetes mellitus     Hypertriglyceridemia     Intramural leiomyoma of uterus     Major depression in remission (H)     Menorrhagia     Right ovarian cyst     RLQ abdominal pain     S/P tubal ligation     Past Surgical History:   Procedure Laterality Date     CHOLECYSTECTOMY       COMBINED CYSTOSCOPY, RETROGRADES, URETEROSCOPY, INSERT STENT Right 10/9/2020    Procedure: Cystoscopy right ureteral stent removal right retrograde pyelogram right ureteroscopy Right ureteral dilation right ureteral stent placement Holmium Laser on Stand-by;  Surgeon: Kavon Hernandez MD;  Location:  OR     COMBINED CYSTOSCOPY, RETROGRADES, URETEROSCOPY, LASER HOLMIUM LITHOTRIPSY URETER(S), INSERT STENT Bilateral 2020    Procedure: Cystoscopy, bilateral stent removal, bilateral ureteroscopy, left laser lithotripsy, stone basketing, bilateral stent placement; bilateral retrograde  pyelogram, fluoroscopic interpretation <1 hour physician time;  Surgeon: Kavon Hernandez MD;  Location: [facility] OR     CYSTOSCOPY, RETROGRADES, INSERT STENT URETER(S), COMBINED Bilateral 8/27/2020    Procedure: Cystoscopy with bilateral retrograde pyelogram and bilateral ureteral stent insertion with Fluoroscopic interpretation <1 hour physician time;  Surgeon: Kavon Hernandez MD;  Location: [facility] OR     LAPAROSCOPIC HYSTERECTOMY TOTAL, SALPINGECTOMY BILATERAL  07/17/2019    AUB, fibroid uterus, anemia. [facility], Dr. Pamela Carrion     LAPAROSCOPIC TUBAL LIGATION       PARATHYROIDECTOMY N/A 2/17/2021    Procedure: CERVICAL EXPLORATION, PARATHYROIDECTOMY;  Surgeon: Adelaide Saenz MD;  Location: [facility] OR      Social History     Tobacco Use     Smoking status: Never Smoker     Smokeless tobacco: Never Used   Substance Use Topics     Alcohol use: No      Problem (# of Occurrences) Relation (Name,Age of Onset)    Family History Negative (1) Mother    Kidney Disease (1) Brother (2): kidney stone    No Known Problems (3) Sister (3), Son (2), Daughter (2)       Negative family history of: Liver Disease, Colon Cancer            acetaminophen (TYLENOL) 500 MG tablet, Take 2 tablets (1,000 mg) by mouth every 6 hours as needed for other (mild pain)  calcium carbonate-vitamin D (OSCAL 500/200 D-3) 500-200 MG-UNIT tablet, Take 2 tablets by mouth every 12 hours Take two tablets every 12 hours for one week.  Then take two tablets once a day for one week.  HYDROcodone-acetaminophen (NORCO) 5-325 MG tablet, Take 1 tablet by mouth every 6 hours as needed for severe pain  ibuprofen (ADVIL/MOTRIN) 600 MG tablet, Take 600 mg by mouth every 6 hours as needed for moderate pain  omeprazole (PRILOSEC) 20 MG DR capsule, Take 20 mg by mouth daily as needed   vitamin D3 (CHOLECALCIFEROL) 50 mcg (2000 units) tablet, Take 1 tablet (50 mcg) by mouth daily    No current facility-administered medications on file prior to visit.    No Known  Allergies    ROS:  5 point ROS negative except as noted above in HPI, including Gen., Resp., CV, GI &  system review.    OBJECTIVE:     /74   Wt 79 kg (174 lb 1.6 oz)   LMP 06/20/2019   BMI 31.84 kg/m     BMI: Body mass index is 31.84 kg/m .  General: Alert and oriented, no distress.  Psychiatric: Mood and affect within normal limits.    INDICATION: Flank pain, kidney stone suspected  COMPARISON: 11/17/2020, 10/07/2020  TECHNIQUE: CT scan of the abdomen and pelvis was performed without IV contrast. Multiplanar reformats were obtained. Dose reduction techniques were used.  CONTRAST: None.     FINDINGS:   LOWER CHEST: Normal.     HEPATOBILIARY: Prior cholecystectomy. Liver unremarkable.     PANCREAS: Normal.     SPLEEN: Normal.     ADRENAL GLANDS: Stable left adrenal adenoma. Right adrenal gland normal.     KIDNEYS/BLADDER: No urinary tract calcification or obstruction.     BOWEL: No obstruction. No free air, free fluid, or inflammatory change. Normal appendix     LYMPH NODES: Normal.     VASCULATURE: Unremarkable.     PELVIC ORGANS: There is a septated 4.0 x 5.2 x 3.3 cm left adnexal cyst. Prior hysterectomy.     MUSCULOSKELETAL: Normal.                                                                         IMPRESSION:   1.  No obstructive uropathy or other acute abnormality in the abdomen or pelvis.  2.  New complex left adnexal cyst measuring 4.0 x 5.2 x 3.3 cm. Follow-up pelvic ultrasound recommended.     EXAM: US PELVIS COMPLETE W TRANSVAGINAL AND DOPPLER LIMITED  LOCATION: [facility]  DATE/TIME: 7/22/2021 10:46 PM     INDICATION: left ovarian cyst  COMPARISON: 07/22/2021  TECHNIQUE: Transabdominal scans were performed. Endovaginal ultrasound was performed to better visualize the adnexa. Color flow with spectral Doppler and waveform analysis performed.     FINDINGS:     UTERUS: Surgically absent.     RIGHT OVARY: Obscured by bowel gas     LEFT OVARY: 5.4 x 4.4 x 3.9 cm. 2  adjacent versus septated cyst left ovary. The larger 3.3 x 2.8 x 3.4 cm and the smaller cyst 2.6 x 2.3 x 2.3 cm. Flow documented to the left ovary. No significant free fluid. No significant free fluid.                                                                      IMPRESSION:    1.  Septated cyst versus 2 adjacent cysts within the left ovary. Follow-up according to guidelines below.        COMPLEX INDETERMINATE CYSTS:  Premenopausal:  Less than or equal to 3 cm: Consider physiologic.  Greater than 3 cm: 8-12 week follow up.     Postmenopausal:  Any complex cyst: Surgical consultation.    ASSESSMENT/PLAN:                                                        ICD-10-CM    1. Cyst of left ovary  N83.202 US Pelvic Complete with Transvaginal         Likely functional cyst and clinically improving.  No evidence for torsion on imaging or clinically.    Ovarian cysts explained and likelihood of spontaneous resolution explained.    Follow up scan in 5 weeks    Cb Baumann MD  [facility] [place]

## 2021-08-19 ENCOUNTER — ANCILLARY PROCEDURE (OUTPATIENT)
Dept: ULTRASOUND IMAGING | Facility: CLINIC | Age: 41
End: 2021-08-19
Attending: OBSTETRICS & GYNECOLOGY
Payer: COMMERCIAL

## 2021-08-19 DIAGNOSIS — N83.202 CYST OF LEFT OVARY: ICD-10-CM

## 2021-08-19 PROCEDURE — 76856 US EXAM PELVIC COMPLETE: CPT | Performed by: FAMILY MEDICINE

## 2021-08-19 PROCEDURE — 76830 TRANSVAGINAL US NON-OB: CPT | Performed by: FAMILY MEDICINE

## 2021-09-18 ENCOUNTER — HEALTH MAINTENANCE LETTER (OUTPATIENT)
Age: 41
End: 2021-09-18

## 2021-11-19 NOTE — PHARMACY-ADMISSION MEDICATION HISTORY
Admission medication history interview status for this patient is complete. See [facility] admission navigator for allergy information, prior to admission medications and immunization status.     Medication history interview done via telephone during Covid-19 pandemic, indicate source(s): Patient  Medication history resources (including written lists, pill bottles, clinic record):None  Pharmacy: [facility] in [place]  Changes made to PTA medication list:  Added: Ibuprofen  Deleted: Omeprazole, Ondansetron, Tizanidine  Changed: None    Actions taken by pharmacist (provider contacted, etc):None     Additional medication history information: Patient reports taking no medications except for occasional PRN ibuprofen.     Medication reconciliation/reorder completed by provider prior to medication history?  Yes   (Y/N)     For patients on insulin therapy: No  (Y/N)      Prior to Admission medications    Medication Sig Last Dose Taking? Auth Provider   ibuprofen (ADVIL/MOTRIN) 200 MG tablet Take 400 mg by mouth every 6 hours as needed for mild pain Past Month at Unknown time Yes Unknown, Entered By History   oxybutynin (DITROPAN) 5 MG tablet Take 1 tablet (5 mg) by mouth 3 times daily as needed for bladder spasms  Yes Kavon Hernandez MD   tamsulosin (FLOMAX) 0.4 MG capsule Take 1 capsule (0.4 mg) by mouth daily While the stent is in place, for stent pain and irritation  Yes Kavon Hernandez MD          patient refused

## 2021-11-20 ENCOUNTER — TRANSFERRED RECORDS (OUTPATIENT)
Dept: HEALTH INFORMATION MANAGEMENT | Facility: CLINIC | Age: 41
End: 2021-11-20
Payer: COMMERCIAL

## 2021-12-10 ENCOUNTER — HOSPITAL ENCOUNTER (OUTPATIENT)
Dept: CT IMAGING | Facility: CLINIC | Age: 41
Discharge: HOME | End: 2021-12-10
Attending: STUDENT IN AN ORGANIZED HEALTH CARE EDUCATION/TRAINING PROGRAM | Admitting: STUDENT IN AN ORGANIZED HEALTH CARE EDUCATION/TRAINING PROGRAM
Payer: COMMERCIAL

## 2021-12-10 DIAGNOSIS — N13.30 HYDRONEPHROSIS OF RIGHT KIDNEY: ICD-10-CM

## 2021-12-10 PROCEDURE — 250N000009 HC RX 250: Performed by: STUDENT IN AN ORGANIZED HEALTH CARE EDUCATION/TRAINING PROGRAM

## 2021-12-10 PROCEDURE — 250N000011 HC RX IP 250 OP 636: Performed by: STUDENT IN AN ORGANIZED HEALTH CARE EDUCATION/TRAINING PROGRAM

## 2021-12-10 PROCEDURE — 74178 CT ABD&PLV WO CNTR FLWD CNTR: CPT

## 2021-12-10 RX ORDER — IOPAMIDOL 755 MG/ML
500 INJECTION, SOLUTION INTRAVASCULAR ONCE
Status: COMPLETED | OUTPATIENT
Start: 2021-12-10 | End: 2021-12-10

## 2021-12-10 RX ADMIN — SODIUM CHLORIDE 95 ML: 9 INJECTION, SOLUTION INTRAVENOUS at 11:02

## 2021-12-10 RX ADMIN — IOPAMIDOL 91 ML: 755 INJECTION, SOLUTION INTRAVENOUS at 11:02

## 2021-12-13 ENCOUNTER — VIRTUAL VISIT (OUTPATIENT)
Dept: FAMILY MEDICINE | Facility: CLINIC | Age: 41
End: 2021-12-13
Payer: COMMERCIAL

## 2021-12-13 DIAGNOSIS — R05.9 COUGH: ICD-10-CM

## 2021-12-13 DIAGNOSIS — J01.10 ACUTE NON-RECURRENT FRONTAL SINUSITIS: Primary | ICD-10-CM

## 2021-12-13 PROCEDURE — 99213 OFFICE O/P EST LOW 20 MIN: CPT | Mod: 95 | Performed by: PHYSICIAN ASSISTANT

## 2021-12-13 RX ORDER — AMOXICILLIN 875 MG
875 TABLET ORAL 2 TIMES DAILY
Qty: 14 TABLET | Refills: 0 | Status: SHIPPED | OUTPATIENT
Start: 2021-12-13 | End: 2021-12-20

## 2021-12-13 NOTE — PROGRESS NOTES
"Radha is a 41 year old who is being evaluated via a billable telephone visit.      What phone number would you like to be contacted at? [phone]  How would you like to obtain your AVS? MyChart    Assessment & Plan     Acute non-recurrent frontal sinusitis  Symptoms consistent with possible sinusitis. Treated as such with amoxicillin as noted below.  If not improving by the end of the week recommended in person evaluation/exam. Patient in agreement.  - amoxicillin (AMOXIL) 875 MG tablet; Take 1 tablet (875 mg) by mouth 2 times daily for 7 days    Cough    Return if symptoms worsen or fail to improve.    MARIA L Ricks [facility]    Subjective   Radha is a 41 year old who presents for the following health issues    HPI     Acute Illness  Onset/Duration: 2-3 weeks  Symptoms:  Fever: no  Chills/Sweats: no  Headache (location?): no  Sinus Pressure: YES- eyes and forehead  Conjunctivitis:  no  Ear Pain: YES: left  Rhinorrhea: no  Congestion: YES  Sore Throat: no  Cough: YES-productive of green sputum  Wheeze: no  Decreased Appetite: no  Nausea: no  Vomiting: no  Diarrhea: no  Dysuria/Freq.: no  Dysuria or Hematuria: no  Fatigue/Achiness: no  Sick/Strep Exposure: no  Therapies tried and outcome: Robitussin       Review of Systems   GENERAL:  No fevers  RESP:  No shortness of breath        Objective    Vitals - Patient Reported  Weight (Patient Reported): 81.6 kg (180 lb)  Height (Patient Reported): 157.5 cm (5' 2\")  BMI (Based on Pt Reported Ht/Wt): 32.92  Pulse (Patient Reported): 60      Vitals:  No vitals were obtained today due to virtual visit.    Physical Exam   healthy, alert and no distress  PSYCH: Alert and oriented times 3; coherent speech, normal   rate and volume, able to articulate logical thoughts, able   to abstract reason, no tangential thoughts, no hallucinations   or delusions  Her affect is normal  RESP: Frequent cough, no audible wheezing, able to talk in " full sentences  Remainder of exam unable to be completed due to telephone visits        Phone call duration: 6 minutes

## 2021-12-15 ENCOUNTER — HOSPITAL ENCOUNTER (OUTPATIENT)
Dept: NUCLEAR MEDICINE | Facility: CLINIC | Age: 41
Setting detail: NUCLEAR MEDICINE
Discharge: HOME | End: 2021-12-15
Attending: STUDENT IN AN ORGANIZED HEALTH CARE EDUCATION/TRAINING PROGRAM | Admitting: STUDENT IN AN ORGANIZED HEALTH CARE EDUCATION/TRAINING PROGRAM
Payer: COMMERCIAL

## 2021-12-15 DIAGNOSIS — N13.30 HYDRONEPHROSIS OF RIGHT KIDNEY: ICD-10-CM

## 2021-12-15 PROCEDURE — A9562 TC99M MERTIATIDE: HCPCS | Performed by: STUDENT IN AN ORGANIZED HEALTH CARE EDUCATION/TRAINING PROGRAM

## 2021-12-15 PROCEDURE — 250N000011 HC RX IP 250 OP 636: Performed by: STUDENT IN AN ORGANIZED HEALTH CARE EDUCATION/TRAINING PROGRAM

## 2021-12-15 PROCEDURE — 78708 K FLOW/FUNCT IMAGE W/DRUG: CPT

## 2021-12-15 PROCEDURE — 343N000001 HC RX 343: Performed by: STUDENT IN AN ORGANIZED HEALTH CARE EDUCATION/TRAINING PROGRAM

## 2021-12-15 RX ORDER — FUROSEMIDE 10 MG/ML
20 INJECTION INTRAMUSCULAR; INTRAVENOUS ONCE
Status: COMPLETED | OUTPATIENT
Start: 2021-12-15 | End: 2021-12-15

## 2021-12-15 RX ADMIN — FUROSEMIDE 20 MG: 10 INJECTION, SOLUTION INTRAMUSCULAR; INTRAVENOUS at 11:01

## 2021-12-15 RX ADMIN — TECHNESCAN TC 99M MERTIATIDE 10 MCI.: 1 INJECTION, POWDER, LYOPHILIZED, FOR SOLUTION INTRAVENOUS at 11:00

## 2021-12-17 ENCOUNTER — OFFICE VISIT (OUTPATIENT)
Dept: UROLOGY | Facility: CLINIC | Age: 41
End: 2021-12-17
Payer: COMMERCIAL

## 2021-12-17 VITALS
HEART RATE: 84 BPM | HEIGHT: 62 IN | SYSTOLIC BLOOD PRESSURE: 114 MMHG | DIASTOLIC BLOOD PRESSURE: 84 MMHG | WEIGHT: 179 LBS | BODY MASS INDEX: 32.94 KG/M2 | OXYGEN SATURATION: 98 %

## 2021-12-17 DIAGNOSIS — N13.30 HYDRONEPHROSIS, UNSPECIFIED HYDRONEPHROSIS TYPE: ICD-10-CM

## 2021-12-17 DIAGNOSIS — R82.991 HYPOCITRATURIA: ICD-10-CM

## 2021-12-17 DIAGNOSIS — R82.994 HYPERCALCIURIA: ICD-10-CM

## 2021-12-17 DIAGNOSIS — N94.9 ADNEXAL CYST: ICD-10-CM

## 2021-12-17 DIAGNOSIS — Z87.442 HISTORY OF KIDNEY STONES: Primary | ICD-10-CM

## 2021-12-17 LAB
ALBUMIN UR-MCNC: ABNORMAL MG/DL
APPEARANCE UR: CLEAR
BILIRUB UR QL STRIP: NEGATIVE
COLOR UR AUTO: YELLOW
GLUCOSE UR STRIP-MCNC: NEGATIVE MG/DL
HGB UR QL STRIP: ABNORMAL
KETONES UR STRIP-MCNC: NEGATIVE MG/DL
LEUKOCYTE ESTERASE UR QL STRIP: NEGATIVE
NITRATE UR QL: NEGATIVE
PH UR STRIP: 5.5 [PH] (ref 5–7)
SP GR UR STRIP: >=1.03 (ref 1–1.03)
UROBILINOGEN UR STRIP-ACNC: 0.2 E.U./DL

## 2021-12-17 PROCEDURE — 81003 URINALYSIS AUTO W/O SCOPE: CPT | Mod: QW | Performed by: STUDENT IN AN ORGANIZED HEALTH CARE EDUCATION/TRAINING PROGRAM

## 2021-12-17 PROCEDURE — 99214 OFFICE O/P EST MOD 30 MIN: CPT | Performed by: STUDENT IN AN ORGANIZED HEALTH CARE EDUCATION/TRAINING PROGRAM

## 2021-12-17 ASSESSMENT — PAIN SCALES - GENERAL: PAINLEVEL: NO PAIN (0)

## 2021-12-17 ASSESSMENT — MIFFLIN-ST. JEOR: SCORE: 1420.19

## 2021-12-17 NOTE — PATIENT INSTRUCTIONS
Patient Education      Cómo prevenir los cálculos renales?  Si tuvo un cálculo renal (nelly en el riñón), quizás le preocupe la posibilidad de tener otro. La extracción o expulsión de un cálculo no previene la formación de otros. Sin embargo, con la ayuda de aiken proveedor de atención médica, podrá reducir el riesgo de que se formen nuevos cálculos. Vaya a las visitas de control con aiken proveedor de atención médica para revisar si tiene cálculos nuevos. Según aiken estado de edgar, es posible que deba hacer visitas de control con aura frecuencia de entre 3 y 12 meses mikel el don de aiken iva.    Dina abundante agua  Estar roxy hidratado es la mejor manera de reducir el riesgo de tener piedras en el futuro. Dina ocho vasos de 12 onzas (355 cc) de agua todos los aide. Dina dos con cada comida y dos entre comidas. Lleve la cuenta de la fibra que consume Intente tener cerca aura jarra con agua tanto de día althea de noche.  [place] medicamentos si es necesario  Es posible que le receten medicamentos, que incluyen vitaminas y minerales, para ciertos tipos de piedras. Es aura buena idea escribir las dosis y los horarios en que debe fernando pk medicamentos en un calendario. Algunos medicamentos reducen los niveles en aiken juarez de aquellas sustancias químicas que favorecen la formación de piedras. Otros ayudan a evitar que esos químicos se cristalicen en la orina. Y otros ayudan a que aiken orina mantenga un equilibrio ácido normal.  Siga la dieta que le indicaron  Aiken proveedor de atención médica le dirá qué alimentos tienen las sustancias químicas que debería evitar. También puede sugerirle que hable con un dietista. Lo ayudará a planear comidas que disfrute. Estas comidas no le generarán riesgos de que se formen piedras en el futuro. Vaya al nutricionista con aiken cónyuge, aiken alli o un amigo de confianza para que lo apoyen en los cambios que tenga que hacer en la dieta.  Es posible que le indiquen limitar ciertos alimentos, según el  tipo de piedras que haya tenido. Debería limitar el consumo de sal en aiken comida a unos 2 gramos al día. Eso ayudará a prevenir la mayoría de los tipos de cálculos renales. Asegúrese de consumir aura cantidad adecuada de calcio en aiken dieta.  En el jaja de las piedras de oxalato de calcio: Limite las proteínas de origen animal, althea la carne, los huevos y el pescado. Limite el jugo de toronja y las bebidas alcohólicas. Limite los alimentos con alto contenido de oxalato (althea las bebidas cola, el té, el chocolate, la espinaca, el ruibarbo, el salvado de jessica y los cacahuates o maníes).  En el jaja de las piedras de ácido úrico: Limite los alimentos con alto contenido de purina, althea los hongos (champiñones), los chícharos (arvejas), los frijoles, las anchoas, la carne parviz, las carne de ave, los mariscos y las achuras (órganos). Estos alimentos aumentan la producción de ácido úrico.  Para los cálculos de cistina: Limite los alimentos con alto contenido de metionina (el pescado es la tone más común, benjamín también las elissa y los huevos). Estos alimentos pueden aumentar la producción de cistina.    [id number] [employer] Todos los derechos reservados. Esta información no pretende sustituir la atención médica profesional. Sólo aiken médico puede diagnosticar y tratar un problema de edgar.

## 2021-12-17 NOTE — Clinical Note
Reviewed litholink results after the patient visit. Referral to nephrology (Dr. Hernandez @ [facility]) and the gynecology (Dr. Baumann @ [place]). Follow up with me in 1 year with DEEPIKA prior

## 2021-12-17 NOTE — LETTER
"12/17/2021       RE: Radha Bryan  [address]  [facility] [address]     Dear Colleague,    Thank you for referring your patient, Radha Bryan, to the [facility] [place] [facility]. Please see a copy of my visit note below.    CHIEF COMPLAINT   Radha Bryan who is a 41 year old female returns today for follow-up of nephrolithiasis.      HPI   Radha Bryan is a 41 year old female h/o left renal and ureteral stones, right hydronephrosis s/p bilateral ureteral stent placement 8/27/2020, left ureteroscopy and laser lithotripsy with bilateral ureteral stent exchange 9/17/2020, with attempted removal of the stents in the office on 10/7/2020, unable to remove the right ureteral stent. Went to OR 10/9/2020 for removal of the right stent which was noted to be encrusted; also seen was a kink in the proximal right ureter concerning for right ureteropelvic junction obstruction. A new stent was placed and then removed 10/15/2020. Found to have severe hypercalciuria, workup revealed hyperparathyroidism and underwent parathyroidectomy     Sent in a litholink in November, no results available for review initially. After the patient encounter I was able to review litholink results. Patient has good urine volume at 4.27L/day, but hypercalciuria with 258 mg/day and profound hypocitratuira <64 mg/day    PHYSICAL EXAM  Patient is a 41 year old  female   Vitals: Blood pressure 114/84, pulse 84, height 1.575 m (5' 2\"), weight 81.2 kg (179 lb), last menstrual period 06/20/2019, SpO2 98 %, not currently breastfeeding.  Body mass index is 32.74 kg/m .  General Appearance Adult:   Alert, no acute distress, oriented  HENT: throat/mouth:normal, good dentition  Lungs: no respiratory distress, or pursed lip breathing  Heart: No obvious jugular venous distension present  Abdomen: soft, nontender, no organomegaly or masses  Musculoskeltal: extremities normal, no " peripheral edema  Skin: no suspicious lesions or rashes  Neuro: Alert, oriented, speech and mentation normal  Psych: affect and mood normal  Gait: Normal    All pertinent imaging reviewed:    CT urogram 12/10/2021    IMPRESSION:   1.  No hydronephrosis or urolithiasis.  2.  Ill-defined area of focal hypoenhancement at the lower left kidney  does not appear substantially changed compared to a contrast-enhanced  CT from 11/17/2020. While indeterminant, this could just be some small  scarring. Consider establishing greater than two years of imaging  stability.  3.  Fatty liver.  4.  New finding of a partially septated cyst at the right ovary/adnexa  that is 3.9 cm. This could be further evaluated with pelvic ultrasound  as clinically indicated. The previously noted left ovarian lesion is  not currently seen.    NM renal scan 12/15/2021  IMPRESSION: Stable exam. Stable minimal prominence of the right renal  pelvis and decreased peak tracer uptake as compared to the left side  that may represent mildly dilated, but nonobstructive right renal  collecting system. The left side appears normal.       ASSESSMENT and PLAN  41 year old female returns today for follow-up of nephrolithiasis, right hydronephrosis (complex history, see HPI)     No further stones have formed on repeat imaging    Nonobstructive right kidney on NM renal scan and CT urogram     Instructed patient to follow up with gynecologist regarding adnexal/ovarian cyst incidentally found on imaging    Has good fluid intake but persistent hypercalciuria and profound hypocitraturia on Litholink despite treatment of hyperparathyroidism with parathyroidectomy. Will refer to nephrology for further assistance with medical stone prevention    Follow up with me in 1 year with KUB prior      Kavon Hernandez MD   [facility]  [facility] Phone: [phone]

## 2021-12-17 NOTE — NURSING NOTE
Chief Complaint   Patient presents with     Hx kidney stones     Review NM Renogram     Regina Charlton, EMT

## 2021-12-17 NOTE — PROGRESS NOTES
"CHIEF COMPLAINT   Radha Bryan who is a 41 year old female returns today for follow-up of nephrolithiasis.      HPI   Radha Bryan is a 41 year old female h/o left renal and ureteral stones, right hydronephrosis s/p bilateral ureteral stent placement 8/27/2020, left ureteroscopy and laser lithotripsy with bilateral ureteral stent exchange 9/17/2020, with attempted removal of the stents in the office on 10/7/2020, unable to remove the right ureteral stent. Went to OR 10/9/2020 for removal of the right stent which was noted to be encrusted; also seen was a kink in the proximal right ureter concerning for right ureteropelvic junction obstruction. A new stent was placed and then removed 10/15/2020. Found to have severe hypercalciuria, workup revealed hyperparathyroidism and underwent parathyroidectomy     Sent in a litholink in November, no results available for review initially. After the patient encounter I was able to review litholink results. Patient has good urine volume at 4.27L/day, but hypercalciuria with 258 mg/day and profound hypocitratuira <64 mg/day    PHYSICAL EXAM  Patient is a 41 year old  female   Vitals: Blood pressure 114/84, pulse 84, height 1.575 m (5' 2\"), weight 81.2 kg (179 lb), last menstrual period 06/20/2019, SpO2 98 %, not currently breastfeeding.  Body mass index is 32.74 kg/m .  General Appearance Adult:   Alert, no acute distress, oriented  HENT: throat/mouth:normal, good dentition  Lungs: no respiratory distress, or pursed lip breathing  Heart: No obvious jugular venous distension present  Abdomen: soft, nontender, no organomegaly or masses  Musculoskeltal: extremities normal, no peripheral edema  Skin: no suspicious lesions or rashes  Neuro: Alert, oriented, speech and mentation normal  Psych: affect and mood normal  Gait: Normal    All pertinent imaging reviewed:    CT urogram 12/10/2021    IMPRESSION:   1.  No hydronephrosis or urolithiasis.  2.  Ill-defined area of focal " hypoenhancement at the lower left kidney  does not appear substantially changed compared to a contrast-enhanced  CT from 11/17/2020. While indeterminant, this could just be some small  scarring. Consider establishing greater than two years of imaging  stability.  3.  Fatty liver.  4.  New finding of a partially septated cyst at the right ovary/adnexa  that is 3.9 cm. This could be further evaluated with pelvic ultrasound  as clinically indicated. The previously noted left ovarian lesion is  not currently seen.    NM renal scan 12/15/2021  IMPRESSION: Stable exam. Stable minimal prominence of the right renal  pelvis and decreased peak tracer uptake as compared to the left side  that may represent mildly dilated, but nonobstructive right renal  collecting system. The left side appears normal.       ASSESSMENT and PLAN  41 year old female returns today for follow-up of nephrolithiasis, right hydronephrosis (complex history, see HPI)     No further stones have formed on repeat imaging    Nonobstructive right kidney on NM renal scan and CT urogram     Instructed patient to follow up with gynecologist regarding adnexal/ovarian cyst incidentally found on imaging    Has good fluid intake but persistent hypercalciuria and profound hypocitraturia on Litholink despite treatment of hyperparathyroidism with parathyroidectomy. Will refer to nephrology for further assistance with medical stone prevention    Follow up with me in 1 year with KUB prior      Kavon Hernandez MD   [facility]  [facility] Phone: [phone]     patient

## 2022-01-27 ENCOUNTER — OFFICE VISIT (OUTPATIENT)
Dept: INTERNAL MEDICINE | Facility: CLINIC | Age: 42
End: 2022-01-27
Payer: COMMERCIAL

## 2022-01-27 VITALS
OXYGEN SATURATION: 98 % | RESPIRATION RATE: 22 BRPM | DIASTOLIC BLOOD PRESSURE: 68 MMHG | BODY MASS INDEX: 32.94 KG/M2 | HEIGHT: 62 IN | HEART RATE: 74 BPM | WEIGHT: 179 LBS | TEMPERATURE: 98.3 F | SYSTOLIC BLOOD PRESSURE: 110 MMHG

## 2022-01-27 DIAGNOSIS — G56.01 CARPAL TUNNEL SYNDROME OF RIGHT WRIST: ICD-10-CM

## 2022-01-27 DIAGNOSIS — S46.819A STRAIN OF TRAPEZIUS MUSCLE, UNSPECIFIED LATERALITY, INITIAL ENCOUNTER: Primary | ICD-10-CM

## 2022-01-27 PROCEDURE — 99213 OFFICE O/P EST LOW 20 MIN: CPT | Performed by: NURSE PRACTITIONER

## 2022-01-27 RX ORDER — CYCLOBENZAPRINE HCL 10 MG
10 TABLET ORAL 3 TIMES DAILY PRN
Qty: 30 TABLET | Refills: 1 | Status: SHIPPED | OUTPATIENT
Start: 2022-01-27 | End: 2022-02-25

## 2022-01-27 RX ORDER — NAPROXEN 500 MG/1
500 TABLET ORAL 2 TIMES DAILY WITH MEALS
Qty: 60 TABLET | Refills: 1 | Status: SHIPPED | OUTPATIENT
Start: 2022-01-27 | End: 2022-02-25

## 2022-01-27 ASSESSMENT — MIFFLIN-ST. JEOR: SCORE: 1420.19

## 2022-01-27 NOTE — PROGRESS NOTES
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    Weight as of this encounter: 81.2 kg (179 lb).       Patient Instructions   Naprosyn twice daily - take with food     Flexeril  at  bedtime     Heat  To neck and upper back     Exercise neck and upper back      Ortho referral for right hand       Return in about 4 weeks (around 2/24/2022) for if not improved .    ELIAZAR Che CNP  [facility]    Divine Garcia is a 41 year old who presents for the following health issues     HPI   Chief Complaint   Patient presents with     Back Pain     pt c/o back pain lower into her back and then up into her shoulders. pt feel tired but unable to sleep.     She is going to school for hair dressing   She also works at [employer] as a      Review of Systems   Constitutional, HEENT, cardiovascular, pulmonary, GI, , musculoskeletal, neuro, skin, endocrine and psych systems are negative, except as otherwise noted.      Objective    /68   Pulse 74   Temp 98.3  F (36.8  C) (Oral)   Resp 22   Ht 1.575 m (5' 2\")   Wt 81.2 kg (179 lb)   LMP 06/20/2019   SpO2 98%   BMI 32.74 kg/m    Body mass index is 32.74 kg/m .  Physical Exam   GENERAL: alert and no distress  RESP: " lungs clear to auscultation - no rales, rhonchi or wheezes  CV: regular rate and rhythm  ABDOMEN: soft, nontender, and bowel sounds normal  MS: trapezius muscle tight especially in right side and into upper back   Weaker  right hand - history of carpal tunnel and has splint at home    NEURO: Normal strength and tone, mentation intact and speech normal  PSYCH: mentation appears normal, affect normal/bright    Discussed x ray but do not feel needed   She agrees

## 2022-01-27 NOTE — LETTER
[place] [address]  [facility] [address]  [phone]          January 27, 2022    RE:  Radha Bryan                                                                                                                                                       [address] [facility] [address]            To whom it may concern:    Radha Bryan was seen in clinic for her health. Please excuse her from work  for this time.            Sincerely,        Cheryl Mahmood CNP

## 2022-01-27 NOTE — PATIENT INSTRUCTIONS
Naprosyn twice daily - take with food     Flexeril  at  bedtime     Heat  To neck and upper back     Exercise neck and upper back      Ortho referral for right hand

## 2022-01-27 NOTE — LETTER
[place] [address]  [facility] [address]  [phone]          January 27, 2022    RE:  Radha Bryan                                                                                                                                                       [address] [facility] [address]            To whom it may concern:    Radha Bryan was seen in clinic for her health. Please excuse her from school for this time.          Sincerely,        Cheryl Mahmood CNP

## 2022-01-27 NOTE — NURSING NOTE
"Chief Complaint   Patient presents with     Back Pain     pt c/o back pain lower into her back and then up into her shoulders. pt feel tired but unable to sleep.     initial /68   Pulse 74   Temp 98.3  F (36.8  C) (Oral)   Resp 22   Ht 1.575 m (5' 2\")   Wt 81.2 kg (179 lb)   LMP 06/20/2019   SpO2 98%   BMI 32.74 kg/m   Estimated body mass index is 32.74 kg/m  as calculated from the following:    Height as of this encounter: 1.575 m (5' 2\").    Weight as of this encounter: 81.2 kg (179 lb)..  bp completed using cuff size large  FLOYD REYNAGA LPN  "

## 2022-02-04 NOTE — TELEPHONE ENCOUNTER
RECORDS RECEIVED FROM: Internal   DATE RECEIVED: 02.25.2022   NOTES STATUS DETAILS   OFFICE NOTE from referring provider Internal 12.17.2021 Kavon Hernandez MD   OFFICE NOTE from other specialist  Care Everywhere 12.30.2020 Kurtis Sim MD     *Only VASCULITIS or LUPUS gather office notes for the following N/A    *PULMONARY   N/A    *ENT Internal    *DERMATOLOGY N/A    *RHEUMATOLOGY N/A    DISCHARGE SUMMARY from hospital Internal 10.09.2020, 08.26.2020 Kavon Hernandez MD   DISCHARGE REPORT from the ER N/A    MEDICATION LIST Internal    IMAGING  (NEED IMAGES AND REPORTS)     KIDNEY CT SCAN N/A    KIDNEY ULTRASOUND N/A    MR ABDOMEN N/A    NUCLEAR MEDICINE RENAL N/A    LABS     CBC Internal 07.22.2021   CMP Internal 07.22.2021   BMP Internal 07.22.2021   UA Internal 07.22.2021   URINE PROTEIN Internal 07.22.2021   RENAL PANEL N/A    BIOPSY     KIDNEY BIOPSY  N/A

## 2022-02-21 DIAGNOSIS — I10 HYPERTENSION, ESSENTIAL: Primary | ICD-10-CM

## 2022-02-25 ENCOUNTER — PRE VISIT (OUTPATIENT)
Dept: NEPHROLOGY | Facility: CLINIC | Age: 42
End: 2022-02-25
Payer: COMMERCIAL

## 2022-02-25 ENCOUNTER — VIRTUAL VISIT (OUTPATIENT)
Dept: NEPHROLOGY | Facility: CLINIC | Age: 42
End: 2022-02-25
Attending: INTERNAL MEDICINE
Payer: COMMERCIAL

## 2022-02-25 DIAGNOSIS — N18.1 CHRONIC KIDNEY DISEASE, STAGE I: ICD-10-CM

## 2022-02-25 DIAGNOSIS — E55.9 VITAMIN D DEFICIENCY: ICD-10-CM

## 2022-02-25 DIAGNOSIS — R82.991 HYPOCITRATURIA: ICD-10-CM

## 2022-02-25 DIAGNOSIS — N20.0 NEPHROLITHIASIS: Primary | ICD-10-CM

## 2022-02-25 DIAGNOSIS — R82.994 HYPERCALCIURIA: ICD-10-CM

## 2022-02-25 DIAGNOSIS — N28.89 DILATED RENAL PELVIS: ICD-10-CM

## 2022-02-25 DIAGNOSIS — E21.1 SECONDARY HYPERPARATHYROIDISM, NON-RENAL (H): ICD-10-CM

## 2022-02-25 PROCEDURE — T1013 SIGN LANG/ORAL INTERPRETER: HCPCS | Mod: U3,GT,95

## 2022-02-25 PROCEDURE — 99205 OFFICE O/P NEW HI 60 MIN: CPT | Mod: 95 | Performed by: INTERNAL MEDICINE

## 2022-02-25 RX ORDER — POTASSIUM CITRATE 10 MEQ/1
20 TABLET, EXTENDED RELEASE ORAL DAILY
Qty: 180 TABLET | Refills: 3 | Status: SHIPPED | OUTPATIENT
Start: 2022-02-25 | End: 2022-05-26

## 2022-02-25 RX ORDER — CHOLECALCIFEROL (VITAMIN D3) 50 MCG
1 TABLET ORAL DAILY
Qty: 90 TABLET | Refills: 3 | Status: SHIPPED | OUTPATIENT
Start: 2022-02-25 | End: 2023-03-22

## 2022-02-25 NOTE — LETTER
2/25/2022     RE: Radha Bryan  [address]  [facility] [address]     Dear Colleague,    Thank you for referring your patient, Radha Bryan, to the [facility] [place] at [facility]. Please see a copy of my visit note below.    Radha is a 41 year old who is being evaluated via a billable video visit.      How would you like to obtain your AVS? MyChart  If the video visit is dropped, the invitation should be resent by: Send to e-mail at: [email or web address]  Will anyone else be joining your video visit? No    Video Start Time: 2:58 PM  Video-Visit Details    Type of service:  Video Visit    Video End Time:3:31 PM    Originating Location (pt. Location): Home    Distant Location (provider location):  [facility] [place]     Platform used for Video Visit: [facility]      Nephrology Clinic Note  February 25, 2022    I was asked to see this patient by Elsie Morales    CC: Nephrolithiasis    HPI: Radha Bryan is a 41 year old female with PMH significant for primary hyperparathyroidism s/p parathyroidectomy, nephrolithiasis, right hydronephrosis, hypovitaminosis D who presents for evaluation and management of Nephrolithiasis.    Pt was interviewed with the help of .    Pt endorsed feeling fine, no new symptoms today. She endorsed that she is worried about her renal function. Up on questioning, denied being sick lately, denied chest pain, SOB, nausea/vomting, diarrhea or abdominal pain. Denied any urinary issues as well.    Endorsed that she had neck surgery done for high jovon last year and also had urological procedures for her kidney stones. Last year was the first time she experienced renal stones, but brother had renal stones as well.     She endorsed that she decreased her water intake to 3 bottles from 4-5 bottles as she is been very busy with work. Also endorsed that she is not  taking vit D supplementation for same reason     Pt endorsed that her diet does not include fresh fruits and vegetables.    - History of urinary symptoms: no   - History of Hematuria: no  - Swelling: some but she stands all day, improves some with leg elevation  - Hx of UTIs: no  - Hx of stones: Yes in 2020  - Rashes/Joint pain: rash on her cheeks which is itchy, but not red/painful   - Family hx of kidney disease: brother with renal stones   - NSAID use: ibuprofen 2-3 days a week.     No Known Allergies    ibuprofen (ADVIL/MOTRIN) 600 MG tablet, Take 600 mg by mouth every 6 hours as needed for moderate pain  omeprazole (PRILOSEC) 20 MG DR capsule, Take 20 mg by mouth daily as needed   acetaminophen (TYLENOL) 500 MG tablet, Take 2 tablets (1,000 mg) by mouth every 6 hours as needed for other (mild pain) (Patient not taking: Reported on 1/27/2022)  calcium carbonate-vitamin D (OSCAL 500/200 D-3) 500-200 MG-UNIT tablet, Take 2 tablets by mouth every 12 hours Take two tablets every 12 hours for one week.  Then take two tablets once a day for one week. (Patient not taking: Reported on 1/27/2022)  cyclobenzaprine (FLEXERIL) 10 MG tablet, Take 1 tablet (10 mg) by mouth 3 times daily as needed for muscle spasms (Patient not taking: Reported on 2/25/2022)  naproxen (NAPROSYN) 500 MG tablet, Take 1 tablet (500 mg) by mouth 2 times daily (with meals) (Patient not taking: Reported on 2/25/2022)  vitamin D3 (CHOLECALCIFEROL) 50 mcg (2000 units) tablet, Take 1 tablet (50 mcg) by mouth daily (Patient not taking: Reported on 2/25/2022)    No current facility-administered medications on file prior to visit.      Past Medical History:   Diagnosis Date     Dyslipidemia      History of blood transfusion 2019    heavy menstrual cycles, anemic     Irregular periods/menstrual cycles      Nephrolithiasis      Obesity      PONV (postoperative nausea and vomiting)      Primary hyperparathyroidism (H) 02/2021     Tinea versicolor        Past  Surgical History:   Procedure Laterality Date     CHOLECYSTECTOMY       COMBINED CYSTOSCOPY, RETROGRADES, URETEROSCOPY, INSERT STENT Right 10/9/2020    Procedure: Cystoscopy right ureteral stent removal right retrograde pyelogram right ureteroscopy Right ureteral dilation right ureteral stent placement Holmium Laser on Stand-by;  Surgeon: Kavon Hernandez MD;  Location: [facility] OR     COMBINED CYSTOSCOPY, RETROGRADES, URETEROSCOPY, LASER HOLMIUM LITHOTRIPSY URETER(S), INSERT STENT Bilateral 9/17/2020    Procedure: Cystoscopy, bilateral stent removal, bilateral ureteroscopy, left laser lithotripsy, stone basketing, bilateral stent placement; bilateral retrograde pyelogram, fluoroscopic interpretation <1 hour physician time;  Surgeon: Kavon Hernandez MD;  Location: [facility] OR     CYSTOSCOPY, RETROGRADES, INSERT STENT URETER(S), COMBINED Bilateral 8/27/2020    Procedure: Cystoscopy with bilateral retrograde pyelogram and bilateral ureteral stent insertion with Fluoroscopic interpretation <1 hour physician time;  Surgeon: Kavon Hernandez MD;  Location: [facility] OR     LAPAROSCOPIC HYSTERECTOMY TOTAL, SALPINGECTOMY BILATERAL  07/17/2019    AUB, fibroid uterus, anemia. [facility], Dr. Pamela Carrion     LAPAROSCOPIC TUBAL LIGATION       PARATHYROIDECTOMY N/A 2/17/2021    Procedure: CERVICAL EXPLORATION, PARATHYROIDECTOMY;  Surgeon: Adelaide Saenz MD;  Location:  OR       Social History     Tobacco Use     Smoking status: Never Smoker     Smokeless tobacco: Never Used   Vaping Use     Vaping Use: Never used   Substance Use Topics     Alcohol use: No     Drug use: No       Family History   Problem Relation Age of Onset     Family History Negative Mother      Kidney Disease Brother         kidney stone     No Known Problems Sister      No Known Problems Son      No Known Problems Daughter      Liver Disease No family hx of      Colon Cancer No family hx of        ROS: A 12 system review of systems was negative other  than noted here or above.     Exam:  LMP 06/20/2019     GENERAL APPEARANCE: alert and no distress  EYES: no scleral icterus  RESP: able to speak in full sentences, not respiratory distress, no accessory muscle usage noted   CV: endorsed ankle edema  SKIN: no visible facial rash, but pt endorsed rash on her mandibles, b/l   NEURO: mentation intact and speech normal  PSYCH: affect normal/bright    Results:    No visits with results within 1 Day(s) from this visit.   Latest known visit with results is:   Office Visit on 12/17/2021   Component Date Value Ref Range Status     Color Urine 12/17/2021 Yellow  Colorless, Straw, Light Yellow, Yellow Final     Appearance Urine 12/17/2021 Clear  Clear Final     Glucose Urine 12/17/2021 Negative  Negative mg/dL Final     Bilirubin Urine 12/17/2021 Negative  Negative Final     Ketones Urine 12/17/2021 Negative  Negative mg/dL Final     Specific Gravity Urine 12/17/2021 >=1.030  1.003 - 1.035 Final     Blood Urine 12/17/2021 Trace (A) Negative Final     pH Urine 12/17/2021 5.5  5.0 - 7.0 Final     Protein Albumin Urine 12/17/2021 Trace (A) Negative mg/dL Final     Urobilinogen Urine 12/17/2021 0.2  0.2, 1.0 E.U./dL Final     Nitrite Urine 12/17/2021 Negative  Negative Final     Leukocyte Esterase Urine 12/17/2021 Negative  Negative Final       Assessment/Plan:   CKD stage I  Nephrolithiasis   Hypocitraturia   Hypercalciuria, improving after parathyroidectomy  Mildly dilated right renal pelvis.  Pt with baseline creatinine of 0.6 in July 2021 with eGFR of >90, but with h/o of nephrolithiasis and hydronephrosis, she belongs to CKD stage I. UA with few RBC when she was undergoing urological procedures and passing stones. No repeat done for this visit. Also UPCR pending. Last renal imaging with residual renal stones.  - will get up dated labs   - increase water intake   - include fresh fruits and vegetables to your diet  - started on potassium citrate for hypocitraturia   - stop  NSAID's including ibuprofen and naproxen     Hypertension :  Historically BP were stable, not on medications    Electrolytes :  Stable in July     BMD :  Hypovitaminosis D  Low vit D noted based on April 2021 labs, pt was not taking her supplementation, so renewed her prescription. Check Vit D now.  Rbuen based on July 2021 was WNL, recheck labs now    Metabolic acidosis :  Bicarb was WNL in July, awaiting labs     Anemia :  Hb was low at around 11, iron is low normal. Repeat labs, if low, supplement.    Other problems  Hyperparathyroidism, s/p parathyroidectomy, will check PTH.    Total time spent on the day of clinic visit was 60 min including 30 min of face to face time with pt, chart review including PCP note, urology notes and previous care every where nephrology note, surgery note, labs and image review, documentation as above.    Letty Cook  Dept of Nephrology and Hypertension  [facility]

## 2022-02-25 NOTE — PROGRESS NOTES
Radha is a 41 year old who is being evaluated via a billable video visit.      How would you like to obtain your AVS? MyChart  If the video visit is dropped, the invitation should be resent by: Send to e-mail at: [email or web address]  Will anyone else be joining your video visit? No    Video Start Time: 2:58 PM  Video-Visit Details    Type of service:  Video Visit    Video End Time:3:31 PM    Originating Location (pt. Location): [place]    Distant Location (provider location):  [facility] [place]     Platform used for Video Visit: [employer]

## 2022-02-25 NOTE — PROGRESS NOTES
Nephrology Clinic Note  February 25, 2022    I was asked to see this patient by Elsie Morales    CC: Nephrolithiasis    HPI: Radha Bryan is a 41 year old female with PMH significant for primary hyperparathyroidism s/p parathyroidectomy, nephrolithiasis, right hydronephrosis, hypovitaminosis D who presents for evaluation and management of Nephrolithiasis.    Pt was interviewed with the help of .    Pt endorsed feeling fine, no new symptoms today. She endorsed that she is worried about her renal function. Up on questioning, denied being sick lately, denied chest pain, SOB, nausea/vomting, diarrhea or abdominal pain. Denied any urinary issues as well.    Endorsed that she had neck surgery done for high jovon last year and also had urological procedures for her kidney stones. Last year was the first time she experienced renal stones, but brother had renal stones as well.     She endorsed that she decreased her water intake to 3 bottles from 4-5 bottles as she is been very busy with work. Also endorsed that she is not taking vit D supplementation for same reason     Pt endorsed that her diet does not include fresh fruits and vegetables.    - History of urinary symptoms: no   - History of Hematuria: no  - Swelling: some but she stands all day, improves some with leg elevation  - Hx of UTIs: no  - Hx of stones: Yes in 2020  - Rashes/Joint pain: rash on her cheeks which is itchy, but not red/painful   - Family hx of kidney disease: brother with renal stones   - NSAID use: ibuprofen 2-3 days a week.     No Known Allergies    ibuprofen (ADVIL/MOTRIN) 600 MG tablet, Take 600 mg by mouth every 6 hours as needed for moderate pain  omeprazole (PRILOSEC) 20 MG DR capsule, Take 20 mg by mouth daily as needed   acetaminophen (TYLENOL) 500 MG tablet, Take 2 tablets (1,000 mg) by mouth every 6 hours as needed for other (mild pain) (Patient not taking: Reported on 1/27/2022)  calcium carbonate-vitamin D  (OSCAL 500/200 D-3) 500-200 MG-UNIT tablet, Take 2 tablets by mouth every 12 hours Take two tablets every 12 hours for one week.  Then take two tablets once a day for one week. (Patient not taking: Reported on 1/27/2022)  cyclobenzaprine (FLEXERIL) 10 MG tablet, Take 1 tablet (10 mg) by mouth 3 times daily as needed for muscle spasms (Patient not taking: Reported on 2/25/2022)  naproxen (NAPROSYN) 500 MG tablet, Take 1 tablet (500 mg) by mouth 2 times daily (with meals) (Patient not taking: Reported on 2/25/2022)  vitamin D3 (CHOLECALCIFEROL) 50 mcg (2000 units) tablet, Take 1 tablet (50 mcg) by mouth daily (Patient not taking: Reported on 2/25/2022)    No current facility-administered medications on file prior to visit.      Past Medical History:   Diagnosis Date     Dyslipidemia      History of blood transfusion 2019    heavy menstrual cycles, anemic     Irregular periods/menstrual cycles      Nephrolithiasis      Obesity      PONV (postoperative nausea and vomiting)      Primary hyperparathyroidism (H) 02/2021     Tinea versicolor        Past Surgical History:   Procedure Laterality Date     CHOLECYSTECTOMY       COMBINED CYSTOSCOPY, RETROGRADES, URETEROSCOPY, INSERT STENT Right 10/9/2020    Procedure: Cystoscopy right ureteral stent removal right retrograde pyelogram right ureteroscopy Right ureteral dilation right ureteral stent placement Holmium Laser on Stand-by;  Surgeon: Kavon Hernandez MD;  Location:  OR     COMBINED CYSTOSCOPY, RETROGRADES, URETEROSCOPY, LASER HOLMIUM LITHOTRIPSY URETER(S), INSERT STENT Bilateral 9/17/2020    Procedure: Cystoscopy, bilateral stent removal, bilateral ureteroscopy, left laser lithotripsy, stone basketing, bilateral stent placement; bilateral retrograde pyelogram, fluoroscopic interpretation <1 hour physician time;  Surgeon: Kavon Hernandez MD;  Location: [facility] OR     CYSTOSCOPY, RETROGRADES, INSERT STENT URETER(S), COMBINED Bilateral 8/27/2020    Procedure: Cystoscopy  with bilateral retrograde pyelogram and bilateral ureteral stent insertion with Fluoroscopic interpretation <1 hour physician time;  Surgeon: Kavon Hernandez MD;  Location: [facility] OR     LAPAROSCOPIC HYSTERECTOMY TOTAL, SALPINGECTOMY BILATERAL  07/17/2019    AUB, fibroid uterus, anemia. [facility], Dr. Pamela Carrion     LAPAROSCOPIC TUBAL LIGATION       PARATHYROIDECTOMY N/A 2/17/2021    Procedure: CERVICAL EXPLORATION, PARATHYROIDECTOMY;  Surgeon: Adelaide Saenz MD;  Location:  OR       Social History     Tobacco Use     Smoking status: Never Smoker     Smokeless tobacco: Never Used   Vaping Use     Vaping Use: Never used   Substance Use Topics     Alcohol use: No     Drug use: No       Family History   Problem Relation Age of Onset     Family History Negative Mother      Kidney Disease Brother         kidney stone     No Known Problems Sister      No Known Problems Son      No Known Problems Daughter      Liver Disease No family hx of      Colon Cancer No family hx of        ROS: A 12 system review of systems was negative other than noted here or above.     Exam:  LMP 06/20/2019     GENERAL APPEARANCE: alert and no distress  EYES: no scleral icterus  RESP: able to speak in full sentences, not respiratory distress, no accessory muscle usage noted   CV: endorsed ankle edema  SKIN: no visible facial rash, but pt endorsed rash on her mandibles, b/l   NEURO: mentation intact and speech normal  PSYCH: affect normal/bright    Results:    No visits with results within 1 Day(s) from this visit.   Latest known visit with results is:   Office Visit on 12/17/2021   Component Date Value Ref Range Status     Color Urine 12/17/2021 Yellow  Colorless, Straw, Light Yellow, Yellow Final     Appearance Urine 12/17/2021 Clear  Clear Final     Glucose Urine 12/17/2021 Negative  Negative mg/dL Final     Bilirubin Urine 12/17/2021 Negative  Negative Final     Ketones Urine 12/17/2021 Negative  Negative mg/dL Final      Specific Gravity Urine 12/17/2021 >=1.030  1.003 - 1.035 Final     Blood Urine 12/17/2021 Trace (A) Negative Final     pH Urine 12/17/2021 5.5  5.0 - 7.0 Final     Protein Albumin Urine 12/17/2021 Trace (A) Negative mg/dL Final     Urobilinogen Urine 12/17/2021 0.2  0.2, 1.0 E.U./dL Final     Nitrite Urine 12/17/2021 Negative  Negative Final     Leukocyte Esterase Urine 12/17/2021 Negative  Negative Final       Assessment/Plan:   CKD stage I  Nephrolithiasis   Hypocitraturia   Hypercalciuria, improving after parathyroidectomy  Mildly dilated right renal pelvis.  Pt with baseline creatinine of 0.6 in July 2021 with eGFR of >90, but with h/o of nephrolithiasis and hydronephrosis, she belongs to CKD stage I. UA with few RBC when she was undergoing urological procedures and passing stones. No repeat done for this visit. Also UPCR pending. Last renal imaging with residual renal stones.  - will get up dated labs   - increase water intake   - include fresh fruits and vegetables to your diet  - started on potassium citrate for hypocitraturia   - stop NSAID's including ibuprofen and naproxen     Hypertension :  Historically BP were stable, not on medications    Electrolytes :  Stable in July     BMD :  Hypovitaminosis D  Low vit D noted based on April 2021 labs, pt was not taking her supplementation, so renewed her prescription. Check Vit D now.  Ruben based on July 2021 was WNL, recheck labs now    Metabolic acidosis :  Bicarb was WNL in July, awaiting labs     Anemia :  Hb was low at around 11, iron is low normal. Repeat labs, if low, supplement.    Other problems  Hyperparathyroidism, s/p parathyroidectomy, will check PTH.    Total time spent on the day of clinic visit was 60 min including 30 min of face to face time with pt, chart review including PCP note, urology notes and previous care every where nephrology note, surgery note, labs and image review, documentation as above.    Letty Cook  Dept of Nephrology and  Hypertension  [facility]

## 2022-02-28 NOTE — RESULT ENCOUNTER NOTE
Please call patient with no sign of bladder infection by UA. Nothing to do.     Thanks,  Pamela Carrion MD No

## 2022-03-01 ENCOUNTER — TELEPHONE (OUTPATIENT)
Dept: MULTI SPECIALTY CLINIC | Facility: CLINIC | Age: 42
End: 2022-03-01

## 2022-03-01 ENCOUNTER — LAB (OUTPATIENT)
Dept: LAB | Facility: CLINIC | Age: 42
End: 2022-03-01
Payer: COMMERCIAL

## 2022-03-01 DIAGNOSIS — I10 HYPERTENSION, ESSENTIAL: ICD-10-CM

## 2022-03-01 LAB
ALBUMIN UR-MCNC: NEGATIVE MG/DL
APPEARANCE UR: CLEAR
BACTERIA #/AREA URNS HPF: ABNORMAL /HPF
BILIRUB UR QL STRIP: NEGATIVE
COLOR UR AUTO: YELLOW
ERYTHROCYTE [DISTWIDTH] IN BLOOD BY AUTOMATED COUNT: 14 % (ref 10–15)
GLUCOSE UR STRIP-MCNC: NEGATIVE MG/DL
HCT VFR BLD AUTO: 36.8 % (ref 35–47)
HGB BLD-MCNC: 12.2 G/DL (ref 11.7–15.7)
HGB UR QL STRIP: ABNORMAL
KETONES UR STRIP-MCNC: NEGATIVE MG/DL
LEUKOCYTE ESTERASE UR QL STRIP: NEGATIVE
MCH RBC QN AUTO: 27.7 PG (ref 26.5–33)
MCHC RBC AUTO-ENTMCNC: 33.2 G/DL (ref 31.5–36.5)
MCV RBC AUTO: 83 FL (ref 78–100)
NITRATE UR QL: NEGATIVE
PH UR STRIP: 5.5 [PH] (ref 5–7)
PLATELET # BLD AUTO: 386 10E3/UL (ref 150–450)
RBC # BLD AUTO: 4.41 10E6/UL (ref 3.8–5.2)
RBC #/AREA URNS AUTO: ABNORMAL /HPF
SP GR UR STRIP: 1.02 (ref 1–1.03)
SQUAMOUS #/AREA URNS AUTO: ABNORMAL /LPF
TRANS CELLS #/AREA URNS HPF: ABNORMAL /HPF
UROBILINOGEN UR STRIP-ACNC: 0.2 E.U./DL
WBC # BLD AUTO: 9.3 10E3/UL (ref 4–11)
WBC #/AREA URNS AUTO: ABNORMAL /HPF

## 2022-03-01 PROCEDURE — 36415 COLL VENOUS BLD VENIPUNCTURE: CPT

## 2022-03-01 PROCEDURE — 85027 COMPLETE CBC AUTOMATED: CPT

## 2022-03-01 PROCEDURE — 80069 RENAL FUNCTION PANEL: CPT

## 2022-03-01 PROCEDURE — 81001 URINALYSIS AUTO W/SCOPE: CPT

## 2022-03-01 PROCEDURE — 82306 VITAMIN D 25 HYDROXY: CPT

## 2022-03-01 PROCEDURE — 83550 IRON BINDING TEST: CPT

## 2022-03-01 PROCEDURE — 84156 ASSAY OF PROTEIN URINE: CPT

## 2022-03-01 PROCEDURE — 82728 ASSAY OF FERRITIN: CPT

## 2022-03-01 PROCEDURE — 83970 ASSAY OF PARATHORMONE: CPT

## 2022-03-01 NOTE — TELEPHONE ENCOUNTER
Tried to call patient to schedule a follow up appt for Dr. Cook LM to return call to scheduling. My chart message sent.     DOUGLAS Colon

## 2022-03-02 LAB
ALBUMIN SERPL-MCNC: 3.6 G/DL (ref 3.4–5)
ANION GAP SERPL CALCULATED.3IONS-SCNC: 9 MMOL/L (ref 3–14)
BUN SERPL-MCNC: 14 MG/DL (ref 7–30)
CALCIUM SERPL-MCNC: 9.1 MG/DL (ref 8.5–10.1)
CHLORIDE BLD-SCNC: 108 MMOL/L (ref 94–109)
CO2 SERPL-SCNC: 20 MMOL/L (ref 20–32)
CREAT SERPL-MCNC: 0.62 MG/DL (ref 0.52–1.04)
CREAT UR-MCNC: 57 MG/DL
DEPRECATED CALCIDIOL+CALCIFEROL SERPL-MC: 18 UG/L (ref 20–75)
FERRITIN SERPL-MCNC: 13 NG/ML (ref 12–150)
GFR SERPL CREATININE-BSD FRML MDRD: >90 ML/MIN/1.73M2
GLUCOSE BLD-MCNC: 94 MG/DL (ref 70–99)
IRON SATN MFR SERPL: 17 % (ref 15–46)
IRON SERPL-MCNC: 76 UG/DL (ref 35–180)
PHOSPHATE SERPL-MCNC: 3.9 MG/DL (ref 2.5–4.5)
POTASSIUM BLD-SCNC: 3.6 MMOL/L (ref 3.4–5.3)
PROT UR-MCNC: <0.05 G/L
PROT/CREAT 24H UR: NORMAL MG/G{CREAT}
PTH-INTACT SERPL-MCNC: 71 PG/ML (ref 18–80)
SODIUM SERPL-SCNC: 137 MMOL/L (ref 133–144)
TIBC SERPL-MCNC: 443 UG/DL (ref 240–430)

## 2022-03-24 ENCOUNTER — APPOINTMENT (OUTPATIENT)
Dept: CT IMAGING | Facility: CLINIC | Age: 42
End: 2022-03-24
Attending: EMERGENCY MEDICINE
Payer: COMMERCIAL

## 2022-03-24 ENCOUNTER — HOSPITAL ENCOUNTER (EMERGENCY)
Facility: CLINIC | Age: 42
Discharge: HOME | End: 2022-03-24
Attending: EMERGENCY MEDICINE | Admitting: EMERGENCY MEDICINE
Payer: COMMERCIAL

## 2022-03-24 VITALS
HEART RATE: 80 BPM | RESPIRATION RATE: 18 BRPM | OXYGEN SATURATION: 99 % | DIASTOLIC BLOOD PRESSURE: 106 MMHG | TEMPERATURE: 97.7 F | SYSTOLIC BLOOD PRESSURE: 132 MMHG

## 2022-03-24 DIAGNOSIS — B96.89 BACTERIAL VAGINOSIS: ICD-10-CM

## 2022-03-24 DIAGNOSIS — N76.0 BACTERIAL VAGINOSIS: ICD-10-CM

## 2022-03-24 DIAGNOSIS — Z87.442 HISTORY OF KIDNEY STONES: ICD-10-CM

## 2022-03-24 DIAGNOSIS — R10.9 RIGHT FLANK PAIN: ICD-10-CM

## 2022-03-24 DIAGNOSIS — R30.0 DYSURIA: ICD-10-CM

## 2022-03-24 LAB
ALBUMIN UR-MCNC: NEGATIVE MG/DL
ANION GAP SERPL CALCULATED.3IONS-SCNC: 4 MMOL/L (ref 3–14)
APPEARANCE UR: CLEAR
BASOPHILS # BLD AUTO: 0.1 10E3/UL (ref 0–0.2)
BASOPHILS NFR BLD AUTO: 0 %
BILIRUB UR QL STRIP: NEGATIVE
BUN SERPL-MCNC: 14 MG/DL (ref 7–30)
CALCIUM SERPL-MCNC: 9.6 MG/DL (ref 8.5–10.1)
CHLORIDE BLD-SCNC: 107 MMOL/L (ref 94–109)
CLUE CELLS: PRESENT
CO2 SERPL-SCNC: 24 MMOL/L (ref 20–32)
COLOR UR AUTO: ABNORMAL
CREAT SERPL-MCNC: 0.56 MG/DL (ref 0.52–1.04)
EOSINOPHIL # BLD AUTO: 0.1 10E3/UL (ref 0–0.7)
EOSINOPHIL NFR BLD AUTO: 1 %
ERYTHROCYTE [DISTWIDTH] IN BLOOD BY AUTOMATED COUNT: 13.8 % (ref 10–15)
GFR SERPL CREATININE-BSD FRML MDRD: >90 ML/MIN/1.73M2
GLUCOSE BLD-MCNC: 93 MG/DL (ref 70–99)
GLUCOSE UR STRIP-MCNC: NEGATIVE MG/DL
HCT VFR BLD AUTO: 38.4 % (ref 35–47)
HGB BLD-MCNC: 12.4 G/DL (ref 11.7–15.7)
HGB UR QL STRIP: NEGATIVE
IMM GRANULOCYTES # BLD: 0 10E3/UL
IMM GRANULOCYTES NFR BLD: 0 %
KETONES UR STRIP-MCNC: NEGATIVE MG/DL
LEUKOCYTE ESTERASE UR QL STRIP: NEGATIVE
LYMPHOCYTES # BLD AUTO: 3.2 10E3/UL (ref 0.8–5.3)
LYMPHOCYTES NFR BLD AUTO: 27 %
MCH RBC QN AUTO: 27.8 PG (ref 26.5–33)
MCHC RBC AUTO-ENTMCNC: 32.3 G/DL (ref 31.5–36.5)
MCV RBC AUTO: 86 FL (ref 78–100)
MONOCYTES # BLD AUTO: 0.7 10E3/UL (ref 0–1.3)
MONOCYTES NFR BLD AUTO: 6 %
NEUTROPHILS # BLD AUTO: 7.8 10E3/UL (ref 1.6–8.3)
NEUTROPHILS NFR BLD AUTO: 66 %
NITRATE UR QL: NEGATIVE
NRBC # BLD AUTO: 0 10E3/UL
NRBC BLD AUTO-RTO: 0 /100
PH UR STRIP: 5.5 [PH] (ref 5–7)
PLATELET # BLD AUTO: 355 10E3/UL (ref 150–450)
POTASSIUM BLD-SCNC: 3.8 MMOL/L (ref 3.4–5.3)
RBC # BLD AUTO: 4.46 10E6/UL (ref 3.8–5.2)
RBC URINE: 1 /HPF
SODIUM SERPL-SCNC: 135 MMOL/L (ref 133–144)
SP GR UR STRIP: 1.01 (ref 1–1.03)
SQUAMOUS EPITHELIAL: 2 /HPF
TRICHOMONAS, WET PREP: ABNORMAL
UROBILINOGEN UR STRIP-MCNC: NORMAL MG/DL
WBC # BLD AUTO: 11.9 10E3/UL (ref 4–11)
WBC URINE: 1 /HPF
WBC'S/HIGH POWER FIELD, WET PREP: ABNORMAL
YEAST, WET PREP: ABNORMAL

## 2022-03-24 PROCEDURE — 81001 URINALYSIS AUTO W/SCOPE: CPT | Performed by: EMERGENCY MEDICINE

## 2022-03-24 PROCEDURE — 87210 SMEAR WET MOUNT SALINE/INK: CPT | Performed by: EMERGENCY MEDICINE

## 2022-03-24 PROCEDURE — 80048 BASIC METABOLIC PNL TOTAL CA: CPT | Performed by: EMERGENCY MEDICINE

## 2022-03-24 PROCEDURE — 250N000011 HC RX IP 250 OP 636: Performed by: EMERGENCY MEDICINE

## 2022-03-24 PROCEDURE — 36415 COLL VENOUS BLD VENIPUNCTURE: CPT | Performed by: EMERGENCY MEDICINE

## 2022-03-24 PROCEDURE — 99284 EMERGENCY DEPT VISIT MOD MDM: CPT | Mod: 25

## 2022-03-24 PROCEDURE — 85025 COMPLETE CBC W/AUTO DIFF WBC: CPT | Performed by: EMERGENCY MEDICINE

## 2022-03-24 PROCEDURE — 74176 CT ABD & PELVIS W/O CONTRAST: CPT

## 2022-03-24 PROCEDURE — 250N000013 HC RX MED GY IP 250 OP 250 PS 637: Performed by: EMERGENCY MEDICINE

## 2022-03-24 RX ORDER — ONDANSETRON 4 MG/1
4-8 TABLET, ORALLY DISINTEGRATING ORAL EVERY 8 HOURS PRN
Qty: 12 TABLET | Refills: 0 | Status: SHIPPED | OUTPATIENT
Start: 2022-03-24 | End: 2022-03-24

## 2022-03-24 RX ORDER — PHENAZOPYRIDINE HYDROCHLORIDE 100 MG/1
200 TABLET, FILM COATED ORAL ONCE
Status: COMPLETED | OUTPATIENT
Start: 2022-03-24 | End: 2022-03-24

## 2022-03-24 RX ORDER — METRONIDAZOLE 500 MG/1
500 TABLET ORAL 2 TIMES DAILY
Qty: 14 TABLET | Refills: 0 | Status: SHIPPED | OUTPATIENT
Start: 2022-03-24 | End: 2022-05-02

## 2022-03-24 RX ORDER — METRONIDAZOLE 500 MG/1
500 TABLET ORAL 2 TIMES DAILY
Qty: 14 TABLET | Refills: 0 | Status: SHIPPED | OUTPATIENT
Start: 2022-03-24 | End: 2022-03-24

## 2022-03-24 RX ORDER — PHENAZOPYRIDINE HYDROCHLORIDE 200 MG/1
200 TABLET, FILM COATED ORAL 3 TIMES DAILY PRN
Qty: 9 TABLET | Refills: 0 | Status: SHIPPED | OUTPATIENT
Start: 2022-03-24 | End: 2022-05-02

## 2022-03-24 RX ORDER — PHENAZOPYRIDINE HYDROCHLORIDE 200 MG/1
200 TABLET, FILM COATED ORAL 3 TIMES DAILY PRN
Qty: 9 TABLET | Refills: 0 | Status: SHIPPED | OUTPATIENT
Start: 2022-03-24 | End: 2022-03-24

## 2022-03-24 RX ORDER — ONDANSETRON 4 MG/1
4-8 TABLET, ORALLY DISINTEGRATING ORAL EVERY 8 HOURS PRN
Qty: 12 TABLET | Refills: 0 | Status: SHIPPED | OUTPATIENT
Start: 2022-03-24 | End: 2022-05-02

## 2022-03-24 RX ORDER — ONDANSETRON 4 MG/1
4 TABLET, ORALLY DISINTEGRATING ORAL ONCE
Status: COMPLETED | OUTPATIENT
Start: 2022-03-24 | End: 2022-03-24

## 2022-03-24 RX ADMIN — PHENAZOPYRIDINE HYDROCHLORIDE 200 MG: 100 TABLET ORAL at 16:16

## 2022-03-24 RX ADMIN — ONDANSETRON 4 MG: 4 TABLET, ORALLY DISINTEGRATING ORAL at 16:16

## 2022-03-24 ASSESSMENT — ENCOUNTER SYMPTOMS
COUGH: 0
LIGHT-HEADEDNESS: 0
SORE THROAT: 0
FREQUENCY: 1
CONSTIPATION: 0
ARTHRALGIAS: 0
COLOR CHANGE: 0
DIZZINESS: 0
DIARRHEA: 0
MYALGIAS: 0
FATIGUE: 0
NAUSEA: 1
SHORTNESS OF BREATH: 0
VOMITING: 0
FEVER: 0
FLANK PAIN: 1
HEMATURIA: 0
DYSURIA: 1
CHILLS: 0

## 2022-03-24 NOTE — ED PROVIDER NOTES
History     Chief Complaint:  Flank Pain and Urinary Problem    HPI   Radha Bryan is a 41 year old female who presents with R flank pain, urinary burning and increased frequency, and increased vaginal discharge. Patient reports discharge changes developed roughly 3 days ago and describes it as milky white, without prominent odor or pruritis. Dysuria began yesterday, but patient denies blood noticed in urine. Flank pain to right side developed today, at roughly 11 AM and is localized to right side with associated nausea. Patient has history of nephrolithiasis for which she has received renal stenting. States this pain feels similar to past stone episode. Denies any chest pain, SOB, fatigue, fevers, vomiting, or bowel changes.  No new sexual partners.  Denies concern for STD.  H/o tubal ligation.      Review of Systems   Constitutional: Negative for chills, fatigue and fever.   HENT: Negative for sneezing and sore throat.    Respiratory: Negative for cough and shortness of breath.    Gastrointestinal: Positive for nausea. Negative for constipation, diarrhea and vomiting.   Genitourinary: Positive for dysuria, flank pain, frequency and vaginal discharge. Negative for decreased urine volume, hematuria and pelvic pain.   Musculoskeletal: Negative for arthralgias and myalgias.   Skin: Negative for color change and pallor.   Neurological: Negative for dizziness and light-headedness.   All other systems reviewed and are negative.    Allergies:  No known allergies    Medications:    ibuprofen (ADVIL/MOTRIN) 600 MG tablet  potassium citrate (UROCIT-K) 10 MEQ (1080 MG) CR tablet  vitamin D3 (CHOLECALCIFEROL) 50 mcg (2000 units) tablet  omeprazole (PRILOSEC) 40 mg Delayed-Release capsule    fluticasone (50 mcg per actuation) nasal solution (FLONASE)     calcium with vitamin D3 (Os-Ruben 500+D) tablet       naproxen (NAPROSYN) 500 mg tablet     cyclobenzaprine (FLEXERIL) 10 mg tablet       Past Medical History:     Dyslipidemia   History of blood transfusion   Irregular periods/menstrual cycles   Nephrolithiasis   Obesity   PONV (postoperative nausea and vomiting)   Primary hyperparathyroidism   Tinea versicolor   Major depression in remission  Primary hyperparathyroidism   Hydronephrosis of right kidney  Ureteral stone  Tinea versicolor  Gastroesophageal reflux disease without esophagitis  Chronic right-sided low back pain  Intramural leiomyoma of uterus  Right ovarian cyst  Microcytic anemia  Menorrhagia  Hypertriglyceridemia    Past Surgical History:    Cholecystectomy   Combined cystoscopy, retrogrades, ureteroscopy, insert stent   Combined cystoscopy, retrogrades, ureteroscopy, laser holmium lithotripsy ureters, insert stent   Combined cystoscopy, retrogrades, insert stent ureters, combined   Laparoscopic hysterectomy total, salpingectomy bilateral   Laparoscopic tubal ligation   Parathyroidectomy     Family History:    Kidney Disease - kidney stones (bother)     Social History:  Patient presents to [facility] independently.    Physical Exam     Patient Vitals for the past 24 hrs:   BP Temp Temp src Pulse Resp SpO2   03/24/22 1400 (!) 132/106 97.7  F (36.5  C) Temporal 80 18 99 %     Physical Exam  Eyes:               Sclera white  ENT:                External ears and nares normal  CV:                  Rate as above with regular rhythm   Resp:               Breath sounds clear and equal bilaterally                          Non-labored, no retractions or accessory muscle use  GI:                   Abdomen is soft, non-tender, non-distended                          No rebound tenderness or peritoneal features  :  No CVA tenderness  MS:                  Moves all extremities  Skin:                Warm and dry  Neuro:             Speech is normal and fluent. No apparent deficit.    Emergency Department Course     Imaging:  CT Abdomen Pelvis w/o Contrast   Final Result   IMPRESSION:    1.  No renal calculi or  hydronephrosis.   2.  Fatty liver.    3.  Cholecystectomy.   4.  No findings to account for the patient's flank pain.           Laboratory:  Labs Ordered and Resulted from Time of ED Arrival to Time of ED Departure   ROUTINE UA WITH MICROSCOPIC REFLEX TO CULTURE - Abnormal       Result Value    Color Urine Straw      Appearance Urine Clear      Glucose Urine Negative      Bilirubin Urine Negative      Ketones Urine Negative      Specific Gravity Urine 1.006      Blood Urine Negative      pH Urine 5.5      Protein Albumin Urine Negative      Urobilinogen Urine Normal      Nitrite Urine Negative      Leukocyte Esterase Urine Negative      RBC Urine 1      WBC Urine 1      Squamous Epithelials Urine 2 (*)    CBC WITH PLATELETS AND DIFFERENTIAL - Abnormal    WBC Count 11.9 (*)     RBC Count 4.46      Hemoglobin 12.4      Hematocrit 38.4      MCV 86      MCH 27.8      MCHC 32.3      RDW 13.8      Platelet Count 355      % Neutrophils 66      % Lymphocytes 27      % Monocytes 6      % Eosinophils 1      % Basophils 0      % Immature Granulocytes 0      NRBCs per 100 WBC 0      Absolute Neutrophils 7.8      Absolute Lymphocytes 3.2      Absolute Monocytes 0.7      Absolute Eosinophils 0.1      Absolute Basophils 0.1      Absolute Immature Granulocytes 0.0      Absolute NRBCs 0.0     WET PREPARATION - Abnormal    Trichomonas Absent      Yeast Absent      Clue Cells Present (*)     WBCs/high power field 2+ (*)    BASIC METABOLIC PANEL - Normal    Sodium 135      Potassium 3.8      Chloride 107      Carbon Dioxide (CO2) 24      Anion Gap 4      Urea Nitrogen 14      Creatinine 0.56      Calcium 9.6      Glucose 93      GFR Estimate >90       Procedures:  None    Emergency Department Course:  Patient presented to [facility] area for R flank pain, dysuria, and vaginal discharge changes.    Reviewed:  I reviewed nursing notes, vitals and past history    Assessments:  1812  I rechecked the patient and explained findings.      Consults:   None    Interventions:  1616  ondansetron (ZOFRAN-ODT) ODT tab 4 mg, PO  1616  phenazopyridine (PYRIDIUM) tablet 200 mg, PO    Disposition:  The patient was discharged to home.    Impression & Plan      Medical Decision Making:  UA without evidence of infection as a source of dysuria.  If there is an infected stone than admission would be indicated.  CT without stone or acute findings.  Vaginal discharge can be associated with conditions that can also cause dysuria.  No concern for STDs.  Wet prep by self swab obtained.  Suggestive of BV which fits symptoms.  Discussed oral vs vaginal treatment.  Avoid alcohol with metronidazole pills.  Symptomatic medication with zofran and pyridium.    Addendum: prescriptions e-prescribed to the [facility] pharmacy on patient's record.  However they printed off twice suggesting the e-prescription did not go through.  Requested them to be faxed to the pharmacy.    Diagnosis:    ICD-10-CM    1. Dysuria  R30.0    2. Bacterial vaginosis  N76.0     B96.89    3. Right flank pain  R10.9    4. History of kidney stones  Z87.442        Discharge Medications:  Discharge Medication List as of 3/24/2022  6:25 PM      START taking these medications    Details   metroNIDAZOLE (FLAGYL) 500 MG tablet Take 1 tablet (500 mg) by mouth 2 times daily for 7 days, Disp-14 tablet, R-0, Local PrintEat yogurt or cottage cheese daily to prevent diarrhea that can be caused by taking this medication.      ondansetron (ZOFRAN ODT) 4 MG ODT tab Take 1-2 tablets (4-8 mg) by mouth every 8 hours as needed for nausea, Disp-12 tablet, R-0, Local Print      phenazopyridine (PYRIDIUM) 200 MG tablet Take 1 tablet (200 mg) by mouth 3 times daily as needed (bladder pain/irritation; turns urine orange), Disp-9 tablet, R-0, Local Print           Scribe Disclosure:  Lala PEREZ, am serving as a scribe at 3:56 PM on 3/24/2022 to document services personally performed by Janice Thompson  MD Belkys based on my observations and the provider's statements to me.     I, Marie Montenegro, am serving as a scribe at 3:56 PM on 3/24/2022 to document services personally performed by Janice Thompson MD based on my observations and the provider's statements to me.      Janice Thompson MD  03/24/22 2045       Janice Thompson MD  03/24/22 2106

## 2022-03-24 NOTE — LETTER
March 24, 2022      To Whom It May Concern:      Radha Bryan was seen in our Emergency Department today, 03/24/22.  I expect her condition to improve over the next 3 days.  She may return to work/school when improved.    Sincerely,        Augusto Pan RN

## 2022-03-25 ENCOUNTER — PATIENT OUTREACH (OUTPATIENT)
Dept: CARE COORDINATION | Facility: CLINIC | Age: 42
End: 2022-03-25

## 2022-03-25 DIAGNOSIS — Z71.89 OTHER SPECIFIED COUNSELING: ICD-10-CM

## 2022-03-25 NOTE — PROGRESS NOTES
"Clinic Care Coordination Contact  [facility]: Post-Discharge Note  SITUATION                                                      Admission:    Admission Date: 03/24/22   Reason for Admission: Flank Pain and Urinary Problem  Discharge:   Discharge Date: 03/24/22  Discharge Diagnosis: Bacterial vaginosis    BACKGROUND                                                      Per hospital discharge summary and inpatient provider notes:41 year old female who presents with R flank pain, urinary burning and increased frequency, and increased vaginal discharge. Patient reports discharge changes developed roughly 3 days ago and describes it as milky white, without prominent odor or pruritis. Dysuria began yesterday, but patient denies blood noticed in urine. Flank pain to right side developed today, at roughly 11 AM and is localized to right side with associated nausea. Patient has history of nephrolithiasis for which she has received renal stenting. States this pain feels similar to past stone episode. Denies any chest pain, SOB, fatigue, fevers, vomiting, or bowel changes.  No new sexual partners.  Denies concern for STD.  H/o tubal ligation.        ASSESSMENT           Discharge Assessment  How are you doing now that you are home?: \" I feel better thank you\"  How are your symptoms? (Red Flag symptoms escalate to triage hotline per guidelines): Improved  Do you feel your condition is stable enough to be safe at home until your provider visit?: Yes  Does the patient have their discharge instructions? : Yes  Does the patient have questions regarding their discharge instructions? : No  Were you started on any new medications or were there changes to any of your previous medications? : Yes  Does the patient have all of their medications?: Yes  Do you have questions regarding any of your medications? : No  Do you have all of your needed medical supplies or equipment (DME)?  (i.e. oxygen tank, CPAP, cane, etc.): " Yes  Discharge follow-up appointment scheduled within 14 calendar days? : No  Is patient agreeable to assistance with scheduling? : No    Post-op ([facility] Only)  If the patient had a surgery or procedure, do they have any questions for a nurse?: No             PLAN                                                      Outpatient Plan: Follow up with Jessica Zimmerman NP as needed for continued symptoms.    Future Appointments   Date Time Provider Department Center   6/17/2022  8:00 AM [facility]   6/20/2022  4:00 PM Letty Cook MD [facility]         For any urgent concerns, please contact our 24 hour nurse triage line: [phone] ([phone])         Dawna Gaspar

## 2022-04-12 ENCOUNTER — OFFICE VISIT (OUTPATIENT)
Dept: FAMILY MEDICINE | Facility: CLINIC | Age: 42
End: 2022-04-12
Payer: COMMERCIAL

## 2022-04-12 VITALS
SYSTOLIC BLOOD PRESSURE: 122 MMHG | DIASTOLIC BLOOD PRESSURE: 80 MMHG | TEMPERATURE: 98.1 F | RESPIRATION RATE: 18 BRPM | HEART RATE: 74 BPM | BODY MASS INDEX: 34.05 KG/M2 | WEIGHT: 186.19 LBS | OXYGEN SATURATION: 97 %

## 2022-04-12 DIAGNOSIS — B96.89 BV (BACTERIAL VAGINOSIS): Primary | ICD-10-CM

## 2022-04-12 DIAGNOSIS — N76.0 BV (BACTERIAL VAGINOSIS): Primary | ICD-10-CM

## 2022-04-12 DIAGNOSIS — R68.89 CHANGE IN WEIGHT: ICD-10-CM

## 2022-04-12 LAB
DEPRECATED CALCIDIOL+CALCIFEROL SERPL-MC: 20 UG/L (ref 20–75)
TSH SERPL DL<=0.005 MIU/L-ACNC: 2.33 MU/L (ref 0.4–4)

## 2022-04-12 PROCEDURE — 36415 COLL VENOUS BLD VENIPUNCTURE: CPT | Performed by: PHYSICIAN ASSISTANT

## 2022-04-12 PROCEDURE — 84443 ASSAY THYROID STIM HORMONE: CPT | Performed by: PHYSICIAN ASSISTANT

## 2022-04-12 PROCEDURE — 99213 OFFICE O/P EST LOW 20 MIN: CPT | Performed by: PHYSICIAN ASSISTANT

## 2022-04-12 PROCEDURE — 82306 VITAMIN D 25 HYDROXY: CPT | Performed by: PHYSICIAN ASSISTANT

## 2022-04-12 ASSESSMENT — PATIENT HEALTH QUESTIONNAIRE - PHQ9: SUM OF ALL RESPONSES TO PHQ QUESTIONS 1-9: 5

## 2022-04-12 NOTE — PROGRESS NOTES
Assessment & Plan     BV (bacterial vaginosis)    Resolved.      Change in weight    Discussed that weight fluctuations are normal and how to weight self to get most accurate and consistent readings. Will check thyroid.    - TSH with free T4 reflex; Future  - TSH with free T4 reflex                   No follow-ups on file.    Julio Nicholas PA-C  [facility] SONA Garcia is a 41 year old who presents for the following health issues     HPI     ED/UC Followup:    Facility:  [facility] Emergency Dept  Date of visit: 03/24/22  Reason for visit: flank pain, dysuria   Current Status: improved- resolved      Has also been noticing fluctuations in her weight. Has been gaining some. Had her parathyroid removed a while ago. Wonders if thyroid could be causing fluctuations.    Review of Systems   Constitutional, HEENT, cardiovascular, pulmonary, gi and gu systems are negative, except as otherwise noted.        Objective    /80 (BP Location: Right arm, Patient Position: Chair, Cuff Size: Adult Regular)   Pulse 74   Temp 98.1  F (36.7  C) (Oral)   Resp 18   Wt 84.5 kg (186 lb 3 oz)   LMP 06/20/2019   SpO2 97%   BMI 34.05 kg/m    Body mass index is 34.05 kg/m .       Physical Exam   GENERAL: healthy, alert and no distress  EYES: Eyes grossly normal to inspection, PERRL and conjunctivae and sclerae normal  RESP: lungs clear to auscultation - no rales, rhonchi or wheezes  CV: regular rate and rhythm, normal S1 S2, no S3 or S4, no murmur, click or rub, no peripheral edema and peripheral pulses strong  ABDOMEN: soft, nontender, no hepatosplenomegaly, no masses and bowel sounds normal  MS: no gross musculoskeletal defects noted, no edema  SKIN: no suspicious lesions or rashes  NEURO: Normal strength and tone, mentation intact and speech normal  BACK: no CVA tenderness  PSYCH: mentation appears normal, affect normal/bright

## 2022-04-30 ENCOUNTER — HEALTH MAINTENANCE LETTER (OUTPATIENT)
Age: 42
End: 2022-04-30

## 2022-05-02 ENCOUNTER — OFFICE VISIT (OUTPATIENT)
Dept: INTERNAL MEDICINE | Facility: CLINIC | Age: 42
End: 2022-05-02
Payer: COMMERCIAL

## 2022-05-02 ENCOUNTER — NURSE TRIAGE (OUTPATIENT)
Dept: INTERNAL MEDICINE | Facility: CLINIC | Age: 42
End: 2022-05-02
Payer: COMMERCIAL

## 2022-05-02 VITALS
DIASTOLIC BLOOD PRESSURE: 70 MMHG | WEIGHT: 186 LBS | OXYGEN SATURATION: 98 % | RESPIRATION RATE: 16 BRPM | TEMPERATURE: 97.3 F | SYSTOLIC BLOOD PRESSURE: 110 MMHG | HEART RATE: 91 BPM | BODY MASS INDEX: 34.02 KG/M2

## 2022-05-02 DIAGNOSIS — F32.5 MAJOR DEPRESSION IN REMISSION (H): ICD-10-CM

## 2022-05-02 DIAGNOSIS — K21.00 GASTROESOPHAGEAL REFLUX DISEASE WITH ESOPHAGITIS, UNSPECIFIED WHETHER HEMORRHAGE: Primary | ICD-10-CM

## 2022-05-02 PROCEDURE — 99214 OFFICE O/P EST MOD 30 MIN: CPT | Performed by: NURSE PRACTITIONER

## 2022-05-02 NOTE — PROGRESS NOTES
"  Assessment & Plan     Gastroesophageal reflux disease with esophagitis, unspecified whether hemorrhage    - Helicobacter pylori Antigen Stool; Future  - omeprazole (PRILOSEC) 20 MG DR capsule; Take 1 capsule (20 mg) by mouth daily    Major depression in remission (H)    - Adult Mental Health  Referral; Future      F/u 1  Month and prn       BMI:   Estimated body mass index is 34.02 kg/m  as calculated from the following:    Height as of 1/27/22: 1.575 m (5' 2\").    Weight as of this encounter: 84.4 kg (186 lb).           Jessica Zimmerman NP  [facility] MILLIE Garcia is a 41 year old who presents for the following health issues     HPI     Complains of right lower abdominal pain for about two weeks .  Worse with laying down or eating   No noted fevers   States would like to speak to psychologist     Concern - abd pain, diarrhea  Onset: pain x 2 weeks, diarrhea today  Description: RUQ pain  Intensity: mild, moderate  Progression of Symptoms:  intermittent  Accompanying Signs & Symptoms: none  Previous history of similar problem: yes, GERD  Precipitating factors:        Worsened by: none  Alleviating factors:        Improved by: none  Therapies tried and outcome: PPI prn      Review of Systems   CONSTITUTIONAL: NEGATIVE for fever, chills, change in weight  ENT/MOUTH: NEGATIVE for ear, mouth and throat problems  RESP: NEGATIVE for significant cough or SOB  CV: NEGATIVE for chest pain, palpitations or peripheral edema  PSYCHIATRIC: POSITIVE foranxiety and depressed mood      Objective    Pulse 91   Temp 97.3  F (36.3  C) (Oral)   Resp 16   Wt 84.4 kg (186 lb)   LMP 06/20/2019   SpO2 98%   BMI 34.02 kg/m    Body mass index is 34.02 kg/m .  Physical Exam   GENERAL: alert, no distress and obese  EYES: Eyes grossly normal to inspection, PERRL and conjunctivae and sclerae normal  NECK: no adenopathy, no asymmetry, masses, or scars and thyroid normal to palpation  RESP: " lungs clear to auscultation - no rales, rhonchi or wheezes  CV: regular rate and rhythm, normal S1 S2, no S3 or S4, no murmur, click or rub, no peripheral edema and peripheral pulses strong  ABDOMEN: tenderness generalized, no guarding or rebound, and bowel sounds normal  PSYCH: mentation appears normal and affect flat

## 2022-05-02 NOTE — LETTER
[place] [address], [address]  [place], [place]  [address]                                            TEL:[phone]  FAX:[phone]      Radha Bryan  [address] [facility] [address]      May 2, 2022      To whom it may concern,    Radha Bryan was absent today due to medical office visit.         Sincerely,      Jessica Zimmerman C.N.P.

## 2022-05-02 NOTE — TELEPHONE ENCOUNTER
Nurse Triage SBAR    Is this a 2nd Level Triage? NO    Situation: Patient calls with right sided abd pain and 3 days of diarrhea    Background: Patient seen and treated for BV 3/24. Seen for f/u 4/12. Completed Abx 3/30    Assessment: Patient calls with right sided abd pain and 3 days of diarrhea. Diarrhea is loose, watery, brown in color and does not have an excessive smell. Patient denies fever, vomiting, illness, ill contact, anyone in the household having similar symptoms.     Abd pain is 5/10, sharp, pretty constant, worse when laying down, can sometimes happen after eating but does not lead to diarrhea.     Protocol Recommended Disposition:   See Today In Office    Recommendation: Same day appointment made.     Appointments in Next Year    May 02, 2022  3:00 PM  (Arrive by 2:40 PM)  Provider Visit with Jesscia Zimmerman NP  [facility] ([facility] ) [phone]   Jun 17, 2022  8:00 AM  LAB with [facility] LAB  [facility] ([facility] ) [phone]   Jun 20, 2022  4:00 PM  (Arrive by 3:45 PM)  Video Visit with Letty Cook MD  [facility] [place] ([facility] ) [phone]            Does the patient meet one of the following criteria for ADS visit consideration? 16+ years old, with an [facility] PCP     TIP  Providers, please consider if this condition is appropriate for management at one of our Acute and Diagnostic Services sites.     If patient is a good candidate, please use dotphrase <dot>triageresponse and select Refer to ADS to document.      Reason for Disposition    MODERATE diarrhea (e.g., 4-6 times / day more than normal) and present > 48 hours (2 days)    Additional Information    Negative: Shock suspected (e.g., cold/pale/clammy skin, too weak to stand, low BP, rapid pulse)    Negative: Difficult to awaken or acting confused  "(e.g., disoriented, slurred speech)    Negative: Sounds like a life-threatening emergency to the triager    Negative: Vomiting also present and worse than the diarrhea    Negative: Blood in stool and without diarrhea    Negative: SEVERE abdominal pain (e.g., excruciating) and present > 1 hour    Negative: SEVERE abdominal pain and age > 60    Negative: Bloody, black, or tarry bowel movements (Exception: chronic-unchanged black-grey bowel movements and is taking iron pills or Pepto-bismol)    Negative: SEVERE diarrhea (e.g., 7 or more times / day more than normal) and age > 60 years    Negative: Constant abdominal pain lasting > 2 hours    Negative: Drinking very little and has signs of dehydration (e.g., no urine > 12 hours, very dry mouth, very lightheaded)    Negative: Patient sounds very sick or weak to the triager    Negative: SEVERE diarrhea (e.g., 7 or more times / day more than normal) and present > 24 hours (1 day)    Answer Assessment - Initial Assessment Questions  1. DIARRHEA SEVERITY: \"How bad is the diarrhea?\" \"How many extra stools have you had in the past 24 hours than normal?\"     - NO DIARRHEA (SCALE 0)    - MILD (SCALE 1-3): Few loose or mushy BMs; increase of 1-3 stools over normal daily number of stools; mild increase in ostomy output.    -  MODERATE (SCALE 4-7): Increase of 4-6 stools daily over normal; moderate increase in ostomy output.  * SEVERE (SCALE 8-10; OR 'WORST POSSIBLE'): Increase of 7 or more stools daily over normal; moderate increase in ostomy output; incontinence.      Moderate  2. ONSET: \"When did the diarrhea begin?\"       3 days ago   3. BM CONSISTENCY: \"How loose or watery is the diarrhea?\"       Loose, watery, brown, no excessive smell  4. VOMITING: \"Are you also vomiting?\" If so, ask: \"How many times in the past 24 hours?\"       No  5. ABDOMINAL PAIN: \"Are you having any abdominal pain?\" If yes: \"What does it feel like?\" (e.g., crampy, dull, intermittent, constant)       " "Right sided pain, constant, described as \"stabbing\" 5/10  6. ABDOMINAL PAIN SEVERITY: If present, ask: \"How bad is the pain?\"  (e.g., Scale 1-10; mild, moderate, or severe)    - MILD (1-3): doesn't interfere with normal activities, abdomen soft and not tender to touch     - MODERATE (4-7): interferes with normal activities or awakens from sleep, tender to touch     - SEVERE (8-10): excruciating pain, doubled over, unable to do any normal activities        5/10  7. ORAL INTAKE: If vomiting, \"Have you been able to drink liquids?\" \"How much fluids have you had in the past 24 hours?\"      Yes  8. HYDRATION: \"Any signs of dehydration?\" (e.g., dry mouth [not just dry lips], too weak to stand, dizziness, new weight loss) \"When did you last urinate?\"      No  9. EXPOSURE: \"Have you traveled to a foreign country recently?\" \"Have you been exposed to anyone with diarrhea?\" \"Could you have eaten any food that was spoiled?\"      No, no one else has diarrhea in the house, no restaurant food  10. ANTIBIOTIC USE: \"Are you taking antibiotics now or have you taken antibiotics in the past 2 months?\"        Finished abx 2 weeks ago- BV  11. OTHER SYMPTOMS: \"Do you have any other symptoms?\" (e.g., fever, blood in stool)        No fever, no blood  12. PREGNANCY: \"Is there any chance you are pregnant?\" \"When was your last menstrual period?\"        No    Protocols used: DIARRHEA-A-OH      "

## 2022-05-04 ENCOUNTER — NURSE TRIAGE (OUTPATIENT)
Dept: NURSING | Facility: CLINIC | Age: 42
End: 2022-05-04
Payer: COMMERCIAL

## 2022-05-04 NOTE — CONFIDENTIAL NOTE
Abdominal pain and diarrhea.  Order for stool sample  Rates pain 8/10 unable to eat due to pain and goes directly to bathroom after eating.  Pain seen in clinic and in ED for same abdominal pain and diarrhea, patient has not left a stool sample as ordered.  Triager encouraged patient to obtain the sample and follow up with urgent care  Caller verbalized and understands directives  COVID 19 Nurse Triage Plan/Patient Instructions    Please be aware that novel coronavirus (COVID-19) may be circulating in the community. If you develop symptoms such as fever, cough, or SOB or if you have concerns about the presence of another infection including coronavirus (COVID-19), please contact your health care provider or visit [email or web address].     Disposition/Instructions    In-Person Visit with provider recommended. Reference Visit Selection Guide.    Thank you for taking steps to prevent the spread of this virus.  o Limit your contact with others.  o Wear a simple mask to cover your cough.  o Wash your hands well and often.    Resources    [facility]: About COVID-19: [email or web address]    CDC: What to Do If You're Sick: www.cdc.gov/coronavirus/2019-ncov/about/steps-when-sick.html    CDC: Ending Home Isolation: www.cdc.gov/coronavirus/2019-ncov/hcp/disposition-in-home-patients.html     CDC: Caring for Someone: www.cdc.gov/coronavirus/2019-ncov/if-you-are-sick/care-for-someone.html     [facility]: Interim Guidance for Hospital Discharge to Home: www.health.[facility].[place]/diseases/coronavirus/hcp/hospdischarge.pdf    [facility] clinical trials (COVID-19 research studies): clinicalaffairs.[facility] [place]/[facility]-clinical-trials     Below are the COVID-19 hotlines at the [place] Department of Health ([facility]). Interpreters are available.   o For health questions: Call 492-419-9318 or 1-538.811.2868 (7 a.m. to 7 p.m.)  o For questions about schools and childcare: Call 453-216-9101 or [phone] (7 a.m. to 7  p.m.)

## 2022-05-05 ENCOUNTER — APPOINTMENT (OUTPATIENT)
Dept: LAB | Facility: CLINIC | Age: 42
End: 2022-05-05
Payer: COMMERCIAL

## 2022-05-05 PROCEDURE — 87338 HPYLORI STOOL AG IA: CPT | Performed by: NURSE PRACTITIONER

## 2022-05-06 ENCOUNTER — TELEPHONE (OUTPATIENT)
Dept: NURSING | Facility: CLINIC | Age: 42
End: 2022-05-06
Payer: COMMERCIAL

## 2022-05-06 LAB — H PYLORI AG STL QL IA: POSITIVE

## 2022-05-06 NOTE — TELEPHONE ENCOUNTER
Pt calling about medication;    Pt reports that she has a positive H- pylori today.  Pt states 'she needs to start medication for this right away because she is feeling so sick.'      Page sent to [facility], Dr.Jeff Teresa Carney who advised;    Can send;   Amoxicillin 500 mg PO twice daily for 14 days.  No refills  Metronidazole 500 mg PO twice daily for 14 days.  No refills.    Rx's called to [facility].  Pharmacist, [place]    Pt Notified.    Tiffany Campuzano RN, Nurse Advisor 7:18 PM 5/6/2022  COVID 19 Nurse Triage Plan/Patient Instructions    Please be aware that novel coronavirus (COVID-19) may be circulating in the community. If you develop symptoms such as fever, cough, or SOB or if you have concerns about the presence of another infection including coronavirus (COVID-19), please contact your health care provider or visit [email or web address].     Disposition/Instructions    Home care recommended. Follow home care protocol based instructions.    Thank you for taking steps to prevent the spread of this virus.  o Limit your contact with others.  o Wear a simple mask to cover your cough.  o Wash your hands well and often.

## 2022-05-09 ENCOUNTER — TELEPHONE (OUTPATIENT)
Dept: INTERNAL MEDICINE | Facility: CLINIC | Age: 42
End: 2022-05-09
Payer: COMMERCIAL

## 2022-05-09 DIAGNOSIS — A04.8 H. PYLORI INFECTION: Primary | ICD-10-CM

## 2022-05-09 RX ORDER — CLARITHROMYCIN 500 MG
500 TABLET ORAL 2 TIMES DAILY
Qty: 28 TABLET | Refills: 0 | Status: SHIPPED | OUTPATIENT
Start: 2022-05-09 | End: 2022-05-23

## 2022-05-09 RX ORDER — AMOXICILLIN 500 MG/1
1000 CAPSULE ORAL 2 TIMES DAILY
Qty: 56 CAPSULE | Refills: 0 | Status: SHIPPED | OUTPATIENT
Start: 2022-05-09 | End: 2022-05-23

## 2022-05-09 NOTE — TELEPHONE ENCOUNTER
No answer and unable to leave vm as no mail box set up.  Please try calling again.  EMILIO RICO CMA

## 2022-05-09 NOTE — TELEPHONE ENCOUNTER
Advise pt + H pylori, eRx sent biaxin, amoxicillin BID x 2 weeks.  Continue prilosec daily indefinitely  Jessica Zimmerman CNP

## 2022-06-07 ENCOUNTER — DOCUMENTATION ONLY (OUTPATIENT)
Dept: LAB | Facility: CLINIC | Age: 42
End: 2022-06-07
Payer: COMMERCIAL

## 2022-06-14 DIAGNOSIS — N18.1 CHRONIC KIDNEY DISEASE, STAGE I: Primary | ICD-10-CM

## 2022-06-17 ENCOUNTER — LAB (OUTPATIENT)
Dept: LAB | Facility: CLINIC | Age: 42
End: 2022-06-17
Payer: COMMERCIAL

## 2022-06-17 DIAGNOSIS — Z11.4 SCREENING FOR HIV (HUMAN IMMUNODEFICIENCY VIRUS): Primary | ICD-10-CM

## 2022-06-17 DIAGNOSIS — N18.1 CHRONIC KIDNEY DISEASE, STAGE I: ICD-10-CM

## 2022-06-17 LAB
ALBUMIN SERPL-MCNC: 3.6 G/DL (ref 3.4–5)
ALBUMIN UR-MCNC: NEGATIVE MG/DL
ANION GAP SERPL CALCULATED.3IONS-SCNC: 5 MMOL/L (ref 3–14)
APPEARANCE UR: CLEAR
BACTERIA #/AREA URNS HPF: ABNORMAL /HPF
BILIRUB UR QL STRIP: NEGATIVE
BUN SERPL-MCNC: 11 MG/DL (ref 7–30)
CALCIUM SERPL-MCNC: 8.6 MG/DL (ref 8.5–10.1)
CHLORIDE BLD-SCNC: 108 MMOL/L (ref 94–109)
CO2 SERPL-SCNC: 26 MMOL/L (ref 20–32)
COLOR UR AUTO: YELLOW
CREAT SERPL-MCNC: 0.53 MG/DL (ref 0.52–1.04)
CREAT UR-MCNC: 252 MG/DL
ERYTHROCYTE [DISTWIDTH] IN BLOOD BY AUTOMATED COUNT: 13.8 % (ref 10–15)
GFR SERPL CREATININE-BSD FRML MDRD: >90 ML/MIN/1.73M2
GLUCOSE BLD-MCNC: 93 MG/DL (ref 70–99)
GLUCOSE UR STRIP-MCNC: NEGATIVE MG/DL
HCT VFR BLD AUTO: 36.6 % (ref 35–47)
HGB BLD-MCNC: 12.5 G/DL (ref 11.7–15.7)
HGB UR QL STRIP: ABNORMAL
HIV 1+2 AB+HIV1 P24 AG SERPL QL IA: NONREACTIVE
KETONES UR STRIP-MCNC: NEGATIVE MG/DL
LEUKOCYTE ESTERASE UR QL STRIP: NEGATIVE
MCH RBC QN AUTO: 27.8 PG (ref 26.5–33)
MCHC RBC AUTO-ENTMCNC: 34.2 G/DL (ref 31.5–36.5)
MCV RBC AUTO: 82 FL (ref 78–100)
NITRATE UR QL: NEGATIVE
PH UR STRIP: 6 [PH] (ref 5–7)
PHOSPHATE SERPL-MCNC: 3.2 MG/DL (ref 2.5–4.5)
PLATELET # BLD AUTO: 362 10E3/UL (ref 150–450)
POTASSIUM BLD-SCNC: 4 MMOL/L (ref 3.4–5.3)
PROT UR-MCNC: 0.16 G/L
PROT/CREAT 24H UR: 0.06 G/G CR (ref 0–0.2)
RBC # BLD AUTO: 4.49 10E6/UL (ref 3.8–5.2)
RBC #/AREA URNS AUTO: ABNORMAL /HPF
SODIUM SERPL-SCNC: 139 MMOL/L (ref 133–144)
SP GR UR STRIP: >=1.03 (ref 1–1.03)
SQUAMOUS #/AREA URNS AUTO: ABNORMAL /LPF
UROBILINOGEN UR STRIP-ACNC: 0.2 E.U./DL
WBC # BLD AUTO: 8.7 10E3/UL (ref 4–11)
WBC #/AREA URNS AUTO: ABNORMAL /HPF

## 2022-06-17 PROCEDURE — 36415 COLL VENOUS BLD VENIPUNCTURE: CPT

## 2022-06-17 PROCEDURE — 84156 ASSAY OF PROTEIN URINE: CPT

## 2022-06-17 PROCEDURE — 85027 COMPLETE CBC AUTOMATED: CPT

## 2022-06-17 PROCEDURE — 87389 HIV-1 AG W/HIV-1&-2 AB AG IA: CPT

## 2022-06-17 PROCEDURE — 80069 RENAL FUNCTION PANEL: CPT

## 2022-06-17 PROCEDURE — 81001 URINALYSIS AUTO W/SCOPE: CPT

## 2022-06-20 ENCOUNTER — VIRTUAL VISIT (OUTPATIENT)
Dept: NEPHROLOGY | Facility: CLINIC | Age: 42
End: 2022-06-20
Attending: INTERNAL MEDICINE
Payer: COMMERCIAL

## 2022-06-20 VITALS — BODY MASS INDEX: 34.04 KG/M2 | HEIGHT: 62 IN | WEIGHT: 185 LBS

## 2022-06-20 DIAGNOSIS — N18.1 CHRONIC KIDNEY DISEASE, STAGE I: Primary | ICD-10-CM

## 2022-06-20 DIAGNOSIS — E21.0 PRIMARY HYPERPARATHYROIDISM (H): ICD-10-CM

## 2022-06-20 DIAGNOSIS — R82.991 HYPOCITRATURIA: ICD-10-CM

## 2022-06-20 DIAGNOSIS — N20.0 NEPHROLITHIASIS: ICD-10-CM

## 2022-06-20 PROCEDURE — 99214 OFFICE O/P EST MOD 30 MIN: CPT | Mod: 95 | Performed by: INTERNAL MEDICINE

## 2022-06-20 PROCEDURE — G0463 HOSPITAL OUTPT CLINIC VISIT: HCPCS | Mod: PN,RTG | Performed by: INTERNAL MEDICINE

## 2022-06-20 ASSESSMENT — PAIN SCALES - GENERAL: PAINLEVEL: NO PAIN (0)

## 2022-06-20 NOTE — PROGRESS NOTES
Nephrology Clinic Note  June 20, 2022    I was asked to see this patient by Elsie Morales     CC: Nephrolithiasis     HPI: Radha Bryan is a 41 year old female with PMH significant for primary hyperparathyroidism s/p parathyroidectomy, nephrolithiasis, right hydronephrosis, hypovitaminosis D who presents for evaluation and management of Nephrolithiasis.    Pt endorsed feeling well. No symptoms today. Pt had ED visit in march when she had CT abdomen which did not show any renal stones but found to have trichomonas infection which was treated. Since that time no other medical issues noted. Pt endorsed no new medication changes, continue to take ibuprofen intermittently but trying tylenol before its use. Denied other systemic symptoms including fever/chills, nausea/vomiting, chest pain, SOB, recurrent headaches, abdominal pain or diarrhea.    Able to increase her water intake and now at or near gallon per day  Also endorsed that she increased fruits and vegetables in her diet.    - History of urinary symptoms: no   - History of Hematuria: no  - Swelling: some but she stands all day, improves some with leg elevation  - Hx of UTIs: no  - Hx of stones: Yes in 2020  - Rashes/Joint pain: rash on her cheeks which is itchy, but not red/painful   - Family hx of kidney disease: brother with renal stones   - NSAID use: ibuprofen 2-3 days a week.     No Known Allergies    ibuprofen (ADVIL/MOTRIN) 600 MG tablet, Take 600 mg by mouth every 6 hours as needed for moderate pain  omeprazole (PRILOSEC) 20 MG DR capsule, Take 1 capsule (20 mg) by mouth daily  vitamin D3 (CHOLECALCIFEROL) 50 mcg (2000 units) tablet, Take 1 tablet (50 mcg) by mouth daily    No current facility-administered medications on file prior to visit.      Past Medical History:   Diagnosis Date     Dyslipidemia      History of blood transfusion 2019    heavy menstrual cycles, anemic     Irregular periods/menstrual cycles      Nephrolithiasis      Obesity       PONV (postoperative nausea and vomiting)      Primary hyperparathyroidism (H) 02/2021     Tinea versicolor        Past Surgical History:   Procedure Laterality Date     CHOLECYSTECTOMY       COMBINED CYSTOSCOPY, RETROGRADES, URETEROSCOPY, INSERT STENT Right 10/9/2020    Procedure: Cystoscopy right ureteral stent removal right retrograde pyelogram right ureteroscopy Right ureteral dilation right ureteral stent placement Holmium Laser on Stand-by;  Surgeon: Kavon Hernandez MD;  Location: [facility] OR     COMBINED CYSTOSCOPY, RETROGRADES, URETEROSCOPY, LASER HOLMIUM LITHOTRIPSY URETER(S), INSERT STENT Bilateral 9/17/2020    Procedure: Cystoscopy, bilateral stent removal, bilateral ureteroscopy, left laser lithotripsy, stone basketing, bilateral stent placement; bilateral retrograde pyelogram, fluoroscopic interpretation <1 hour physician time;  Surgeon: Kavon Hernandez MD;  Location: [facility] OR     CYSTOSCOPY, RETROGRADES, INSERT STENT URETER(S), COMBINED Bilateral 8/27/2020    Procedure: Cystoscopy with bilateral retrograde pyelogram and bilateral ureteral stent insertion with Fluoroscopic interpretation <1 hour physician time;  Surgeon: Kavon Hernandez MD;  Location: [facility] OR     LAPAROSCOPIC HYSTERECTOMY TOTAL, SALPINGECTOMY BILATERAL  07/17/2019    AUB, fibroid uterus, anemia. [facility], Dr. Pamela Carrion     LAPAROSCOPIC TUBAL LIGATION       PARATHYROIDECTOMY N/A 2/17/2021    Procedure: CERVICAL EXPLORATION, PARATHYROIDECTOMY;  Surgeon: Adelaide Saenz MD;  Location: [facility] OR       Social History     Tobacco Use     Smoking status: Never Smoker     Smokeless tobacco: Never Used   Vaping Use     Vaping Use: Never used   Substance Use Topics     Alcohol use: No     Drug use: No       Family History   Problem Relation Age of Onset     Family History Negative Mother      Kidney Disease Brother         kidney stone     No Known Problems Sister      No Known Problems Son      No Known Problems Daughter       "Liver Disease No family hx of      Colon Cancer No family hx of        ROS: A 12 system review of systems was negative other than noted here or above.     Exam:  Ht 1.575 m (5' 2\")   Wt 83.9 kg (185 lb)   LMP 06/20/2019   BMI 33.84 kg/m         GENERAL APPEARANCE: alert and no distress  EYES: no scleral icterus  RESP: able to speak in full sentences, not respiratory distress, no accessory muscle usage noted   CV: endorsed ankle edema  SKIN: no visible facial rash, but pt endorsed rash on her mandibles, b/l   NEURO: mentation intact and speech normal  PSYCH: affect normal/bright    Results:    No visits with results within 1 Day(s) from this visit.   Latest known visit with results is:   Lab on 06/17/2022   Component Date Value Ref Range Status     HIV Antigen Antibody Combo 06/17/2022 Nonreactive  Nonreactive Final    HIV-1 p24 Ag & HIV-1/HIV-2 Ab Not Detected     Sodium 06/17/2022 139  133 - 144 mmol/L Final     Potassium 06/17/2022 4.0  3.4 - 5.3 mmol/L Final     Chloride 06/17/2022 108  94 - 109 mmol/L Final     Carbon Dioxide (CO2) 06/17/2022 26  20 - 32 mmol/L Final     Anion Gap 06/17/2022 5  3 - 14 mmol/L Final     Urea Nitrogen 06/17/2022 11  7 - 30 mg/dL Final     Creatinine 06/17/2022 0.53  0.52 - 1.04 mg/dL Final     Calcium 06/17/2022 8.6  8.5 - 10.1 mg/dL Final     Glucose 06/17/2022 93  70 - 99 mg/dL Final     Albumin 06/17/2022 3.6  3.4 - 5.0 g/dL Final     Phosphorus 06/17/2022 3.2  2.5 - 4.5 mg/dL Final     GFR Estimate 06/17/2022 >90  >60 mL/min/1.73m2 Final    Effective December 21, 2021 eGFRcr in adults is calculated using the 2021 CKD-EPI creatinine equation which includes age and gender (Tanika fletcher al., NEJM, DOI: 10.1056/PUNXcc4192360)     WBC Count 06/17/2022 8.7  4.0 - 11.0 10e3/uL Final     RBC Count 06/17/2022 4.49  3.80 - 5.20 10e6/uL Final     Hemoglobin 06/17/2022 12.5  11.7 - 15.7 g/dL Final     Hematocrit 06/17/2022 36.6  35.0 - 47.0 % Final     MCV 06/17/2022 82  78 - 100 fL Final "     MCH 06/17/2022 27.8  26.5 - 33.0 pg Final     MCHC 06/17/2022 34.2  31.5 - 36.5 g/dL Final     RDW 06/17/2022 13.8  10.0 - 15.0 % Final     Platelet Count 06/17/2022 362  150 - 450 10e3/uL Final     Total Protein Random Urine g/L 06/17/2022 0.16  g/L Final    The reference range has not been established for total protein in random urine samples.  The result should be integrated into the clinical context for interpretation.     Total Protein Urine g/gr Creatinine 06/17/2022 0.06  0.00 - 0.20 g/g Cr Final     Creatinine Urine mg/dL 06/17/2022 252  mg/dL Final     Color Urine 06/17/2022 Yellow  Colorless, Straw, Light Yellow, Yellow Final     Appearance Urine 06/17/2022 Clear  Clear Final     Glucose Urine 06/17/2022 Negative  Negative mg/dL Final     Bilirubin Urine 06/17/2022 Negative  Negative Final     Ketones Urine 06/17/2022 Negative  Negative mg/dL Final     Specific Gravity Urine 06/17/2022 >=1.030  1.003 - 1.035 Final     Blood Urine 06/17/2022 Trace (A) Negative Final     pH Urine 06/17/2022 6.0  5.0 - 7.0 Final     Protein Albumin Urine 06/17/2022 Negative  Negative mg/dL Final     Urobilinogen Urine 06/17/2022 0.2  0.2, 1.0 E.U./dL Final     Nitrite Urine 06/17/2022 Negative  Negative Final     Leukocyte Esterase Urine 06/17/2022 Negative  Negative Final     Bacteria Urine 06/17/2022 Few (A) None Seen /HPF Final     RBC Urine 06/17/2022 0-2  0-2 /HPF /HPF Final     WBC Urine 06/17/2022 0-5  0-5 /HPF /HPF Final     Squamous Epithelials Urine 06/17/2022 Few (A) None Seen /LPF Final       Assessment/Plan:     CKD stage I  Nephrolithiasis   Hypocitraturia   Hypercalciuria, improving after parathyroidectomy  Mildly dilated right renal pelvis.  Pt with baseline creatinine of 0.5- 0.6 in July 2021 with eGFR of >90, but with h/o of nephrolithiasis and hydronephrosis, she belongs to CKD stage I. UA with few RBC when she was undergoing urological procedures and passing stones, but repeat with out RBC's. Her  recent imaging in march 2022 showed no opacified renal stones noted which is reasscuring and pt did not pass any new renal stones since 2020  - continue goody hydration as you are doing now   - continue including fresh fruits and vegetables to your diet  - was on potassium citrate, but not currently, will repeat litholink in a year and reassess its need  - stop NSAID's including ibuprofen and naproxen      Hypertension :  Historically BP were stable, not on medications     Electrolytes :  Stable in July      BMD :  Hypovitaminosis D, improving   Repeat vit D levels in April showed improvement, continue supplementataion  Ruben,phos and PTH levels were WNL     Metabolic acidosis :  Bicarb was WNL      Anemia :  Hb is WNL     Other problems  Hyperparathyroidism, s/p parathyroidectomy, now PTH is normalized     Total time spent on the day of clinic visit was 30 min including 15 min of face to face time with pt, chart review including PCP note, urology notes and previous care every where nephrology note, surgery note, labs and image review, documentation as above.    Letty Cook  Dept of Nephrology and Hypertension  [facility]

## 2022-06-20 NOTE — PROGRESS NOTES
Radha is a 41 year old who is being evaluated via a billable video visit.      How would you like to obtain your AVS? MyChart  If the video visit is dropped, the invitation should be resent by: Text to cell phone: [phone]   Will anyone else be joining your video visit?   Sister will be present  No BP taken    Video-Visit Details    Video Start Time: 4:00 PM    Type of service:  Video Visit    Video End Time:4:14 PM    Originating Location (pt. Location): Home    Distant Location (provider location):  [facility] [place]     Platform used for Video Visit: [employer]

## 2022-06-20 NOTE — LETTER
6/20/2022       RE: Radha Bryan  [address]  [facility] [address]     Dear Colleague,    Thank you for referring your patient, Radha Bryan, to the [facility] NEPHROLOGY CLINIC [place] at [facility]. Please see a copy of my visit note below.      Nephrology Clinic Note  June 20, 2022    I was asked to see this patient by Elsie Morales     CC: Nephrolithiasis     HPI: Radha Bryan is a 41 year old female with PMH significant for primary hyperparathyroidism s/p parathyroidectomy, nephrolithiasis, right hydronephrosis, hypovitaminosis D who presents for evaluation and management of Nephrolithiasis.    Pt endorsed feeling well. No symptoms today. Pt had ED visit in march when she had CT abdomen which did not show any renal stones but found to have trichomonas infection which was treated. Since that time no other medical issues noted. Pt endorsed no new medication changes, continue to take ibuprofen intermittently but trying tylenol before its use. Denied other systemic symptoms including fever/chills, nausea/vomiting, chest pain, SOB, recurrent headaches, abdominal pain or diarrhea.    Able to increase her water intake and now at or near gallon per day  Also endorsed that she increased fruits and vegetables in her diet.    - History of urinary symptoms: no   - History of Hematuria: no  - Swelling: some but she stands all day, improves some with leg elevation  - Hx of UTIs: no  - Hx of stones: Yes in 2020  - Rashes/Joint pain: rash on her cheeks which is itchy, but not red/painful   - Family hx of kidney disease: brother with renal stones   - NSAID use: ibuprofen 2-3 days a week.     No Known Allergies    ibuprofen (ADVIL/MOTRIN) 600 MG tablet, Take 600 mg by mouth every 6 hours as needed for moderate pain  omeprazole (PRILOSEC) 20 MG DR capsule, Take 1 capsule (20 mg) by mouth daily  vitamin D3 (CHOLECALCIFEROL) 50 mcg (2000 units) tablet, Take  1 tablet (50 mcg) by mouth daily    No current facility-administered medications on file prior to visit.      Past Medical History:   Diagnosis Date     Dyslipidemia      History of blood transfusion 2019    heavy menstrual cycles, anemic     Irregular periods/menstrual cycles      Nephrolithiasis      Obesity      PONV (postoperative nausea and vomiting)      Primary hyperparathyroidism (H) 02/2021     Tinea versicolor        Past Surgical History:   Procedure Laterality Date     CHOLECYSTECTOMY       COMBINED CYSTOSCOPY, RETROGRADES, URETEROSCOPY, INSERT STENT Right 10/9/2020    Procedure: Cystoscopy right ureteral stent removal right retrograde pyelogram right ureteroscopy Right ureteral dilation right ureteral stent placement Holmium Laser on Stand-by;  Surgeon: Kavon Hernandez MD;  Location: [facility] OR     COMBINED CYSTOSCOPY, RETROGRADES, URETEROSCOPY, LASER HOLMIUM LITHOTRIPSY URETER(S), INSERT STENT Bilateral 9/17/2020    Procedure: Cystoscopy, bilateral stent removal, bilateral ureteroscopy, left laser lithotripsy, stone basketing, bilateral stent placement; bilateral retrograde pyelogram, fluoroscopic interpretation <1 hour physician time;  Surgeon: Kavon Hernandez MD;  Location: [facility] OR     CYSTOSCOPY, RETROGRADES, INSERT STENT URETER(S), COMBINED Bilateral 8/27/2020    Procedure: Cystoscopy with bilateral retrograde pyelogram and bilateral ureteral stent insertion with Fluoroscopic interpretation <1 hour physician time;  Surgeon: Kavon Hernandez MD;  Location: [facility] OR     LAPAROSCOPIC HYSTERECTOMY TOTAL, SALPINGECTOMY BILATERAL  07/17/2019    AUB, fibroid uterus, anemia. [facility], Dr. Pamela Carrion     LAPAROSCOPIC TUBAL LIGATION       PARATHYROIDECTOMY N/A 2/17/2021    Procedure: CERVICAL EXPLORATION, PARATHYROIDECTOMY;  Surgeon: Adelaide Saenz MD;  Location:  OR       Social History     Tobacco Use     Smoking status: Never Smoker     Smokeless tobacco: Never Used   Vaping Use      "Vaping Use: Never used   Substance Use Topics     Alcohol use: No     Drug use: No       Family History   Problem Relation Age of Onset     Family History Negative Mother      Kidney Disease Brother         kidney stone     No Known Problems Sister      No Known Problems Son      No Known Problems Daughter      Liver Disease No family hx of      Colon Cancer No family hx of        ROS: A 12 system review of systems was negative other than noted here or above.     Exam:  Ht 1.575 m (5' 2\")   Wt 83.9 kg (185 lb)   LMP 06/20/2019   BMI 33.84 kg/m         GENERAL APPEARANCE: alert and no distress  EYES: no scleral icterus  RESP: able to speak in full sentences, not respiratory distress, no accessory muscle usage noted   CV: endorsed ankle edema  SKIN: no visible facial rash, but pt endorsed rash on her mandibles, b/l   NEURO: mentation intact and speech normal  PSYCH: affect normal/bright    Results:    No visits with results within 1 Day(s) from this visit.   Latest known visit with results is:   Lab on 06/17/2022   Component Date Value Ref Range Status     HIV Antigen Antibody Combo 06/17/2022 Nonreactive  Nonreactive Final    HIV-1 p24 Ag & HIV-1/HIV-2 Ab Not Detected     Sodium 06/17/2022 139  133 - 144 mmol/L Final     Potassium 06/17/2022 4.0  3.4 - 5.3 mmol/L Final     Chloride 06/17/2022 108  94 - 109 mmol/L Final     Carbon Dioxide (CO2) 06/17/2022 26  20 - 32 mmol/L Final     Anion Gap 06/17/2022 5  3 - 14 mmol/L Final     Urea Nitrogen 06/17/2022 11  7 - 30 mg/dL Final     Creatinine 06/17/2022 0.53  0.52 - 1.04 mg/dL Final     Calcium 06/17/2022 8.6  8.5 - 10.1 mg/dL Final     Glucose 06/17/2022 93  70 - 99 mg/dL Final     Albumin 06/17/2022 3.6  3.4 - 5.0 g/dL Final     Phosphorus 06/17/2022 3.2  2.5 - 4.5 mg/dL Final     GFR Estimate 06/17/2022 >90  >60 mL/min/1.73m2 Final    Effective December 21, 2021 eGFRcr in adults is calculated using the 2021 CKD-EPI creatinine equation which includes age and " gender (Tanika fletcher al., [facility], DOI: 10.1056/TDMGmf4211305)     WBC Count 06/17/2022 8.7  4.0 - 11.0 10e3/uL Final     RBC Count 06/17/2022 4.49  3.80 - 5.20 10e6/uL Final     Hemoglobin 06/17/2022 12.5  11.7 - 15.7 g/dL Final     Hematocrit 06/17/2022 36.6  35.0 - 47.0 % Final     MCV 06/17/2022 82  78 - 100 fL Final     MCH 06/17/2022 27.8  26.5 - 33.0 pg Final     MCHC 06/17/2022 34.2  31.5 - 36.5 g/dL Final     RDW 06/17/2022 13.8  10.0 - 15.0 % Final     Platelet Count 06/17/2022 362  150 - 450 10e3/uL Final     Total Protein Random Urine g/L 06/17/2022 0.16  g/L Final    The reference range has not been established for total protein in random urine samples.  The result should be integrated into the clinical context for interpretation.     Total Protein Urine g/gr Creatinine 06/17/2022 0.06  0.00 - 0.20 g/g Cr Final     Creatinine Urine mg/dL 06/17/2022 252  mg/dL Final     Color Urine 06/17/2022 Yellow  Colorless, Straw, Light Yellow, Yellow Final     Appearance Urine 06/17/2022 Clear  Clear Final     Glucose Urine 06/17/2022 Negative  Negative mg/dL Final     Bilirubin Urine 06/17/2022 Negative  Negative Final     Ketones Urine 06/17/2022 Negative  Negative mg/dL Final     Specific Gravity Urine 06/17/2022 >=1.030  1.003 - 1.035 Final     Blood Urine 06/17/2022 Trace (A) Negative Final     pH Urine 06/17/2022 6.0  5.0 - 7.0 Final     Protein Albumin Urine 06/17/2022 Negative  Negative mg/dL Final     Urobilinogen Urine 06/17/2022 0.2  0.2, 1.0 E.U./dL Final     Nitrite Urine 06/17/2022 Negative  Negative Final     Leukocyte Esterase Urine 06/17/2022 Negative  Negative Final     Bacteria Urine 06/17/2022 Few (A) None Seen /HPF Final     RBC Urine 06/17/2022 0-2  0-2 /HPF /HPF Final     WBC Urine 06/17/2022 0-5  0-5 /HPF /HPF Final     Squamous Epithelials Urine 06/17/2022 Few (A) None Seen /LPF Final       Assessment/Plan:     CKD stage I  Nephrolithiasis   Hypocitraturia   Hypercalciuria, improving after  parathyroidectomy  Mildly dilated right renal pelvis.  Pt with baseline creatinine of 0.5- 0.6 in July 2021 with eGFR of >90, but with h/o of nephrolithiasis and hydronephrosis, she belongs to CKD stage I. UA with few RBC when she was undergoing urological procedures and passing stones, but repeat with out RBC's. Her recent imaging in march 2022 showed no opacified renal stones noted which is reasscuring and pt did not pass any new renal stones since 2020  - continue goody hydration as you are doing now   - continue including fresh fruits and vegetables to your diet  - was on potassium citrate, but not currently, will repeat litholink in a year and reassess its need  - stop NSAID's including ibuprofen and naproxen      Hypertension :  Historically BP were stable, not on medications     Electrolytes :  Stable in July      BMD :  Hypovitaminosis D, improving   Repeat vit D levels in April showed improvement, continue supplementataion  Ruben,phos and PTH levels were WNL     Metabolic acidosis :  Bicarb was WNL      Anemia :  Hb is WNL     Other problems  Hyperparathyroidism, s/p parathyroidectomy, now PTH is normalized     Total time spent on the day of clinic visit was 30 min including 15 min of face to face time with pt, chart review including PCP note, urology notes and previous care every where nephrology note, surgery note, labs and image review, documentation as above.    Letty Cook  Dept of Nephrology and Hypertension  [facility]

## 2022-06-23 ENCOUNTER — TELEPHONE (OUTPATIENT)
Dept: NEPHROLOGY | Facility: CLINIC | Age: 42
End: 2022-06-23

## 2022-09-15 DIAGNOSIS — Z87.442 HISTORY OF KIDNEY STONES: Primary | ICD-10-CM

## 2022-11-15 ENCOUNTER — TELEPHONE (OUTPATIENT)
Dept: DERMATOLOGY | Facility: CLINIC | Age: 42
End: 2022-11-15

## 2022-11-15 ENCOUNTER — OFFICE VISIT (OUTPATIENT)
Dept: DERMATOLOGY | Facility: CLINIC | Age: 42
End: 2022-11-15
Payer: COMMERCIAL

## 2022-11-15 DIAGNOSIS — B36.0 TV (TINEA VERSICOLOR): Primary | ICD-10-CM

## 2022-11-15 PROCEDURE — 99204 OFFICE O/P NEW MOD 45 MIN: CPT | Performed by: PHYSICIAN ASSISTANT

## 2022-11-15 RX ORDER — ITRACONAZOLE 100 MG/1
CAPSULE ORAL
Qty: 28 CAPSULE | Refills: 2 | Status: SHIPPED | OUTPATIENT
Start: 2022-11-15 | End: 2023-02-06

## 2022-11-15 ASSESSMENT — PAIN SCALES - GENERAL: PAINLEVEL: NO PAIN (0)

## 2022-11-15 NOTE — TELEPHONE ENCOUNTER
[facility] Call Center    Phone Message    May a detailed message be left on voicemail: yes     Reason for Call: Medication Question or concern regarding medication   Prescription Clarification  Name of Medication: itraconazole (SPORANOX) 100 MG capsule  Prescribing Provider: Adelaide Rodgers PA-C   Pharmacy[place] [facility] [place] [address] [facility] [place]   What on the order needs clarification? Pt states she tried to  her Rx but the pharmacist told her it hasn't been released to the pharmacy yet. Pt requests a call back when the Rx has been released so she knows she can pick it up. Thank you.     Action Taken: Other: OX Derm    Travel Screening: Not Applicable

## 2022-11-15 NOTE — PROGRESS NOTES
HPI:   Chief complaints: Radha Bryan is a pleasant 41 year old female who presents for evaluation of a rash on the chest and back. In the past she had a similar rash which was successfully treated with antifungal medications. She was clear for awhile but the rash has come back. She tried OTC antifungal medications as well as selsun blue but these did not work.       PHYSICAL EXAM:    LMP 06/20/2019   Breastfeeding No   Skin exam performed as follows: Type 4 skin. Mood appropriate  Alert and Oriented X 3. Well developed, well nourished in no distress.  General appearance: Normal  Head including face: Normal  Eyes: conjunctiva and lids: Normal  Mouth: Lips, teeth, gums: Normal  Neck: Normal  Cardiovascular: Exam of peripheral vascular system by observation for swelling, varicosities, edema: Normal  Right upper extremity: Normal  Left upper extremity: Normal  Right lower extremity: Normal  Left lower extremity: Normal  Skin: Scalp and body hair: See below    Brown reticulated eruption on the chest, neck and upper back    ASSESSMENT/PLAN:     1. Tinea versicolor vs CARP - discussed differential. Will treat as TV for now as antifungals worked in the past. Advised if ineffective to call for minocycline.   --Start itraconazole 200 mg BID x 1 week          Follow-up: PRN  CC:   Scribed By: Adelaide Rodgers, MS, PA-C

## 2022-11-15 NOTE — TELEPHONE ENCOUNTER
Called and let pt know a PA is necessary. Started the PA now.    Paty DAILY RN  Dermatology   [phone]

## 2022-11-15 NOTE — LETTER
11/15/2022         RE: Radha Bryan  [address]  [facility] [address]        Dear Colleague,    Thank you for referring your patient, Radha Bryan, to the [facility]. Please see a copy of my visit note below.    HPI:   Chief complaints: Radha Bryan is a pleasant 41 year old female who presents for evaluation of a rash on the chest and back. In the past she had a similar rash which was successfully treated with antifungal medications. She was clear for awhile but the rash has come back. She tried OTC antifungal medications as well as selsun blue but these did not work.       PHYSICAL EXAM:    LMP 06/20/2019   Breastfeeding No   Skin exam performed as follows: Type 4 skin. Mood appropriate  Alert and Oriented X 3. Well developed, well nourished in no distress.  General appearance: Normal  Head including face: Normal  Eyes: conjunctiva and lids: Normal  Mouth: Lips, teeth, gums: Normal  Neck: Normal  Cardiovascular: Exam of peripheral vascular system by observation for swelling, varicosities, edema: Normal  Right upper extremity: Normal  Left upper extremity: Normal  Right lower extremity: Normal  Left lower extremity: Normal  Skin: Scalp and body hair: See below    Brown reticulated eruption on the chest, neck and upper back    ASSESSMENT/PLAN:     1. Tinea versicolor vs CARP - discussed differential. Will treat as TV for now as antifungals worked in the past. Advised if ineffective to call for minocycline.   --Start itraconazole 200 mg BID x 1 week          Follow-up: PRN  CC:   Scribed By: Adelaide Rodgers MS, PALIANE          Again, thank you for allowing me to participate in the care of your patient.        Sincerely,        Adelaide Rodgers PA-C

## 2022-11-15 NOTE — TELEPHONE ENCOUNTER
Prior Authorization Specialty Medication Request    Medication/Dose: itraconazole (SPORANOX) 100   ICD code (if different than what is on RX):    Previously Tried and Failed:      Important Lab Values:   Rationale:     Insurance Name:   Insurance ID:   Insurance Phone Number:     Pharmacy Information (if different than what is on RX)  Name:    Phone:

## 2022-11-17 NOTE — TELEPHONE ENCOUNTER
Prior Authorization Approval    Authorization Effective Date: 8/17/2022  Authorization Expiration Date: 11/15/2013  Medication: PA needed for itraconazole (SPORANOX) 100 - EPA APPROVED  Insurance Company: [employer] - Phone [phone] Fax [phone]  Which Pharmacy is filling the prescription (Not needed for infusion/clinic administered): [facility] [place], [place] - [address] [facility]  Pharmacy Notified: Yes  Patient Notified: Yes

## 2022-11-20 ENCOUNTER — HEALTH MAINTENANCE LETTER (OUTPATIENT)
Age: 42
End: 2022-11-20

## 2023-02-06 ENCOUNTER — OFFICE VISIT (OUTPATIENT)
Dept: FAMILY MEDICINE | Facility: CLINIC | Age: 43
End: 2023-02-06
Payer: COMMERCIAL

## 2023-02-06 VITALS
TEMPERATURE: 97.8 F | DIASTOLIC BLOOD PRESSURE: 77 MMHG | OXYGEN SATURATION: 96 % | WEIGHT: 197 LBS | HEIGHT: 62 IN | SYSTOLIC BLOOD PRESSURE: 118 MMHG | BODY MASS INDEX: 36.25 KG/M2 | HEART RATE: 77 BPM

## 2023-02-06 DIAGNOSIS — B96.89 ACUTE BACTERIAL SINUSITIS: Primary | ICD-10-CM

## 2023-02-06 DIAGNOSIS — J01.90 ACUTE BACTERIAL SINUSITIS: Primary | ICD-10-CM

## 2023-02-06 PROCEDURE — 99213 OFFICE O/P EST LOW 20 MIN: CPT

## 2023-02-06 RX ORDER — METRONIDAZOLE 500 MG/1
1 TABLET ORAL
COMMUNITY
Start: 2022-05-06 | End: 2023-03-22

## 2023-02-06 RX ORDER — FLUTICASONE PROPIONATE 50 MCG
1 SPRAY, SUSPENSION (ML) NASAL DAILY
Qty: 16 G | Refills: 1 | Status: SHIPPED | OUTPATIENT
Start: 2023-02-06 | End: 2023-07-10

## 2023-02-06 ASSESSMENT — PATIENT HEALTH QUESTIONNAIRE - PHQ9
10. IF YOU CHECKED OFF ANY PROBLEMS, HOW DIFFICULT HAVE THESE PROBLEMS MADE IT FOR YOU TO DO YOUR WORK, TAKE CARE OF THINGS AT HOME, OR GET ALONG WITH OTHER PEOPLE: NOT DIFFICULT AT ALL
SUM OF ALL RESPONSES TO PHQ QUESTIONS 1-9: 0
SUM OF ALL RESPONSES TO PHQ QUESTIONS 1-9: 0

## 2023-02-06 NOTE — PATIENT INSTRUCTIONS
Augmentin for acute bacterial sinusitis    Flonase for nasal swelling and congestion    Stay hydrated    Ok to use OTC medicines for symptomatic treatment.     Follow up if not feeling better within 2-3 days after using Augmentin.

## 2023-02-06 NOTE — PROGRESS NOTES
"  Assessment & Plan     (J01.90,  B96.89) Acute bacterial sinusitis  (primary encounter diagnosis)  Comment: Patient history and exam suggestive of acute bacterial sinusitis. Augmentin and flonase prescribed. Risks, benefits, side effects and intended purposes of Augmentin and flonase discussed.  Discussed the use of OTC medications such as mucinex, tylenol/ibuprofen, and honey for symptomatic treatment. Patient agrees with plan of care and instructed to follow up in 2-3 days if symptoms worsen or do not improve with Augmentin.   Plan: amoxicillin-clavulanate (AUGMENTIN) 875-125 MG         tablet, fluticasone (FLONASE) 50 MCG/ACT nasal         spray         BMI:   Estimated body mass index is 36.03 kg/m  as calculated from the following:    Height as of this encounter: 1.575 m (5' 2\").    Weight as of this encounter: 89.4 kg (197 lb).       No follow-ups on file.    ELIAZAR Guerrero [facility]    Divine Garcia is a 42 year old, presenting for the following health issues:  Ear Problem and Fatigue      History of Present Illness       Reason for visit:  Earache    She eats 2-3 servings of fruits and vegetables daily.She consumes 2 sweetened beverage(s) daily.She exercises with enough effort to increase her heart rate 9 or less minutes per day.  She exercises with enough effort to increase her heart rate 3 or less days per week.   She is taking medications regularly.    Today's PHQ-9         PHQ-9 Total Score: 0    PHQ-9 Q9 Thoughts of better off dead/self-harm past 2 weeks :   Not at all    How difficult have these problems made it for you to do your work, take care of things at home, or get along with other people: Not difficult at all       Acute Illness  Acute illness concerns: earaches   Onset/Duration: 2 weeks  Symptoms:  Fever: No  Chills/Sweats: No  Headache (location?): YES  Sinus Pressure: YES  Conjunctivitis:  No  Ear Pain: YES: both  Rhinorrhea: No  Congestion: YES, " "sometimes   Sore Throat: YES  Cough: no  Wheeze: No  Decreased Appetite: No  Nausea: No  Vomiting: No  Diarrhea: No  Dysuria/Freq.: No  Dysuria or Hematuria: No  Fatigue/Achiness: YES, sometimes feeling tired  Sick/Strep Exposure: No  Therapies tried and outcome: none    -Ears hurt when lying down, mild to moderate discomfort does not wake patient up  -Adenopathy noted by patient  -Head and sinuses hurts  -No drainage from sinuses however, having More pressure in face and sinuses when bending over  -Sometimes dizzy and occasionally tired  -no concerns with eating and drinking  -No fever or chills, body aches,N/V, SOB, wheezing.    Review of Systems   Constitutional, HEENT, cardiovascular, pulmonary, gi and gu systems are negative, except as otherwise noted.      Objective    /77 (BP Location: Right arm, Patient Position: Sitting, Cuff Size: Adult Large)   Pulse 77   Temp 97.8  F (36.6  C) (Oral)   Ht 1.575 m (5' 2\")   Wt 89.4 kg (197 lb)   LMP 06/20/2019   SpO2 96%   BMI 36.03 kg/m    Body mass index is 36.03 kg/m .  Physical Exam  Vitals and nursing note reviewed.   Constitutional:       General: She is not in acute distress.     Appearance: Normal appearance. She is not ill-appearing.   HENT:      Right Ear: Tympanic membrane, ear canal and external ear normal. There is no impacted cerumen.      Left Ear: Tympanic membrane, ear canal and external ear normal. There is no impacted cerumen.      Nose: Congestion and rhinorrhea present.      Right Turbinates: Swollen.      Left Turbinates: Swollen.      Mouth/Throat:      Mouth: Mucous membranes are moist.      Pharynx: Pharyngeal swelling and posterior oropharyngeal erythema present. No oropharyngeal exudate or uvula swelling.      Tonsils: No tonsillar exudate or tonsillar abscesses. 2+ on the right. 2+ on the left.   Eyes:      General: No scleral icterus.        Right eye: No discharge.         Left eye: No discharge.      Conjunctiva/sclera: " Conjunctivae normal.      Pupils: Pupils are equal, round, and reactive to light.   Cardiovascular:      Rate and Rhythm: Normal rate and regular rhythm.      Heart sounds: No murmur heard.    No friction rub. No gallop.   Pulmonary:      Effort: No respiratory distress.      Breath sounds: Normal breath sounds. No stridor. No wheezing, rhonchi or rales.   Musculoskeletal:      Cervical back: No tenderness.   Lymphadenopathy:      Cervical: Cervical adenopathy present.   Skin:     General: Skin is warm and dry.      Capillary Refill: Capillary refill takes less than 2 seconds.   Neurological:      General: No focal deficit present.      Mental Status: She is alert and oriented to person, place, and time.   Psychiatric:         Mood and Affect: Mood normal.         Behavior: Behavior normal.         Thought Content: Thought content normal.         Judgment: Judgment normal.

## 2023-03-07 ENCOUNTER — HOSPITAL ENCOUNTER (OUTPATIENT)
Dept: GENERAL RADIOLOGY | Facility: CLINIC | Age: 43
Discharge: HOME | End: 2023-03-07
Attending: STUDENT IN AN ORGANIZED HEALTH CARE EDUCATION/TRAINING PROGRAM | Admitting: STUDENT IN AN ORGANIZED HEALTH CARE EDUCATION/TRAINING PROGRAM
Payer: COMMERCIAL

## 2023-03-07 DIAGNOSIS — Z87.442 HISTORY OF KIDNEY STONES: ICD-10-CM

## 2023-03-07 PROCEDURE — 74018 RADEX ABDOMEN 1 VIEW: CPT

## 2023-03-13 ENCOUNTER — VIRTUAL VISIT (OUTPATIENT)
Dept: UROLOGY | Facility: CLINIC | Age: 43
End: 2023-03-13
Payer: COMMERCIAL

## 2023-03-13 DIAGNOSIS — Z87.442 HISTORY OF KIDNEY STONES: Primary | ICD-10-CM

## 2023-03-13 PROCEDURE — 99213 OFFICE O/P EST LOW 20 MIN: CPT | Mod: VID | Performed by: STUDENT IN AN ORGANIZED HEALTH CARE EDUCATION/TRAINING PROGRAM

## 2023-03-13 NOTE — LETTER
Date:March 14, 2023      Provider requested that no letter be sent. Do not send.       [facility]

## 2023-03-13 NOTE — LETTER
3/13/2023       RE: Radha Bryan  [address]  [facility] [address]     Dear Colleague,    Thank you for referring your patient, Radha Bryan, to the [facility] UROLOGY CLINIC [place] at [facility]. Please see a copy of my visit note below.    Radha is a 42 year old who is being evaluated via a billable video visit.        How would you like to obtain your AVS? MyChart  If the video visit is dropped, the invitation should be resent by: Text to cell phone: [phone]  Will anyone else be joining your video visit? No    CHIEF COMPLAINT   Radha Bryan who is a 42 year old female returns today for follow-up of nephrolithiasis.      HPI   Radha Bryan is a 42 year old female h/o left renal and ureteral stones, right hydronephrosis s/p bilateral ureteral stent placement 8/27/2020, left ureteroscopy and laser lithotripsy with bilateral ureteral stent exchange 9/17/2020, with attempted removal of the stents in the office on 10/7/2020, unable to remove the right ureteral stent. Went to OR 10/9/2020 for removal of the right stent which was noted to be encrusted; also seen was a kink in the proximal right ureter concerning for right ureteropelvic junction obstruction. A new stent was placed and then removed 10/15/2020. Found to have severe hypercalciuria, workup revealed hyperparathyroidism and underwent parathyroidectomy 2/17/2021    At last visit she was noted to have persistent hypercalciuria and hypocitraturia, was referred to nephrology for further assistance and she has been following with Dr. Cook.    She denies any flank pain    Patient notes that she has gained weight unintentionally. She is going to see PCP later this month.            PHYSICAL EXAM  Patient is a 42 year old  female   Vitals: Last menstrual period 06/20/2019, not currently breastfeeding.  There is no height or weight on file to calculate BMI.  General Appearance Adult:   Alert,  no acute distress, oriented  HENT: throat/mouth:normal, good dentition  Lungs: no respiratory distress, or pursed lip breathing  Heart: No obvious jugular venous distension present  Musculoskeltal: extremities normal, no peripheral edema  Skin: no suspicious lesions or rashes  Neuro: Alert, oriented, speech and mentation normal  Psych: affect and mood normal      All pertinent imaging reviewed:    KUB 3/7/2023                                                                   IMPRESSION: No convincing urinary calculi. Bowel gas pattern is within  normal limits. Surgical clips right upper quadrant.     CT 3/24/2022    IMPRESSION:   1.  No renal calculi or hydronephrosis.  2.  Fatty liver.   3.  Cholecystectomy.  4.  No findings to account for the patient's flank pain.    ASSESSMENT and PLAN  2 year old female h/o left renal and ureteral stones, right hydronephrosis s/p bilateral ureteral stent placement 8/27/2020, left ureteroscopy and laser lithotripsy with bilateral ureteral stent exchange 9/17/2020, with attempted removal of the stents in the office on 10/7/2020, unable to remove the right ureteral stent. Went to OR 10/9/2020 for removal of the right stent which was noted to be encrusted; also seen was a kink in the proximal right ureter concerning for right ureteropelvic junction obstruction. A new stent was placed and then removed 10/15/2020. Found to have severe hypercalciuria, workup revealed hyperparathyroidism and underwent parathyroidectomy 2/17/2021    She does not have any further kidney stones on CT from a year ago or recent KUB    Plan to follow up 1 year with CT abd/pelvis w/o contrast prior  She should continue to follow with nephrology for medical prevention    Kavon Hernandez MD   [facility] Urology  [facility] Phone: [phone]      Video-Visit Details    Type of service:  Video Visit   Video Start Time: 2:22pm  Video End Time:2:26 PM    Originating Location (pt. Location):  Home  Distant Location (provider location):  On-site  Platform used for Video Visit: Keyshawn      Again, thank you for allowing me to participate in the care of your patient.      Sincerely,    Kavon Hernandez MD

## 2023-03-13 NOTE — PROGRESS NOTES
Radha is a 42 year old who is being evaluated via a billable video visit.        How would you like to obtain your AVS? MyChart  If the video visit is dropped, the invitation should be resent by: Text to cell phone: [phone]  Will anyone else be joining your video visit? No    CHIEF COMPLAINT   Radha Bryan who is a 42 year old female returns today for follow-up of nephrolithiasis.      HPI   Radha Bryan is a 42 year old female h/o left renal and ureteral stones, right hydronephrosis s/p bilateral ureteral stent placement 8/27/2020, left ureteroscopy and laser lithotripsy with bilateral ureteral stent exchange 9/17/2020, with attempted removal of the stents in the office on 10/7/2020, unable to remove the right ureteral stent. Went to OR 10/9/2020 for removal of the right stent which was noted to be encrusted; also seen was a kink in the proximal right ureter concerning for right ureteropelvic junction obstruction. A new stent was placed and then removed 10/15/2020. Found to have severe hypercalciuria, workup revealed hyperparathyroidism and underwent parathyroidectomy 2/17/2021    At last visit she was noted to have persistent hypercalciuria and hypocitraturia, was referred to nephrology for further assistance and she has been following with Dr. Cook.    She denies any flank pain    Patient notes that she has gained weight unintentionally. She is going to see PCP later this month.            PHYSICAL EXAM  Patient is a 42 year old  female   Vitals: Last menstrual period 06/20/2019, not currently breastfeeding.  There is no height or weight on file to calculate BMI.  General Appearance Adult:   Alert, no acute distress, oriented  HENT: throat/mouth:normal, good dentition  Lungs: no respiratory distress, or pursed lip breathing  Heart: No obvious jugular venous distension present  Musculoskeltal: extremities normal, no peripheral edema  Skin: no suspicious lesions or rashes  Neuro: Alert, oriented,  speech and mentation normal  Psych: affect and mood normal      All pertinent imaging reviewed:    KUB 3/7/2023                                                                   IMPRESSION: No convincing urinary calculi. Bowel gas pattern is within  normal limits. Surgical clips right upper quadrant.     CT 3/24/2022    IMPRESSION:   1.  No renal calculi or hydronephrosis.  2.  Fatty liver.   3.  Cholecystectomy.  4.  No findings to account for the patient's flank pain.    ASSESSMENT and PLAN  2 year old female h/o left renal and ureteral stones, right hydronephrosis s/p bilateral ureteral stent placement 8/27/2020, left ureteroscopy and laser lithotripsy with bilateral ureteral stent exchange 9/17/2020, with attempted removal of the stents in the office on 10/7/2020, unable to remove the right ureteral stent. Went to OR 10/9/2020 for removal of the right stent which was noted to be encrusted; also seen was a kink in the proximal right ureter concerning for right ureteropelvic junction obstruction. A new stent was placed and then removed 10/15/2020. Found to have severe hypercalciuria, workup revealed hyperparathyroidism and underwent parathyroidectomy 2/17/2021    She does not have any further kidney stones on CT from a year ago or recent KUB    Plan to follow up 1 year with CT abd/pelvis w/o contrast prior  She should continue to follow with nephrology for medical prevention    Kavon Hernandez MD   [facility]  [facility] Phone: [phone]      Video-Visit Details    Type of service:  Video Visit   Video Start Time: 2:22pm  Video End Time:2:26 PM    Originating Location (pt. Location): Home  Distant Location (provider location):  On-site  Platform used for Video Visit: Keyshawn

## 2023-03-22 ENCOUNTER — OFFICE VISIT (OUTPATIENT)
Dept: INTERNAL MEDICINE | Facility: CLINIC | Age: 43
End: 2023-03-22
Payer: COMMERCIAL

## 2023-03-22 VITALS
WEIGHT: 200.5 LBS | DIASTOLIC BLOOD PRESSURE: 68 MMHG | TEMPERATURE: 98.1 F | SYSTOLIC BLOOD PRESSURE: 106 MMHG | BODY MASS INDEX: 34.23 KG/M2 | HEIGHT: 64 IN | OXYGEN SATURATION: 97 % | RESPIRATION RATE: 16 BRPM | HEART RATE: 106 BPM

## 2023-03-22 DIAGNOSIS — R63.5 WEIGHT GAIN: ICD-10-CM

## 2023-03-22 DIAGNOSIS — Z13.220 SCREENING FOR LIPID DISORDERS: ICD-10-CM

## 2023-03-22 DIAGNOSIS — Z00.00 ROUTINE GENERAL MEDICAL EXAMINATION AT A HEALTH CARE FACILITY: Primary | ICD-10-CM

## 2023-03-22 DIAGNOSIS — R41.3 MEMORY CHANGE: ICD-10-CM

## 2023-03-22 DIAGNOSIS — K21.9 GASTROESOPHAGEAL REFLUX DISEASE WITHOUT ESOPHAGITIS: ICD-10-CM

## 2023-03-22 LAB
ANION GAP SERPL CALCULATED.3IONS-SCNC: 14 MMOL/L (ref 7–15)
BUN SERPL-MCNC: 12.5 MG/DL (ref 6–20)
CALCIUM SERPL-MCNC: 9.5 MG/DL (ref 8.6–10)
CHLORIDE SERPL-SCNC: 104 MMOL/L (ref 98–107)
CHOLEST SERPL-MCNC: 168 MG/DL
CREAT SERPL-MCNC: 0.58 MG/DL (ref 0.51–0.95)
DEPRECATED HCO3 PLAS-SCNC: 21 MMOL/L (ref 22–29)
GFR SERPL CREATININE-BSD FRML MDRD: >90 ML/MIN/1.73M2
GLUCOSE SERPL-MCNC: 82 MG/DL (ref 70–99)
HDLC SERPL-MCNC: 39 MG/DL
LDLC SERPL CALC-MCNC: 76 MG/DL
NONHDLC SERPL-MCNC: 129 MG/DL
POTASSIUM SERPL-SCNC: 4.2 MMOL/L (ref 3.4–5.3)
SODIUM SERPL-SCNC: 139 MMOL/L (ref 136–145)
TRIGL SERPL-MCNC: 266 MG/DL
TSH SERPL DL<=0.005 MIU/L-ACNC: 3.37 UIU/ML (ref 0.3–4.2)

## 2023-03-22 PROCEDURE — 99396 PREV VISIT EST AGE 40-64: CPT | Performed by: INTERNAL MEDICINE

## 2023-03-22 PROCEDURE — 80061 LIPID PANEL: CPT | Performed by: INTERNAL MEDICINE

## 2023-03-22 PROCEDURE — 36415 COLL VENOUS BLD VENIPUNCTURE: CPT | Performed by: INTERNAL MEDICINE

## 2023-03-22 PROCEDURE — 80048 BASIC METABOLIC PNL TOTAL CA: CPT | Performed by: INTERNAL MEDICINE

## 2023-03-22 PROCEDURE — 99214 OFFICE O/P EST MOD 30 MIN: CPT | Mod: 25 | Performed by: INTERNAL MEDICINE

## 2023-03-22 PROCEDURE — 84443 ASSAY THYROID STIM HORMONE: CPT | Performed by: INTERNAL MEDICINE

## 2023-03-22 ASSESSMENT — ENCOUNTER SYMPTOMS
EYE PAIN: 1
WEAKNESS: 1
HEARTBURN: 1
JOINT SWELLING: 0
DIARRHEA: 0
DIZZINESS: 0
FEVER: 0
MYALGIAS: 1
DYSURIA: 0
SHORTNESS OF BREATH: 0
COUGH: 1
HEMATOCHEZIA: 0
ABDOMINAL PAIN: 0
FREQUENCY: 0
SORE THROAT: 1
BREAST MASS: 0
NAUSEA: 0
ARTHRALGIAS: 0
NERVOUS/ANXIOUS: 0
PALPITATIONS: 0
HEMATURIA: 0
HEADACHES: 1
PARESTHESIAS: 0
CHILLS: 0

## 2023-03-22 ASSESSMENT — PAIN SCALES - GENERAL: PAINLEVEL: NO PAIN (0)

## 2023-03-22 NOTE — PROGRESS NOTES
SUBJECTIVE:   CC: Radha is an 42 year old who presents for preventive health visit.   No flowsheet data found.  Patient has been advised of split billing requirements and indicates understanding: Yes  Healthy Habits:     Getting at least 3 servings of Calcium per day:  Yes    Bi-annual eye exam:  Yes    Dental care twice a year:  NO    Sleep apnea or symptoms of sleep apnea:  Excessive snoring    Diet:  Regular (no restrictions)    Frequency of exercise:  None    Taking medications regularly:  Yes    Medication side effects:  None    PHQ-2 Total Score: 0    Additional concerns today:  Yes        Has been having increased weight concerns since her past in office     Today's PHQ-2 Score:   PHQ-2 ( 1999 [employer]) 3/22/2023   Q1: Little interest or pleasure in doing things 0   Q2: Feeling down, depressed or hopeless 0   PHQ-2 Score 0   PHQ-2 Total Score (12-17 Years)- Positive if 3 or more points; Administer PHQ-A if positive -   Q1: Little interest or pleasure in doing things Not at all   Q2: Feeling down, depressed or hopeless Not at all   PHQ-2 Score 0       Have you ever done Advance Care Planning? (For example, a Health Directive, POLST, or a discussion with a medical provider or your loved ones about your wishes): No, advance care planning information given to patient to review.  Patient declined advance care planning discussion at this time.    Social History     Tobacco Use     Smoking status: Never     Smokeless tobacco: Never   Substance Use Topics     Alcohol use: No         Alcohol Use 3/22/2023   Prescreen: >3 drinks/day or >7 drinks/week? No     Reviewed orders with patient.  Reviewed health maintenance and updated orders accordingly - Yes  Lab work is in process    Breast Cancer Screening:  Any new diagnosis of family breast, ovarian, or bowel cancer? No    FHS-7: No flowsheet data found.      Pertinent mammograms are reviewed under the imaging tab.    History of abnormal Pap smear: NO - age 30-65 PAP  "every 5 years with negative HPV co-testing recommended  PAP / HPV Latest Ref Rng & Units 6/27/2019   PAP (Historical) - NIL   HPV16 NEG:Negative Negative   HPV18 NEG:Negative Negative   HRHPV NEG:Negative Negative     Reviewed and updated as needed this visit by clinical staff   Tobacco  Allergies  Meds              Reviewed and updated as needed this visit by Provider                     Review of Systems   Constitutional: Negative for chills and fever.   HENT: Positive for congestion, ear pain, hearing loss and sore throat.    Eyes: Positive for pain. Negative for visual disturbance.   Respiratory: Positive for cough. Negative for shortness of breath.    Cardiovascular: Negative for chest pain, palpitations and peripheral edema.   Gastrointestinal: Positive for heartburn. Negative for abdominal pain, diarrhea, hematochezia and nausea.   Breasts:  Positive for tenderness. Negative for breast mass and discharge.   Genitourinary: Negative for dysuria, frequency, genital sores, hematuria, pelvic pain, urgency, vaginal bleeding and vaginal discharge.   Musculoskeletal: Positive for myalgias. Negative for arthralgias and joint swelling.   Skin: Negative for rash.   Neurological: Positive for weakness and headaches. Negative for dizziness and paresthesias.   Psychiatric/Behavioral: Positive for mood changes. The patient is not nervous/anxious.           OBJECTIVE:   /68   Pulse 106   Temp 98.1  F (36.7  C) (Oral)   Resp 16   Ht 1.613 m (5' 3.5\")   Wt 90.9 kg (200 lb 8 oz)   LMP 06/20/2019   SpO2 97%   BMI 34.96 kg/m    Physical Exam  GENERAL: healthy, alert and no distress  EYES: Eyes grossly normal to inspection, PERRL and conjunctivae and sclerae normal  HENT: ear canals and TM's normal, nose and mouth without ulcers or lesions  RESP: lungs clear to auscultation - no rales, rhonchi or wheezes  BREAST: normal without masses, tenderness or nipple discharge and no palpable axillary masses or " "adenopathy  CV: regular rate and rhythm, normal S1 S2.  ABDOMEN: soft, nontender, no hepatosplenomegaly, no masses  MS: no gross musculoskeletal defects noted, no edema  SKIN: no suspicious lesions or rashes  NEURO: Normal strength and tone, mentation intact and speech normal  PSYCH: mentation appears normal, affect normal/bright        ASSESSMENT/PLAN:   (Z00.00) Routine general medical examination at a health care facility  (primary encounter diagnosis)  Comment: fasting for lab work which has been ordered today. Up to date with pap smear screening. Last pap in 2019 with HPV co testing, next pap in 2024.  Plan: Basic metabolic panel            (R63.5) Weight gain  Comment: having weight gain concerns since her last in office visit.has noted increased snoring as well with the same. Discussed on exercising as well as dietary restrictions as initial recommendations. Will obtain TSH to rule out any underlying thyroid concerns.  Plan: TSH            (Z13.220) Screening for lipid disorders  Plan: Lipid panel reflex to direct LDL Fasting            (R41.3) Memory change  Comment: concerns of increased repeating of statements and short term memory concerns. No family history of early dementia or parkinson's or alzheimer's.mini cog in office today was normal.reassured patient with normal mini cog testing and  Discussed with patient on further monitoring and the further evaluation with TSH.       (K21.9) Gastroesophageal reflux disease without esophagitis.  Plan: omeprazole (PRILOSEC) 20 MG DR capsule        We discussed on continuing prilosec without any changes today.      Patient has been advised of split billing requirements and indicates understanding: Yes      COUNSELING:       Regular exercise       Healthy diet/nutrition       Vision screening       Hearing screening      BMI:   Estimated body mass index is 34.96 kg/m  as calculated from the following:    Height as of this encounter: 1.613 m (5' 3.5\").    Weight as " of this encounter: 90.9 kg (200 lb 8 oz).         She reports that she has never smoked. She has never used smokeless tobacco.          Sandy Obrien MD  [facility]

## 2023-06-26 ENCOUNTER — MYC MEDICAL ADVICE (OUTPATIENT)
Dept: DERMATOLOGY | Facility: CLINIC | Age: 43
End: 2023-06-26
Payer: COMMERCIAL

## 2023-06-26 DIAGNOSIS — B36.0 TV (TINEA VERSICOLOR): ICD-10-CM

## 2023-06-27 RX ORDER — ITRACONAZOLE 100 MG/1
CAPSULE ORAL
Qty: 28 CAPSULE | Refills: 2 | Status: SHIPPED | OUTPATIENT
Start: 2023-06-27 | End: 2023-07-10

## 2023-07-10 ENCOUNTER — ANCILLARY PROCEDURE (OUTPATIENT)
Dept: GENERAL RADIOLOGY | Facility: CLINIC | Age: 43
End: 2023-07-10
Attending: FAMILY MEDICINE
Payer: COMMERCIAL

## 2023-07-10 ENCOUNTER — OFFICE VISIT (OUTPATIENT)
Dept: FAMILY MEDICINE | Facility: CLINIC | Age: 43
End: 2023-07-10
Payer: COMMERCIAL

## 2023-07-10 VITALS
TEMPERATURE: 97.8 F | OXYGEN SATURATION: 98 % | SYSTOLIC BLOOD PRESSURE: 107 MMHG | HEIGHT: 62 IN | RESPIRATION RATE: 19 BRPM | BODY MASS INDEX: 34.96 KG/M2 | WEIGHT: 190 LBS | DIASTOLIC BLOOD PRESSURE: 74 MMHG | HEART RATE: 78 BPM

## 2023-07-10 DIAGNOSIS — R05.1 ACUTE COUGH: ICD-10-CM

## 2023-07-10 DIAGNOSIS — R09.1 PLEURISY: ICD-10-CM

## 2023-07-10 DIAGNOSIS — J06.9 UPPER RESPIRATORY TRACT INFECTION, UNSPECIFIED TYPE: Primary | ICD-10-CM

## 2023-07-10 DIAGNOSIS — E21.0 PRIMARY HYPERPARATHYROIDISM (H): ICD-10-CM

## 2023-07-10 DIAGNOSIS — H60.502 ACUTE OTITIS EXTERNA OF LEFT EAR, UNSPECIFIED TYPE: ICD-10-CM

## 2023-07-10 DIAGNOSIS — F32.5 MAJOR DEPRESSION IN REMISSION (H): ICD-10-CM

## 2023-07-10 LAB — SARS-COV-2 RNA RESP QL NAA+PROBE: NEGATIVE

## 2023-07-10 PROCEDURE — 71046 X-RAY EXAM CHEST 2 VIEWS: CPT | Mod: TC | Performed by: RADIOLOGY

## 2023-07-10 PROCEDURE — 87635 SARS-COV-2 COVID-19 AMP PRB: CPT | Performed by: FAMILY MEDICINE

## 2023-07-10 PROCEDURE — 99214 OFFICE O/P EST MOD 30 MIN: CPT | Performed by: FAMILY MEDICINE

## 2023-07-10 RX ORDER — MELOXICAM 15 MG/1
15 TABLET ORAL DAILY
Qty: 10 TABLET | Refills: 0 | Status: SHIPPED | OUTPATIENT
Start: 2023-07-10 | End: 2024-03-14

## 2023-07-10 RX ORDER — HYDROCORTISONE AND ACETIC ACID 20.75; 10.375 MG/ML; MG/ML
3 SOLUTION AURICULAR (OTIC) 3 TIMES DAILY
Qty: 10 ML | Refills: 0 | Status: SHIPPED | OUTPATIENT
Start: 2023-07-10 | End: 2024-03-14

## 2023-07-10 RX ORDER — DEXTROMETHORPHAN POLISTIREX 30 MG/5ML
60 SUSPENSION ORAL 2 TIMES DAILY
Qty: 148 ML | Refills: 1 | Status: SHIPPED | OUTPATIENT
Start: 2023-07-10 | End: 2024-03-14

## 2023-07-10 RX ORDER — BENZONATATE 200 MG/1
200 CAPSULE ORAL 3 TIMES DAILY PRN
Qty: 30 CAPSULE | Refills: 0 | Status: SHIPPED | OUTPATIENT
Start: 2023-07-10 | End: 2024-03-14

## 2023-07-10 ASSESSMENT — PATIENT HEALTH QUESTIONNAIRE - PHQ9
SUM OF ALL RESPONSES TO PHQ QUESTIONS 1-9: 2
10. IF YOU CHECKED OFF ANY PROBLEMS, HOW DIFFICULT HAVE THESE PROBLEMS MADE IT FOR YOU TO DO YOUR WORK, TAKE CARE OF THINGS AT HOME, OR GET ALONG WITH OTHER PEOPLE: NOT DIFFICULT AT ALL
SUM OF ALL RESPONSES TO PHQ QUESTIONS 1-9: 2

## 2023-07-10 NOTE — PROGRESS NOTES
Assessment & Plan   Problem List Items Addressed This Visit     Acute cough     As described.  Chest clear to auscultation         Relevant Orders    XR Chest 2 Views (Completed)    Acute otitis externa of left ear, unspecified type     Left ear with tender red canal, normal TM.  Treat         Relevant Medications    acetic acid-hydrocortisone (VOSOL-HC) 1-2 % otic solution    Major depression in remission (H)     Continues in complete remission         Pleurisy     Chest hurts with coughing, pain reproduced with AP compression.  X-ray reassuring         Relevant Medications    meloxicam (MOBIC) 15 MG tablet    Primary hyperparathyroidism (H)     Parathyroidectomy 2021.  Normalized at last measure         Upper respiratory tract infection, unspecified type - Primary     Viral.  Symptomatic measures         Relevant Medications    dextromethorphan (DELSYM) 30 MG/5ML liquid    meloxicam (MOBIC) 15 MG tablet    benzonatate (TESSALON) 200 MG capsule    Other Relevant Orders    Symptomatic COVID-19 Virus (Coronavirus) by PCR Nose                     Trell Grier MD  [facility]    Divine Garcia is a 42 year old, presenting for the following health issues:  URI        7/10/2023     9:27 AM   Additional Questions   Roomed by Halima STERN    History of Present Illness       Reason for visit:  Cough and chest    She eats 0-1 servings of fruits and vegetables daily.She consumes 1 sweetened beverage(s) daily.She exercises with enough effort to increase her heart rate 9 or less minutes per day.  She exercises with enough effort to increase her heart rate 3 or less days per week. She is missing 1 dose(s) of medications per week.    Today's PHQ-9         PHQ-9 Total Score: 2    PHQ-9 Q9 Thoughts of better off dead/self-harm past 2 weeks :   Not at all    How difficult have these problems made it for you to do your work, take care of things at home, or get along with other people:  "Not difficult at all       Acute Illness  Acute illness concerns: cough and chest pain  Onset/Duration: 4 days ago  Symptoms:  Fever: No  Chills/Sweats: No  Headache (location?): YES  Sinus Pressure: No  Conjunctivitis:  No  Ear Pain: YES right side  Rhinorrhea: No  Congestion: YES  Sore Throat: YES  Cough: YES-non-productive  Wheeze: No  Decreased Appetite: No  Nausea: No  Vomiting: No  Diarrhea: No  Dysuria/Freq.: No  Dysuria or Hematuria: No  Fatigue/Achiness: YES  Sick/Strep Exposure: No  Therapies tried and outcome: None        Review of Systems   As described      Objective    /74 (BP Location: Right arm, Patient Position: Sitting, Cuff Size: Adult Large)   Pulse 78   Temp 97.8  F (36.6  C) (Oral)   Resp 19   Ht 1.575 m (5' 2\")   Wt 86.2 kg (190 lb)   LMP 06/20/2019   SpO2 98%   BMI 34.75 kg/m    Body mass index is 34.75 kg/m .  Physical Exam   Ears as described  Coryza  No cervical nodes  Clear chest  Chest wall as described    Results for orders placed or performed in visit on 07/10/23   XR Chest 2 Views     Status: None    Narrative    CHEST TWO VIEWS  7/10/2023 10:16 AM     HISTORY:  Acute cough.    COMPARISON: 5/6/2019.      Impression    IMPRESSION: Negative chest. Lungs clear.    MILLY ESCALANTE MD         SYSTEM ID:  [id number]         Trell Grier MD              "

## 2024-01-25 NOTE — LETTER
May 5, 2020      Radha Bryan  [address] [address] [address]        To Whom It May Concern:    Radha Bryan had a virtual visit with our clinic. She will be absent from work for 1 week due to back pain.  .      Sincerely,        Jessica Zimmerman NP           PAST MEDICAL HISTORY:  PCOS (polycystic ovarian syndrome)

## 2024-02-03 ENCOUNTER — HEALTH MAINTENANCE LETTER (OUTPATIENT)
Age: 44
End: 2024-02-03

## 2024-02-21 ENCOUNTER — PATIENT OUTREACH (OUTPATIENT)
Dept: CARE COORDINATION | Facility: CLINIC | Age: 44
End: 2024-02-21
Payer: COMMERCIAL

## 2024-03-06 ENCOUNTER — PATIENT OUTREACH (OUTPATIENT)
Dept: CARE COORDINATION | Facility: CLINIC | Age: 44
End: 2024-03-06

## 2024-03-06 ENCOUNTER — TELEPHONE (OUTPATIENT)
Dept: INTERNAL MEDICINE | Facility: CLINIC | Age: 44
End: 2024-03-06

## 2024-03-06 ENCOUNTER — OFFICE VISIT (OUTPATIENT)
Dept: INTERNAL MEDICINE | Facility: CLINIC | Age: 44
End: 2024-03-06
Payer: COMMERCIAL

## 2024-03-06 VITALS
OXYGEN SATURATION: 96 % | BODY MASS INDEX: 35.96 KG/M2 | SYSTOLIC BLOOD PRESSURE: 116 MMHG | HEART RATE: 82 BPM | HEIGHT: 62 IN | RESPIRATION RATE: 18 BRPM | TEMPERATURE: 97.4 F | WEIGHT: 195.4 LBS | DIASTOLIC BLOOD PRESSURE: 72 MMHG

## 2024-03-06 DIAGNOSIS — E66.01 CLASS 2 SEVERE OBESITY DUE TO EXCESS CALORIES WITH SERIOUS COMORBIDITY AND BODY MASS INDEX (BMI) OF 35.0 TO 35.9 IN ADULT (H): ICD-10-CM

## 2024-03-06 DIAGNOSIS — N95.1 MENOPAUSAL SYNDROME (HOT FLASHES): ICD-10-CM

## 2024-03-06 DIAGNOSIS — Z12.11 SCREEN FOR COLON CANCER: ICD-10-CM

## 2024-03-06 DIAGNOSIS — Z12.31 VISIT FOR SCREENING MAMMOGRAM: ICD-10-CM

## 2024-03-06 DIAGNOSIS — N95.1 MENOPAUSAL SYNDROME (HOT FLASHES): Primary | ICD-10-CM

## 2024-03-06 DIAGNOSIS — E66.812 CLASS 2 SEVERE OBESITY DUE TO EXCESS CALORIES WITH SERIOUS COMORBIDITY AND BODY MASS INDEX (BMI) OF 35.0 TO 35.9 IN ADULT (H): ICD-10-CM

## 2024-03-06 DIAGNOSIS — E78.1 HYPERTRIGLYCERIDEMIA: ICD-10-CM

## 2024-03-06 LAB — HBA1C MFR BLD: 5.7 % (ref 0–5.6)

## 2024-03-06 PROCEDURE — 83036 HEMOGLOBIN GLYCOSYLATED A1C: CPT | Performed by: INTERNAL MEDICINE

## 2024-03-06 PROCEDURE — 99214 OFFICE O/P EST MOD 30 MIN: CPT | Performed by: INTERNAL MEDICINE

## 2024-03-06 PROCEDURE — 80053 COMPREHEN METABOLIC PANEL: CPT | Performed by: INTERNAL MEDICINE

## 2024-03-06 PROCEDURE — 36415 COLL VENOUS BLD VENIPUNCTURE: CPT | Performed by: INTERNAL MEDICINE

## 2024-03-06 PROCEDURE — 80061 LIPID PANEL: CPT | Performed by: INTERNAL MEDICINE

## 2024-03-06 RX ORDER — GABAPENTIN 100 MG/1
CAPSULE ORAL
Qty: 256 CAPSULE | Refills: 0 | Status: SHIPPED | OUTPATIENT
Start: 2024-03-06 | End: 2024-07-03

## 2024-03-06 RX ORDER — GABAPENTIN 100 MG/1
CAPSULE ORAL
Qty: 7 CAPSULE | Refills: 0 | Status: SHIPPED | OUTPATIENT
Start: 2024-03-06 | End: 2024-03-06

## 2024-03-06 ASSESSMENT — PATIENT HEALTH QUESTIONNAIRE - PHQ9
10. IF YOU CHECKED OFF ANY PROBLEMS, HOW DIFFICULT HAVE THESE PROBLEMS MADE IT FOR YOU TO DO YOUR WORK, TAKE CARE OF THINGS AT HOME, OR GET ALONG WITH OTHER PEOPLE: NOT DIFFICULT AT ALL
SUM OF ALL RESPONSES TO PHQ QUESTIONS 1-9: 2
SUM OF ALL RESPONSES TO PHQ QUESTIONS 1-9: 2

## 2024-03-06 NOTE — PROGRESS NOTES
"  Assessment & Plan     Menopausal syndrome (hot flashes)  Onset of menopausal symptoms including hot flashes.  Patient prefers to initiate a nonhormonal medication for symptom control.  Gabapentin medication discussed with the patient.  Side effect profile discussed with the patient.  Patient will take the medication at 100 mg nightly for 1 week and increase it to 300 mg nightly.  To follow-up in 6 weeks for reevaluation.  - Comprehensive metabolic panel; Future  - gabapentin (NEURONTIN) 100 MG capsule; Take 1 capsule (100 mg) by mouth at bedtime for 7 days, THEN 3 capsules (300 mg) at bedtime for 83 days.  - Comprehensive metabolic panel    Hypertriglyceridemia  Continues with exercising.  Dietary recommendations discussed with the patient.  Update lipid panel.  - Lipid panel reflex to direct LDL Fasting; Future  - Lipid panel reflex to direct LDL Fasting        Class 2 severe obesity due to excess calories with serious comorbidity and body mass index (BMI) of 35.0 to 35.9 in adult (H)  - Hemoglobin A1c; Future  - Hemoglobin A1c    Visit for screening mammogram  - MA SCREENING DIGITAL BILAT - Future  (s+30); Future    Screen for colon cancer  - Colonoscopy Screening  Referral; Future            BMI  Estimated body mass index is 35.74 kg/m  as calculated from the following:    Height as of this encounter: 1.575 m (5' 2\").    Weight as of this encounter: 88.6 kg (195 lb 6.4 oz).             Divine Garcia is a 43 year old, presenting for the following health issues:  Menopausal Sx (Need to check A1C )        3/6/2024    10:59 AM   Additional Questions   Roomed by Frank   Accompanied by Self     History of Present Illness       Reason for visit:  Personal    She eats 2-3 servings of fruits and vegetables daily.She consumes 2 sweetened beverage(s) daily.She exercises with enough effort to increase her heart rate 9 or less minutes per day.  She exercises with enough effort to increase her heart rate 3 or " "less days per week.   She is taking medications regularly.       Patient comes in today for general follow-up.  Symptoms of hot flashes.  Patient has had a hysterectomy and bilateral salpingectomy in 2019 due to concerns of fibroid uterus and iron deficiency anemia.          Review of Systems  Constitutional, HEENT, cardiovascular, pulmonary, gi and gu systems are negative, except as otherwise noted.      Objective    /72 (BP Location: Right arm, Patient Position: Sitting, Cuff Size: Adult Large)   Pulse 82   Temp 97.4  F (36.3  C) (Tympanic)   Resp 18   Ht 1.575 m (5' 2\")   Wt 88.6 kg (195 lb 6.4 oz)   LMP 06/20/2019   SpO2 96%   BMI 35.74 kg/m    Body mass index is 35.74 kg/m .  Physical Exam   GENERAL: alert and no distress  RESP: lungs clear to auscultation - no rales, rhonchi or wheezes  CV: regular rate and rhythm, normal S1 S2  MS: no gross musculoskeletal defects noted, no edema  NEURO: Normal strength and tone, mentation intact and speech normal  PSYCH: mentation appears normal, affect normal/bright            Signed Electronically by: Sandy Obrien MD    "

## 2024-03-06 NOTE — TELEPHONE ENCOUNTER
Received call from pharmacy regarding Gabapentin prescription e-scribed today.    Quantity is only for 7 capsules.    Please send a new prescription with updated quantity, thank you.

## 2024-03-07 ENCOUNTER — PREP FOR PROCEDURE (OUTPATIENT)
Dept: SURGERY | Facility: CLINIC | Age: 44
End: 2024-03-07
Payer: COMMERCIAL

## 2024-03-07 ENCOUNTER — HOSPITAL ENCOUNTER (OUTPATIENT)
Facility: CLINIC | Age: 44
End: 2024-03-07
Attending: STUDENT IN AN ORGANIZED HEALTH CARE EDUCATION/TRAINING PROGRAM | Admitting: STUDENT IN AN ORGANIZED HEALTH CARE EDUCATION/TRAINING PROGRAM
Payer: COMMERCIAL

## 2024-03-07 ENCOUNTER — PREP FOR PROCEDURE (OUTPATIENT)
Dept: GASTROENTEROLOGY | Facility: CLINIC | Age: 44
End: 2024-03-07
Payer: COMMERCIAL

## 2024-03-07 ENCOUNTER — TELEPHONE (OUTPATIENT)
Dept: GASTROENTEROLOGY | Facility: CLINIC | Age: 44
End: 2024-03-07
Payer: COMMERCIAL

## 2024-03-07 DIAGNOSIS — Z12.11 SPECIAL SCREENING FOR MALIGNANT NEOPLASMS, COLON: Primary | ICD-10-CM

## 2024-03-07 DIAGNOSIS — Z12.11 SCREENING FOR COLON CANCER: Primary | ICD-10-CM

## 2024-03-07 LAB
ALBUMIN SERPL BCG-MCNC: 4.6 G/DL (ref 3.5–5.2)
ALP SERPL-CCNC: 78 U/L (ref 40–150)
ALT SERPL W P-5'-P-CCNC: 32 U/L (ref 0–50)
ANION GAP SERPL CALCULATED.3IONS-SCNC: 9 MMOL/L (ref 7–15)
AST SERPL W P-5'-P-CCNC: 29 U/L (ref 0–45)
BILIRUB SERPL-MCNC: 0.6 MG/DL
BUN SERPL-MCNC: 13.6 MG/DL (ref 6–20)
CALCIUM SERPL-MCNC: 9.6 MG/DL (ref 8.6–10)
CHLORIDE SERPL-SCNC: 104 MMOL/L (ref 98–107)
CHOLEST SERPL-MCNC: 191 MG/DL
CREAT SERPL-MCNC: 0.72 MG/DL (ref 0.51–0.95)
DEPRECATED HCO3 PLAS-SCNC: 26 MMOL/L (ref 22–29)
EGFRCR SERPLBLD CKD-EPI 2021: >90 ML/MIN/1.73M2
FASTING STATUS PATIENT QL REPORTED: YES
GLUCOSE SERPL-MCNC: 89 MG/DL (ref 70–99)
HDLC SERPL-MCNC: 38 MG/DL
LDLC SERPL CALC-MCNC: 110 MG/DL
NONHDLC SERPL-MCNC: 153 MG/DL
POTASSIUM SERPL-SCNC: 4.5 MMOL/L (ref 3.4–5.3)
PROT SERPL-MCNC: 8.1 G/DL (ref 6.4–8.3)
SODIUM SERPL-SCNC: 139 MMOL/L (ref 135–145)
TRIGL SERPL-MCNC: 217 MG/DL

## 2024-03-07 NOTE — TELEPHONE ENCOUNTER
"Endoscopy Scheduling Screen    Have you had a positive Covid test in the last 14 days?  No    Are you active on MyChart?   Yes    What insurance is in the chart?  Other:  [employer]    Ordering/Referring Provider:     OLIVER REED      (If ordering provider performs procedure, schedule with ordering provider unless otherwise instructed. )    BMI: Estimated body mass index is 35.74 kg/m  as calculated from the following:    Height as of 3/6/24: 1.575 m (5' 2\").    Weight as of 3/6/24: 88.6 kg (195 lb 6.4 oz).     Sedation Ordered  moderate sedation.   If patient BMI > 50 do not schedule in [facility].    If patient BMI > 45 do not schedule at [facility].    Are you taking methadone or Suboxone?  No    Have you had difficulties, pain, or discomfort during past endoscopy procedures?  No    Are you taking any prescription medications for pain 3 or more times per week?   NO - No RN review required.    Do you have a history of malignant hyperthermia or adverse reaction to anesthesia?  No    (Females) Are you currently pregnant?   No     Have you been diagnosed or told you have pulmonary hypertension?   No    Do you have an LVAD?  No    Have you been told you have moderate to severe sleep apnea?  No    Have you been told you have COPD, asthma, or any other lung disease?  No    Do you have any heart conditions?  No     Have you ever had an organ transplant?   No    Have you ever had or are you awaiting a heart or lung transplant?   No    Have you had a stroke or transient ischemic attack (TIA aka \"mini stroke\" in the last 6 months?   No    Have you been diagnosed with or been told you have cirrhosis of the liver?   No    Are you currently on dialysis?   No    Do you need assistance transferring?   No    BMI: Estimated body mass index is 35.74 kg/m  as calculated from the following:    Height as of 3/6/24: 1.575 m (5' 2\").    Weight as of 3/6/24: 88.6 kg (195 lb 6.4 oz).     Is patients BMI > 40 and scheduling location [facility]?  No    Do you " take an injectable medication for weight loss or diabetes (excluding insulin)?  No    Do you take the medication Naltrexone?  No    Do you take blood thinners?  No       Prep   Are you currently on dialysis or do you have chronic kidney disease?  Yes (Golytely Prep)    Do you have a diagnosis of diabetes?  No    Do you have a diagnosis of cystic fibrosis (CF)?  No    On a regular basis do you go 3 -5 days between bowel movements?  No    BMI > 40?  No    Preferred Pharmacy:    [facility] [place] [id number] [facility] [address]  Phone: [phone] Fax: [phone]    Final Scheduling Details   Colonoscopy prep sent?  N/A    Procedure scheduled  Colonoscopy    Surgeon:  CHEYENNE     Date of procedure:  09/26/2024     Pre-OP / PAC:   Yes - Patient informed of pre-op requirement.    Location  [facility] - Per order.    Sedation   MAC/Deep Sedation - Per order.      Patient Reminders:   You will receive a call from a Nurse to review instructions and health history.  This assessment must be completed prior to your procedure.  Failure to complete the Nurse assessment may result in the procedure being cancelled.      On the day of your procedure, please designate an adult(s) who can drive you home stay with you for the next 24 hours. The medicines used in the exam will make you sleepy. You will not be able to drive.      You cannot take public transportation, ride share services, or non-medical taxi service without a responsible caregiver.  Medical transport services are allowed with the requirement that a responsible caregiver will receive you at your destination.  We require that drivers and caregivers are confirmed prior to your procedure.

## 2024-03-13 ENCOUNTER — HOSPITAL ENCOUNTER (OUTPATIENT)
Dept: CT IMAGING | Facility: CLINIC | Age: 44
Discharge: HOME | End: 2024-03-13
Attending: STUDENT IN AN ORGANIZED HEALTH CARE EDUCATION/TRAINING PROGRAM | Admitting: STUDENT IN AN ORGANIZED HEALTH CARE EDUCATION/TRAINING PROGRAM
Payer: COMMERCIAL

## 2024-03-13 DIAGNOSIS — Z87.442 HISTORY OF KIDNEY STONES: ICD-10-CM

## 2024-03-13 PROCEDURE — 74176 CT ABD & PELVIS W/O CONTRAST: CPT

## 2024-03-14 ENCOUNTER — OFFICE VISIT (OUTPATIENT)
Dept: UROLOGY | Facility: CLINIC | Age: 44
End: 2024-03-14
Payer: COMMERCIAL

## 2024-03-14 VITALS
HEIGHT: 62 IN | SYSTOLIC BLOOD PRESSURE: 102 MMHG | BODY MASS INDEX: 34.96 KG/M2 | DIASTOLIC BLOOD PRESSURE: 67 MMHG | WEIGHT: 190 LBS | HEART RATE: 71 BPM

## 2024-03-14 DIAGNOSIS — Z87.442 HISTORY OF KIDNEY STONES: Primary | ICD-10-CM

## 2024-03-14 PROCEDURE — 99213 OFFICE O/P EST LOW 20 MIN: CPT | Performed by: STUDENT IN AN ORGANIZED HEALTH CARE EDUCATION/TRAINING PROGRAM

## 2024-03-14 ASSESSMENT — PAIN SCALES - GENERAL: PAINLEVEL: NO PAIN (0)

## 2024-03-14 NOTE — LETTER
"3/14/2024       RE: Radha Bryan  [address]  [place] [address]     Dear Colleague,    Thank you for referring your patient, Radha Bryan, to the [facility] [place] [facility]. Please see a copy of my visit note below.    CHIEF COMPLAINT   Radha Bryan who is a 43 year old female returns today for follow-up of h/o nephrolithiasis.      HPI   Radha Bryan is a  43 year old female h/o left renal and ureteral stones, right hydronephrosis s/p bilateral ureteral stent placement 8/27/2020, left ureteroscopy and laser lithotripsy with bilateral ureteral stent exchange 9/17/2020, with attempted removal of the stents in the office on 10/7/2020, unable to remove the right ureteral stent. Went to OR 10/9/2020 for removal of the right stent which was noted to be encrusted; also seen was a kink in the proximal right ureter concerning for right ureteropelvic junction obstruction. A new stent was placed and then removed 10/15/2020. Found to have severe hypercalciuria, workup revealed hyperparathyroidism and underwent parathyroidectomy 2/17/2021     Denies any recent stone episodes or renal colic    PHYSICAL EXAM  Patient is a 43 year old  female   Vitals: Blood pressure 102/67, pulse 71, height 1.575 m (5' 2\"), weight 86.2 kg (190 lb), last menstrual period 06/20/2019, not currently breastfeeding.  Body mass index is 34.75 kg/m .  General Appearance Adult:   Alert, no acute distress, oriented  HENT: throat/mouth:normal, good dentition  Lungs: no respiratory distress, or pursed lip breathing  Heart: No obvious jugular venous distension present  Abdomen: nondistended  Musculoskeltal: extremities normal, no peripheral edema  Skin: no suspicious lesions or rashes  Neuro: Alert, oriented, speech and mentation normal  Psych: affect and mood normal    All pertinent imaging reviewed:    Ct 3/13/2024    Reviewed and interpreted personally. No " stones      ASSESSMENT and PLAN  43 year old female h/o left renal and ureteral stones, right hydronephrosis s/p bilateral ureteral stent placement 8/27/2020, left ureteroscopy and laser lithotripsy with bilateral ureteral stent exchange 9/17/2020, with attempted removal of the stents in the office on 10/7/2020, unable to remove the right ureteral stent. Went to OR 10/9/2020 for removal of the right stent which was noted to be encrusted; also seen was a kink in the proximal right ureter concerning for right ureteropelvic junction obstruction. A new stent was placed and then removed 10/15/2020. Found to have severe hypercalciuria, workup revealed hyperparathyroidism and underwent parathyroidectomy 2/17/2021     No recurrent stones over the past three years. At this point can follow up with urology as needed.    Kavon Hernandez MD   [facility]  [phone] clinic phone

## 2024-03-14 NOTE — NURSING NOTE
Chief Complaint   Patient presents with    Kidney Stone Related     1 year with CT      Pt states she occasionally has right flank pain, dull not very bothersome    Kathleen Clemente, CMA

## 2024-03-14 NOTE — PROGRESS NOTES
"CHIEF COMPLAINT   Radha Bryan who is a 43 year old female returns today for follow-up of h/o nephrolithiasis.      HPI   Radha Bryan is a  43 year old female h/o left renal and ureteral stones, right hydronephrosis s/p bilateral ureteral stent placement 8/27/2020, left ureteroscopy and laser lithotripsy with bilateral ureteral stent exchange 9/17/2020, with attempted removal of the stents in the office on 10/7/2020, unable to remove the right ureteral stent. Went to OR 10/9/2020 for removal of the right stent which was noted to be encrusted; also seen was a kink in the proximal right ureter concerning for right ureteropelvic junction obstruction. A new stent was placed and then removed 10/15/2020. Found to have severe hypercalciuria, workup revealed hyperparathyroidism and underwent parathyroidectomy 2/17/2021     Denies any recent stone episodes or renal colic    PHYSICAL EXAM  Patient is a 43 year old  female   Vitals: Blood pressure 102/67, pulse 71, height 1.575 m (5' 2\"), weight 86.2 kg (190 lb), last menstrual period 06/20/2019, not currently breastfeeding.  Body mass index is 34.75 kg/m .  General Appearance Adult:   Alert, no acute distress, oriented  HENT: throat/mouth:normal, good dentition  Lungs: no respiratory distress, or pursed lip breathing  Heart: No obvious jugular venous distension present  Abdomen: nondistended  Musculoskeltal: extremities normal, no peripheral edema  Skin: no suspicious lesions or rashes  Neuro: Alert, oriented, speech and mentation normal  Psych: affect and mood normal    All pertinent imaging reviewed:    Ct 3/13/2024    Reviewed and interpreted personally. No stones      ASSESSMENT and PLAN  43 year old female h/o left renal and ureteral stones, right hydronephrosis s/p bilateral ureteral stent placement 8/27/2020, left ureteroscopy and laser lithotripsy with bilateral ureteral stent exchange 9/17/2020, with attempted removal of the stents in the office on 10/7/2020, " unable to remove the right ureteral stent. Went to OR 10/9/2020 for removal of the right stent which was noted to be encrusted; also seen was a kink in the proximal right ureter concerning for right ureteropelvic junction obstruction. A new stent was placed and then removed 10/15/2020. Found to have severe hypercalciuria, workup revealed hyperparathyroidism and underwent parathyroidectomy 2/17/2021     No recurrent stones over the past three years. At this point can follow up with urology as needed.    Kavon Hernandez MD   [facility]  [phone] clinic phone

## 2024-04-16 ENCOUNTER — ANCILLARY PROCEDURE (OUTPATIENT)
Dept: GENERAL RADIOLOGY | Facility: CLINIC | Age: 44
End: 2024-04-16
Payer: COMMERCIAL

## 2024-04-16 ENCOUNTER — OFFICE VISIT (OUTPATIENT)
Dept: INTERNAL MEDICINE | Facility: CLINIC | Age: 44
End: 2024-04-16
Payer: COMMERCIAL

## 2024-04-16 VITALS
DIASTOLIC BLOOD PRESSURE: 80 MMHG | TEMPERATURE: 97.1 F | HEART RATE: 76 BPM | OXYGEN SATURATION: 96 % | HEIGHT: 62 IN | SYSTOLIC BLOOD PRESSURE: 104 MMHG | RESPIRATION RATE: 20 BRPM | BODY MASS INDEX: 35.79 KG/M2 | WEIGHT: 194.5 LBS

## 2024-04-16 DIAGNOSIS — M54.50 ACUTE LEFT-SIDED LOW BACK PAIN WITHOUT SCIATICA: ICD-10-CM

## 2024-04-16 DIAGNOSIS — M54.50 ACUTE LEFT-SIDED LOW BACK PAIN WITHOUT SCIATICA: Primary | ICD-10-CM

## 2024-04-16 PROCEDURE — 99214 OFFICE O/P EST MOD 30 MIN: CPT

## 2024-04-16 PROCEDURE — 72080 X-RAY EXAM THORACOLMB 2/> VW: CPT | Mod: TC | Performed by: RADIOLOGY

## 2024-04-16 RX ORDER — CYCLOBENZAPRINE HCL 5 MG
5 TABLET ORAL 3 TIMES DAILY PRN
Qty: 15 TABLET | Refills: 0 | Status: SHIPPED | OUTPATIENT
Start: 2024-04-16 | End: 2024-04-21

## 2024-04-16 ASSESSMENT — PAIN SCALES - GENERAL: PAINLEVEL: EXTREME PAIN (8)

## 2024-04-16 NOTE — PROGRESS NOTES
"  Assessment & Plan     (M54.50) Acute left-sided low back pain without sciatica  (primary encounter diagnosis)  Comment: Patient presents to the clinic with complaint of low back pain on the left side after lifting a 25 lb weight. Clinical picture suggest back strain from improper body mechanics. Will obtain an x-ray and refer patient to PT and ortho in the future with no improvements with PT. Will also prescribe muscle relaxer to take at night or during the day as long as the patient does not operate a motor vehicle or heavy machinery.   Plan: XR Thoracic Lumbar Spine 2 Views,         cyclobenzaprine (FLEXERIL) 5 MG tablet,         Physical Therapy  Referral        Imaging pending, PT referral placed. Medication sent in to pharmacy. Discussed with the patient that she cannot operate a vehicle while taking this medication. Patient verbalized understanding.       35 minutes spent by me on the date of the encounter doing chart review, review of test results, interpretation of tests, patient visit, and documentation       BMI  Estimated body mass index is 35.57 kg/m  as calculated from the following:    Height as of this encounter: 1.575 m (5' 2\").    Weight as of this encounter: 88.2 kg (194 lb 8 oz).   Weight management plan: Patient was referred to their PCP to discuss a diet and exercise plan.          Subjective   Radha is a 43 year old, presenting for the following health issues: Yesterday the patient was lifting a 25 lb weight and tweaked her back. She has not iced but she has taken ibuprofen with minimal help. No tylenol.   No urinary symptoms   No nausea/vomiting   Difficult to walk and lift leg  Difficult to bend and twist       Musculoskeletal Problem (Patient was lifting a 25 lbweight yesterday and felt pain on the left side of her back.)      4/16/2024     9:42 AM   Additional Questions   Roomed by Deepti   Accompanied by tita         4/16/2024     9:42 AM   Patient Reported Additional " "Medications   Patient reports taking the following new medications no     Musculoskeletal Problem    History of Present Illness       Reason for visit:  Back pain    She eats 0-1 servings of fruits and vegetables daily.She consumes 1 sweetened beverage(s) daily.She exercises with enough effort to increase her heart rate 10 to 19 minutes per day.  She exercises with enough effort to increase her heart rate 3 or less days per week.   She is taking medications regularly.                 Review of Systems  Constitutional, HEENT, cardiovascular, pulmonary, gi and gu systems are negative, except as otherwise noted.      Objective    /80 (BP Location: Right arm, Patient Position: Sitting, Cuff Size: Adult Regular)   Pulse 76   Temp 97.1  F (36.2  C) (Tympanic)   Resp 20   Ht 1.575 m (5' 2\")   Wt 88.2 kg (194 lb 8 oz)   LMP 06/20/2019   SpO2 96%   BMI 35.57 kg/m    Body mass index is 35.57 kg/m .  Physical Exam   GENERAL: alert and no distress  NECK: no adenopathy, no asymmetry, masses, or scars  RESP: lungs clear to auscultation - no rales, rhonchi or wheezes  CV: regular rate and rhythm, normal S1 S2, no S3 or S4, no murmur, click or rub, no peripheral edema  MS: no gross musculoskeletal defects noted, no edema  BACK: no CVA tenderness, no paralumbar tenderness            Signed Electronically by: ELIAZAR Herrera CNP    "

## 2024-04-16 NOTE — PATIENT INSTRUCTIONS
Flexeril is a muscle relaxer. Take this at bedtime to help with the pain and with sleep. Do not drive while taking this medication.     ICE 15 minutes on and 15 minutes off.     Will get an xray and will refer you to physical therapy.

## 2024-04-22 ENCOUNTER — THERAPY VISIT (OUTPATIENT)
Dept: PHYSICAL THERAPY | Facility: CLINIC | Age: 44
End: 2024-04-22
Payer: COMMERCIAL

## 2024-04-22 DIAGNOSIS — M54.50 ACUTE LEFT-SIDED LOW BACK PAIN WITHOUT SCIATICA: ICD-10-CM

## 2024-04-22 PROCEDURE — 97161 PT EVAL LOW COMPLEX 20 MIN: CPT | Mod: GP | Performed by: PHYSICAL THERAPIST

## 2024-04-22 PROCEDURE — 97110 THERAPEUTIC EXERCISES: CPT | Mod: GP | Performed by: PHYSICAL THERAPIST

## 2024-04-22 NOTE — PROGRESS NOTES
"PHYSICAL THERAPY EVALUATION  Type of Visit: Evaluation    See electronic medical record for Abuse and Falls Screening details.    Subjective       Presenting condition or subjective complaint: Main concern is LBP that started one week ago when she hurt her LB lifting a 25# object.  She saw her provder the next day.  She started mm relaxants on 4/16.  These are not helping a lot.  It hurts to stand after sitting, standing > 15\" and cannot walk 100 yards.  Denies any weakness.  Has hx of LBP.  She hurt her LB @ 7 years ago.  Date of onset:      Relevant medical history:     Dates & types of surgery:      Prior diagnostic imaging/testing results: MRI; X-ray (x-ray current, last MRI 2020.)     Prior therapy history for the same diagnosis, illness or injury: No        Living Environment  Social support: With family members   Type of home: House   Stairs to enter the home: Yes 5     Ramp: No   Stairs inside the home: Yes 7     Help at home:    Equipment owned:       Employment:   [employer], standing  Hobbies/Interests: Exercise    Patient goals for therapy:         Objective   LUMBAR SPINE EVALUATION  PAIN: Pain Level at Rest: 1/10  Pain Level with Use: 6/10    POSTURE: Standing Posture: Lordosis increased  Sitting Posture: Rounded shoulders, Thoracic kyphosis increased    WEIGHTBEARING ALIGNMENT: WFL  NON-WEIGHTBEARING ALIGNMENT: WFL   ROM:  Lx arom - flexion 25+, ext 10+.  B SB 25+.  Seated rotation 50+.  Did not assess hip prom.  ++ with 90/90.    PELVIC/SI SCREEN:  not assessed  STRENGTH:  seated MMT inconsistent with patient effort.  @ 4/5 knee flex, ext and hip flexion.    MYOTOMES: WNL  NEURAL TENSION:  PKB rt and lt -.  SLR hard to assess, inconsistent.  Right +.  Will reassess next visit.    LUMBAR/HIP Special Tests:  assess further as able.     FUNCTIONAL TESTS:  assess further, double squat, SLS  PALPATION:  rt > lt lx paraspinals +  SPINAL SEGMENTAL CONCLUSIONS:  GI PA L1-5 +      Assessment & Plan "   CLINICAL IMPRESSIONS  Medical Diagnosis:      Treatment Diagnosis:     Impression/Assessment: Patient is a 43 year old female with LBP complaints.  The following significant findings have been identified: Pain, Decreased ROM/flexibility, Decreased joint mobility, Decreased strength, Decreased proprioception, Decreased activity tolerance, and Impaired posture. These impairments interfere with their ability to perform self care tasks, work tasks, recreational activities, household chores, household mobility, and community mobility as compared to previous level of function.     Clinical Decision Making (Complexity):  Clinical Presentation: Stable/Uncomplicated  Clinical Presentation Rationale: based on medical and personal factors listed in PT evaluation  Clinical Decision Making (Complexity): Low complexity    PLAN OF CARE  Treatment Interventions:  Interventions: Manual Therapy, Neuromuscular Re-education, Therapeutic Activity, Therapeutic Exercise, Self-Care/Home Management    Long Term Goals            Frequency of Treatment:    Duration of Treatment:        Education Assessment:        Risks and benefits of evaluation/treatment have been explained.   Patient/Family/caregiver agrees with Plan of Care.     Evaluation Time:             Signing Clinician: Dhiraj Lau, PT      [facility]                                                                                   OUTPATIENT PHYSICAL THERAPY      PLAN OF TREATMENT FOR OUTPATIENT REHABILITATION   Patient's Last Name, First Name, Radha Montelongo    YOB: 1980   Provider's Name   [facility]   Medical Record No.  [id number]     Onset Date:  4/15/2024  Start of Care Date:  4/22/22024     Medical Diagnosis:   Acute left sided LBP without sciatica      PT Treatment Diagnosis:  LBP, mm versus derangement  Plan of Treatment  Frequency/Duration: 1x for 4 weeks, eo week x 8 /12      Certification date from 4/22/24  to 7/15/24          See note for plan of treatment details and functional goals     Dhiraj Lau, PT                         I CERTIFY THE NEED FOR THESE SERVICES FURNISHED UNDER        THIS PLAN OF TREATMENT AND WHILE UNDER MY CARE     (Physician attestation of this document indicates review and certification of the therapy plan).              Referring Provider:  Caridad Horn    Initial Assessment  See Epic Evaluation-

## 2024-06-10 ENCOUNTER — TELEPHONE (OUTPATIENT)
Dept: INTERNAL MEDICINE | Facility: CLINIC | Age: 44
End: 2024-06-10
Payer: COMMERCIAL

## 2024-06-10 NOTE — TELEPHONE ENCOUNTER
Symptoms    Describe your symptoms:  dry mouth, dizzy and tired    Any pain: No    How long have you been having symptoms: 3 weeks      Have you been seen for this:  No    Appointment offered?: Yes: Appt tomorrow, but not with Dr. Obrien, as she does not have any openings until August    Triage offered?: No    Home remedies tried: No    Preferred Pharmacy:       [address] [address]  Phone: [phone] Fax: [phone]      Could we send this information to you in [employer] or would you prefer to receive a phone call?:   Patient would prefer a phone call   Okay to leave a detailed message?: Yes at Home number on file [phone] (home) or Cell number on file:    Telephone Information:   Mobile [phone]

## 2024-06-10 NOTE — TELEPHONE ENCOUNTER
Spoke to Patient and scheduled her to see Caridad morrison.  Cancelled appointment that was scheduled for tomorrow also for the reason at [facility].

## 2024-06-11 ENCOUNTER — OFFICE VISIT (OUTPATIENT)
Dept: INTERNAL MEDICINE | Facility: CLINIC | Age: 44
End: 2024-06-11
Payer: COMMERCIAL

## 2024-06-11 VITALS
BODY MASS INDEX: 35.72 KG/M2 | OXYGEN SATURATION: 96 % | HEIGHT: 62 IN | WEIGHT: 194.1 LBS | RESPIRATION RATE: 20 BRPM | DIASTOLIC BLOOD PRESSURE: 76 MMHG | SYSTOLIC BLOOD PRESSURE: 112 MMHG | HEART RATE: 90 BPM | TEMPERATURE: 97.2 F

## 2024-06-11 DIAGNOSIS — L60.0 INGROWN TOENAIL: Primary | ICD-10-CM

## 2024-06-11 DIAGNOSIS — R73.03 PREDIABETES: ICD-10-CM

## 2024-06-11 DIAGNOSIS — R42 DIZZINESS: ICD-10-CM

## 2024-06-11 DIAGNOSIS — R53.83 OTHER FATIGUE: ICD-10-CM

## 2024-06-11 PROCEDURE — 84443 ASSAY THYROID STIM HORMONE: CPT

## 2024-06-11 PROCEDURE — 99214 OFFICE O/P EST MOD 30 MIN: CPT

## 2024-06-11 PROCEDURE — 36415 COLL VENOUS BLD VENIPUNCTURE: CPT

## 2024-06-11 RX ORDER — METFORMIN HCL 500 MG
500 TABLET, EXTENDED RELEASE 24 HR ORAL
Qty: 90 TABLET | Refills: 0 | Status: SHIPPED | OUTPATIENT
Start: 2024-06-11 | End: 2024-09-10

## 2024-06-11 ASSESSMENT — PAIN SCALES - GENERAL: PAINLEVEL: NO PAIN (0)

## 2024-06-11 NOTE — PROGRESS NOTES
Assessment & Plan     (L60.0) Ingrown toenail  (primary encounter diagnosis)  Comment: Patient presents to the clinic with a chief complaint of ingrown toenail.  She states that a couple weeks ago she tried to take the ingrown toenail out herself but now she states there is still some pieces left.  Will refer patient to podiatry for further evaluation.  There is no infection present at this time.  No drainage from the area.  Plan: Orthopedic  Referral        Referral placed.    (R73.03) Prediabetes  Comment: Patient recently had a hemoglobin A1c checked which her hemoglobin A1c shows 5.7.  As that is prediabetes the patient would like to start medication to ensure she does not go into diabetes.  Will start her on 500 mg of metformin to take once with supper.  Patient verbalizes understanding.  Will have patient follow-up with primary care provider in 3 months to ensure her hemoglobin A1c is stable.  Plan: metFORMIN (GLUCOPHAGE XR) 500 MG 24 hr tablet        Medication sent to pharmacy.  Discussed side effects of medication.  Follow-up with primary care provider in 3 months.    (R42) Dizziness  Comment: Patient also has a chief complaint of ongoing dizziness for the last month or so. Patient feels this is related to her blood sugars. However, with her accompanied symptoms, I feel it could be related to thyroid. Will obtain labs to ensure it is not related to her thyroid.   Plan: TSH with free T4 reflex        Labs pending.     (R53.83) Other fatigue  Comment: given patients on going fatigue, will obtain thyroid labs as it has not been checked in over a year.   Plan: TSH with free T4 reflex        Labs pending.       30 minutes spent by me on the date of the encounter doing chart review, review of test results, interpretation of tests, patient visit, and documentation                 Subjective   Radha is a 43 year old, presenting for the following health issues:  Patient is here today for an ingrown  "toenail. It is on her right big toe- that has been there for two weeks. She did try to take it out herself.     She was recently in the clinic for check with PCP. Her hemoglobin A1c was 5.7 so she is worried she has diabetes.   She states she is often time very thirsty   She tries to stop eating sugar.   Eats fruit  Mouth is very dry  She also is dizzy and tired.   Her feet hurt- 4 days a week. She states it feels like something is burning.         Nail Problem (Right toe nail ingrown/)        6/11/2024     1:06 PM   Additional Questions   Roomed by Frank   Accompanied by Self     History of Present Illness       Reason for visit:  An igrown toenail    She eats 2-3 servings of fruits and vegetables daily.She consumes 1 sweetened beverage(s) daily.She exercises with enough effort to increase her heart rate 10 to 19 minutes per day.  She exercises with enough effort to increase her heart rate 3 or less days per week.   She is taking medications regularly.                     Objective    /76 (BP Location: Right arm, Patient Position: Sitting, Cuff Size: Adult Regular)   Pulse 90   Temp 97.2  F (36.2  C) (Tympanic)   Resp 20   Ht 1.575 m (5' 2\")   Wt 88 kg (194 lb 1.6 oz)   LMP 06/20/2019   SpO2 96%   BMI 35.50 kg/m    Body mass index is 35.5 kg/m .  Physical Exam               Signed Electronically by: ELIAZAR Herrera CNP 2 years because  "

## 2024-06-12 PROBLEM — M54.50 ACUTE LOW BACK PAIN: Status: RESOLVED | Noted: 2024-04-22 | Resolved: 2024-06-12

## 2024-06-12 LAB — TSH SERPL DL<=0.005 MIU/L-ACNC: 2.88 UIU/ML (ref 0.3–4.2)

## 2024-06-12 NOTE — PROGRESS NOTES
DISCHARGE  Reason for Discharge: Patient has failed to schedule further appointments.  Patient only seen for initial evaluation.      Equipment Issued: none    Discharge Plan: Patient to continue home program.    Referring Provider:  Caridad Horn

## 2024-06-16 ENCOUNTER — HEALTH MAINTENANCE LETTER (OUTPATIENT)
Age: 44
End: 2024-06-16

## 2024-06-19 ENCOUNTER — OFFICE VISIT (OUTPATIENT)
Dept: PODIATRY | Facility: CLINIC | Age: 44
End: 2024-06-19
Payer: COMMERCIAL

## 2024-06-19 VITALS — BODY MASS INDEX: 35.48 KG/M2 | WEIGHT: 194 LBS | DIASTOLIC BLOOD PRESSURE: 70 MMHG | SYSTOLIC BLOOD PRESSURE: 102 MMHG

## 2024-06-19 DIAGNOSIS — M79.671 RIGHT FOOT PAIN: Primary | ICD-10-CM

## 2024-06-19 DIAGNOSIS — L60.0 INGROWN TOENAIL: ICD-10-CM

## 2024-06-19 DIAGNOSIS — L60.0 INGROWN NAIL OF GREAT TOE OF RIGHT FOOT: ICD-10-CM

## 2024-06-19 PROCEDURE — 11730 AVULSION NAIL PLATE SIMPLE 1: CPT | Mod: T5 | Performed by: PODIATRIST

## 2024-06-19 PROCEDURE — 99203 OFFICE O/P NEW LOW 30 MIN: CPT | Mod: 25 | Performed by: PODIATRIST

## 2024-06-19 NOTE — PATIENT INSTRUCTIONS
Thank you for choosing [facility] / Foot & Ankle Surgery!    DR GARCIA'S CLINIC:  [facility]   [address]   [place] [address]      TRIAGE LINE: [phone]  APPOINTMENTS: [phone]  RADIOLOGY: [phone]  SET UP SURGERY: [phone]  PHYSICAL THERAPY: [phone]   FAX NUMBER: [phone]  BILLING QUESTIONS: [phone]       Follow up: As needed      INGROWN TOENAILS  When a toenail is ingrown, it is curved and grows into the skin, usually at the nail borders (the sides of the nail). This  digging in  of the nail irritates the skin, often creating pain, redness, swelling, and warmth in the toe.  If an ingrown nail causes a break in the skin, bacteria may enter and cause an infection in the area, which is often marked by drainage and a foul odor. However, even if the toe isn t painful, red, swollen, or warm, a nail that curves downward into the skin can progress to an infection.  CAUSES:  Heredity: In many people, the tendency for ingrown toenails is inherited.   Trauma: Sometimes an ingrown toenail is the result of trauma, such as stubbing your toe, having an object fall on your toe, or engaging in activities that involve repeated pressure on the toes, such as kicking or running.   Improper Trimming:  The most common cause of ingrown toenails is cutting your nails too short. This encourages the skin next to the nail to fold over the nail.   Improperly Sized Footwear: Ingrown toenails can result from wearing socks and shoes that are tight or short.   Nail Conditions: Ingrown toenails can be caused by nail problems, such as fungal infections or losing a nail due to trauma.   TREATMENT: Sometimes initial treatment for ingrown toenails can be safely performed at home. However, home treatment is strongly discouraged if an infection is suspected, or for those who have medical conditions that put feet at high risk, such as diabetes, nerve damage in the foot, or poor  circulation.  Home care: If you don t have an infection or any of the above medical conditions, you can soak your foot in room-temperature water (adding Epsom s salt may be recommended by your doctor), and gently massage the side of the nail fold to help reduce the inflammation.  Avoid attempting  bathroom surgery.  Repeated cutting of the nail can cause the condition to worsen over time. If your symptoms fail to improve, it s time to see a foot and ankle surgeon.  Physician care: After examining the toe, the foot and ankle surgeon will select the treatment best suited for you. If an infection is present, an oral antibiotic may be prescribed.  Sometimes a minor surgical procedure, often performed in the office, will ease the pain and remove the offending nail. After applying a local anesthetic, the doctor removes part of the nail s side border. Some nails may become ingrown again, requiring removal of the nail root.  Following the nail procedure, a light bandage will be applied. Most people experience very little pain after surgery and may resume normal activity the next day. If your surgeon has prescribed an oral antibiotic, be sure to take all the medication, even if your symptoms have improved.  PREVENTION:  Proper Trimming: Cut toenails in a fairly straight line, and don t cut them too short. You should be able to get your fingernail under the sides and end of the nail.   Well-fitting Footwear: Don t wear shoes that are short or tight in the toe area. Avoid shoes that are loose, because they too cause pressure on the toes, especially when running or walking briskly.     INGROWN TOENAIL REMOVAL AFTERCARE   Go directly home and elevate the affected foot on one or two pillows for the remainder of the day/evening if possible. Your toe may stay numb anywhere from 2-8 hours.   Take Tylenol, ibuprofen or another anti-inflammatory as needed for pain.   Take antibiotic if that has been prescribed. Finish the entire  prescribed antibiotic even if your symptoms have improved.   The evening of the procedure, soak/wash the affected area in warm water (you may add Epsom salt) for 5 to 10 minutes. Do this twice a day for 2-4 weeks (6-8 weeks if you had phenol) (you may count showering/bathing as one soak).  After soaks, pat the area dry and then allow to airdry for a few minutes. Apply antibiotic ointment to the area and cover with 2 X 2 gauze and paper tape or band-aid.  You may pursue everyday activities as tolerated with either an open toe shoe or cut-out shoe as needed or you may wear regular shoes if no pain is noted.  Watch for any signs and symptoms of infection such as: redness, red streaks going up the foot/leg, swelling, pus or foul odor. Those that have had the phenol procedure, the toe will drain longer and will look like it is infected because it is a chemical burn.   Please call with questions.

## 2024-06-19 NOTE — PROGRESS NOTES
PATIENT HISTORY:  Caridad PEREA requested I see this patient for their foot issue.  Radha Bryan is a 43 year old female who presents to clinic for painful ingrown nail to the right great toe.  Notes has been going on for about 7 weeks.  Pain can be 7 out of 10.  She is tried cutting it out herself.  Has not really helped.  Worse with standing walking or pressure.  Denies fever, nausea, vomiting.  Wondering what can be done for it.    Review of Systems:  Patient denies fever, chills, rash, wound, stiffness, limping, numbness, weakness, heart burn, blood in stool, chest pain with activity, calf pain when walking, shortness of breath with activity, chronic cough, easy bleeding/bruising, swelling of ankles, excessive thirst, fatigue, depression, anxiety. .     PAST MEDICAL HISTORY:   Past Medical History:   Diagnosis Date    Dyslipidemia     History of blood transfusion 2019    heavy menstrual cycles, anemic    Irregular periods/menstrual cycles     Nephrolithiasis     Obesity     Pleurisy 7/10/2023    PONV (postoperative nausea and vomiting)     Primary hyperparathyroidism (H24) 02/2021    Tinea versicolor         PAST SURGICAL HISTORY:   Past Surgical History:   Procedure Laterality Date    CHOLECYSTECTOMY      COMBINED CYSTOSCOPY, RETROGRADES, URETEROSCOPY, INSERT STENT Right 10/9/2020    Procedure: Cystoscopy right ureteral stent removal right retrograde pyelogram right ureteroscopy Right ureteral dilation right ureteral stent placement Holmium Laser on Stand-by;  Surgeon: Kavon Hernandez MD;  Location:  OR    COMBINED CYSTOSCOPY, RETROGRADES, URETEROSCOPY, LASER HOLMIUM LITHOTRIPSY URETER(S), INSERT STENT Bilateral 9/17/2020    Procedure: Cystoscopy, bilateral stent removal, bilateral ureteroscopy, left laser lithotripsy, stone basketing, bilateral stent placement; bilateral retrograde pyelogram, fluoroscopic interpretation <1 hour physician time;  Surgeon: Kavon Hernandez MD;  Location:  OR     CYSTOSCOPY, RETROGRADES, INSERT STENT URETER(S), COMBINED Bilateral 8/27/2020    Procedure: Cystoscopy with bilateral retrograde pyelogram and bilateral ureteral stent insertion with Fluoroscopic interpretation <1 hour physician time;  Surgeon: Kavon Hernandez MD;  Location: [facility] OR    LAPAROSCOPIC HYSTERECTOMY TOTAL, SALPINGECTOMY BILATERAL  07/17/2019    AUB, fibroid uterus, anemia. [facility], Dr. Pamela Carrion    LAPAROSCOPIC TUBAL LIGATION      PARATHYROIDECTOMY N/A 2/17/2021    Procedure: CERVICAL EXPLORATION, PARATHYROIDECTOMY;  Surgeon: Adelaide Saenz MD;  Location: [facility] OR        MEDICATIONS:   Current Outpatient Medications:     metFORMIN (GLUCOPHAGE XR) 500 MG 24 hr tablet, Take 1 tablet (500 mg) by mouth daily (with dinner) for 90 days, Disp: 90 tablet, Rfl: 0    gabapentin (NEURONTIN) 100 MG capsule, Take 1 capsule (100 mg) by mouth at bedtime for 7 days, THEN 3 capsules (300 mg) at bedtime for 83 days. (Patient not taking: Reported on 4/16/2024), Disp: 256 capsule, Rfl: 0    omeprazole (PRILOSEC) 20 MG DR capsule, Take 1 capsule (20 mg) by mouth daily, Disp: 90 capsule, Rfl: 1     ALLERGIES:  No Known Allergies     SOCIAL HISTORY:   Social History     Socioeconomic History    Marital status: Single     Spouse name: Not on file    Number of children: Not on file    Years of education: Not on file    Highest education level: Not on file   Occupational History    Not on file   Tobacco Use    Smoking status: Never    Smokeless tobacco: Never   Vaping Use    Vaping status: Never Used   Substance and Sexual Activity    Alcohol use: No    Drug use: No    Sexual activity: Yes     Partners: Male     Birth control/protection: Female Surgical   Other Topics Concern    Parent/sibling w/ CABG, MI or angioplasty before 65F 55M? Not Asked   Social History Narrative    Not on file     Social Determinants of Health     Financial Resource Strain: Not on file   Food Insecurity: Not on file   Transportation  Needs: Not on file   Physical Activity: Not on file   Stress: Not on file   Social Connections: Not on file   Interpersonal Safety: Low Risk  (3/6/2024)    Interpersonal Safety     Do you feel physically and emotionally safe where you currently live?: Yes     Within the past 12 months, have you been hit, slapped, kicked or otherwise physically hurt by someone?: No     Within the past 12 months, have you been humiliated or emotionally abused in other ways by your partner or ex-partner?: No   Housing Stability: Not on file        FAMILY HISTORY:   Family History   Problem Relation Age of Onset    Family History Negative Mother     Kidney Disease Brother         kidney stone    No Known Problems Sister     No Known Problems Son     No Known Problems Daughter     Liver Disease No family hx of     Colon Cancer No family hx of         EXAM:Vitals: /70   Wt 88 kg (194 lb)   LMP 06/20/2019   BMI 35.48 kg/m    BMI= Body mass index is 35.48 kg/m .    General appearance: Patient is alert and fully cooperative with history & exam.  No sign of distress is noted during the visit.     Psychiatric: Affect is pleasant & appropriate.  Patient appears motivated to improve health.     Respiratory: Breathing is regular & unlabored while sitting.     HEENT: Hearing is intact to spoken word.  Speech is clear.  No gross evidence of visual impairment that would impact ambulation.     Dermatologic: Medial border of right great toenail is incurvated.  Localized redness and pain on palpation.     Vascular: DP & PT pulses are intact & regular bilaterally.  No significant edema or varicosities noted.  CFT and skin temperature is normal to both lower extremities.     Neurologic: Lower extremity sensation is intact to light touch.  No evidence of weakness or contracture in the lower extremities.  No evidence of neuropathy.     Musculoskeletal: Patient is ambulatory without assistive device or brace.  No gross ankle deformity noted.  No  foot or ankle joint effusion is noted.     ASSESSMENT:    Ingrown toenail  Right foot pain  Ingrown nail of great toe of right foot       Medical Decision Making/Plan:  Reviewed patient's chart in [facility]. The potential causes and nature of an ingrown toenail were discussed with the patient.  We reviewed the natural history/prognosis of the condition and potential risks if no treatment is provided.      Treatment options discussed included conservative management (oral antibiotics, soaking of foot, adequate width shoes)  as well as surgical management (partial or total nail removal).  The pros and cons of both forms of treatment were reviewed.      After thorough discussion and answering all questions, the patient elected to have the border removed.  She will soak the foot twice a day for 2 weeks and apply antibiotic ointment and a bandage.    All questions were answered to patient satisfaction she will call further questions or concerns.    Procedure: After verbal consent, the right big toe was anesthetized with 5cc's of 1% lidocaine plain. A tourniquet was applied to the toe. The medial border was then raised from the nail bed and then cut the length of the nail.  The offending nail border was then removed.   Bacitracin was applied to the nail bed.  The tourniquet was removed.  Bandage was applied to the toe.  The patient tolerated the procedure and anesthesia well.      Patient risk factor: Patient is at low risk for infection.        Nancie Ca DPM, Podiatry/Foot and Ankle Surgery

## 2024-06-19 NOTE — LETTER
6/19/2024      Radha Bryan  [address]  [place] [address]      Dear Colleague,    Thank you for referring your patient, Radha Bryan, to the [facility]. Please see a copy of my visit note below.    PATIENT HISTORY:  Caridad PEREA requested I see this patient for their foot issue.  Radha Bryan is a 43 year old female who presents to clinic for painful ingrown nail to the right great toe.  Notes has been going on for about 7 weeks.  Pain can be 7 out of 10.  She is tried cutting it out herself.  Has not really helped.  Worse with standing walking or pressure.  Denies fever, nausea, vomiting.  Wondering what can be done for it.    Review of Systems:  Patient denies fever, chills, rash, wound, stiffness, limping, numbness, weakness, heart burn, blood in stool, chest pain with activity, calf pain when walking, shortness of breath with activity, chronic cough, easy bleeding/bruising, swelling of ankles, excessive thirst, fatigue, depression, anxiety. .     PAST MEDICAL HISTORY:   Past Medical History:   Diagnosis Date     Dyslipidemia      History of blood transfusion 2019    heavy menstrual cycles, anemic     Irregular periods/menstrual cycles      Nephrolithiasis      Obesity      Pleurisy 7/10/2023     PONV (postoperative nausea and vomiting)      Primary hyperparathyroidism (H24) 02/2021     Tinea versicolor         PAST SURGICAL HISTORY:   Past Surgical History:   Procedure Laterality Date     CHOLECYSTECTOMY       COMBINED CYSTOSCOPY, RETROGRADES, URETEROSCOPY, INSERT STENT Right 10/9/2020    Procedure: Cystoscopy right ureteral stent removal right retrograde pyelogram right ureteroscopy Right ureteral dilation right ureteral stent placement Holmium Laser on Stand-by;  Surgeon: Kavon Hernandez MD;  Location: [facility] OR     COMBINED CYSTOSCOPY, RETROGRADES, URETEROSCOPY, LASER HOLMIUM LITHOTRIPSY URETER(S), INSERT STENT Bilateral 9/17/2020    Procedure:  Cystoscopy, bilateral stent removal, bilateral ureteroscopy, left laser lithotripsy, stone basketing, bilateral stent placement; bilateral retrograde pyelogram, fluoroscopic interpretation <1 hour physician time;  Surgeon: Kavon Hernandez MD;  Location: [facility] OR     CYSTOSCOPY, RETROGRADES, INSERT STENT URETER(S), COMBINED Bilateral 8/27/2020    Procedure: Cystoscopy with bilateral retrograde pyelogram and bilateral ureteral stent insertion with Fluoroscopic interpretation <1 hour physician time;  Surgeon: Kavon Hernandez MD;  Location: [facility] OR     LAPAROSCOPIC HYSTERECTOMY TOTAL, SALPINGECTOMY BILATERAL  07/17/2019    AUB, fibroid uterus, anemia. [facility], Dr. Pamela Carrion     LAPAROSCOPIC TUBAL LIGATION       PARATHYROIDECTOMY N/A 2/17/2021    Procedure: CERVICAL EXPLORATION, PARATHYROIDECTOMY;  Surgeon: Adelaide Saenz MD;  Location: [facility] OR        MEDICATIONS:   Current Outpatient Medications:      metFORMIN (GLUCOPHAGE XR) 500 MG 24 hr tablet, Take 1 tablet (500 mg) by mouth daily (with dinner) for 90 days, Disp: 90 tablet, Rfl: 0     gabapentin (NEURONTIN) 100 MG capsule, Take 1 capsule (100 mg) by mouth at bedtime for 7 days, THEN 3 capsules (300 mg) at bedtime for 83 days. (Patient not taking: Reported on 4/16/2024), Disp: 256 capsule, Rfl: 0     omeprazole (PRILOSEC) 20 MG DR capsule, Take 1 capsule (20 mg) by mouth daily, Disp: 90 capsule, Rfl: 1     ALLERGIES:  No Known Allergies     SOCIAL HISTORY:   Social History     Socioeconomic History     Marital status: Single     Spouse name: Not on file     Number of children: Not on file     Years of education: Not on file     Highest education level: Not on file   Occupational History     Not on file   Tobacco Use     Smoking status: Never     Smokeless tobacco: Never   Vaping Use     Vaping status: Never Used   Substance and Sexual Activity     Alcohol use: No     Drug use: No     Sexual activity: Yes     Partners: Male     Birth  control/protection: Female Surgical   Other Topics Concern     Parent/sibling w/ CABG, MI or angioplasty before 65F 55M? Not Asked   Social History Narrative     Not on file     Social Determinants of Health     Financial Resource Strain: Not on file   Food Insecurity: Not on file   Transportation Needs: Not on file   Physical Activity: Not on file   Stress: Not on file   Social Connections: Not on file   Interpersonal Safety: Low Risk  (3/6/2024)    Interpersonal Safety      Do you feel physically and emotionally safe where you currently live?: Yes      Within the past 12 months, have you been hit, slapped, kicked or otherwise physically hurt by someone?: No      Within the past 12 months, have you been humiliated or emotionally abused in other ways by your partner or ex-partner?: No   Housing Stability: Not on file        FAMILY HISTORY:   Family History   Problem Relation Age of Onset     Family History Negative Mother      Kidney Disease Brother         kidney stone     No Known Problems Sister      No Known Problems Son      No Known Problems Daughter      Liver Disease No family hx of      Colon Cancer No family hx of         EXAM:Vitals: /70   Wt 88 kg (194 lb)   LMP 06/20/2019   BMI 35.48 kg/m    BMI= Body mass index is 35.48 kg/m .    General appearance: Patient is alert and fully cooperative with history & exam.  No sign of distress is noted during the visit.     Psychiatric: Affect is pleasant & appropriate.  Patient appears motivated to improve health.     Respiratory: Breathing is regular & unlabored while sitting.     HEENT: Hearing is intact to spoken word.  Speech is clear.  No gross evidence of visual impairment that would impact ambulation.     Dermatologic: Medial border of right great toenail is incurvated.  Localized redness and pain on palpation.     Vascular: DP & PT pulses are intact & regular bilaterally.  No significant edema or varicosities noted.  CFT and skin temperature is  normal to both lower extremities.     Neurologic: Lower extremity sensation is intact to light touch.  No evidence of weakness or contracture in the lower extremities.  No evidence of neuropathy.     Musculoskeletal: Patient is ambulatory without assistive device or brace.  No gross ankle deformity noted.  No foot or ankle joint effusion is noted.     ASSESSMENT:    Ingrown toenail  Right foot pain  Ingrown nail of great toe of right foot       Medical Decision Making/Plan:  Reviewed [facility]'s chart in [facility]. The potential causes and nature of an ingrown toenail were discussed with the patient.  We reviewed the natural history/prognosis of the condition and potential risks if no treatment is provided.      Treatment options discussed included conservative management (oral antibiotics, soaking of foot, adequate width shoes)  as well as surgical management (partial or total nail removal).  The pros and cons of both forms of treatment were reviewed.      After thorough discussion and answering all questions, the patient elected to have the border removed.  She will soak the foot twice a day for 2 weeks and apply antibiotic ointment and a bandage.    All questions were answered to patient satisfaction she will call further questions or concerns.    Procedure: After verbal consent, the right big toe was anesthetized with 5cc's of 1% lidocaine plain. A tourniquet was applied to the toe. The medial border was then raised from the nail bed and then cut the length of the nail.  The offending nail border was then removed.   Bacitracin was applied to the nail bed.  The tourniquet was removed.  Bandage was applied to the toe.  The patient tolerated the procedure and anesthesia well.      Patient risk factor: Patient is at low risk for infection.        Nancie Ca DPM, Podiatry/Foot and Ankle Surgery      Again, thank you for allowing me to participate in the care of your patient.        Sincerely,        Nancie Ca,  DPM, Podiatry/Foot and Ankle Surgery

## 2024-07-02 SDOH — HEALTH STABILITY: PHYSICAL HEALTH: ON AVERAGE, HOW MANY MINUTES DO YOU ENGAGE IN EXERCISE AT THIS LEVEL?: 10 MIN

## 2024-07-02 SDOH — HEALTH STABILITY: PHYSICAL HEALTH: ON AVERAGE, HOW MANY DAYS PER WEEK DO YOU ENGAGE IN MODERATE TO STRENUOUS EXERCISE (LIKE A BRISK WALK)?: 1 DAY

## 2024-07-03 ENCOUNTER — OFFICE VISIT (OUTPATIENT)
Dept: INTERNAL MEDICINE | Facility: CLINIC | Age: 44
End: 2024-07-03
Payer: COMMERCIAL

## 2024-07-03 VITALS
SYSTOLIC BLOOD PRESSURE: 116 MMHG | OXYGEN SATURATION: 96 % | DIASTOLIC BLOOD PRESSURE: 72 MMHG | BODY MASS INDEX: 35.35 KG/M2 | TEMPERATURE: 97.4 F | HEIGHT: 62 IN | HEART RATE: 91 BPM | WEIGHT: 192.1 LBS | RESPIRATION RATE: 18 BRPM

## 2024-07-03 DIAGNOSIS — E21.0 PRIMARY HYPERPARATHYROIDISM (H): ICD-10-CM

## 2024-07-03 DIAGNOSIS — L91.8 SKIN TAG: ICD-10-CM

## 2024-07-03 DIAGNOSIS — R73.03 PREDIABETES: ICD-10-CM

## 2024-07-03 DIAGNOSIS — Z00.00 ROUTINE GENERAL MEDICAL EXAMINATION AT A HEALTH CARE FACILITY: Primary | ICD-10-CM

## 2024-07-03 DIAGNOSIS — K21.9 GASTROESOPHAGEAL REFLUX DISEASE WITHOUT ESOPHAGITIS: ICD-10-CM

## 2024-07-03 DIAGNOSIS — Z12.31 ENCOUNTER FOR SCREENING MAMMOGRAM FOR BREAST CANCER: ICD-10-CM

## 2024-07-03 DIAGNOSIS — F32.5 MAJOR DEPRESSION IN REMISSION (H): ICD-10-CM

## 2024-07-03 DIAGNOSIS — N95.1 MENOPAUSAL SYNDROME (HOT FLASHES): ICD-10-CM

## 2024-07-03 PROCEDURE — 99214 OFFICE O/P EST MOD 30 MIN: CPT | Mod: 25 | Performed by: INTERNAL MEDICINE

## 2024-07-03 PROCEDURE — 90715 TDAP VACCINE 7 YRS/> IM: CPT | Performed by: INTERNAL MEDICINE

## 2024-07-03 PROCEDURE — 36415 COLL VENOUS BLD VENIPUNCTURE: CPT | Performed by: INTERNAL MEDICINE

## 2024-07-03 PROCEDURE — 99396 PREV VISIT EST AGE 40-64: CPT | Mod: 25 | Performed by: INTERNAL MEDICINE

## 2024-07-03 PROCEDURE — 83970 ASSAY OF PARATHORMONE: CPT | Performed by: INTERNAL MEDICINE

## 2024-07-03 PROCEDURE — 90471 IMMUNIZATION ADMIN: CPT | Performed by: INTERNAL MEDICINE

## 2024-07-03 ASSESSMENT — PAIN SCALES - GENERAL: PAINLEVEL: NO PAIN (0)

## 2024-07-03 NOTE — PATIENT INSTRUCTIONS
[place]) call [phone]  or   [phone]

## 2024-07-03 NOTE — PROGRESS NOTES
Preventive Care Visit  [facility]  Sandy Obrien MD, Internal Medicine  Jul 3, 2024      Assessment & Plan     Routine general medical examination at a health care facility  Encouraged to complete mammogram.  Has colonoscopy scheduled in the next 2 months.    Menopausal syndrome (hot flashes)  Overall, symptoms have improved.  Patient use gabapentin for around 1 month and stopped using the medication upon symptom improvement.    Encouraged to continue with lifestyle changes.    Prediabetes  Recently initiated on metformin medication.  Tolerating medication well.  Has also been working on dietary changes including cutting back on sugars.    Gastroesophageal reflux disease without esophagitis  Overall, symptoms stable.  Continue omeprazole at the current dose.  - omeprazole (PRILOSEC) 20 MG DR capsule; Take 1 capsule (20 mg) by mouth daily    Major depression in remission (H24)  No recent concerns.  Clinically stable.    Primary hyperparathyroidism (H24)  S/p parathyroidectomy on 2/17/2021.  Update PTH lab work.  - Parathyroid Hormone Intact; Future  - Parathyroid Hormone Intact    Encounter for screening mammogram for breast cancer      Skin tag  Will be following up with dermatology for multiple skin tags.    Patient has been advised of split billing requirements and indicates understanding: Yes        Counseling  Appropriate preventive services were discussed with this patient, including applicable screening as appropriate for fall prevention, nutrition, physical activity.          Subjective   Radha is a 43 year old, presenting for the following:  Physical        7/3/2024     1:44 PM   Additional Questions   Roomed by Frank   Accompanied by Self        Health Care Directive  Patient does not have a Health Care Directive or Living Will: Discussed advance care planning with patient; however, patient declined at this time.    HPI    Patient comes in today for annual physical and follow-up.   Denies any new concerns or symptoms.      7/2/2024   General Health   How would you rate your overall physical health? Good   Feel stress (tense, anxious, or unable to sleep) Only a little      (!) STRESS CONCERN      7/2/2024   Nutrition   Three or more servings of calcium each day? Yes   Diet: Regular (no restrictions)   How many servings of fruit and vegetables per day? (!) 0-1   How many sweetened beverages each day? (!) 2            7/2/2024   Exercise   Days per week of moderate/strenous exercise 1 day   Average minutes spent exercising at this level 10 min      (!) EXERCISE CONCERN      7/2/2024   Social Factors   Worry food won't last until get money to buy more No   Food not last or not have enough money for food? No   Do you have housing? (Housing is defined as stable permanent housing and does not include staying ouside in a car, in a tent, in an abandoned building, in an overnight shelter, or couch-surfing.) Yes   Are you worried about losing your housing? No   Lack of transportation? No   Unable to get utilities (heat,electricity)? No            7/2/2024   Dental   Dentist two times every year? Yes            7/2/2024   TB Screening   Were you born outside of the [place]? Yes            Today's PHQ-9 Score:       3/6/2024    10:52 AM   PHQ-9 SCORE   PHQ-9 Total Score MyChart 2 (Minimal depression)   PHQ-9 Total Score 2           7/2/2024   Substance Use   Alcohol more than 3/day or more than 7/wk No   Do you use any other substances recreationally? No        Social History     Tobacco Use    Smoking status: Never    Smokeless tobacco: Never   Vaping Use    Vaping status: Never Used   Substance Use Topics    Alcohol use: No    Drug use: No          Mammogram Screening - Mammogram every 1-2 years updated in Health Maintenance based on mutual decision making        7/2/2024   STI Screening   New sexual partner(s) since last STI/HIV test? No        History of abnormal Pap smear: S/p hysterectomy and bilateral  "salpingectomy in 2019 due to concerns of fibroid uterus and iron deficiency anemia        Latest Ref Rng & Units 6/27/2019     3:27 PM 6/27/2019     3:16 PM 3/25/2016    12:00 AM   PAP / HPV   PAP (Historical)  NIL      HPV 16 DNA NEG^Negative  Negative     HPV 18 DNA NEG^Negative  Negative     Other HR HPV NEG^Negative  Negative     PAP-ABSTRACT    See Scanned Document           This result is from an external source.     ASCVD Risk   The 10-year ASCVD risk score (Kimberly CHUNG, et al., 2019) is: 1.1%    Values used to calculate the score:      Age: 43 years      Sex: Female      Is Non- : No      Diabetic: No      Tobacco smoker: No      Systolic Blood Pressure: 116 mmHg      Is BP treated: No      HDL Cholesterol: 38 mg/dL      Total Cholesterol: 191 mg/dL       Reviewed and updated as needed this visit by Provider                          Review of Systems  Constitutional, HEENT, cardiovascular, pulmonary, gi and gu systems are negative, except as otherwise noted.     Objective    Exam  /72 (BP Location: Right arm, Patient Position: Sitting, Cuff Size: Adult Regular)   Pulse 91   Temp 97.4  F (36.3  C) (Tympanic)   Resp 18   Ht 1.575 m (5' 2\")   Wt 87.1 kg (192 lb 1.6 oz)   LMP 06/20/2019   SpO2 96%   BMI 35.14 kg/m     Estimated body mass index is 35.14 kg/m  as calculated from the following:    Height as of this encounter: 1.575 m (5' 2\").    Weight as of this encounter: 87.1 kg (192 lb 1.6 oz).    Physical Exam  GENERAL: alert and no distress  EYES: Eyes grossly normal to inspection, PERRL and conjunctivae and sclerae normal  HENT: ear canals and TM's normal, nose and mouth without ulcers or lesions  RESP: lungs clear to auscultation - no rales, rhonchi or wheezes  BREAST: normal without masses, tenderness or nipple discharge and no palpable axillary masses or adenopathy  CV: regular rate and rhythm, normal S1 S2  ABDOMEN: soft, nontender, no hepatosplenomegaly, no " masses  MS: no gross musculoskeletal defects noted, no edema  SKIN: no suspicious lesions or rashes  NEURO: Normal strength and tone, mentation intact and speech normal  PSYCH: mentation appears normal, affect normal/bright        Signed Electronically by: Sandy Obiren MD

## 2024-07-04 LAB — PTH-INTACT SERPL-MCNC: 47 PG/ML (ref 15–65)

## 2024-07-15 ENCOUNTER — OFFICE VISIT (OUTPATIENT)
Dept: DERMATOLOGY | Facility: CLINIC | Age: 44
End: 2024-07-15
Payer: COMMERCIAL

## 2024-07-15 DIAGNOSIS — L71.9 ROSACEA: ICD-10-CM

## 2024-07-15 DIAGNOSIS — L82.0 INFLAMED SEBORRHEIC KERATOSIS: ICD-10-CM

## 2024-07-15 DIAGNOSIS — B36.0 TINEA VERSICOLOR DUE TO MALASSEZIA FURFUR: Primary | ICD-10-CM

## 2024-07-15 DIAGNOSIS — L91.8 INFLAMED ACROCHORDON: ICD-10-CM

## 2024-07-15 PROCEDURE — 99214 OFFICE O/P EST MOD 30 MIN: CPT | Mod: 25 | Performed by: STUDENT IN AN ORGANIZED HEALTH CARE EDUCATION/TRAINING PROGRAM

## 2024-07-15 PROCEDURE — 11301 SHAVE SKIN LESION 0.6-1.0 CM: CPT | Performed by: STUDENT IN AN ORGANIZED HEALTH CARE EDUCATION/TRAINING PROGRAM

## 2024-07-15 PROCEDURE — 17110 DESTRUCTION B9 LES UP TO 14: CPT | Mod: XS | Performed by: STUDENT IN AN ORGANIZED HEALTH CARE EDUCATION/TRAINING PROGRAM

## 2024-07-15 RX ORDER — ITRACONAZOLE 100 MG/1
200 CAPSULE ORAL DAILY
Qty: 14 CAPSULE | Refills: 0 | Status: SHIPPED | OUTPATIENT
Start: 2024-07-15

## 2024-07-15 RX ORDER — AZELAIC ACID 0.15 G/G
GEL TOPICAL
Qty: 50 G | Refills: 3 | Status: SHIPPED | OUTPATIENT
Start: 2024-07-15

## 2024-07-15 RX ORDER — KETOCONAZOLE 20 MG/ML
SHAMPOO TOPICAL DAILY PRN
Qty: 120 ML | Refills: 3 | Status: SHIPPED | OUTPATIENT
Start: 2024-07-15

## 2024-07-15 NOTE — PATIENT INSTRUCTIONS
Take 2 pills of itraconazole daily for 1 week  Use ketoconazole shampoo to the chest daily as maintenance    Use azelaic acid to the armpits to help with pigmentation

## 2024-07-15 NOTE — PROGRESS NOTES
[facility] Dermatology Note    Encounter Date: Jul 15, 2024    Dermatology Problem List:  ___________________________________    Impression/Plan:  Radha was seen today for medication refill.    Diagnoses and all orders for this visit:    Tinea versicolor due to Malassezia furfur  -     itraconazole (SPORANOX) 100 MG capsule; Take 2 capsules (200 mg) by mouth daily  -     ketoconazole (NIZORAL) 2 % external shampoo; Apply topically daily as needed for itching or irritation  States that last time the itraconazole The rash away for 6 months  - Repeat  - Can use ketoconazole as maintenance    Rosacea  -     azelaic acid (FINACIA) 15 % external gel; Apply to armpits twice daily  -Bothered by hyperpigmentation in bilateral axilla which is very subtle  - Can use azelaic acid which would be a low risk intervention    Inflamed seborrheic keratosis  -     DESTRUCT BENIGN LESION, UP TO 14  -10 I SKs right axilla    Inflamed acrochordon  -     SHAV SKIN LESION TRUNK/ARM/LEG 0.6-1.0 CM  -1 large inflamed acrochordon left axilla, 6 mm  -Painful, getting caught on clothing, occasionally bleeding  - Due to intolerable side effects, shave removal performed for palliation of symptoms  - See procedure note        Cryotherapy procedure note: After verbal consent and discussion of risks and benefits including but no limited to dyspigmentation/scar, blister, and pain, 10 I SKs right axilla was(were) treated with 1-2mm freeze border for 2 cycles with liquid nitrogen. Post cryotherapy instructions were provided.    and     - Shave Removal PROCEDURE NOTE: After written informed consent was obtained, a time out was taken to identify the patient and the correct site for removal. The lesion on the left axilla (6 mm) was cleansed with a 70% isopropyl alcohol wipe, and then injected with 1cc of lidocaine 1% with epinephrine 1:100,000. Once anesthesia was ensured, the visible surface of the lesion was removed using traction  and hyfrecator on a setting of 10 in standard technique applied to the base of lesion. Hemostasis was obtained with pressure and aluminum chloride 20% solution. The wound was dressed with white petrolatum and an adhesive bandage. The patient tolerated the procedure well. Post-procedure instructions and recommendations were provided both verbally and in writing.     Follow-up PRN.       Staff Involved:  Staff Only    Cameron Acuna MD   of Dermatology  Department of Dermatology  [facility] [employer]      CC:   Chief Complaint   Patient presents with    Medication Refill     Medication for the spots on chest. Needs refill. Itraconazole        History of Present Illness:  Ms. Radha Bryan is a 43 year old female who presents as a new patient.    Radha was last seen November 2022 she was treated for a rash thought to be possibly due to tinea versicolor versus confluent reticulated papillomatosis.  She was given itraconazole 200 mg twice a day for 1 week.    Labs:      Physical exam:  Vitals: LMP 06/20/2019   GEN: well developed, well-nourished, in no acute distress, in a pleasant mood.     SKIN: Stern phototype 3  - Focused examination of the chest and b/l axilla was performed.  -Velvety hyperpigmentation of bilateral axillae  - Pedunculated 6 mm papule left axilla  - Stuck on brown spots right axilla  - Reticulated slightly scaly patches central chest  - No other lesions of concern on areas examined.     Past Medical History:   Past Medical History:   Diagnosis Date    Dyslipidemia     History of blood transfusion 2019    heavy menstrual cycles, anemic    Irregular periods/menstrual cycles     Nephrolithiasis     Obesity     Pleurisy 7/10/2023    PONV (postoperative nausea and vomiting)     Primary hyperparathyroidism (H24) 02/2021    Tinea versicolor      Past Surgical History:   Procedure Laterality Date    CHOLECYSTECTOMY      COMBINED CYSTOSCOPY, RETROGRADES,  URETEROSCOPY, INSERT STENT Right 10/9/2020    Procedure: Cystoscopy right ureteral stent removal right retrograde pyelogram right ureteroscopy Right ureteral dilation right ureteral stent placement Holmium Laser on Stand-by;  Surgeon: Kavon Hernandez MD;  Location: [facility] OR    COMBINED CYSTOSCOPY, RETROGRADES, URETEROSCOPY, LASER HOLMIUM LITHOTRIPSY URETER(S), INSERT STENT Bilateral 9/17/2020    Procedure: Cystoscopy, bilateral stent removal, bilateral ureteroscopy, left laser lithotripsy, stone basketing, bilateral stent placement; bilateral retrograde pyelogram, fluoroscopic interpretation <1 hour physician time;  Surgeon: Kavon Hernandez MD;  Location: [facility] OR    CYSTOSCOPY, RETROGRADES, INSERT STENT URETER(S), COMBINED Bilateral 8/27/2020    Procedure: Cystoscopy with bilateral retrograde pyelogram and bilateral ureteral stent insertion with Fluoroscopic interpretation <1 hour physician time;  Surgeon: Kavon Hernandez MD;  Location: [facility] OR    LAPAROSCOPIC HYSTERECTOMY TOTAL, SALPINGECTOMY BILATERAL  07/17/2019    AUB, fibroid uterus, anemia. [facility], Dr. Pamela Carrion    LAPAROSCOPIC TUBAL LIGATION      PARATHYROIDECTOMY N/A 2/17/2021    Procedure: CERVICAL EXPLORATION, PARATHYROIDECTOMY;  Surgeon: Adelaide Saenz MD;  Location:  OR       Social History:   reports that she has never smoked. She has never used smokeless tobacco. She reports that she does not drink alcohol and does not use drugs.    Family History:  Family History   Problem Relation Age of Onset    Family History Negative Mother     Kidney Disease Brother         kidney stone    No Known Problems Sister     No Known Problems Son     No Known Problems Daughter     Liver Disease No family hx of     Colon Cancer No family hx of        Medications:  Current Outpatient Medications   Medication Sig Dispense Refill    azelaic acid (FINACIA) 15 % external gel Apply to armpits twice daily 50 g 3    itraconazole (SPORANOX) 100 MG capsule  Take 2 capsules (200 mg) by mouth daily 14 capsule 0    ketoconazole (NIZORAL) 2 % external shampoo Apply topically daily as needed for itching or irritation 120 mL 3    metFORMIN (GLUCOPHAGE XR) 500 MG 24 hr tablet Take 1 tablet (500 mg) by mouth daily (with dinner) for 90 days 90 tablet 0    omeprazole (PRILOSEC) 20 MG DR capsule Take 1 capsule (20 mg) by mouth daily 90 capsule 1     No Known Allergies

## 2024-07-15 NOTE — LETTER
7/15/2024      Radha Bryan  [address]  [place] [facility] [address]      Dear Colleague,    Thank you for referring your patient, Radha Bryan, to the [facility]. Please see a copy of my visit note below.    [facility] Dermatology Note    Encounter Date: Jul 15, 2024    Dermatology Problem List:  ___________________________________    Impression/Plan:  Radha was seen today for medication refill.    Diagnoses and all orders for this visit:    Tinea versicolor due to Malassezia furfur  -     itraconazole (SPORANOX) 100 MG capsule; Take 2 capsules (200 mg) by mouth daily  -     ketoconazole (NIZORAL) 2 % external shampoo; Apply topically daily as needed for itching or irritation  States that last time the itraconazole The rash away for 6 months  - Repeat  - Can use ketoconazole as maintenance    Rosacea  -     azelaic acid (FINACIA) 15 % external gel; Apply to armpits twice daily  -Bothered by hyperpigmentation in bilateral axilla which is very subtle  - Can use azelaic acid which would be a low risk intervention    Inflamed seborrheic keratosis  -     DESTRUCT BENIGN LESION, UP TO 14  -10 I SKs right axilla    Inflamed acrochordon  -     SHAV SKIN LESION TRUNK/ARM/LEG 0.6-1.0 CM  -1 large inflamed acrochordon left axilla, 6 mm  -Painful, getting caught on clothing, occasionally bleeding  - Due to intolerable side effects, shave removal performed for palliation of symptoms  - See procedure note        Cryotherapy procedure note: After verbal consent and discussion of risks and benefits including but no limited to dyspigmentation/scar, blister, and pain, 10 I SKs right axilla was(were) treated with 1-2mm freeze border for 2 cycles with liquid nitrogen. Post cryotherapy instructions were provided.    and     - Shave Removal PROCEDURE NOTE: After written informed consent was obtained, a time out was taken to identify the patient and the correct site for  removal. The lesion on the left axilla (6 mm) was cleansed with a 70% isopropyl alcohol wipe, and then injected with 1cc of lidocaine 1% with epinephrine 1:100,000. Once anesthesia was ensured, the visible surface of the lesion was removed using traction and hyfrecator on a setting of 10 in standard technique applied to the base of lesion. Hemostasis was obtained with pressure and aluminum chloride 20% solution. The wound was dressed with white petrolatum and an adhesive bandage. The patient tolerated the procedure well. Post-procedure instructions and recommendations were provided both verbally and in writing.     Follow-up PRN.       Staff Involved:  Staff Only    Cameron Acuna MD   of Dermatology  Department of Dermatology  [facility]      CC:   Chief Complaint   Patient presents with     Medication Refill     Medication for the spots on chest. Needs refill. Itraconazole        History of Present Illness:  Ms. Radha Bryan is a 43 year old female who presents as a new patient.    Radha was last seen November 2022 she was treated for a rash thought to be possibly due to tinea versicolor versus confluent reticulated papillomatosis.  She was given itraconazole 200 mg twice a day for 1 week.    Labs:      Physical exam:  Vitals: LMP 06/20/2019   GEN: well developed, well-nourished, in no acute distress, in a pleasant mood.     SKIN: Stern phototype 3  - Focused examination of the chest and b/l axilla was performed.  -Velvety hyperpigmentation of bilateral axillae  - Pedunculated 6 mm papule left axilla  - Stuck on brown spots right axilla  - Reticulated slightly scaly patches central chest  - No other lesions of concern on areas examined.     Past Medical History:   Past Medical History:   Diagnosis Date     Dyslipidemia      History of blood transfusion 2019    heavy menstrual cycles, anemic     Irregular periods/menstrual cycles      Nephrolithiasis       Obesity      Pleurisy 7/10/2023     PONV (postoperative nausea and vomiting)      Primary hyperparathyroidism (H24) 02/2021     Tinea versicolor      Past Surgical History:   Procedure Laterality Date     CHOLECYSTECTOMY       COMBINED CYSTOSCOPY, RETROGRADES, URETEROSCOPY, INSERT STENT Right 10/9/2020    Procedure: Cystoscopy right ureteral stent removal right retrograde pyelogram right ureteroscopy Right ureteral dilation right ureteral stent placement Holmium Laser on Stand-by;  Surgeon: Kavon Hernandez MD;  Location: [facility] OR     COMBINED CYSTOSCOPY, RETROGRADES, URETEROSCOPY, LASER HOLMIUM LITHOTRIPSY URETER(S), INSERT STENT Bilateral 9/17/2020    Procedure: Cystoscopy, bilateral stent removal, bilateral ureteroscopy, left laser lithotripsy, stone basketing, bilateral stent placement; bilateral retrograde pyelogram, fluoroscopic interpretation <1 hour physician time;  Surgeon: Kavon Hernandez MD;  Location: [facility] OR     CYSTOSCOPY, RETROGRADES, INSERT STENT URETER(S), COMBINED Bilateral 8/27/2020    Procedure: Cystoscopy with bilateral retrograde pyelogram and bilateral ureteral stent insertion with Fluoroscopic interpretation <1 hour physician time;  Surgeon: Kavon Hernandez MD;  Location: [facility] OR     LAPAROSCOPIC HYSTERECTOMY TOTAL, SALPINGECTOMY BILATERAL  07/17/2019    AUB, fibroid uterus, anemia. [facility], Dr. Pamela Carrion     LAPAROSCOPIC TUBAL LIGATION       PARATHYROIDECTOMY N/A 2/17/2021    Procedure: CERVICAL EXPLORATION, PARATHYROIDECTOMY;  Surgeon: Adelaide Saenz MD;  Location:  OR       Social History:   reports that she has never smoked. She has never used smokeless tobacco. She reports that she does not drink alcohol and does not use drugs.    Family History:  Family History   Problem Relation Age of Onset     Family History Negative Mother      Kidney Disease Brother         kidney stone     No Known Problems Sister      No Known Problems Son      No Known Problems  Daughter      Liver Disease No family hx of      Colon Cancer No family hx of        Medications:  Current Outpatient Medications   Medication Sig Dispense Refill     azelaic acid (FINACIA) 15 % external gel Apply to armpits twice daily 50 g 3     itraconazole (SPORANOX) 100 MG capsule Take 2 capsules (200 mg) by mouth daily 14 capsule 0     ketoconazole (NIZORAL) 2 % external shampoo Apply topically daily as needed for itching or irritation 120 mL 3     metFORMIN (GLUCOPHAGE XR) 500 MG 24 hr tablet Take 1 tablet (500 mg) by mouth daily (with dinner) for 90 days 90 tablet 0     omeprazole (PRILOSEC) 20 MG DR capsule Take 1 capsule (20 mg) by mouth daily 90 capsule 1     No Known Allergies              Again, thank you for allowing me to participate in the care of your patient.        Sincerely,        Cameron Acuna MD

## 2024-08-16 ENCOUNTER — HOSPITAL ENCOUNTER (OUTPATIENT)
Dept: MAMMOGRAPHY | Facility: CLINIC | Age: 44
Discharge: HOME | End: 2024-08-16
Attending: INTERNAL MEDICINE | Admitting: INTERNAL MEDICINE
Payer: COMMERCIAL

## 2024-08-16 DIAGNOSIS — Z12.31 VISIT FOR SCREENING MAMMOGRAM: ICD-10-CM

## 2024-08-16 PROCEDURE — 77063 BREAST TOMOSYNTHESIS BI: CPT

## 2024-08-29 RX ORDER — LIDOCAINE 40 MG/G
CREAM TOPICAL
Status: CANCELLED | OUTPATIENT
Start: 2024-08-29

## 2024-08-29 RX ORDER — ONDANSETRON 2 MG/ML
4 INJECTION INTRAMUSCULAR; INTRAVENOUS
Status: CANCELLED | OUTPATIENT
Start: 2024-08-29

## 2024-09-05 RX ORDER — BISACODYL 5 MG/1
TABLET, DELAYED RELEASE ORAL
Qty: 4 TABLET | Refills: 0 | Status: SHIPPED | OUTPATIENT
Start: 2024-09-05

## 2024-09-05 NOTE — TELEPHONE ENCOUNTER
Standard Golytely Bowel Prep recommended due to CKD noted.  Instructions were sent via [email or web address]. Bowel prep was sent 9/5/2024 to    [facility] - [place], [place] - [address] [address].

## 2024-09-10 DIAGNOSIS — R73.03 PREDIABETES: ICD-10-CM

## 2024-09-10 RX ORDER — METFORMIN HCL 500 MG
500 TABLET, EXTENDED RELEASE 24 HR ORAL
Qty: 90 TABLET | Refills: 0 | Status: SHIPPED | OUTPATIENT
Start: 2024-09-10

## 2024-09-10 NOTE — TELEPHONE ENCOUNTER
Hi there,     Pt requesting to refill metformin 500 mg. Please review/advise if appropriate    Thank you,  Heaven Oliveira PhT  [facility]

## 2024-10-07 ENCOUNTER — OFFICE VISIT (OUTPATIENT)
Dept: INTERNAL MEDICINE | Facility: CLINIC | Age: 44
End: 2024-10-07
Payer: COMMERCIAL

## 2024-10-07 ENCOUNTER — ANCILLARY PROCEDURE (OUTPATIENT)
Dept: GENERAL RADIOLOGY | Facility: CLINIC | Age: 44
End: 2024-10-07
Payer: COMMERCIAL

## 2024-10-07 VITALS
SYSTOLIC BLOOD PRESSURE: 111 MMHG | DIASTOLIC BLOOD PRESSURE: 75 MMHG | OXYGEN SATURATION: 95 % | WEIGHT: 192.8 LBS | TEMPERATURE: 96.5 F | BODY MASS INDEX: 35.26 KG/M2 | HEART RATE: 75 BPM | RESPIRATION RATE: 16 BRPM

## 2024-10-07 DIAGNOSIS — M54.42 ACUTE LEFT-SIDED LOW BACK PAIN WITH LEFT-SIDED SCIATICA: Primary | ICD-10-CM

## 2024-10-07 DIAGNOSIS — M54.42 ACUTE LEFT-SIDED LOW BACK PAIN WITH LEFT-SIDED SCIATICA: ICD-10-CM

## 2024-10-07 PROCEDURE — 99214 OFFICE O/P EST MOD 30 MIN: CPT

## 2024-10-07 PROCEDURE — 72100 X-RAY EXAM L-S SPINE 2/3 VWS: CPT | Mod: TC | Performed by: RADIOLOGY

## 2024-10-07 RX ORDER — CYCLOBENZAPRINE HCL 10 MG
10 TABLET ORAL 3 TIMES DAILY PRN
Qty: 42 TABLET | Refills: 0 | Status: SHIPPED | OUTPATIENT
Start: 2024-10-07

## 2024-10-07 RX ORDER — LIDOCAINE 4 G/G
1 PATCH TOPICAL EVERY 24 HOURS
Qty: 30 PATCH | Refills: 1 | Status: SHIPPED | OUTPATIENT
Start: 2024-10-07 | End: 2024-10-11

## 2024-10-07 ASSESSMENT — ENCOUNTER SYMPTOMS: BACK PAIN: 1

## 2024-10-07 ASSESSMENT — PAIN SCALES - GENERAL: PAINLEVEL: EXTREME PAIN (8)
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
## 2024-10-07 NOTE — PATIENT INSTRUCTIONS
Try ice to reduce inflammation, Tylenol, and lidocaine patch.    Cyclobenzaprine can make you drowsy, so do not take and drive a car until you know how it makes you feel.     Try a low back support to protect your back
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
## 2024-10-08 ENCOUNTER — PATIENT OUTREACH (OUTPATIENT)
Dept: CARE COORDINATION | Facility: CLINIC | Age: 44
End: 2024-10-08
Payer: COMMERCIAL

## 2024-10-10 ENCOUNTER — TELEPHONE (OUTPATIENT)
Dept: INTERNAL MEDICINE | Facility: CLINIC | Age: 44
End: 2024-10-10
Payer: COMMERCIAL

## 2024-10-10 ENCOUNTER — PATIENT OUTREACH (OUTPATIENT)
Dept: CARE COORDINATION | Facility: CLINIC | Age: 44
End: 2024-10-10
Payer: COMMERCIAL

## 2024-10-10 NOTE — TELEPHONE ENCOUNTER
Reason for Call:  Appointment Request    Patient requesting this type of appt:  Now having pain in left hip area/groin     Requested provider: Sandy Obrien    Reason patient unable to be scheduled: Needs to be scheduled by clinic    When does patient want to be seen/preferred time: Same day    Comments: Would like to be seen as it is difficult to walk due to the pain she is experiencing in her hip area.    Could we send this information to you in [facility] or would you prefer to receive a phone call?:   Patient would prefer a phone call   Okay to leave a detailed message?: Yes at Cell number on file:    Telephone Information:   Mobile [phone]       Call taken on 10/10/2024 at 5:40 PM by Radha Patiño

## 2024-10-11 ENCOUNTER — OFFICE VISIT (OUTPATIENT)
Dept: INTERNAL MEDICINE | Facility: CLINIC | Age: 44
End: 2024-10-11
Payer: COMMERCIAL

## 2024-10-11 VITALS
BODY MASS INDEX: 35.51 KG/M2 | OXYGEN SATURATION: 99 % | HEIGHT: 62 IN | TEMPERATURE: 97.3 F | WEIGHT: 193 LBS | SYSTOLIC BLOOD PRESSURE: 123 MMHG | RESPIRATION RATE: 16 BRPM | HEART RATE: 94 BPM | DIASTOLIC BLOOD PRESSURE: 79 MMHG

## 2024-10-11 DIAGNOSIS — M54.42 ACUTE LEFT-SIDED LOW BACK PAIN WITH LEFT-SIDED SCIATICA: ICD-10-CM

## 2024-10-11 PROCEDURE — 99215 OFFICE O/P EST HI 40 MIN: CPT

## 2024-10-11 RX ORDER — LIDOCAINE 4 G/G
1 PATCH TOPICAL EVERY 24 HOURS
Qty: 30 PATCH | Refills: 1 | Status: SHIPPED | OUTPATIENT
Start: 2024-10-11
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
## 2024-10-11 NOTE — PATIENT INSTRUCTIONS
Wear lower back support anytime you are doing lifting or anything that may strain your back. Place a pillow between your legs when you sleep at night to keep your hip in alignment. Use proper body mechanics when lifting.
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
## 2024-10-21 ENCOUNTER — OFFICE VISIT (OUTPATIENT)
Dept: FAMILY MEDICINE | Facility: CLINIC | Age: 44
End: 2024-10-21
Payer: COMMERCIAL

## 2024-10-21 VITALS
WEIGHT: 194 LBS | OXYGEN SATURATION: 97 % | HEART RATE: 84 BPM | RESPIRATION RATE: 14 BRPM | TEMPERATURE: 98.4 F | BODY MASS INDEX: 35.7 KG/M2 | HEIGHT: 62 IN | DIASTOLIC BLOOD PRESSURE: 79 MMHG | SYSTOLIC BLOOD PRESSURE: 124 MMHG

## 2024-10-21 DIAGNOSIS — G44.209 TENSION-TYPE HEADACHE, NOT INTRACTABLE, UNSPECIFIED CHRONICITY PATTERN: Primary | ICD-10-CM

## 2024-10-21 DIAGNOSIS — H92.02 LEFT EAR PAIN: ICD-10-CM

## 2024-10-21 DIAGNOSIS — G43.909 MIGRAINE WITHOUT STATUS MIGRAINOSUS, NOT INTRACTABLE, UNSPECIFIED MIGRAINE TYPE: ICD-10-CM

## 2024-10-21 DIAGNOSIS — R09.82 POST-NASAL DRIP: ICD-10-CM

## 2024-10-21 PROCEDURE — 99214 OFFICE O/P EST MOD 30 MIN: CPT | Performed by: FAMILY MEDICINE

## 2024-10-21 PROCEDURE — G2211 COMPLEX E/M VISIT ADD ON: HCPCS | Performed by: FAMILY MEDICINE

## 2024-10-21 RX ORDER — NAPROXEN 500 MG/1
500 TABLET ORAL 2 TIMES DAILY PRN
Qty: 30 TABLET | Refills: 0 | Status: SHIPPED | OUTPATIENT
Start: 2024-10-21

## 2024-10-21 RX ORDER — FLUTICASONE PROPIONATE 50 MCG
1 SPRAY, SUSPENSION (ML) NASAL DAILY
Qty: 18.1 ML | Refills: 1 | Status: SHIPPED | OUTPATIENT
Start: 2024-10-21

## 2024-10-21 RX ORDER — SUMATRIPTAN 50 MG/1
50 TABLET, FILM COATED ORAL
Qty: 10 TABLET | Refills: 0 | Status: SHIPPED | OUTPATIENT
Start: 2024-10-21

## 2024-10-21 ASSESSMENT — ENCOUNTER SYMPTOMS: HEADACHES: 1

## 2024-10-21 ASSESSMENT — PAIN SCALES - GENERAL: PAINLEVEL: EXTREME PAIN (8)

## 2024-10-21 NOTE — PROGRESS NOTES
"  Assessment & Plan     Tension-type headache, not intractable, unspecified chronicity pattern  Sounds primarily like tension type headaches, but has a component that sounds like migraine type headaches.  Recommend use of Naproxen with tizanidine PRN to start, and if that does not help, recommend trial of Sumatriptan.  Follow up if not improving.  - naproxen (NAPROSYN) 500 MG tablet; Take 1 tablet (500 mg) by mouth 2 times daily as needed for headaches.  - tiZANidine (ZANAFLEX) 4 MG tablet; Take 1 tablet (4 mg) by mouth 3 times daily as needed for muscle spasms.    Migraine without status migrainosus, not intractable, unspecified migraine type  As above  - tiZANidine (ZANAFLEX) 4 MG tablet; Take 1 tablet (4 mg) by mouth 3 times daily as needed for muscle spasms.  - SUMAtriptan (IMITREX) 50 MG tablet; Take 1 tablet (50 mg) by mouth at onset of headache for migraine. May repeat in 2 hours. Max 4 tablets/24 hours.    Post-nasal drip  Left ear pain  Normal ear exam, evidence of inflammation.  Start Flonase nasal spray, follow up if not improving.  - fluticasone (FLONASE) 50 MCG/ACT nasal spray; Spray 1 spray into both nostrils daily.    The longitudinal plan of care for the diagnosis(es)/condition(s) as documented were addressed during this visit. Due to the added complexity in care, I will continue to support Radha in the subsequent management and with ongoing continuity of care.    BMI  Estimated body mass index is 35.48 kg/m  as calculated from the following:    Height as of this encounter: 1.575 m (5' 2\").    Weight as of this encounter: 88 kg (194 lb).       See Patient Instructions    Subjective   Radha is a 43 year old, presenting for the following health issues:  Headache and Ear Problem        10/21/2024     9:47 AM   Additional Questions   Roomed by Vianey Barron, EMT-B     Headache     Ear Problem  Associated symptoms include headaches.   History of Present Illness       Reason for visit:  Pain   She is " "taking medications regularly.         Pain History:  When did you first notice your pain? 3 weeks   Have you seen anyone else for your pain? No  How has your pain affected your ability to work? sometimes  Where in your body do you have pain? Headache  Onset/Duration: 3 weeks  Description  Location: bilateral in the occipital area, unilateral in the occipital area   Character: sharp pain  Frequency:  constant  Duration:  constant  Wake with headaches: YES  Able to do daily activities when headache present: YES- sometimes  Intensity:  moderate to severe  Progression of Symptoms:  same and constant  Accompanying signs and symptoms:  Stiff neck: No  Neck or upper back pain: No  Sinus or URI symptoms YES  Fever: No  Nausea or vomiting: No  Dizziness: YES  Numbness/tingling: No  Weakness: YES  Visual changes: none  History  Head trauma: No  Family history of migraines: No  Daily pain medication use: No  Previous tests for headaches: No  Neurologist evaluation: No  Precipitating or Alleviating factors (light/sound/sleep/caffeine): light & Sound  Therapies tried and outcome: Ibuprofen (Advil, Motrin) and Tylenol              Outcome - not effective  Frequent/daily pain medication use: No    Eyes are not sensitive to light but she says \"swollen\", both make her head hurt a little. No nausea or vomiting.  Thinks she had migraines in the past.  Tried to take a Cyclobenzaprine yesterday, which works a little bit, but makes her very tired.    **Ear pain started 1 week ago, Left ear, pressure. Dull ache.  Has some congestion, pain into the throat. Some post nasal drip.  When she blows her nose nothing comes out.  No allergies that she is aware of.  No fever, no sore throat.    Pt has tried Tylenol/Ibuprofen - not effective          Current Outpatient Medications   Medication Sig Dispense Refill    cyclobenzaprine (FLEXERIL) 10 MG tablet Take 1 tablet (10 mg) by mouth 3 times daily as needed for muscle spasms. 42 tablet 0    " "itraconazole (SPORANOX) 100 MG capsule Take 2 capsules (200 mg) by mouth daily 14 capsule 0    Lidocaine (LIDOCARE) 4 % Patch Place 1 patch over 12 hours onto the skin every 24 hours. To prevent lidocaine toxicity, patient should be patch free for 12 hrs daily. 30 patch 1    metFORMIN (GLUCOPHAGE XR) 500 MG 24 hr tablet Take 1 tablet (500 mg) by mouth daily (with dinner). 90 tablet 0    omeprazole (PRILOSEC) 20 MG DR capsule Take 1 capsule (20 mg) by mouth daily 90 capsule 1    bisacodyl (DULCOLAX) 5 MG EC tablet Take 2 tablets at 3 pm the day before your procedure. If your procedure is before 11 am, take 2 additional tablets at 11 pm. If your procedure is after 11 am, take 2 additional tablets at 6 am. For additional instructions refer to your colonoscopy prep instructions. (Patient not taking: Reported on 10/7/2024) 4 tablet 0    ketoconazole (NIZORAL) 2 % external shampoo Apply topically daily as needed for itching or irritation (Patient not taking: Reported on 10/21/2024) 120 mL 3     No current facility-administered medications for this visit.           Objective    /79 (BP Location: Right arm, Patient Position: Sitting, Cuff Size: Adult Regular)   Pulse 84   Temp 98.4  F (36.9  C) (Oral)   Resp 14   Ht 1.575 m (5' 2\")   Wt 88 kg (194 lb)   LMP 06/20/2019 (Exact Date)   SpO2 97%   BMI 35.48 kg/m    Body mass index is 35.48 kg/m .  Physical Exam   GENERAL: alert and no distress  EYES: Eyes grossly normal to inspection, PERRL and conjunctivae and sclerae normal  HENT: normal cephalic/atraumatic, ear canals and TM's normal, nasal mucosa edematous , oropharynx clear, and oral mucous membranes moist  NECK: no adenopathy, no asymmetry, masses, or scars  RESP: lungs clear to auscultation - no rales, rhonchi or wheezes  CV: regular rates and rhythm, normal S1 S2, no S3 or S4, and no murmur, click or rub  MS: no gross musculoskeletal defects noted, no edema  PSYCH: mentation appears normal, affect " normal/bright          Signed Electronically by: Melvi Mujica MD

## 2024-10-21 NOTE — PATIENT INSTRUCTIONS
Flonase- one spray per nostril twice per day for 7 days, then once daily for 3 more weeks.  Naproxen, one tablet twice per day as needed for headache.  Tizanidine (Muscle relaxer), one tablet up to three times per day as needed for headache. Okay to take with Naproxen.  If these do not work, try Sumatriptan, one tablet for headache. Do not take with the Sumatriptan, space apart a few hours.

## 2024-12-09 DIAGNOSIS — R73.03 PREDIABETES: ICD-10-CM

## 2024-12-09 RX ORDER — METFORMIN HYDROCHLORIDE 500 MG/1
500 TABLET, EXTENDED RELEASE ORAL
Qty: 90 TABLET | Refills: 0 | Status: SHIPPED | OUTPATIENT
Start: 2024-12-09

## 2025-01-22 ENCOUNTER — OFFICE VISIT (OUTPATIENT)
Dept: INTERNAL MEDICINE | Facility: CLINIC | Age: 45
End: 2025-01-22
Payer: COMMERCIAL

## 2025-01-22 VITALS
TEMPERATURE: 97.2 F | BODY MASS INDEX: 34.98 KG/M2 | WEIGHT: 190.1 LBS | HEIGHT: 62 IN | DIASTOLIC BLOOD PRESSURE: 78 MMHG | OXYGEN SATURATION: 96 % | HEART RATE: 94 BPM | SYSTOLIC BLOOD PRESSURE: 122 MMHG | RESPIRATION RATE: 16 BRPM

## 2025-01-22 DIAGNOSIS — R73.03 PREDIABETES: ICD-10-CM

## 2025-01-22 DIAGNOSIS — J30.2 SEASONAL ALLERGIES: ICD-10-CM

## 2025-01-22 DIAGNOSIS — K21.9 GASTROESOPHAGEAL REFLUX DISEASE WITHOUT ESOPHAGITIS: ICD-10-CM

## 2025-01-22 DIAGNOSIS — J32.4 CHRONIC PANSINUSITIS: Primary | ICD-10-CM

## 2025-01-22 DIAGNOSIS — K91.89 POSTCHOLECYSTECTOMY DIARRHEA: ICD-10-CM

## 2025-01-22 DIAGNOSIS — R19.7 POSTCHOLECYSTECTOMY DIARRHEA: ICD-10-CM

## 2025-01-22 PROCEDURE — 99214 OFFICE O/P EST MOD 30 MIN: CPT | Performed by: INTERNAL MEDICINE

## 2025-01-22 RX ORDER — CHOLESTYRAMINE 4 G/9G
1 POWDER, FOR SUSPENSION ORAL 2 TIMES DAILY WITH MEALS
Qty: 60 EACH | Refills: 2 | Status: SHIPPED | OUTPATIENT
Start: 2025-01-22

## 2025-01-22 NOTE — PROGRESS NOTES
"  Assessment & Plan     Chronic pansinusitis  Seasonal allergies  Symptoms of paranasal congestion which are intermittent and recurrent.  Patient does not endorse constitutional symptoms, unlikely infectious in nature.  We did discuss holding off on antibiotics.  Does also endorse postnasal drip and rhinitis with the symptoms.  Seasonal allergies discussed with the patient.  Has been using Flonase nasal spray, continue use of the spray.  May benefit from ENT referral for further evaluation of the recurrent sinus congestive symptoms.  Referral placed.  - Adult ENT  Referral; Future    Gastroesophageal reflux disease without esophagitis  Overall, symptoms stable.  Continue medication at the current dose.  - omeprazole (PRILOSEC) 20 MG DR capsule; Take 1 capsule (20 mg) by mouth daily.    Prediabetes  Has been tolerating metformin medication well.  Continue medication at the current dose.    Postcholecystectomy diarrhea  Patient has had cholecystectomy greater than 10 years ago.  Does endorse having symptoms of diarrhea after meals.  Patient avoids eating meals at work to avoid diarrhea symptoms.  Denies any unintentional weight loss or abdominal pain or nausea/vomiting symptoms.  Discussed with the patient on trial of cholestyramine.  Patient does have 2 meals per day.  Patient will take the medication with meals.  If symptoms are still persistent, can consider further evaluation with the GI team.  - cholestyramine (QUESTRAN) 4 g packet; Take 1 packet (4 g) by mouth 2 times daily (with meals).          BMI  Estimated body mass index is 34.77 kg/m  as calculated from the following:    Height as of this encounter: 1.575 m (5' 2\").    Weight as of this encounter: 86.2 kg (190 lb 1.6 oz).             Divine Garcia is a 44 year old, presenting for the following health issues:  Sinus Problem and Abdominal Pain (With diarrhea )        1/22/2025     2:15 PM   Additional Questions   Roomed by Frank   Accompanied " "by Self     History of Present Illness       Reason for visit:  Personal   She is taking medications regularly.                 Review of Systems  Constitutional, HEENT, cardiovascular, pulmonary, gi and gu systems are negative, except as otherwise noted.      Objective    /78 (BP Location: Right arm, Patient Position: Sitting, Cuff Size: Adult Large)   Pulse 94   Temp 97.2  F (36.2  C) (Tympanic)   Resp 16   Ht 1.575 m (5' 2\")   Wt 86.2 kg (190 lb 1.6 oz)   LMP 06/20/2019 (Exact Date)   SpO2 96%   BMI 34.77 kg/m    Body mass index is 34.77 kg/m .  Physical Exam   GENERAL: alert and no distress  RESP: lungs clear to auscultation - no rales, rhonchi or wheezes  CV: regular rate and rhythm, normal S1 S2  MS: no gross musculoskeletal defects noted, no edema  NEURO: Normal strength and tone, mentation intact and speech normal  PSYCH: mentation appears normal, affect normal            Signed Electronically by: Sandy Obrien MD    "

## 2025-03-05 ENCOUNTER — OFFICE VISIT (OUTPATIENT)
Dept: OTOLARYNGOLOGY | Facility: CLINIC | Age: 45
End: 2025-03-05
Attending: INTERNAL MEDICINE
Payer: COMMERCIAL

## 2025-03-05 DIAGNOSIS — E04.1 THYROID NODULE: Primary | ICD-10-CM

## 2025-03-05 DIAGNOSIS — J32.4 CHRONIC PANSINUSITIS: ICD-10-CM

## 2025-03-05 DIAGNOSIS — J30.2 SEASONAL ALLERGIES: ICD-10-CM

## 2025-03-05 ASSESSMENT — ENCOUNTER SYMPTOMS
HEARTBURN: 1
RESPIRATORY NEGATIVE: 1
SINUS PAIN: 1
EYES NEGATIVE: 1
CONSTITUTIONAL NEGATIVE: 1
HEADACHES: 1

## 2025-03-05 ASSESSMENT — PAIN SCALES - GENERAL: PAINLEVEL_OUTOF10: NO PAIN (0)

## 2025-03-05 NOTE — LETTER
3/5/2025      Radha Bryan  [address]  [facility] [address]      Dear Colleague,    Thank you for referring your patient, Radha Bryan, to the [facility]. Please see a copy of my visit note below.    Chief Complaint   Patient presents with     Sinus Problem     Sinus pain and pressure, headaches, uses daily flonase.      PCP: Sandy Obrien     Referring Provider: Sandy Obrien    LMP 06/20/2019 (Exact Date)     ENT Problem List:  Patient Active Problem List   Diagnosis     Microcytic anemia     Gastroesophageal reflux disease without esophagitis     Chronic right-sided low back pain     Tinea versicolor     Ureteral stone     Hydronephrosis of right kidney     Primary hyperparathyroidism     Family history of diabetes mellitus     Hypertriglyceridemia     Intramural leiomyoma of uterus     Major depression in remission     Menorrhagia     Right ovarian cyst     RLQ abdominal pain     S/P tubal ligation     Upper respiratory tract infection, unspecified type     Acute cough     Acute otitis externa of left ear, unspecified type     Pleurisy     Class 2 severe obesity due to excess calories with serious comorbidity in adult (H)      Current Medications:  Current Outpatient Medications   Medication Sig Dispense Refill     cholestyramine (QUESTRAN) 4 g packet Take 1 packet (4 g) by mouth 2 times daily (with meals). 60 each 2     cyclobenzaprine (FLEXERIL) 10 MG tablet Take 1 tablet (10 mg) by mouth 3 times daily as needed for muscle spasms. 42 tablet 0     fluticasone (FLONASE) 50 MCG/ACT nasal spray Spray 1 spray into both nostrils daily. 18.1 mL 1     ketoconazole (NIZORAL) 2 % external shampoo Apply topically daily as needed for itching or irritation (Patient not taking: Reported on 1/22/2025) 120 mL 3     Lidocaine (LIDOCARE) 4 % Patch Place 1 patch over 12 hours onto the skin every 24 hours. To prevent lidocaine toxicity, patient should be patch free for 12 hrs daily. 30 patch  1     metFORMIN (GLUCOPHAGE XR) 500 MG 24 hr tablet Take 1 tablet (500 mg) by mouth daily (with dinner). 90 tablet 0     naproxen (NAPROSYN) 500 MG tablet Take 1 tablet (500 mg) by mouth 2 times daily as needed for headaches. 30 tablet 0     omeprazole (PRILOSEC) 20 MG DR capsule Take 1 capsule (20 mg) by mouth daily. 90 capsule 1     SUMAtriptan (IMITREX) 50 MG tablet Take 1 tablet (50 mg) by mouth at onset of headache for migraine. May repeat in 2 hours. Max 4 tablets/24 hours. 10 tablet 0     tiZANidine (ZANAFLEX) 4 MG tablet Take 1 tablet (4 mg) by mouth 3 times daily as needed for muscle spasms. 30 tablet 0     No current facility-administered medications for this visit.     Surgical History:  Past Surgical History:   Procedure Laterality Date     CHOLECYSTECTOMY       COMBINED CYSTOSCOPY, RETROGRADES, URETEROSCOPY, INSERT STENT Right 10/9/2020    Procedure: Cystoscopy right ureteral stent removal right retrograde pyelogram right ureteroscopy Right ureteral dilation right ureteral stent placement Holmium Laser on Stand-by;  Surgeon: Kavon Hernandez MD;  Location:  OR     COMBINED CYSTOSCOPY, RETROGRADES, URETEROSCOPY, LASER HOLMIUM LITHOTRIPSY URETER(S), INSERT STENT Bilateral 9/17/2020    Procedure: Cystoscopy, bilateral stent removal, bilateral ureteroscopy, left laser lithotripsy, stone basketing, bilateral stent placement; bilateral retrograde pyelogram, fluoroscopic interpretation <1 hour physician time;  Surgeon: Kavon Hernandez MD;  Location: [facility] OR     CYSTOSCOPY, RETROGRADES, INSERT STENT URETER(S), COMBINED Bilateral 8/27/2020    Procedure: Cystoscopy with bilateral retrograde pyelogram and bilateral ureteral stent insertion with Fluoroscopic interpretation <1 hour physician time;  Surgeon: Kavon Hernandez MD;  Location: [facility] OR     LAPAROSCOPIC HYSTERECTOMY TOTAL, SALPINGECTOMY BILATERAL  07/17/2019    AUB, fibroid uterus, anemia. [facility], Dr. Pamela Carrion     LAPAROSCOPIC TUBAL  "LIGATION       PARATHYROIDECTOMY N/A 2/17/2021    Procedure: CERVICAL EXPLORATION, PARATHYROIDECTOMY;  Surgeon: Adelaide Saenz MD;  Location: [facility] OR     CT HEAD BRAIN WO 3/8/2022    INDICATION: Headache, chronic, new features or increased frequency.   COMPARISON: Head CT 08/07/2020.   TECHNIQUE: Routine CT Head without IV contrast. Multiplanar reformats. Dose reduction techniques were used.     FINDINGS:   INTRACRANIAL CONTENTS: Probable tiny (6 mm) left lateral frontal meningioma again noted without change. No intracranial hemorrhage, extraaxial collection, or mass effect. No CT evidence of acute infarct. Normal parenchymal attenuation. Normal ventricles and sulci.     VISUALIZED ORBITS/SINUSES/MASTOIDS: No intraorbital abnormality. No paranasal sinus mucosal disease. No middle ear or mastoid effusion.     BONES/SOFT TISSUES: No acute abnormality.     IMPRESSION:   1. Probable tiny (6 mm) left lateral frontal meningioma again noted without change.   2.  Otherwise, normal head CT.     CHEST TWO VIEWS  7/10/2023     HISTORY:  Acute cough.     COMPARISON: 5/6/2019.                                                                   IMPRESSION: Negative chest. Lungs clear.    HPI  Pleasant 44 year old female presents today as a(n) new patient for sinus pain and pressure and headaches that onset \"a long time ago\". She states that she cannot wear her glasses sometimes because they hurt on her face. She reports nasal congestion that does not come out when she blows her nose. She states that the sensation in her nose sometimes makes her feel as if she has to sneeze, but she cannot sneeze. She reports daily Flonase use and has not noticed a difference when using it.  She reports that she sneezes year round.   She reports intermittent post nasal drip and watery rhinorrhea.  She reports heartburn and takes Omeprazole 20 MG for it prn.   She reports frequent throat clearing due to phlegm buildup in her throat.  She " describes a globus sensation and fullness in her neck.    She denies hx of tonsil stones    Review of Systems   Constitutional: Negative.    HENT:  Positive for congestion and sinus pain.    Eyes: Negative.    Respiratory: Negative.     Gastrointestinal:  Positive for heartburn.   Skin: Negative.    Neurological:  Positive for headaches.   Endo/Heme/Allergies: Negative.        Physical Exam  Vitals and nursing note reviewed.   Constitutional:       Appearance: Normal appearance.   HENT:      Head: Normocephalic and atraumatic.      Jaw: There is normal jaw occlusion.      Right Ear: Hearing, tympanic membrane and ear canal normal. No middle ear effusion.      Left Ear: Hearing, tympanic membrane and ear canal normal.  No middle ear effusion.      Nose: Septal deviation (slight) and congestion present. No mucosal edema or rhinorrhea.      Right Nostril: No occlusion.      Left Nostril: No occlusion.      Right Turbinates: Swollen. Not enlarged.      Left Turbinates: Swollen. Not enlarged.      Right Sinus: No maxillary sinus tenderness or frontal sinus tenderness.      Left Sinus: No maxillary sinus tenderness or frontal sinus tenderness.      Mouth/Throat:      Mouth: Mucous membranes are moist.      Pharynx: Oropharynx is clear. Uvula midline.   Eyes:      Extraocular Movements: Extraocular movements intact.      Pupils: Pupils are equal, round, and reactive to light.   Neurological:      Mental Status: She is alert.       Thyroid is palpable.  Bilateral nasal cavities were examined under the microscope  Nasal turbinates congested. Septum is deviated to the left.    A/P  This pleasant patient has a likely thyroid nodule, seasonal allergies, and nasal turbinate hypertrophy bilaterally. Options including a CT sinus w/o contrast and an US thyroid and a diagnostic nasal endoscopy for further investigation were discussed. She elects to go through with a CT sinus w/o contrast and an US thyroid, and they are ordered  today. She will receive a phone call to review the results of this test and future potential treatment options. In the meantime, she is advised to continue daily use of Flonase. Questions and concerns were addressed.    Follow up in clinic prn pending imaging results.    Scribe/Staff:    Scribe Disclosure:   I, Pati Goodman, am serving as a scribe; to document services personally performed by Aminah Samuels MD based on data collection and the provider's statements to me.     Provider Disclosure:  I agree with above History, Review of Systems, Physical exam and Plan.  I have reviewed the content of the documentation and have edited it as needed. I have personally performed the services documented here and the documentation accurately represents those services and the decisions I have made.      Electronically signed by:  Aminah Samuels MD       Again, thank you for allowing me to participate in the care of your patient.        Sincerely,      Aminah Samuels MD    Electronically signed

## 2025-03-05 NOTE — PROGRESS NOTES
Chief Complaint   Patient presents with    Sinus Problem     Sinus pain and pressure, headaches, uses daily flonase.      PCP: Sandy Obrien     Referring Provider: Sandy Obrien    LMP 06/20/2019 (Exact Date)     ENT Problem List:  Patient Active Problem List   Diagnosis    Microcytic anemia    Gastroesophageal reflux disease without esophagitis    Chronic right-sided low back pain    Tinea versicolor    Ureteral stone    Hydronephrosis of right kidney    Primary hyperparathyroidism    Family history of diabetes mellitus    Hypertriglyceridemia    Intramural leiomyoma of uterus    Major depression in remission    Menorrhagia    Right ovarian cyst    RLQ abdominal pain    S/P tubal ligation    Upper respiratory tract infection, unspecified type    Acute cough    Acute otitis externa of left ear, unspecified type    Pleurisy    Class 2 severe obesity due to excess calories with serious comorbidity in adult (H)      Current Medications:  Current Outpatient Medications   Medication Sig Dispense Refill    cholestyramine (QUESTRAN) 4 g packet Take 1 packet (4 g) by mouth 2 times daily (with meals). 60 each 2    cyclobenzaprine (FLEXERIL) 10 MG tablet Take 1 tablet (10 mg) by mouth 3 times daily as needed for muscle spasms. 42 tablet 0    fluticasone (FLONASE) 50 MCG/ACT nasal spray Spray 1 spray into both nostrils daily. 18.1 mL 1    ketoconazole (NIZORAL) 2 % external shampoo Apply topically daily as needed for itching or irritation (Patient not taking: Reported on 1/22/2025) 120 mL 3    Lidocaine (LIDOCARE) 4 % Patch Place 1 patch over 12 hours onto the skin every 24 hours. To prevent lidocaine toxicity, patient should be patch free for 12 hrs daily. 30 patch 1    metFORMIN (GLUCOPHAGE XR) 500 MG 24 hr tablet Take 1 tablet (500 mg) by mouth daily (with dinner). 90 tablet 0    naproxen (NAPROSYN) 500 MG tablet Take 1 tablet (500 mg) by mouth 2 times daily as needed for headaches. 30 tablet 0    omeprazole (PRILOSEC) 20  MG DR capsule Take 1 capsule (20 mg) by mouth daily. 90 capsule 1    SUMAtriptan (IMITREX) 50 MG tablet Take 1 tablet (50 mg) by mouth at onset of headache for migraine. May repeat in 2 hours. Max 4 tablets/24 hours. 10 tablet 0    tiZANidine (ZANAFLEX) 4 MG tablet Take 1 tablet (4 mg) by mouth 3 times daily as needed for muscle spasms. 30 tablet 0     No current facility-administered medications for this visit.     Surgical History:  Past Surgical History:   Procedure Laterality Date    CHOLECYSTECTOMY      COMBINED CYSTOSCOPY, RETROGRADES, URETEROSCOPY, INSERT STENT Right 10/9/2020    Procedure: Cystoscopy right ureteral stent removal right retrograde pyelogram right ureteroscopy Right ureteral dilation right ureteral stent placement Holmium Laser on Stand-by;  Surgeon: Kavon Hernandez MD;  Location:  OR    COMBINED CYSTOSCOPY, RETROGRADES, URETEROSCOPY, LASER HOLMIUM LITHOTRIPSY URETER(S), INSERT STENT Bilateral 9/17/2020    Procedure: Cystoscopy, bilateral stent removal, bilateral ureteroscopy, left laser lithotripsy, stone basketing, bilateral stent placement; bilateral retrograde pyelogram, fluoroscopic interpretation <1 hour physician time;  Surgeon: Kavon Hernandez MD;  Location:  OR    CYSTOSCOPY, RETROGRADES, INSERT STENT URETER(S), COMBINED Bilateral 8/27/2020    Procedure: Cystoscopy with bilateral retrograde pyelogram and bilateral ureteral stent insertion with Fluoroscopic interpretation <1 hour physician time;  Surgeon: Kavon Hernandez MD;  Location: [facility] OR    LAPAROSCOPIC HYSTERECTOMY TOTAL, SALPINGECTOMY BILATERAL  07/17/2019    AUB, fibroid uterus, anemia. [facility], Dr. Pamela Carrion    LAPAROSCOPIC TUBAL LIGATION      PARATHYROIDECTOMY N/A 2/17/2021    Procedure: CERVICAL EXPLORATION, PARATHYROIDECTOMY;  Surgeon: Adelaide Saenz MD;  Location:  OR     CT HEAD BRAIN WO 3/8/2022    INDICATION: Headache, chronic, new features or increased frequency.   COMPARISON: Head CT  "08/07/2020.   TECHNIQUE: Routine CT Head without IV contrast. Multiplanar reformats. Dose reduction techniques were used.     FINDINGS:   INTRACRANIAL CONTENTS: Probable tiny (6 mm) left lateral frontal meningioma again noted without change. No intracranial hemorrhage, extraaxial collection, or mass effect. No CT evidence of acute infarct. Normal parenchymal attenuation. Normal ventricles and sulci.     VISUALIZED ORBITS/SINUSES/MASTOIDS: No intraorbital abnormality. No paranasal sinus mucosal disease. No middle ear or mastoid effusion.     BONES/SOFT TISSUES: No acute abnormality.     IMPRESSION:   1. Probable tiny (6 mm) left lateral frontal meningioma again noted without change.   2.  Otherwise, normal head CT.     CHEST TWO VIEWS  7/10/2023     HISTORY:  Acute cough.     COMPARISON: 5/6/2019.                                                                   IMPRESSION: Negative chest. Lungs clear.    HPI  Pleasant 44 year old female presents today as a(n) new patient for sinus pain and pressure and headaches that onset \"a long time ago\". She states that she cannot wear her glasses sometimes because they hurt on her face. She reports nasal congestion that does not come out when she blows her nose. She states that the sensation in her nose sometimes makes her feel as if she has to sneeze, but she cannot sneeze. She reports daily Flonase use and has not noticed a difference when using it.  She reports that she sneezes year round.   She reports intermittent post nasal drip and watery rhinorrhea.  She reports heartburn and takes Omeprazole 20 MG for it prn.   She reports frequent throat clearing due to phlegm buildup in her throat.  She describes a globus sensation and fullness in her neck.    She denies hx of tonsil stones    Review of Systems   Constitutional: Negative.    HENT:  Positive for congestion and sinus pain.    Eyes: Negative.    Respiratory: Negative.     Gastrointestinal:  Positive for heartburn. "   Skin: Negative.    Neurological:  Positive for headaches.   Endo/Heme/Allergies: Negative.        Physical Exam  Vitals and nursing note reviewed.   Constitutional:       Appearance: Normal appearance.   HENT:      Head: Normocephalic and atraumatic.      Jaw: There is normal jaw occlusion.      Right Ear: Hearing, tympanic membrane and ear canal normal. No middle ear effusion.      Left Ear: Hearing, tympanic membrane and ear canal normal.  No middle ear effusion.      Nose: Septal deviation (slight) and congestion present. No mucosal edema or rhinorrhea.      Right Nostril: No occlusion.      Left Nostril: No occlusion.      Right Turbinates: Swollen. Not enlarged.      Left Turbinates: Swollen. Not enlarged.      Right Sinus: No maxillary sinus tenderness or frontal sinus tenderness.      Left Sinus: No maxillary sinus tenderness or frontal sinus tenderness.      Mouth/Throat:      Mouth: Mucous membranes are moist.      Pharynx: Oropharynx is clear. Uvula midline.   Eyes:      Extraocular Movements: Extraocular movements intact.      Pupils: Pupils are equal, round, and reactive to light.   Neurological:      Mental Status: She is alert.       Thyroid is palpable.  Bilateral nasal cavities were examined under the microscope  Nasal turbinates congested. Septum is deviated to the left.    A/P  This pleasant patient has a likely thyroid nodule, seasonal allergies, and nasal turbinate hypertrophy bilaterally. Options including a CT sinus w/o contrast and an US thyroid and a diagnostic nasal endoscopy for further investigation were discussed. She elects to go through with a CT sinus w/o contrast and an US thyroid, and they are ordered today. She will receive a phone call to review the results of this test and future potential treatment options. In the meantime, she is advised to continue daily use of Flonase. Questions and concerns were addressed.    Follow up in clinic prn pending imaging  results.    Scribe/Staff:    Scribe Disclosure:   I, Pati Goodman, am serving as a scribe; to document services personally performed by Aminah Samuels MD based on data collection and the provider's statements to me.     Provider Disclosure:  I agree with above History, Review of Systems, Physical exam and Plan.  I have reviewed the content of the documentation and have edited it as needed. I have personally performed the services documented here and the documentation accurately represents those services and the decisions I have made.      Electronically signed by:  Aminah Samuels MD

## 2025-03-05 NOTE — NURSING NOTE
Radha Bryan's goals for this visit include:   Chief Complaint   Patient presents with    Sinus Problem     Sinus pain and pressure, headaches, uses daily flonase.       She requests these members of her care team be copied on today's visit information:     PCP: Sandy Obrien    Referring Provider:  Sandy Obrien MD  [address] [place] [address]    [facility] 06/20/2019 (Exact Date)     Do you need any medication refills at today's visit? No    Margi Rodas RN

## 2025-03-22 DIAGNOSIS — B36.0 TINEA VERSICOLOR DUE TO MALASSEZIA FURFUR: Primary | ICD-10-CM

## 2025-03-22 RX ORDER — ITRACONAZOLE 100 MG/1
200 CAPSULE ORAL DAILY
Qty: 28 CAPSULE | Refills: 1 | Status: SHIPPED | OUTPATIENT
Start: 2025-03-22

## 2025-03-24 ENCOUNTER — HOSPITAL ENCOUNTER (OUTPATIENT)
Dept: ULTRASOUND IMAGING | Facility: CLINIC | Age: 45
Discharge: HOME | End: 2025-03-24
Attending: OTOLARYNGOLOGY | Admitting: OTOLARYNGOLOGY
Payer: COMMERCIAL

## 2025-03-24 DIAGNOSIS — E04.1 THYROID NODULE: ICD-10-CM

## 2025-03-24 PROCEDURE — 76536 US EXAM OF HEAD AND NECK: CPT

## 2025-03-25 ENCOUNTER — MYC REFILL (OUTPATIENT)
Dept: INTERNAL MEDICINE | Facility: CLINIC | Age: 45
End: 2025-03-25
Payer: COMMERCIAL

## 2025-03-25 DIAGNOSIS — R73.03 PREDIABETES: ICD-10-CM

## 2025-03-25 DIAGNOSIS — K21.9 GASTROESOPHAGEAL REFLUX DISEASE WITHOUT ESOPHAGITIS: ICD-10-CM

## 2025-03-26 ENCOUNTER — HOSPITAL ENCOUNTER (OUTPATIENT)
Dept: CT IMAGING | Facility: CLINIC | Age: 45
Discharge: HOME | End: 2025-03-26
Attending: OTOLARYNGOLOGY
Payer: COMMERCIAL

## 2025-03-26 DIAGNOSIS — J30.2 SEASONAL ALLERGIES: ICD-10-CM

## 2025-03-26 DIAGNOSIS — J32.4 CHRONIC PANSINUSITIS: ICD-10-CM

## 2025-03-26 PROCEDURE — 70486 CT MAXILLOFACIAL W/O DYE: CPT

## 2025-03-26 RX ORDER — OMEPRAZOLE 20 MG/1
20 CAPSULE, DELAYED RELEASE ORAL DAILY
Qty: 90 CAPSULE | Refills: 1 | OUTPATIENT
Start: 2025-03-26

## 2025-04-01 RX ORDER — METFORMIN HYDROCHLORIDE 500 MG/1
500 TABLET, EXTENDED RELEASE ORAL
Qty: 90 TABLET | Refills: 2 | Status: SHIPPED | OUTPATIENT
Start: 2025-04-01

## 2025-06-03 ENCOUNTER — PATIENT OUTREACH (OUTPATIENT)
Dept: CARE COORDINATION | Facility: CLINIC | Age: 45
End: 2025-06-03
Payer: COMMERCIAL

## 2025-06-17 ENCOUNTER — PATIENT OUTREACH (OUTPATIENT)
Dept: CARE COORDINATION | Facility: CLINIC | Age: 45
End: 2025-06-17
Payer: COMMERCIAL

## 2025-08-12 DIAGNOSIS — R73.03 PREDIABETES: ICD-10-CM

## 2025-08-12 RX ORDER — METFORMIN HYDROCHLORIDE 500 MG/1
500 TABLET, EXTENDED RELEASE ORAL
Qty: 90 TABLET | Refills: 0 | Status: SHIPPED | OUTPATIENT
Start: 2025-08-12

## 2025-08-23 ENCOUNTER — HEALTH MAINTENANCE LETTER (OUTPATIENT)
Age: 45
End: 2025-08-23

## (undated) DEVICE — ESU ELEC BLADE 2.75" COATED/INSULATED E1455

## (undated) DEVICE — RAD RX ISOVUE 300 (50ML) 61% IOPAMIDOL CHARGE PER ML

## (undated) DEVICE — LINEN FULL SHEET 5511

## (undated) DEVICE — GLOVE PROTEXIS MICRO 7.0  2D73PM70

## (undated) DEVICE — GUIDEWIRE URO STR STIFF .035"X150CM NITINOL 150NSS35

## (undated) DEVICE — PREP POVIDONE IODINE SOLUTION 10% 4OZ

## (undated) DEVICE — Device

## (undated) DEVICE — BASKET NITINOL TIPLESS HALO  1.5FRX120CM 554120

## (undated) DEVICE — DRSG STERI STRIP 1/2X4" R1547

## (undated) DEVICE — BAG CLEAR TRASH 1.3M 39X33" P4040C

## (undated) DEVICE — COVER FOOTSWITCH W/CINCH 20X24" 923267

## (undated) DEVICE — ESU GROUND PAD ADULT W/CORD E7507

## (undated) DEVICE — PAD CHUX UNDERPAD 23X24" 7136

## (undated) DEVICE — PREP POVIDONE IODINE SCRUB 7.5% 120ML

## (undated) DEVICE — SOL WATER IRRIG 1000ML BOTTLE 2F7114

## (undated) DEVICE — GLOVE PROTEXIS POWDER FREE SMT 8.0  2D72PT80X

## (undated) DEVICE — LINEN HALF SHEET 5512

## (undated) DEVICE — SYR 03ML LL W/O NDL 309657

## (undated) DEVICE — GLOVE PROTEXIS BLUE W/NEU-THERA 8.0  2D73EB80

## (undated) DEVICE — GLOVE PROTEXIS POWDER FREE 7.5 ORTHOPEDIC 2D73ET75

## (undated) DEVICE — CATH URETERAL FLEX TIP TIGERTAIL 06FRX70CM 139006

## (undated) DEVICE — TUBING IRRIG TUR Y TYPE 96" LF 6543-01

## (undated) DEVICE — SPONGE KITTNER 30-101

## (undated) DEVICE — PROBE NEUROSIGN MONOPOLAR DISP 2MM 3602-00-TE

## (undated) DEVICE — ESU LIGASURE OPEN SEALER/DIVIDER SM JAW 16.5MM LF1212A

## (undated) DEVICE — SYR EAR BULB 3OZ 0035830

## (undated) DEVICE — GLOVE PROTEXIS BLUE W/NEU-THERA 7.5  2D73EB75

## (undated) DEVICE — SOL NACL 0.9% IRRIG 3000ML BAG 2B7477

## (undated) DEVICE — ESU CORD BIPOLAR GREEN 10-4000

## (undated) DEVICE — GLOVE PROTEXIS BLUE W/NEU-THERA 7.0  2D73EB70

## (undated) DEVICE — FLEXIVA 200

## (undated) DEVICE — SURGICEL FIBRILLAR HEMOSTAT 1"X2" 1961

## (undated) DEVICE — SU VICRYL 3-0 TIE 12X18" J904T

## (undated) DEVICE — DRSG TELFA 2X3"

## (undated) DEVICE — GLOVE PROTEXIS BLUE W/NEU-THERA 6.5  2D73EB65

## (undated) DEVICE — PACK CYSTO CUSTOM RIDGES

## (undated) DEVICE — ADH SKIN CLOSURE PREMIERPRO EXOFIN 1.0ML 3470

## (undated) DEVICE — PACK MAJOR HEAD AND NECK RIDGES

## (undated) DEVICE — PREP CHLORAPREP 26ML TINTED HI-LITE ORANGE 930815

## (undated) DEVICE — SU VICRYL 3-0 SH 27" J316H

## (undated) DEVICE — CATH URETERAL DUAL LUMEN 10FRX54CM M0064051000

## (undated) DEVICE — LINEN TOWEL PACK X10 5473

## (undated) DEVICE — NDL 22GA 1.5"

## (undated) DEVICE — GLOVE PROTEXIS POWDER FREE 6.5 ORTHOPEDIC 2D73ET65

## (undated) DEVICE — GUIDEWIRE SENSOR DUAL FLEX ANG 0.035"X150CM M0066703010

## (undated) DEVICE — SU VICRYL 2-0 TIE 12X18" J905T

## (undated) DEVICE — GLOVE PROTEXIS W/NEU-THERA 7.5  2D73TE75

## (undated) DEVICE — GUIDEWIRE SENSOR DUAL FLEX STR 0.035"X150CM M0066703080

## (undated) DEVICE — SOL NACL 0.9% IRRIG 1000ML BOTTLE 2F7124

## (undated) DEVICE — WIRE GUIDE AMPLATZ SUPER STIFF 0.035"X145CM 46-524

## (undated) DEVICE — LINEN TOWEL PACK X5 5464

## (undated) DEVICE — PREP POVIDONE IODINE SOLUTION 10% 120ML

## (undated) DEVICE — SU MONOCRYL 4-0 P-3 18" UND Y494G

## (undated) DEVICE — LABEL MEDICATION SYSTEM 3303-P

## (undated) RX ORDER — FENTANYL CITRATE 50 UG/ML
INJECTION, SOLUTION INTRAMUSCULAR; INTRAVENOUS
Status: DISPENSED
Start: 2021-02-17

## (undated) RX ORDER — OXYCODONE HYDROCHLORIDE 5 MG/1
TABLET ORAL
Status: DISPENSED
Start: 2020-10-09

## (undated) RX ORDER — CEFAZOLIN SODIUM 2 G/100ML
INJECTION, SOLUTION INTRAVENOUS
Status: DISPENSED
Start: 2021-02-17

## (undated) RX ORDER — GLYCOPYRROLATE 0.2 MG/ML
INJECTION INTRAMUSCULAR; INTRAVENOUS
Status: DISPENSED
Start: 2021-02-17

## (undated) RX ORDER — HYDROMORPHONE HYDROCHLORIDE 1 MG/ML
INJECTION, SOLUTION INTRAMUSCULAR; INTRAVENOUS; SUBCUTANEOUS
Status: DISPENSED
Start: 2021-02-17

## (undated) RX ORDER — PROPOFOL 10 MG/ML
INJECTION, EMULSION INTRAVENOUS
Status: DISPENSED
Start: 2021-02-17

## (undated) RX ORDER — HYDROMORPHONE HYDROCHLORIDE 1 MG/ML
INJECTION, SOLUTION INTRAMUSCULAR; INTRAVENOUS; SUBCUTANEOUS
Status: DISPENSED
Start: 2020-09-17

## (undated) RX ORDER — FENTANYL CITRATE 50 UG/ML
INJECTION, SOLUTION INTRAMUSCULAR; INTRAVENOUS
Status: DISPENSED
Start: 2020-09-17

## (undated) RX ORDER — CEFAZOLIN SODIUM 2 G/100ML
INJECTION, SOLUTION INTRAVENOUS
Status: DISPENSED
Start: 2020-09-17

## (undated) RX ORDER — FENTANYL CITRATE-0.9 % NACL/PF 10 MCG/ML
PLASTIC BAG, INJECTION (ML) INTRAVENOUS
Status: DISPENSED
Start: 2020-09-17

## (undated) RX ORDER — FENTANYL CITRATE 50 UG/ML
INJECTION, SOLUTION INTRAMUSCULAR; INTRAVENOUS
Status: DISPENSED
Start: 2020-08-27

## (undated) RX ORDER — OXYCODONE HYDROCHLORIDE 5 MG/1
TABLET ORAL
Status: DISPENSED
Start: 2020-09-17

## (undated) RX ORDER — ATROPA BELLADONNA AND OPIUM 16.2; 3 MG/1; MG/1
SUPPOSITORY RECTAL
Status: DISPENSED
Start: 2020-09-17

## (undated) RX ORDER — ACETAMINOPHEN 325 MG/1
TABLET ORAL
Status: DISPENSED
Start: 2020-08-27

## (undated) RX ORDER — OXYCODONE HYDROCHLORIDE 5 MG/1
TABLET ORAL
Status: DISPENSED
Start: 2021-02-17

## (undated) RX ORDER — DEXAMETHASONE SODIUM PHOSPHATE 4 MG/ML
INJECTION, SOLUTION INTRA-ARTICULAR; INTRALESIONAL; INTRAMUSCULAR; INTRAVENOUS; SOFT TISSUE
Status: DISPENSED
Start: 2021-02-17

## (undated) RX ORDER — ONDANSETRON 2 MG/ML
INJECTION INTRAMUSCULAR; INTRAVENOUS
Status: DISPENSED
Start: 2021-02-17

## (undated) RX ORDER — FUROSEMIDE 10 MG/ML
INJECTION INTRAMUSCULAR; INTRAVENOUS
Status: DISPENSED
Start: 2021-12-15

## (undated) RX ORDER — ACETAMINOPHEN 325 MG/1
TABLET ORAL
Status: DISPENSED
Start: 2020-09-17

## (undated) RX ORDER — PROPOFOL 10 MG/ML
INJECTION, EMULSION INTRAVENOUS
Status: DISPENSED
Start: 2020-08-27

## (undated) RX ORDER — IBUPROFEN 600 MG/1
TABLET, FILM COATED ORAL
Status: DISPENSED
Start: 2020-10-09

## (undated) RX ORDER — ACETAMINOPHEN 500 MG
TABLET ORAL
Status: DISPENSED
Start: 2020-09-17

## (undated) RX ORDER — HYDROMORPHONE HYDROCHLORIDE 1 MG/ML
INJECTION, SOLUTION INTRAMUSCULAR; INTRAVENOUS; SUBCUTANEOUS
Status: DISPENSED
Start: 2020-10-09

## (undated) RX ORDER — ONDANSETRON 2 MG/ML
INJECTION INTRAMUSCULAR; INTRAVENOUS
Status: DISPENSED
Start: 2020-10-09

## (undated) RX ORDER — LIDOCAINE HYDROCHLORIDE 10 MG/ML
INJECTION, SOLUTION EPIDURAL; INFILTRATION; INTRACAUDAL; PERINEURAL
Status: DISPENSED
Start: 2021-02-17

## (undated) RX ORDER — FENTANYL CITRATE 50 UG/ML
INJECTION, SOLUTION INTRAMUSCULAR; INTRAVENOUS
Status: DISPENSED
Start: 2020-10-09

## (undated) RX ORDER — ATROPA BELLADONNA AND OPIUM 16.2; 3 MG/1; MG/1
SUPPOSITORY RECTAL
Status: DISPENSED
Start: 2020-10-09

## (undated) RX ORDER — ONDANSETRON 4 MG/1
TABLET, ORALLY DISINTEGRATING ORAL
Status: DISPENSED
Start: 2020-09-17